# Patient Record
Sex: MALE | Race: WHITE | NOT HISPANIC OR LATINO | Employment: OTHER | URBAN - NONMETROPOLITAN AREA
[De-identification: names, ages, dates, MRNs, and addresses within clinical notes are randomized per-mention and may not be internally consistent; named-entity substitution may affect disease eponyms.]

---

## 2017-07-10 ENCOUNTER — APPOINTMENT (OUTPATIENT)
Dept: LAB | Facility: HOSPITAL | Age: 62
End: 2017-07-10

## 2017-07-10 ENCOUNTER — OFFICE VISIT (OUTPATIENT)
Dept: FAMILY MEDICINE CLINIC | Facility: CLINIC | Age: 62
End: 2017-07-10

## 2017-07-10 VITALS
DIASTOLIC BLOOD PRESSURE: 72 MMHG | WEIGHT: 184 LBS | SYSTOLIC BLOOD PRESSURE: 104 MMHG | HEIGHT: 66 IN | BODY MASS INDEX: 29.57 KG/M2

## 2017-07-10 DIAGNOSIS — R53.81 MALAISE: ICD-10-CM

## 2017-07-10 DIAGNOSIS — R73.9 HYPERGLYCEMIA: ICD-10-CM
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
## 2017-07-10 DIAGNOSIS — J18.9 PNEUMONIA DUE TO ORGANISM: ICD-10-CM

## 2017-07-10 DIAGNOSIS — Z00.00 GENERAL MEDICAL EXAM: Primary | ICD-10-CM

## 2017-07-10 DIAGNOSIS — R93.89 ABNORMAL CHEST X-RAY: Primary | ICD-10-CM

## 2017-07-10 DIAGNOSIS — R05.9 COUGH: ICD-10-CM

## 2017-07-10 DIAGNOSIS — Z12.11 SCREENING FOR COLON CANCER: ICD-10-CM

## 2017-07-10 LAB
25(OH)D3 SERPL-MCNC: 58.2 NG/ML (ref 30–100)
ALBUMIN SERPL-MCNC: 3.9 G/DL (ref 3.4–4.8)
ALBUMIN/GLOB SERPL: 1.6 G/DL (ref 1.1–1.8)
ALP SERPL-CCNC: 69 U/L (ref 38–126)
ALT SERPL W P-5'-P-CCNC: 61 U/L (ref 21–72)
ANION GAP SERPL CALCULATED.3IONS-SCNC: 9 MMOL/L (ref 5–15)
ARTICHOKE IGE QN: 96 MG/DL (ref 1–129)
AST SERPL-CCNC: 33 U/L (ref 17–59)
BASOPHILS # BLD AUTO: 0.02 10*3/MM3 (ref 0–0.2)
BASOPHILS NFR BLD AUTO: 0.3 % (ref 0–2)
BILIRUB SERPL-MCNC: 0.9 MG/DL (ref 0.2–1.3)
BUN BLD-MCNC: 12 MG/DL (ref 7–21)
BUN/CREAT SERPL: 14.1 (ref 7–25)
CALCIUM SPEC-SCNC: 8.9 MG/DL (ref 8.4–10.2)
CHLORIDE SERPL-SCNC: 106 MMOL/L (ref 95–110)
CHOLEST SERPL-MCNC: 153 MG/DL (ref 0–199)
CO2 SERPL-SCNC: 24 MMOL/L (ref 22–31)
CREAT BLD-MCNC: 0.85 MG/DL (ref 0.7–1.3)
DEPRECATED RDW RBC AUTO: 45.5 FL (ref 35.1–43.9)
EOSINOPHIL # BLD AUTO: 0.13 10*3/MM3 (ref 0–0.7)
EOSINOPHIL NFR BLD AUTO: 2 % (ref 0–7)
ERYTHROCYTE [DISTWIDTH] IN BLOOD BY AUTOMATED COUNT: 13.6 % (ref 11.5–14.5)
GFR SERPL CREATININE-BSD FRML MDRD: 92 ML/MIN/1.73 (ref 60–113)
GLOBULIN UR ELPH-MCNC: 2.5 GM/DL (ref 2.3–3.5)
GLUCOSE BLD-MCNC: 92 MG/DL (ref 60–100)
HBA1C MFR BLD: 5.51 % (ref 4–5.6)
HCT VFR BLD AUTO: 43.5 % (ref 39–49)
HDLC SERPL-MCNC: 54 MG/DL (ref 60–200)
HGB BLD-MCNC: 14.5 G/DL (ref 13.7–17.3)
IMM GRANULOCYTES # BLD: 0.01 10*3/MM3 (ref 0–0.02)
IMM GRANULOCYTES NFR BLD: 0.2 % (ref 0–0.5)
IRON 24H UR-MRATE: 27 MCG/DL (ref 49–181)
LDLC/HDLC SERPL: 1.61 {RATIO} (ref 0–3.55)
LYMPHOCYTES # BLD AUTO: 1.93 10*3/MM3 (ref 0.6–4.2)
LYMPHOCYTES NFR BLD AUTO: 30.2 % (ref 10–50)
MAGNESIUM SERPL-MCNC: 2.1 MG/DL (ref 1.6–2.3)
MCH RBC QN AUTO: 30.6 PG (ref 26.5–34)
MCHC RBC AUTO-ENTMCNC: 33.3 G/DL (ref 31.5–36.3)
MCV RBC AUTO: 91.8 FL (ref 80–98)
MONOCYTES # BLD AUTO: 0.64 10*3/MM3 (ref 0–0.9)
MONOCYTES NFR BLD AUTO: 10 % (ref 0–12)
NEUTROPHILS # BLD AUTO: 3.66 10*3/MM3 (ref 2–8.6)
NEUTROPHILS NFR BLD AUTO: 57.3 % (ref 37–80)
PLATELET # BLD AUTO: 165 10*3/MM3 (ref 150–450)
PMV BLD AUTO: 10.7 FL (ref 8–12)
POTASSIUM BLD-SCNC: 4.1 MMOL/L (ref 3.5–5.1)
PROT SERPL-MCNC: 6.4 G/DL (ref 6.3–8.6)
RBC # BLD AUTO: 4.74 10*6/MM3 (ref 4.37–5.74)
SODIUM BLD-SCNC: 139 MMOL/L (ref 137–145)
TRIGL SERPL-MCNC: 59 MG/DL (ref 20–199)
TSH SERPL DL<=0.05 MIU/L-ACNC: 1.7 MIU/ML (ref 0.46–4.68)
VIT B12 BLD-MCNC: 589 PG/ML (ref 239–931)
WBC NRBC COR # BLD: 6.39 10*3/MM3 (ref 3.2–9.8)

## 2017-07-10 PROCEDURE — 93010 ELECTROCARDIOGRAM REPORT: CPT | Performed by: INTERNAL MEDICINE

## 2017-07-10 PROCEDURE — 93005 ELECTROCARDIOGRAM TRACING: CPT | Performed by: NURSE PRACTITIONER

## 2017-07-10 PROCEDURE — 82607 VITAMIN B-12: CPT | Performed by: NURSE PRACTITIONER

## 2017-07-10 PROCEDURE — 80061 LIPID PANEL: CPT | Performed by: NURSE PRACTITIONER

## 2017-07-10 PROCEDURE — 83036 HEMOGLOBIN GLYCOSYLATED A1C: CPT | Performed by: NURSE PRACTITIONER

## 2017-07-10 PROCEDURE — 84443 ASSAY THYROID STIM HORMONE: CPT | Performed by: NURSE PRACTITIONER

## 2017-07-10 PROCEDURE — 87340 HEPATITIS B SURFACE AG IA: CPT | Performed by: NURSE PRACTITIONER

## 2017-07-10 PROCEDURE — 83735 ASSAY OF MAGNESIUM: CPT | Performed by: NURSE PRACTITIONER

## 2017-07-10 PROCEDURE — 80053 COMPREHEN METABOLIC PANEL: CPT | Performed by: NURSE PRACTITIONER

## 2017-07-10 PROCEDURE — 36415 COLL VENOUS BLD VENIPUNCTURE: CPT | Performed by: NURSE PRACTITIONER

## 2017-07-10 PROCEDURE — 82306 VITAMIN D 25 HYDROXY: CPT | Performed by: NURSE PRACTITIONER

## 2017-07-10 PROCEDURE — 86705 HEP B CORE ANTIBODY IGM: CPT | Performed by: NURSE PRACTITIONER

## 2017-07-10 PROCEDURE — 83540 ASSAY OF IRON: CPT | Performed by: NURSE PRACTITIONER

## 2017-07-10 PROCEDURE — 85025 COMPLETE CBC W/AUTO DIFF WBC: CPT | Performed by: NURSE PRACTITIONER

## 2017-07-10 PROCEDURE — 99214 OFFICE O/P EST MOD 30 MIN: CPT | Performed by: NURSE PRACTITIONER

## 2017-07-10 PROCEDURE — 86709 HEPATITIS A IGM ANTIBODY: CPT | Performed by: NURSE PRACTITIONER

## 2017-07-10 RX ORDER — CLARITHROMYCIN 500 MG/1
500 TABLET, COATED ORAL 2 TIMES DAILY
Qty: 20 TABLET | Refills: 0 | Status: SHIPPED | OUTPATIENT
Start: 2017-07-10 | End: 2018-04-27

## 2017-07-10 NOTE — PROGRESS NOTES
Chief Complaint   Patient presents with   • Establish Care     has been feeling bad the past few weeks     Subjective   Pavan Mccauley is a 62 y.o. male.     Fatigue   This is a recurrent problem. The current episode started in the past 7 days. The problem occurs constantly. The problem has been gradually worsening. Associated symptoms include arthralgias, coughing, fatigue and myalgias. Pertinent negatives include no abdominal pain, anorexia, change in bowel habit, chest pain, chills, congestion, diaphoresis, fever, headaches, joint swelling, nausea, neck pain, numbness, rash, sore throat, swollen glands, urinary symptoms, vertigo, visual change, vomiting or weakness. Nothing aggravates the symptoms. He has tried rest for the symptoms. The treatment provided mild relief.   Cough   This is a recurrent problem. The current episode started more than 1 month ago. The problem has been gradually worsening. The problem occurs every few hours. The cough is non-productive. Associated symptoms include myalgias, nasal congestion, postnasal drip, rhinorrhea and shortness of breath. Pertinent negatives include no chest pain, chills, ear congestion, ear pain, eye redness, fever, headaches, heartburn, hemoptysis, rash, sore throat, sweats, weight loss or wheezing. Nothing aggravates the symptoms. He has tried nothing for the symptoms. There is no history of asthma, bronchiectasis, bronchitis, COPD, emphysema, environmental allergies or pneumonia.        The following portions of the patient's history were reviewed and updated as appropriate: allergies, current medications, past social history and problem list.    Review of Systems   Constitutional: Positive for activity change, appetite change and fatigue. Negative for chills, diaphoresis, fever and weight loss.   HENT: Positive for postnasal drip and rhinorrhea. Negative for congestion, ear pain and sore throat.    Eyes: Negative.  Negative for photophobia, pain, discharge,  "redness, itching and visual disturbance.   Respiratory: Positive for cough and shortness of breath. Negative for apnea, hemoptysis, choking, chest tightness, wheezing and stridor.    Cardiovascular: Negative.  Negative for chest pain, palpitations and leg swelling.   Gastrointestinal: Negative.  Negative for abdominal distention, abdominal pain, anal bleeding, anorexia, blood in stool, change in bowel habit, heartburn, nausea and vomiting.   Endocrine: Negative.  Negative for cold intolerance, heat intolerance, polydipsia, polyphagia and polyuria.   Genitourinary: Negative.    Musculoskeletal: Positive for arthralgias and myalgias. Negative for back pain, gait problem, joint swelling and neck pain.   Skin: Positive for color change. Negative for pallor and rash.        Recent tick bite-right leg stayed on his leg for 2 days    Allergic/Immunologic: Negative.  Negative for environmental allergies, food allergies and immunocompromised state.   Neurological: Negative.  Negative for dizziness, vertigo, tremors, seizures, syncope, facial asymmetry, speech difficulty, weakness, light-headedness, numbness and headaches.   Hematological: Negative.  Negative for adenopathy. Does not bruise/bleed easily.   Psychiatric/Behavioral: Negative.  Negative for agitation, behavioral problems, confusion, decreased concentration, dysphoric mood, hallucinations, self-injury, sleep disturbance and suicidal ideas. The patient is not nervous/anxious and is not hyperactive.        Objective   /72  Ht 66\" (167.6 cm)  Wt 184 lb (83.5 kg)  BMI 29.7 kg/m2  Physical Exam   Constitutional: He is oriented to person, place, and time. He appears well-developed and well-nourished.   HENT:   Head: Normocephalic and atraumatic.   Mouth/Throat: No oropharyngeal exudate.   Eyes: Conjunctivae and EOM are normal. Pupils are equal, round, and reactive to light. Right eye exhibits no discharge. Left eye exhibits no discharge. No scleral icterus. "   Neck: Normal range of motion. Neck supple. No JVD present. No tracheal deviation present. No thyromegaly present.   Cardiovascular: Normal rate, regular rhythm and normal heart sounds.  Exam reveals no gallop and no friction rub.    No murmur heard.  ekg sinus arrhythmia    Pulmonary/Chest: Effort normal and breath sounds normal. No stridor. No respiratory distress. He has no wheezes. He has no rales. He exhibits no tenderness.   Abdominal: Soft. Bowel sounds are normal. He exhibits no distension and no mass. There is no tenderness. There is no rebound and no guarding. No hernia.   Genitourinary: Rectum normal and penis normal.   Musculoskeletal: Normal range of motion. He exhibits no edema, tenderness or deformity.   Lymphadenopathy:     He has no cervical adenopathy.   Neurological: He is alert and oriented to person, place, and time. He has normal reflexes. He displays normal reflexes. No cranial nerve deficit. He exhibits normal muscle tone. Coordination normal.   Skin: Skin is warm and dry. Rash noted. No erythema. No pallor.   Previous area of tick bite stayed on his leg for 2 days    Psychiatric: He has a normal mood and affect.   Nursing note and vitals reviewed.      Assessment/Plan   Problem List Items Addressed This Visit        Respiratory    Cough    Relevant Orders    XR Chest 2 View (Completed)    Hepatitis Diagnostic Profile    Pneumonia due to organism       Musculoskeletal and Integument    Tick bite    Relevant Orders    Prashant [place] Spotted Fever, IgM    Prashant [place] Spotted Fever, IgG    Lyme Disease, PCR       Other    General medical exam - Primary    Relevant Orders    CBC Auto Differential (Completed)    Hemoglobin A1c (Completed)    Comprehensive Metabolic Panel (Completed)    Iron (Completed)    Lipid Panel (Completed)    Magnesium (Completed)    TSH (Completed)    Vitamin B12 (Completed)    Vitamin D 25 Hydroxy (Completed)    XR Chest 2 View (Completed)    Hepatitis Diagnostic Profile     Malaise    Relevant Orders    CBC Auto Differential (Completed)    Hemoglobin A1c (Completed)    Comprehensive Metabolic Panel (Completed)    Iron (Completed)    Lipid Panel (Completed)    Magnesium (Completed)    TSH (Completed)    Vitamin B12 (Completed)    Vitamin D 25 Hydroxy (Completed)    Hepatitis Diagnostic Profile    ECG 12 Lead    Hyperglycemia    Relevant Orders    CBC Auto Differential (Completed)    Hemoglobin A1c (Completed)    Comprehensive Metabolic Panel (Completed)    Iron (Completed)    Lipid Panel (Completed)    Magnesium (Completed)    TSH (Completed)    Vitamin B12 (Completed)    Vitamin D 25 Hydroxy (Completed)    Hepatitis Diagnostic Profile    Screening for colon cancer    Relevant Orders    Ambulatory Referral to Gastroenterology         No orders of the defined types were placed in this encounter.          It's not just what you eat, but when you eat  Eat breakfast, and eat smaller meals throughout the day. A healthy breakfast can jumpstart your metabolism, while eating small, healthy meals (rather than the standard three large meals) keeps your energy up.   Avoid eating at night. Try to eat dinner earlier and fast for 14-16 hours until breakfast the next morning. Studies suggest that eating only when you’re most active and giving your digestive system a long break each day may help to regulate weight.     Ekg, labs and xray ordered-chest xray pneumonia-repeat xray in 2 weeks for resolution of symptoms

## 2017-07-12 LAB
HAV IGM SERPL QL IA: NEGATIVE
HBV CORE IGM SERPL QL IA: NEGATIVE
HBV SURFACE AG SERPL QL IA: NEGATIVE

## 2017-07-13 LAB
R RICKETTSI IGG SER QL IA: ABNORMAL
R RICKETTSI IGG SER QL IA: POSITIVE
R RICKETTSI IGM TITR SER: 0.75 INDEX (ref 0–0.89)

## 2017-07-13 RX ORDER — DOXYCYCLINE 100 MG/1
100 CAPSULE ORAL 2 TIMES DAILY
Qty: 28 CAPSULE | Refills: 0 | Status: SHIPPED | OUTPATIENT
Start: 2017-07-13 | End: 2018-04-27

## 2018-04-19 ENCOUNTER — HOSPITAL ENCOUNTER (EMERGENCY)
Facility: HOSPITAL | Age: 63
Discharge: HOME | End: 2018-04-19
Attending: EMERGENCY MEDICINE | Admitting: FAMILY MEDICINE

## 2018-04-19 ENCOUNTER — APPOINTMENT (OUTPATIENT)
Dept: GENERAL RADIOLOGY | Facility: HOSPITAL | Age: 63
End: 2018-04-19

## 2018-04-19 ENCOUNTER — APPOINTMENT (OUTPATIENT)
Dept: MRI IMAGING | Facility: HOSPITAL | Age: 63
End: 2018-04-19

## 2018-04-19 VITALS
WEIGHT: 182 LBS | OXYGEN SATURATION: 95 % | RESPIRATION RATE: 18 BRPM | DIASTOLIC BLOOD PRESSURE: 88 MMHG | SYSTOLIC BLOOD PRESSURE: 131 MMHG | HEART RATE: 78 BPM | BODY MASS INDEX: 28.56 KG/M2 | TEMPERATURE: 97.4 F | HEIGHT: 67 IN

## 2018-04-19 DIAGNOSIS — R42 VERTIGO: Primary | ICD-10-CM

## 2018-04-19 LAB
ALBUMIN SERPL-MCNC: 4.1 G/DL (ref 3.4–4.8)
ALBUMIN/GLOB SERPL: 1.6 G/DL (ref 1.1–1.8)
ALP SERPL-CCNC: 67 U/L (ref 38–126)
ALT SERPL W P-5'-P-CCNC: 34 U/L (ref 21–72)
ANION GAP SERPL CALCULATED.3IONS-SCNC: 13 MMOL/L (ref 5–15)
APTT PPP: 22.3 SECONDS (ref 20–40.3)
AST SERPL-CCNC: 18 U/L (ref 17–59)
BASOPHILS # BLD AUTO: 0.03 10*3/MM3 (ref 0–0.2)
BASOPHILS NFR BLD AUTO: 0.5 % (ref 0–2)
BILIRUB SERPL-MCNC: 0.7 MG/DL (ref 0.2–1.3)
BILIRUB UR QL STRIP: NEGATIVE
BUN BLD-MCNC: 13 MG/DL (ref 7–21)
BUN/CREAT SERPL: 17.8 (ref 7–25)
CALCIUM SPEC-SCNC: 9.1 MG/DL (ref 8.4–10.2)
CHLORIDE SERPL-SCNC: 105 MMOL/L (ref 95–110)
CK MB SERPL-CCNC: 1.94 NG/ML (ref 0–5)
CK SERPL-CCNC: 116 U/L (ref 55–170)
CLARITY UR: CLEAR
CO2 SERPL-SCNC: 24 MMOL/L (ref 22–31)
COLOR UR: YELLOW
CREAT BLD-MCNC: 0.73 MG/DL (ref 0.7–1.3)
D-DIMER, QUANTITATIVE (MAD,POW, STR): 328 NG/ML (FEU) (ref 0–470)
DEPRECATED RDW RBC AUTO: 44.9 FL (ref 35.1–43.9)
EOSINOPHIL # BLD AUTO: 0.13 10*3/MM3 (ref 0–0.7)
EOSINOPHIL NFR BLD AUTO: 2 % (ref 0–7)
ERYTHROCYTE [DISTWIDTH] IN BLOOD BY AUTOMATED COUNT: 13.9 % (ref 11.5–14.5)
GFR SERPL CREATININE-BSD FRML MDRD: 110 ML/MIN/1.73 (ref 49–113)
GLOBULIN UR ELPH-MCNC: 2.6 GM/DL (ref 2.3–3.5)
GLUCOSE BLD-MCNC: 108 MG/DL (ref 60–100)
GLUCOSE BLDC GLUCOMTR-MCNC: 104 MG/DL (ref 70–130)
GLUCOSE UR STRIP-MCNC: NEGATIVE MG/DL
HCT VFR BLD AUTO: 46.1 % (ref 39–49)
HGB BLD-MCNC: 15.9 G/DL (ref 13.7–17.3)
HGB UR QL STRIP.AUTO: NEGATIVE
HOLD SPECIMEN: NORMAL
IMM GRANULOCYTES # BLD: 0.01 10*3/MM3 (ref 0–0.02)
IMM GRANULOCYTES NFR BLD: 0.2 % (ref 0–0.5)
INR PPP: 1.01 (ref 0.8–1.2)
KETONES UR QL STRIP: ABNORMAL
LEUKOCYTE ESTERASE UR QL STRIP.AUTO: NEGATIVE
LYMPHOCYTES # BLD AUTO: 1.97 10*3/MM3 (ref 0.6–4.2)
LYMPHOCYTES NFR BLD AUTO: 30.8 % (ref 10–50)
MCH RBC QN AUTO: 30.6 PG (ref 26.5–34)
MCHC RBC AUTO-ENTMCNC: 34.5 G/DL (ref 31.5–36.3)
MCV RBC AUTO: 88.7 FL (ref 80–98)
MONOCYTES # BLD AUTO: 0.56 10*3/MM3 (ref 0–0.9)
MONOCYTES NFR BLD AUTO: 8.8 % (ref 0–12)
NEUTROPHILS # BLD AUTO: 3.7 10*3/MM3 (ref 2–8.6)
NEUTROPHILS NFR BLD AUTO: 57.7 % (ref 37–80)
NITRITE UR QL STRIP: NEGATIVE
PH UR STRIP.AUTO: 5.5 [PH] (ref 5–9)
PLATELET # BLD AUTO: 167 10*3/MM3 (ref 150–450)
PMV BLD AUTO: 10 FL (ref 8–12)
POTASSIUM BLD-SCNC: 3.5 MMOL/L (ref 3.5–5.1)
PROT SERPL-MCNC: 6.7 G/DL (ref 6.3–8.6)
PROT UR QL STRIP: NEGATIVE
PROTHROMBIN TIME: 13.1 SECONDS (ref 11.1–15.3)
RBC # BLD AUTO: 5.2 10*6/MM3 (ref 4.37–5.74)
SODIUM BLD-SCNC: 142 MMOL/L (ref 137–145)
SP GR UR STRIP: 1.02 (ref 1–1.03)
TROPONIN I SERPL-MCNC: <0.012 NG/ML
UROBILINOGEN UR QL STRIP: ABNORMAL
WBC NRBC COR # BLD: 6.4 10*3/MM3 (ref 3.2–9.8)

## 2018-04-19 PROCEDURE — 96375 TX/PRO/DX INJ NEW DRUG ADDON: CPT

## 2018-04-19 PROCEDURE — 82553 CREATINE MB FRACTION: CPT | Performed by: EMERGENCY MEDICINE

## 2018-04-19 PROCEDURE — 96361 HYDRATE IV INFUSION ADD-ON: CPT

## 2018-04-19 PROCEDURE — 85379 FIBRIN DEGRADATION QUANT: CPT | Performed by: EMERGENCY MEDICINE

## 2018-04-19 PROCEDURE — 93005 ELECTROCARDIOGRAM TRACING: CPT | Performed by: EMERGENCY MEDICINE

## 2018-04-19 PROCEDURE — 85025 COMPLETE CBC W/AUTO DIFF WBC: CPT | Performed by: EMERGENCY MEDICINE

## 2018-04-19 PROCEDURE — 82962 GLUCOSE BLOOD TEST: CPT

## 2018-04-19 PROCEDURE — 25010000002 LORAZEPAM PER 2 MG: Performed by: EMERGENCY MEDICINE

## 2018-04-19 PROCEDURE — 99284 EMERGENCY DEPT VISIT MOD MDM: CPT

## 2018-04-19 PROCEDURE — 71045 X-RAY EXAM CHEST 1 VIEW: CPT

## 2018-04-19 PROCEDURE — 85610 PROTHROMBIN TIME: CPT | Performed by: EMERGENCY MEDICINE

## 2018-04-19 PROCEDURE — 81003 URINALYSIS AUTO W/O SCOPE: CPT | Performed by: EMERGENCY MEDICINE

## 2018-04-19 PROCEDURE — 82550 ASSAY OF CK (CPK): CPT | Performed by: EMERGENCY MEDICINE

## 2018-04-19 PROCEDURE — 70551 MRI BRAIN STEM W/O DYE: CPT

## 2018-04-19 PROCEDURE — 85730 THROMBOPLASTIN TIME PARTIAL: CPT | Performed by: EMERGENCY MEDICINE

## 2018-04-19 PROCEDURE — 70544 MR ANGIOGRAPHY HEAD W/O DYE: CPT

## 2018-04-19 PROCEDURE — 25010000002 ONDANSETRON PER 1 MG: Performed by: EMERGENCY MEDICINE

## 2018-04-19 PROCEDURE — 84484 ASSAY OF TROPONIN QUANT: CPT | Performed by: EMERGENCY MEDICINE

## 2018-04-19 PROCEDURE — 93010 ELECTROCARDIOGRAM REPORT: CPT | Performed by: INTERNAL MEDICINE

## 2018-04-19 PROCEDURE — 80053 COMPREHEN METABOLIC PANEL: CPT | Performed by: EMERGENCY MEDICINE

## 2018-04-19 PROCEDURE — 96374 THER/PROPH/DIAG INJ IV PUSH: CPT

## 2018-04-19 RX ORDER — SODIUM CHLORIDE 0.9 % (FLUSH) 0.9 %
10 SYRINGE (ML) INJECTION AS NEEDED
Status: DISCONTINUED | OUTPATIENT
Start: 2018-04-19 | End: 2018-04-19 | Stop reason: HOSPADM

## 2018-04-19 RX ORDER — LORAZEPAM 2 MG/ML
1 INJECTION INTRAMUSCULAR ONCE
Status: COMPLETED | OUTPATIENT
Start: 2018-04-19 | End: 2018-04-19

## 2018-04-19 RX ORDER — SODIUM CHLORIDE 9 MG/ML
125 INJECTION, SOLUTION INTRAVENOUS CONTINUOUS
Status: DISCONTINUED | OUTPATIENT
Start: 2018-04-19 | End: 2018-04-19 | Stop reason: HOSPADM

## 2018-04-19 RX ORDER — ONDANSETRON 2 MG/ML
4 INJECTION INTRAMUSCULAR; INTRAVENOUS ONCE
Status: COMPLETED | OUTPATIENT
Start: 2018-04-19 | End: 2018-04-19

## 2018-04-19 RX ORDER — MECLIZINE HYDROCHLORIDE 25 MG/1
25 TABLET ORAL 3 TIMES DAILY PRN
Qty: 15 TABLET | Refills: 0 | Status: SHIPPED | OUTPATIENT
Start: 2018-04-19 | End: 2019-12-02

## 2018-04-19 RX ADMIN — LORAZEPAM 1 MG: 2 INJECTION INTRAMUSCULAR; INTRAVENOUS at 15:09

## 2018-04-19 RX ADMIN — ONDANSETRON 4 MG: 2 INJECTION INTRAMUSCULAR; INTRAVENOUS at 15:08

## 2018-04-19 RX ADMIN — SODIUM CHLORIDE 500 ML: 9 INJECTION, SOLUTION INTRAVENOUS at 15:08

## 2018-04-19 NOTE — ED PROVIDER NOTES
Subjective   63yo male presents ED c/o 1d hx onset dizziness/vertigo, associated nausea, exacerbated head movement/partial relief head immobility.  ROS neg headache/fever/chills/trauma/paresthesia/motor weakness/diplopia/dysarthria/dysphagia/syncope/ chest pain/otalgia/tinnitus/hearing loss.        Dizziness   Quality:  Head spinning and imbalance  Severity:  Moderate  Onset quality:  Sudden  Duration:  1 day  Timing:  Constant  Progression:  Waxing and waning  Context: head movement    Relieved by:  Nothing  Worsened by:  Turning head  Ineffective treatments:  None tried  Associated symptoms: nausea    Associated symptoms: no headaches, no vomiting and no weakness        Review of Systems   Constitutional: Negative.    HENT: Negative.    Eyes: Negative.    Respiratory: Negative.    Cardiovascular: Negative.    Gastrointestinal: Positive for nausea. Negative for abdominal pain and vomiting.   Genitourinary: Negative.    Musculoskeletal: Negative.    Neurological: Positive for dizziness. Negative for tremors, syncope, facial asymmetry, speech difficulty, weakness, light-headedness, numbness and headaches.       Past Medical History:   Diagnosis Date   • Bronchopneumonia    • Chest pain    • Corneal foreign body     both eyes   • Neck pain    • Perforation of left tympanic membrane     history of   • Prashant [place] spotted fever    • Wheezing        No Known Allergies    Past Surgical History:   Procedure Laterality Date   • EYE FOREIGN BODY REMOVAL  07/29/2013    REMOVE FOREIGN BODY CORNEA W/SLIT LAMP 96201 (1)     • INJECTION OF MEDICATION  07/12/2011    Kenalog (1)      • SPINE SURGERY  03/12/1991    Exploration of the previous C5-6 fusion with refusion, exploration of the C5 nerve root, left side   • TYMPANOPLASTY Left 05/16/1972    Perforation of the left tympanic membrane       History reviewed. No pertinent family history.    Social History     Social History   • Marital status:      Social History Main  Topics   • Smoking status: Current Every Day Smoker   • Smokeless tobacco: Never Used   • Alcohol use Yes      Comment: nominal   • Drug use: Unknown     Other Topics Concern   • Not on file           Objective   Physical Exam   Constitutional: He appears well-developed and well-nourished.   HENT:   Head: Normocephalic and atraumatic.   Right Ear: External ear normal.   Left Ear: External ear normal.   Mouth/Throat: Oropharynx is clear and moist.   Eyes: EOM are normal. Pupils are equal, round, and reactive to light.   Neck: Normal range of motion. Neck supple. No JVD present. No tracheal deviation present.   Cardiovascular: Normal rate, regular rhythm, normal heart sounds and intact distal pulses.  Exam reveals no gallop and no friction rub.    No murmur heard.  Pulmonary/Chest: Effort normal and breath sounds normal. He has no wheezes. He has no rales.   Abdominal: Soft. Bowel sounds are normal. He exhibits no mass. There is no tenderness. There is no rebound and no guarding.   Musculoskeletal: Normal range of motion.   Lymphadenopathy:     He has no cervical adenopathy.   Neurological: He has normal strength. No cranial nerve deficit or sensory deficit. He displays a negative Romberg sign. Coordination normal. GCS eye subscore is 4. GCS verbal subscore is 5. GCS motor subscore is 6.   HINTS (+)  Catalina Hallpike (+)   Skin: Skin is warm and dry.   Nursing note and vitals reviewed.      ECG 12 Lead    Date/Time: 4/19/2018 3:02 PM  Performed by: ЕЛЕНА DOUGLASS  Authorized by: ЕЛЕНА DOUGLASS   Interpreted by physician  Rhythm: sinus rhythm  Rate: bradycardic  BPM: 59  QRS axis: normal  Conduction: conduction normal  ST Segments: ST segments normal  T Waves: T waves normal  Other: no other findings  Clinical impression: normal ECG                 ED Course  ED Course   Comment By Time   Pt evaluated by teleneurology SOC. See consult note. Recommends MRI brain-pending. Елена Douglass MD 04/19 3890   Checkout Dr. Fraire  1900hrs. Pending MRI Brain report. Avery Ibrahim MD 04/19 1844      Labs Reviewed   COMPREHENSIVE METABOLIC PANEL - Abnormal; Notable for the following:        Result Value    Glucose 108 (*)     All other components within normal limits   URINALYSIS W/ CULTURE IF INDICATED - Abnormal; Notable for the following:     Ketones, UA Trace (*)     All other components within normal limits    Narrative:     Urine microscopic not indicated.   CBC WITH AUTO DIFFERENTIAL - Abnormal; Notable for the following:     RDW-SD 44.9 (*)     All other components within normal limits   PROTIME-INR - Normal    Narrative:     Therapeutic range for most indications is 2.0-3.0 INR,  or 2.5-3.5 for mechanical heart valves.   APTT - Normal    Narrative:     The recommended Heparin therapeutic range is 68-97 seconds.   TROPONIN (IN-HOUSE) - Normal   CK - Normal   CK MB - Normal   D-DIMER, QUANTITATIVE - Normal    Narrative:     Dimer values <500 ng/ml FEU are FDA approved as aid in diagnosis of deep venous thrombosis and pulmonary embolism.  This test should not be used in an exclusion strategy with pretest probability alone.    A recent guideline regarding diagnosis for pulmonary thomboembolism recommends an adjusted exclusion criterion of age x 10 ng/ml FEU for patients >50 years of age (Emi Intern Med 2015; 163: 701-711).   POCT GLUCOSE FINGERSTICK - Normal   TROPONIN (IN-HOUSE)   TROPONIN (IN-HOUSE)   POCT GLUCOSE FINGERSTICK   CBC AND DIFFERENTIAL    Narrative:     The following orders were created for panel order CBC & Differential.  Procedure                               Abnormality         Status                     ---------                               -----------         ------                     CBC Auto Differential[[id number]]        Abnormal            Final result                 Please view results for these tests on the individual orders.   EXTRA TUBES    Narrative:     The following orders were created for panel order  Extra Tubes.  Procedure                               Abnormality         Status                     ---------                               -----------         ------                     [facility] - [facility][[id number]]                                   Final result                 Please view results for these tests on the individual orders.   GOLD TOP - [facility]     No results found.              [facility]    Final diagnoses:   Vertigo            Avery Ibrahim MD  04/19/18 [id number]

## 2018-04-20 ENCOUNTER — TELEPHONE (OUTPATIENT)
Dept: FAMILY MEDICINE CLINIC | Facility: CLINIC | Age: 63
End: 2018-04-20

## 2018-04-20 NOTE — ED PROVIDER NOTES
Subjective   History of Present Illness    Review of Systems    Past Medical History:   Diagnosis Date   • Bronchopneumonia    • Chest pain    • Corneal foreign body     both eyes   • Neck pain    • Perforation of left tympanic membrane     history of   • Prashant [place] spotted fever    • Wheezing        No Known Allergies    Past Surgical History:   Procedure Laterality Date   • EYE FOREIGN BODY REMOVAL  07/29/2013    REMOVE FOREIGN BODY CORNEA W/SLIT LAMP 30853 (1)     • INJECTION OF MEDICATION  07/12/2011    Kenalog (1)      • SPINE SURGERY  03/12/1991    Exploration of the previous C5-6 fusion with refusion, exploration of the C5 nerve root, left side   • TYMPANOPLASTY Left 05/16/1972    Perforation of the left tympanic membrane       History reviewed. No pertinent family history.    Social History     Social History   • Marital status:      Social History Main Topics   • Smoking status: Current Every Day Smoker   • Smokeless tobacco: Never Used   • Alcohol use Yes      Comment: nominal   • Drug use: Unknown     Other Topics Concern   • Not on file           Objective   Physical Exam    Procedures         ED Course  ED Course   Comment By Time   Pt evaluated by teleneurology SOC. See consult note. Recommends MRI brain-pending. Avery Ibrahim MD 04/19 1635   Checkout Dr. Fraire 1900hrs. Pending MRI Brain report. Avery Ibrahim MD 04/19 1844      Patient was revaluated. The dizziness has resolved. Patient has been waiting for the MRI result for a long time. He said that he he can wait anymore. He understand the risk involved.  I called the radiologist several times and they said that their pac is down.             MDM    Final diagnoses:   Vertigo            Kimani Fraire MD  04/19/18 2022

## 2018-04-20 NOTE — TELEPHONE ENCOUNTER
Patient was seen in ER for dizziness and he is wondering if it's ok to take the Biaxin that you gave him back the last time he saw you for RMSF. Is it ok for him to take that along with the meclizine.

## 2018-04-25 ENCOUNTER — HOSPITAL ENCOUNTER (EMERGENCY)
Facility: HOSPITAL | Age: 63
Discharge: HOME | End: 2018-04-25
Attending: EMERGENCY MEDICINE | Admitting: EMERGENCY MEDICINE

## 2018-04-25 ENCOUNTER — APPOINTMENT (OUTPATIENT)
Dept: CT IMAGING | Facility: HOSPITAL | Age: 63
End: 2018-04-25

## 2018-04-25 VITALS
BODY MASS INDEX: 28.56 KG/M2 | OXYGEN SATURATION: 94 % | RESPIRATION RATE: 18 BRPM | TEMPERATURE: 98.4 F | DIASTOLIC BLOOD PRESSURE: 90 MMHG | HEART RATE: 78 BPM | SYSTOLIC BLOOD PRESSURE: 150 MMHG | HEIGHT: 67 IN | WEIGHT: 182 LBS

## 2018-04-25 DIAGNOSIS — I67.1 ANEURYSM OF INTERNAL CAROTID ARTERY: Primary | ICD-10-CM

## 2018-04-25 LAB
HOLD SPECIMEN: NORMAL
HOLD SPECIMEN: NORMAL
WHOLE BLOOD HOLD SPECIMEN: NORMAL
WHOLE BLOOD HOLD SPECIMEN: NORMAL

## 2018-04-25 PROCEDURE — 70496 CT ANGIOGRAPHY HEAD: CPT

## 2018-04-25 PROCEDURE — 0 IOPAMIDOL PER 1 ML: Performed by: EMERGENCY MEDICINE

## 2018-04-25 PROCEDURE — 96360 HYDRATION IV INFUSION INIT: CPT

## 2018-04-25 PROCEDURE — 99283 EMERGENCY DEPT VISIT LOW MDM: CPT

## 2018-04-25 RX ORDER — SODIUM CHLORIDE 9 MG/ML
125 INJECTION, SOLUTION INTRAVENOUS CONTINUOUS
Status: DISCONTINUED | OUTPATIENT
Start: 2018-04-25 | End: 2018-04-25 | Stop reason: HOSPADM

## 2018-04-25 RX ORDER — SODIUM CHLORIDE 0.9 % (FLUSH) 0.9 %
10 SYRINGE (ML) INJECTION AS NEEDED
Status: DISCONTINUED | OUTPATIENT
Start: 2018-04-25 | End: 2018-04-25 | Stop reason: HOSPADM

## 2018-04-25 RX ADMIN — IOPAMIDOL 95 ML: 755 INJECTION, SOLUTION INTRAVENOUS at 18:55

## 2018-04-25 RX ADMIN — SODIUM CHLORIDE 125 ML/HR: 9 INJECTION, SOLUTION INTRAVENOUS at 19:06

## 2018-04-25 NOTE — ED PROVIDER NOTES
Subjective   63yo male presents ED c/o 6d hx intermittent dizziness/vertigo which has been improving.  Pt seen [facility] ED 04.19.2018 for same with teleneurology consultation and MRI/MRA brain obtained with initial interpretation as negative study for acute abnormality or aneurysm.  [facility] ED notified today by Dr. 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  Pt presents to ED for evaluation et transfer for [facility] consultation.        History provided by:  Patient  Dizziness   Quality:  Vertigo  Severity:  Moderate  Timing:  Intermittent  Progression:  Improving  Chronicity:  New  Associated symptoms: no headaches and no weakness        Review of Systems   Constitutional: Negative.    HENT: Negative.    Eyes: Negative.    Respiratory: Negative.    Cardiovascular: Negative.    Gastrointestinal: Negative.    Neurological: Positive for dizziness. Negative for seizures, syncope, facial asymmetry, speech difficulty, weakness, light-headedness and headaches.   All other systems reviewed and are negative.      Past Medical History:   Diagnosis Date   • Bronchopneumonia    • Chest pain    • Corneal foreign body     both eyes   • Neck pain    • Perforation of left tympanic membrane     history of   • Prashant [place] spotted fever    • Wheezing        No Known Allergies    Past Surgical History:   Procedure Laterality Date   • EYE FOREIGN BODY REMOVAL  07/29/2013    REMOVE FOREIGN BODY CORNEA W/SLIT LAMP 78986 (1)     • INJECTION OF MEDICATION  07/12/2011    Kenalog (1)      • SPINE SURGERY  03/12/1991    Exploration of the previous C5-6 fusion with refusion, exploration of the C5 nerve root, left side   • TYMPANOPLASTY Left 05/16/1972    Perforation of the left tympanic membrane       History reviewed. No pertinent family history.    Social History     Social History   • Marital status:      Social History Main Topics   • Smoking  status: Current Every Day Smoker     Types: Cigarettes   • Smokeless tobacco: Never Used   • Alcohol use Yes      Comment: nominal   • Drug use: No   • Sexual activity: Defer     Other Topics Concern   • Not on file           Objective   Physical Exam   Constitutional: He is oriented to person, place, and time. He appears well-developed and well-nourished.   HENT:   Head: Normocephalic and atraumatic.   Right Ear: External ear normal.   Left Ear: External ear normal.   Nose: Nose normal.   Mouth/Throat: Oropharynx is clear and moist.   Eyes: EOM are normal. Pupils are equal, round, and reactive to light.   Neck: Normal range of motion. Neck supple. No JVD present. No tracheal deviation present.   Cardiovascular: Normal rate, regular rhythm, normal heart sounds and intact distal pulses.  Exam reveals no gallop and no friction rub.    No murmur heard.  Pulmonary/Chest: Effort normal and breath sounds normal. He has no wheezes. He has no rales. He exhibits no tenderness.   Abdominal: Soft. Bowel sounds are normal. He exhibits no mass. There is no tenderness. There is no rebound and no guarding.   Musculoskeletal: He exhibits no edema.   Lymphadenopathy:     He has no cervical adenopathy.   Neurological: He is alert and oriented to person, place, and time. He has normal strength. No cranial nerve deficit or sensory deficit. Coordination normal. GCS eye subscore is 4. GCS verbal subscore is 5. GCS motor subscore is 6.   Skin: Skin is warm and dry.   Nursing note and vitals reviewed.      Procedures         ED Course  ED Course   Comment By Time   Will arrange interfacility transfer secondary to no FROD on call at this facility. Avery Ibrahim MD 04/25 0533   [facility], Dr. Amaya.  Pt not accepted for transfer. Avery Ibrahim MD 04/25 1822   D/w Dr. Hays ([facility]). Requests noncontrast CT head/CTA head. Advised if no hemorrhage noted, patient stable for outpatient [facility] followup with   [facility] [phone]. Avery Ibrahim MD 04/25 1835      Mri Angiogram Head Without Contrast    Result Date: 4/20/2018  Narrative: PROCEDURE: MR BRAIN WITHOUT IV CONTRAST, MR BRAIN ANGIOGRAPHY WITHOUT IV CONTRAST COMPARISON: No comparison HISTORY: VERTIGO TECHNIQUE: Multisequence multiplanar MR imaging of the brain is obtained agnetic resonance angiography (MRA) of the [place] was performed using 3-dimensional time of flight sequences without contrast. Computer generated 3-D reconstructions/MIPS were performed CONTRAST: None MRI findings: There is no infarct, hemorrhage, hydrocephalus or intracranial mass. There is no vasogenic edema or mass effect. Foci of increased T2 signal are seen in the periventricular white matter, possibly due to microvascular ischemia versus demyelination. There is partial opacification of the left maxillary sinus and bilateral ethmoid air cells. There is a partial left mastoid effusion.  The orbits appear unremarkable. The visualized paranasal sinuses and mastoid air cells appear otherwise clear. There are degenerative changes of the tip of the atlantodental joint. MRA findings: The distal right and left internal carotid arteries as well as the anterior and middle cerebral arteries are normally visualized. Anterior and posterior communicating arteries are not definitely visualized. The basilar artery as well as the right and left posterior cerebral arteries are also normally visualized.  There is no evidence of aneurysm.     Impression: Impression: Foci of increased T2 signal are seen in the periventricular white matter, possibly due to microvascular ischemia versus demyelination. Paranasal sinus disease as described above. No evidence of aneurysm noted in the brain. Electronically signed by:  Trever Barroso MD  4/19/2018 9:40 PM CDT Workstation: [id number]    Mri Brain Without Contrast    Result Date: 4/20/2018  Narrative: PROCEDURE: MR BRAIN WITHOUT IV  CONTRAST, MR BRAIN ANGIOGRAPHY WITHOUT IV CONTRAST COMPARISON: No comparison HISTORY: VERTIGO TECHNIQUE: Multisequence multiplanar MR imaging of the brain is obtained agnetic resonance angiography (MRA) of the [place] of [place] was performed using 3-dimensional time of flight sequences without contrast. Computer generated 3-D reconstructions/MIPS were performed CONTRAST: None MRI findings: There is no infarct, hemorrhage, hydrocephalus or intracranial mass. There is no vasogenic edema or mass effect. Foci of increased T2 signal are seen in the periventricular white matter, possibly due to microvascular ischemia versus demyelination. There is partial opacification of the left maxillary sinus and bilateral ethmoid air cells. There is a partial left mastoid effusion.  The orbits appear unremarkable. The visualized paranasal sinuses and mastoid air cells appear otherwise clear. There are degenerative changes of the tip of the atlantodental joint. MRA findings: The distal right and left internal carotid arteries as well as the anterior and middle cerebral arteries are normally visualized. Anterior and posterior communicating arteries are not definitely visualized. The basilar artery as well as the right and left posterior cerebral arteries are also normally visualized.  There is no evidence of aneurysm.     Impression: Impression: Foci of increased T2 signal are seen in the periventricular white matter, possibly due to microvascular ischemia versus demyelination. Paranasal sinus disease as described above. No evidence of aneurysm noted in the brain. Electronically signed by:  Trever Barroso MD  4/19/2018 9:40 PM CDT Workstation: [id number]    Xr Chest 1 View    Result Date: 4/19/2018  Narrative: EXAM:         Radiograph(s), Chest VIEWS:   Portable ; 1     DATE/TIME:  4/19/2018 9:38 PM CDT            INDICATION:   dizziness  COMPARISON:  CXR: 7/10/17         FINDINGS:         - lines/tubes:    none   - cardiac:         size  within normal limits       - mediastinum: contour within normal limits       - lungs:         no focal air space process, pulmonary interstitial edema, nodule(s)/mass           - pleura:         no evidence of  fluid                - osseous:         unremarkable for age                - misc.:        Impression: CONCLUSION:    1. No evidence of an active cardiopulmonary process.                                  Electronically signed by:  SERGIO Henriquez MD  4/19/2018 9:38 PM CDT Workstation: [id number]    Ct Angiogram Head With Contrast    Result Date: 4/25/2018  Narrative: EXAM DESCRIPTION: CT ANGIOGRAM HEAD WITH CONTRAST CLINICAL HISTORY: 70-year-old male with history given for cerebral aneurysm. COMPARISON: MRI/MRA brain 4/19/2018. TECHNIQUE: Noncontrast imaging of the head are obtained followed by postcontrast CTA imaging through the [place] per CTA head protocol. Reconstructed MIP images were obtained in multiple obliquities. No 3-D surface rendered images were obtained for this exam. Computer generated 3-D reconstructions/MIPS were performed . This exam was performed according to our departmental dose optimization program, which includes automated exposure control, adjustment of the mA and/or KV according to patient size and/or use of iterative reconstruction technique. The total DLP is 1897.2 FINDINGS:  NONCONTRAST CT HEAD: The craniovertebral junction is unremarkable. No Chiari malformation. Unremarkable CT appearance of the pituitary and the stalk. The midline structures appear intact. No evidence of intracranial hemorrhage or suspicious extra-axial fluid collections. No edema, midline shift, or mass effect. Gray-white differentiation is maintained. No hydrocephalus. There is a 4 mm rounded calcification positioned within the anterior interhemispheric fissure that favors a dural calcification. The orbits are unremarkable for acute or suspicious finding. The mastoid air cells and middle ear  cavities are clear. There is trace scattered mucosal thickening within the paranasal sinuses. No air-fluid levels. No acute or suspicious calvarial lesion identified. CTA HEAD: There is luminal contrast within the included distal extracranial internal carotid arteries, as well as the petrous, cavernous, and supraclinoid segments. There is no evidence of flow-limiting stenosis. There is an approximate 3.8 mm aneurysm projecting medially and inferiorly from the cavernous segment of the left internal carotid artery (series 7 image 57, series 10 image 39) no additional aneurysm seen involving the intracranial carotid arteries. No evidence of flow-limiting stenosis. There is luminal contrast seen within the bilateral anterior cerebral arteries. No evidence of flow-limiting stenosis or aneurysm. Luminal contrast present within the bilateral middle cerebral arteries. No occlusion, significant flow-limiting stenosis or aneurysm. A small anterior communicating artery is noted without aneurysm. Luminal contrast seen within the V4 segments of the vertebral arteries. The left vertebral artery is dominant. There is signal contrast noted within the posterior inferior cerebellar arteries. The vertebrobasilar confluence is normal. The basilar artery demonstrates normal caliber without flow-limiting stenosis or aneurysm. There is luminal contrast seen within the anterior inferior cerebellar arteries, superior cerebellar arteries and the posterior cerebral arteries without flow-limiting stenosis or aneurysm. Equivocal small right posterior communicating artery. The postcontrast appearance of the brain is unremarkable. There is patency of the dural venous sinuses noted.     Impression: No CT evidence of intracranial hemorrhage, mass effect, or acute large vessel distribution infarct. Evidence of a 3.8 mm aneurysm projecting medially and inferiorly from the left cavernous carotid artery. Otherwise, no evidence of occlusion, significant  flow-limiting stenosis, or additional aneurysms. Finding is reported to Dr. Ibrahim on 4/25/2018 at 7:55 PM CST. Electronically signed by:  Andrea Chamberlain MD  4/25/2018 7:58 PM CDT Workstation: [id number]                [facility]    Final diagnoses:   Aneurysm of internal carotid artery            Avery Ibrahim MD  04/25/18 1753       Avery Ibrahim MD  04/25/18 2023

## 2018-04-26 ENCOUNTER — TELEPHONE (OUTPATIENT)
Dept: FAMILY MEDICINE CLINIC | Facility: CLINIC | Age: 63
End: 2018-04-26

## 2018-04-26 DIAGNOSIS — I72.0 ANEURYSM OF CAROTID ARTERY (HCC): Primary | ICD-10-CM

## 2018-04-26 NOTE — DISCHARGE INSTRUCTIONS
Return ED fever, headache, vomiting, syncope, worse condition, other concerns  Followup Dr. Castro as directed.  [facility] [phone].  Call for appointment.

## 2018-04-27 ENCOUNTER — OFFICE VISIT (OUTPATIENT)
Dept: FAMILY MEDICINE CLINIC | Facility: CLINIC | Age: 63
End: 2018-04-27

## 2018-04-27 VITALS
HEIGHT: 67 IN | SYSTOLIC BLOOD PRESSURE: 110 MMHG | DIASTOLIC BLOOD PRESSURE: 80 MMHG | BODY MASS INDEX: 27.78 KG/M2 | WEIGHT: 177 LBS

## 2018-04-27 DIAGNOSIS — R42 DIZZINESS: ICD-10-CM

## 2018-04-27 DIAGNOSIS — I72.0 CAROTID ARTERY ANEURYSM (HCC): Primary | ICD-10-CM

## 2018-04-27 PROCEDURE — 99213 OFFICE O/P EST LOW 20 MIN: CPT | Performed by: NURSE PRACTITIONER

## 2018-04-27 NOTE — PROGRESS NOTES
"  Chief Complaint   Patient presents with   • Follow-up     E/R , needing referral      Subjective   Pavan Mccauley is a 62 y.o. male.     Presents with recheck from ER-requesting referral to Neurosurgeon due to CTA findings. Has records present and chart reviewed -2 brothers with brain aneurysm-one has passed and one has lived with severe neuro deficits -per patient \"the ER provider has already spoke with the neurosurgeon-I just need a referral\".       Dizziness   This is a new problem. The current episode started 1 to 4 weeks ago. The problem occurs intermittently. The problem has been unchanged. Associated symptoms include arthralgias, congestion, fatigue, myalgias and vertigo. Pertinent negatives include no abdominal pain, anorexia, change in bowel habit, chest pain, chills, coughing, diaphoresis, fever, headaches, joint swelling, nausea, neck pain, numbness, rash, sore throat, swollen glands, urinary symptoms, visual change, vomiting or weakness. Exacerbated by: worse when standing still  Treatments tried: antivert otc-it has helped  The treatment provided mild relief.        The following portions of the patient's history were reviewed and updated as appropriate: allergies, current medications, past social history and problem list.    Review of Systems   Constitutional: Positive for activity change, appetite change and fatigue. Negative for chills, diaphoresis and fever.   HENT: Positive for congestion, postnasal drip, rhinorrhea and tinnitus. Negative for dental problem, drooling, ear discharge, ear pain, facial swelling, hearing loss, mouth sores, nosebleeds, sinus pressure, sneezing, sore throat, trouble swallowing and voice change.    Eyes: Negative.  Negative for photophobia, pain, discharge, redness, itching and visual disturbance.   Respiratory: Negative for apnea, cough, choking, chest tightness, shortness of breath, wheezing and stridor.    Cardiovascular: Negative.  Negative for chest pain, " "palpitations and leg swelling.   Gastrointestinal: Negative.  Negative for abdominal distention, abdominal pain, anal bleeding, anorexia, blood in stool, change in bowel habit, constipation, diarrhea, nausea and vomiting.   Endocrine: Negative.  Negative for cold intolerance, heat intolerance, polydipsia, polyphagia and polyuria.   Genitourinary: Negative.  Negative for difficulty urinating, dysuria and enuresis.   Musculoskeletal: Positive for arthralgias and myalgias. Negative for back pain, gait problem, joint swelling, neck pain and neck stiffness.   Skin: Positive for color change. Negative for pallor and rash.   Allergic/Immunologic: Negative.  Negative for environmental allergies, food allergies and immunocompromised state.   Neurological: Positive for dizziness, vertigo and light-headedness. Negative for tremors, seizures, syncope, facial asymmetry, speech difficulty, weakness, numbness and headaches.        Denies headache-does report dizziness-mostly with standing still-not positional -cta reviewed -patient aware of findings.    Hematological: Negative.  Negative for adenopathy. Does not bruise/bleed easily.   Psychiatric/Behavioral: Negative.  Negative for agitation, behavioral problems, confusion, decreased concentration, dysphoric mood, hallucinations, self-injury, sleep disturbance and suicidal ideas. The patient is not nervous/anxious and is not hyperactive.        Objective   /80   Ht 170.2 cm (67\")   Wt 80.3 kg (177 lb)   BMI 27.72 kg/m²   Physical Exam   Constitutional: He is oriented to person, place, and time. He appears well-developed and well-nourished.   HENT:   Head: Normocephalic and atraumatic.   Mouth/Throat: No oropharyngeal exudate.   Clear nasal congestion -tm's clear   Eyes: Conjunctivae and EOM are normal. Pupils are equal, round, and reactive to light. Right eye exhibits no discharge. Left eye exhibits no discharge. No scleral icterus.   Neck: Normal range of motion. Neck " supple. No JVD present. No tracheal deviation present. No thyromegaly present.   Cardiovascular: Normal rate, regular rhythm and normal heart sounds.  Exam reveals no gallop and no friction rub.    No murmur heard.  Pulmonary/Chest: Effort normal and breath sounds normal. No stridor. No respiratory distress. He has no wheezes. He has no rales. He exhibits no tenderness.   Abdominal: Soft. Bowel sounds are normal. He exhibits no distension and no mass. There is no tenderness. There is no rebound and no guarding. No hernia.   Genitourinary: Rectum normal and penis normal.   Musculoskeletal: Normal range of motion. He exhibits no edema, tenderness or deformity.        Right shoulder: He exhibits normal range of motion, no tenderness, no bony tenderness, no swelling, no effusion, no crepitus, no deformity, no laceration, no pain, no spasm, normal pulse and normal strength.   Lymphadenopathy:     He has no cervical adenopathy.   Neurological: He is alert and oriented to person, place, and time. He displays no atrophy, no tremor and normal reflexes. No cranial nerve deficit or sensory deficit. He exhibits normal muscle tone. He displays no seizure activity. Coordination and gait normal. GCS eye subscore is 4. GCS verbal subscore is 5. GCS motor subscore is 6.   Reflex Scores:       Tricep reflexes are 4+ on the right side and 4+ on the left side.       Bicep reflexes are 4+ on the right side and 4+ on the left side.       Brachioradialis reflexes are 4+ on the right side and 4+ on the left side.       Patellar reflexes are 4+ on the right side and 4+ on the left side.       Achilles reflexes are 4+ on the left side.  No neuro deficits noted during exam    Skin: Skin is warm and dry. No rash noted. No erythema. No pallor.   Psychiatric: He has a normal mood and affect.   Nursing note and vitals reviewed.    CT Angiogram Head With Contrast [[id number]] (Order [id number])   Order   Status: Final result   Appointment Information      PACS Images     Radiology Images   Study Result     EXAM DESCRIPTION: CT ANGIOGRAM HEAD WITH CONTRAST     CLINICAL HISTORY: 70-year-old male with history given for  cerebral aneurysm.     COMPARISON: MRI/MRA brain 4/19/2018.     TECHNIQUE: Noncontrast imaging of the head are obtained followed  by postcontrast CTA imaging through the [place] of [place] per CTA  head protocol. Reconstructed MIP images were obtained in multiple  obliquities. No 3-D surface rendered images were obtained for  this exam. Computer generated 3-D reconstructions/MIPS were  performed .     This exam was performed according to our departmental dose  optimization program, which includes automated exposure control,  adjustment of the mA and/or KV according to patient size and/or  use of iterative reconstruction technique. The total DLP is  1897.2     FINDINGS:       NONCONTRAST CT HEAD:  The craniovertebral junction is unremarkable. No Chiari  malformation. Unremarkable CT appearance of the pituitary and the  stalk. The midline structures appear intact.     No evidence of intracranial hemorrhage or suspicious extra-axial  fluid collections. No edema, midline shift, or mass effect.  Gray-white differentiation is maintained. No hydrocephalus. There  is a 4 mm rounded calcification positioned within the anterior  interhemispheric fissure that favors a dural calcification.  The orbits are unremarkable for acute or suspicious finding. The  mastoid air cells and middle ear cavities are clear. There is  trace scattered mucosal thickening within the paranasal sinuses.  No air-fluid levels. No acute or suspicious calvarial lesion  identified.     CTA HEAD:  There is luminal contrast within the included distal extracranial  internal carotid arteries, as well as the petrous, cavernous, and  supraclinoid segments. There is no evidence of flow-limiting  stenosis. There is an approximate 3.8 mm aneurysm projecting  medially and inferiorly from the cavernous  segment of the left  internal carotid artery (series 7 image 57, series 10 image 39)  no additional aneurysm seen involving the intracranial carotid  arteries. No evidence of flow-limiting stenosis.     There is luminal contrast seen within the bilateral anterior  cerebral arteries. No evidence of flow-limiting stenosis or  aneurysm.  Luminal contrast present within the bilateral middle cerebral  arteries. No occlusion, significant flow-limiting stenosis or  aneurysm. A small anterior communicating artery is noted without  aneurysm.     Luminal contrast seen within the V4 segments of the vertebral  arteries. The left vertebral artery is dominant. There is signal  contrast noted within the posterior inferior cerebellar arteries.  The vertebrobasilar confluence is normal. The basilar artery  demonstrates normal caliber without flow-limiting stenosis or  aneurysm. There is luminal contrast seen within the anterior  inferior cerebellar arteries, superior cerebellar arteries and  the posterior cerebral arteries without flow-limiting stenosis or  aneurysm. Equivocal small right posterior communicating artery.     The postcontrast appearance of the brain is unremarkable. There  is patency of the dural venous sinuses noted.     IMPRESSION:  No CT evidence of intracranial hemorrhage, mass effect, or acute  large vessel distribution infarct.     Evidence of a 3.8 mm aneurysm projecting medially and inferiorly  from the left cavernous carotid artery.     Otherwise, no evidence of occlusion, significant flow-limiting  stenosis, or additional aneurysms.     Finding is reported to Dr. Ibrahim on 4/25/2018 at 7:55 PM CST.     Electronically signed by:  Andrea Chamberlain           Assessment/Plan   Problem List Items Addressed This Visit        Cardiovascular and Mediastinum    Carotid artery aneurysm - Primary    Relevant Orders    Ambulatory Referral to Neurosurgery       Other    Dizziness      Other Visit Diagnoses    None.         No orders of the defined types were placed in this encounter.     sent reports with referral -patient instructed if symptoms worsen will present directly to ER in [place] or [place] where neuro is present or call 911-patient agrees with plan of action-referral sent as directed-patient's questions answered in detail.

## 2018-05-18 RX ORDER — CLARITHROMYCIN 500 MG/1
500 TABLET, COATED ORAL EVERY 12 HOURS SCHEDULED
Qty: 20 TABLET | Refills: 0 | Status: SHIPPED | OUTPATIENT
Start: 2018-05-18 | End: 2018-05-28

## 2019-12-02 ENCOUNTER — LAB (OUTPATIENT)
Dept: LAB | Facility: HOSPITAL | Age: 64
End: 2019-12-02

## 2019-12-02 ENCOUNTER — TELEPHONE (OUTPATIENT)
Dept: FAMILY MEDICINE CLINIC | Facility: CLINIC | Age: 64
End: 2019-12-02

## 2019-12-02 ENCOUNTER — OFFICE VISIT (OUTPATIENT)
Dept: FAMILY MEDICINE CLINIC | Facility: CLINIC | Age: 64
End: 2019-12-02

## 2019-12-02 VITALS
DIASTOLIC BLOOD PRESSURE: 90 MMHG | SYSTOLIC BLOOD PRESSURE: 128 MMHG | WEIGHT: 193.1 LBS | OXYGEN SATURATION: 94 % | HEART RATE: 76 BPM | BODY MASS INDEX: 30.31 KG/M2 | HEIGHT: 67 IN

## 2019-12-02 DIAGNOSIS — L52 ERYTHEMA NODOSUM: ICD-10-CM

## 2019-12-02 DIAGNOSIS — L52 ERYTHEMA NODOSUM: Primary | ICD-10-CM

## 2019-12-02 DIAGNOSIS — J01.10 ACUTE NON-RECURRENT FRONTAL SINUSITIS: ICD-10-CM

## 2019-12-02 LAB
BASOPHILS # BLD AUTO: 0.05 10*3/MM3 (ref 0–0.2)
BASOPHILS NFR BLD AUTO: 0.3 % (ref 0–1.5)
DEPRECATED RDW RBC AUTO: 40.6 FL (ref 37–54)
EOSINOPHIL # BLD AUTO: 0.05 10*3/MM3 (ref 0–0.4)
EOSINOPHIL NFR BLD AUTO: 0.3 % (ref 0.3–6.2)
ERYTHROCYTE [DISTWIDTH] IN BLOOD BY AUTOMATED COUNT: 12.7 % (ref 12.3–15.4)
ERYTHROCYTE [SEDIMENTATION RATE] IN BLOOD: 40 MM/HR (ref 0–15)
EXPIRATION DATE: NORMAL
HCT VFR BLD AUTO: 47.2 % (ref 37.5–51)
HGB BLD-MCNC: 16.2 G/DL (ref 13–17.7)
IMM GRANULOCYTES # BLD AUTO: 0.06 10*3/MM3 (ref 0–0.05)
IMM GRANULOCYTES NFR BLD AUTO: 0.4 % (ref 0–0.5)
INTERNAL CONTROL: NORMAL
LYMPHOCYTES # BLD AUTO: 1.29 10*3/MM3 (ref 0.7–3.1)
LYMPHOCYTES NFR BLD AUTO: 8.7 % (ref 19.6–45.3)
Lab: NORMAL
MCH RBC QN AUTO: 30.1 PG (ref 26.6–33)
MCHC RBC AUTO-ENTMCNC: 34.3 G/DL (ref 31.5–35.7)
MCV RBC AUTO: 87.6 FL (ref 79–97)
MONOCYTES # BLD AUTO: 1.32 10*3/MM3 (ref 0.1–0.9)
MONOCYTES NFR BLD AUTO: 8.9 % (ref 5–12)
NEUTROPHILS # BLD AUTO: 12.1 10*3/MM3 (ref 1.7–7)
NEUTROPHILS NFR BLD AUTO: 81.4 % (ref 42.7–76)
NRBC BLD AUTO-RTO: 0 /100 WBC (ref 0–0.2)
PLATELET # BLD AUTO: 307 10*3/MM3 (ref 140–450)
PMV BLD AUTO: 9.7 FL (ref 6–12)
RBC # BLD AUTO: 5.39 10*6/MM3 (ref 4.14–5.8)
S PYO AG THROAT QL: NEGATIVE
WBC NRBC COR # BLD: 14.87 10*3/MM3 (ref 3.4–10.8)

## 2019-12-02 PROCEDURE — 11104 PUNCH BX SKIN SINGLE LESION: CPT | Performed by: FAMILY MEDICINE

## 2019-12-02 PROCEDURE — 86592 SYPHILIS TEST NON-TREP QUAL: CPT

## 2019-12-02 PROCEDURE — 88305 TISSUE EXAM BY PATHOLOGIST: CPT | Performed by: PATHOLOGY

## 2019-12-02 PROCEDURE — 86480 TB TEST CELL IMMUN MEASURE: CPT

## 2019-12-02 PROCEDURE — 86060 ANTISTREPTOLYSIN O TITER: CPT

## 2019-12-02 PROCEDURE — 36415 COLL VENOUS BLD VENIPUNCTURE: CPT

## 2019-12-02 PROCEDURE — 80053 COMPREHEN METABOLIC PANEL: CPT | Performed by: FAMILY MEDICINE

## 2019-12-02 PROCEDURE — 88305 TISSUE EXAM BY PATHOLOGIST: CPT | Performed by: FAMILY MEDICINE

## 2019-12-02 PROCEDURE — 86038 ANTINUCLEAR ANTIBODIES: CPT

## 2019-12-02 PROCEDURE — 85025 COMPLETE CBC W/AUTO DIFF WBC: CPT

## 2019-12-02 PROCEDURE — 99214 OFFICE O/P EST MOD 30 MIN: CPT | Performed by: FAMILY MEDICINE

## 2019-12-02 PROCEDURE — 85651 RBC SED RATE NONAUTOMATED: CPT

## 2019-12-02 PROCEDURE — 87880 STREP A ASSAY W/OPTIC: CPT | Performed by: FAMILY MEDICINE

## 2019-12-02 RX ORDER — AMOXICILLIN AND CLAVULANATE POTASSIUM 875; 125 MG/1; MG/1
1 TABLET, FILM COATED ORAL 2 TIMES DAILY
Qty: 14 TABLET | Refills: 0 | Status: ON HOLD | OUTPATIENT
Start: 2019-12-02 | End: 2020-08-31

## 2019-12-02 NOTE — PROGRESS NOTES
"Acute Office Visit          Pavan Mccauley is a 64 y.o. male that presents today for   Chief Complaint   Patient presents with   • Rash     all over body       HPI   Nasal congestion, cough and sore neck muscles started about a week ago. Patient figured that symptoms were from a cold and would pass, however they are still present.  No fever with this illness.  Some associated sinus pressure.  Did have a really sore throat initially, which has improved. Patient then developed a painful rash about 4 days ago.  Pain started in his fingertips and has spread.  He now has rash on his palms, hands, forearms, trunk and thighs.  No lesions in genital area or on feet.  Patient took six doses of an old antibiotic (clarithromycin) that he had left over from when he previously had [place] Spotted Fever.  No improvement with this treatment.  He has also sporadically been taking ibuprofen.       The following portions of the patient's history were reviewed and updated as appropriate: allergies, current medications, past medical history and problem list.    Review of Systems   Constitutional: Negative for activity change, appetite change, chills and fever.   HENT: Positive for congestion, sinus pain and sore throat.    Eyes: Negative for visual disturbance.   Respiratory: Positive for cough and shortness of breath. Negative for wheezing.    Cardiovascular: Negative for chest pain and palpitations.   Gastrointestinal: Negative for abdominal pain, nausea and vomiting.   Musculoskeletal: Positive for myalgias and neck stiffness.   Skin: Positive for rash.   Neurological: Negative for headaches.           Vitals:    12/02/19 0814   BP: 128/90   Pulse: 76   SpO2: 94%   Weight: 87.6 kg (193 lb 1.6 oz)   Height: 170.2 cm (67\")     Body mass index is 30.24 kg/m².    Physical Exam   Constitutional: He is oriented to person, place, and time. He appears well-developed and well-nourished. No distress.   HENT:   Head: Normocephalic and " atraumatic.   Nose: Right sinus exhibits frontal sinus tenderness. Left sinus exhibits frontal sinus tenderness.   Mouth/Throat: Oropharynx is clear and moist.   Eyes: Conjunctivae and EOM are normal.   Neck: Neck supple. Muscular tenderness present. No spinous process tenderness present. No neck rigidity. Decreased range of motion (mild) present. No edema present. No Kernig's sign noted.   Cardiovascular: Normal rate and regular rhythm.   No murmur heard.  Pulmonary/Chest: Effort normal. No respiratory distress. He has wheezes (right lower lung field).   Abdominal: Soft. There is no tenderness.   Musculoskeletal:        Right hand: He exhibits decreased range of motion and tenderness.        Left hand: He exhibits decreased range of motion and tenderness.   Neurological: He is alert and oriented to person, place, and time.   Skin: Skin is warm. Rash noted.   Small erythematous nodules present on palms, more scattered nodules on lower extremities, forearms and trunk.  Painful to touch.   Psychiatric: He has a normal mood and affect.                 Biopsy  Date/Time: 12/2/2019 8:30 AM  Performed by: Renee Roblero MD  Authorized by: Renee Roblero MD     Procedure Details - Skin Biopsy:     Body area: trunk    Trunk location: back    Final defect size (mm): 3    Destruction method: punch biopsy      Comments:   Informed consent obtained.  1% lidocaine used for local anesthesia.  Area prepped in sterile fashion.  3 mm punch biopsy done.  Tissue placed in specimen container and sent to pathology.  Silver nitrate used for coagulation.  Gauze and bandage applied.  Patient tolerated procedure well.           Pavan was seen today for rash.    Diagnoses and all orders for this visit:    Erythema nodosum  History of recent URI symptoms with the above physical exam findings are suspicious for erythema nodosum. Will check labs for further evaluation.  Rapid strep test done in the office and was negative.  Punch  biopsy of the lesion done given atypical location of lesions.   Patient should use anti-inflammatory medication regularly over the next 3-4 days for treatment and pain relief.  Will contact patient with lab results when available.     -     CBC & Differential; Future  -     Sedimentation rate, automated; Future  -     Antistreptolysin O (ASO) Titer; Future  -     FARHANA; Future  -     XR Chest PA & Lateral; Future  -     QuantiFERON TB Gold; Future  -     RPR; Future  -     POCT rapid strep A  -     Tissue Pathology Exam; Future    Acute non-recurrent frontal sinusitis  URI symptoms present for > 7 days with worsening.  Will treat with antibiotics.  Patient advised to take medication twice daily for the next 7 days.  Should call with worsening or persistent symptoms despite treatment.  -     amoxicillin-clavulanate (AUGMENTIN) 875-125 MG per tablet; Take 1 tablet by mouth 2 (Two) Times a Day.            This document has been electronically signed by Renee Roblero MD on December 2, 2019 8:29 AM

## 2019-12-02 NOTE — PATIENT INSTRUCTIONS
Erythema Nodosum  Erythema nodosum is a skin condition in which patches of fat under the skin of the lower legs become inflamed. This causes painful bumps (nodules) to form.  What are the causes?  This condition may be caused by:  · Infections. Strep throat (pharyngitis) is a common cause.  · Certain medicines, especially birth control pills, penicillin, and sulfa medicines.  · Sarcoidosis.  · Pregnancy.  · Certain inflammatory conditions, such as Crohn's disease.  · Certain cancers.  In some cases, the cause may not be known.  What increases the risk?  This condition is more likely to develop in young adult women.  What are the signs or symptoms?  The nodules from erythema nodosum:  · Look like raised bruises and are tender to the touch.  · Usually appear on the front of the lower legs (shins) and may also appear on the arms or the abdomen.  · Gradually change in color from pink to brown.  · Leave a dark presley that fades away after several months.  Other symptoms besides nodules may include:  · Fever.  · Fatigue.  · Joint pain.  How is this diagnosed?  This condition is often diagnosed based on your symptoms. You may have a physical exam, X-rays, and blood tests to help find the cause of your erythema nodosum. A skin sample may be removed (skin biopsy) to be examined by a specialist (pathologist).  How is this treated?  Treatment for this condition depends on the cause. The nodules usually go away after the underlying cause is treated, such as after medicine is used to fight infection. Treatment may also include:  · NSAIDs for pain and inflammation.  · Potassium iodide supplements.  · Steroid medicines.  · Rest.  Follow these instructions at home:  Activity  · Rest and return to your normal activities as told by your health care provider. Ask your health care provider what activities are safe for you.  · Avoid very intense (vigorous) exercise until your symptoms go away.  General instructions    · Take  over-the-counter and prescription medicines only as told by your health care provider. If you were prescribed antibiotic medicine, take or apply it as told by your health care provider. Do not stop using the antibiotic even if you start to feel better  · If directed, put ice on affected areas to help relieve pain or inflammation:  ? Put ice in a plastic bag.  ? Place a towel between your skin and the bag.  ? Apply ice 2-3 times a day. Do not apply ice for more than 20 minutes at a time.  · If possible, raise (elevate) the affected area above the level of your heart when you are sitting or lying down. This may help with pain and inflammation.  · Avoid scratching your skin.  · To relieve itchiness, make a paste with dry oatmeal and warm water, then put the paste on itchy areas. Let the paste dry, remove it, and then apply moisturizer. You may do this 2-3 times a day, or as needed.  · Keep all follow-up visits as told by your health care provider. This is important.  Contact a health care provider if you:  · Have symptoms that do not get better with treatment and home care.  · Have a fever that does not go away.  Get help right away if you:  · Have pain that gets worse.  · Have a sore throat.  · Vomit more than one time.  Summary  · Erythema nodosum is a skin condition that causes painful bumps (nodules) to form.  · The bumps usually appear on the front of the lower legs (shins).  · Avoid very intense (vigorous) exercise until your symptoms go away.  · Contact a health care provider if you have a fever or if your symptoms do not improve.  This information is not intended to replace advice given to you by your health care provider. Make sure you discuss any questions you have with your health care provider.  Document Released: 01/25/2006 Document Revised: 10/23/2018 Document Reviewed: 10/23/2018  [employer] Interactive Patient Education © 2019 [employer]

## 2019-12-02 NOTE — TELEPHONE ENCOUNTER
Called and let patient know that his ESR was elevated.  WBC also elevated with 81% neutrophils.  Patient should take antibiotics for bacterial sinusitis as discussed at office visit.  Advised ibuprofen 400 mg every 4-6 hours over the next couple of days to help with inflammation.  Patient will call in 2 days to check in with MA and let her know how he is doing.  Kim Roblero

## 2019-12-03 LAB
ANA SER QL: NEGATIVE
ASO AB TITR SER: ABNORMAL {TITER}
CYTO UR: NORMAL
LAB AP CASE REPORT: NORMAL
LAB AP CLINICAL INFORMATION: NORMAL
LAB AP INTRADEPARTMENTAL CONSULT: NORMAL
PATH REPORT.FINAL DX SPEC: NORMAL
PATH REPORT.GROSS SPEC: NORMAL
RPR SER QL: NORMAL

## 2019-12-04 ENCOUNTER — TELEPHONE (OUTPATIENT)
Dept: FAMILY MEDICINE CLINIC | Facility: CLINIC | Age: 64
End: 2019-12-04

## 2019-12-04 ENCOUNTER — OFFICE VISIT (OUTPATIENT)
Dept: FAMILY MEDICINE CLINIC | Facility: CLINIC | Age: 64
End: 2019-12-04

## 2019-12-04 VITALS
BODY MASS INDEX: 30.29 KG/M2 | SYSTOLIC BLOOD PRESSURE: 110 MMHG | OXYGEN SATURATION: 95 % | DIASTOLIC BLOOD PRESSURE: 74 MMHG | HEART RATE: 84 BPM | WEIGHT: 193 LBS | HEIGHT: 67 IN

## 2019-12-04 DIAGNOSIS — R21 RASH: ICD-10-CM

## 2019-12-04 DIAGNOSIS — R21 RASH: Primary | ICD-10-CM

## 2019-12-04 DIAGNOSIS — L52 ERYTHEMA NODOSUM: Primary | ICD-10-CM

## 2019-12-04 LAB
QUANTIFERON CRITERIA: NORMAL
QUANTIFERON INCUBATION: NORMAL
QUANTIFERON MITOGEN VALUE: >10 IU/ML
QUANTIFERON NIL VALUE: 0.02 IU/ML
QUANTIFERON TB1 AG VALUE: 0.02 IU/ML
QUANTIFERON TB2 AG VALUE: 0.02 IU/ML
QUANTIFERON-TB GOLD PLUS: NEGATIVE

## 2019-12-04 PROCEDURE — 99213 OFFICE O/P EST LOW 20 MIN: CPT | Performed by: FAMILY MEDICINE

## 2019-12-04 NOTE — TELEPHONE ENCOUNTER
Patient is requesting a call back regarding office visit on Monday 12/2/19 please call [phone]

## 2019-12-04 NOTE — TELEPHONE ENCOUNTER
Called patient back and he stated that red spots on legs and arms are getting bigger-rash is still present. I told him I would relay msg to Dr Roblero and get back with him. He stated that he will be at Dr. Parker office at 1 today, with his sister, in case she wanted to look at rash.

## 2019-12-05 ENCOUNTER — TELEPHONE (OUTPATIENT)
Dept: FAMILY MEDICINE CLINIC | Facility: CLINIC | Age: 64
End: 2019-12-05

## 2019-12-05 ENCOUNTER — APPOINTMENT (OUTPATIENT)
Dept: LAB | Facility: HOSPITAL | Age: 64
End: 2019-12-05

## 2019-12-05 ENCOUNTER — TRANSCRIBE ORDERS (OUTPATIENT)
Dept: LAB | Facility: HOSPITAL | Age: 64
End: 2019-12-05

## 2019-12-05 DIAGNOSIS — T50.905A ADVERSE EFFECT OF DRUG, INITIAL ENCOUNTER: Primary | ICD-10-CM

## 2019-12-05 DIAGNOSIS — R21 RASH: Primary | ICD-10-CM

## 2019-12-05 LAB
ALBUMIN SERPL-MCNC: 4.1 G/DL (ref 3.5–5.2)
ALBUMIN/GLOB SERPL: 1.4 G/DL
ALP SERPL-CCNC: 92 U/L (ref 39–117)
ALT SERPL W P-5'-P-CCNC: 20 U/L (ref 1–41)
ANION GAP SERPL CALCULATED.3IONS-SCNC: 15 MMOL/L (ref 5–15)
AST SERPL-CCNC: 18 U/L (ref 1–40)
BILIRUB SERPL-MCNC: 0.6 MG/DL (ref 0.2–1.2)
BUN BLD-MCNC: 12 MG/DL (ref 8–23)
BUN/CREAT SERPL: 16.2 (ref 7–25)
CALCIUM SPEC-SCNC: 9.4 MG/DL (ref 8.6–10.5)
CHLORIDE SERPL-SCNC: 102 MMOL/L (ref 98–107)
CO2 SERPL-SCNC: 20 MMOL/L (ref 22–29)
CREAT BLD-MCNC: 0.74 MG/DL (ref 0.76–1.27)
GFR SERPL CREATININE-BSD FRML MDRD: 107 ML/MIN/1.73
GLOBULIN UR ELPH-MCNC: 2.9 GM/DL
GLUCOSE BLD-MCNC: 98 MG/DL (ref 65–99)
POTASSIUM BLD-SCNC: 4.5 MMOL/L (ref 3.5–5.2)
PROT SERPL-MCNC: 7 G/DL (ref 6–8.5)
SODIUM BLD-SCNC: 137 MMOL/L (ref 136–145)

## 2019-12-05 PROCEDURE — 81003 URINALYSIS AUTO W/O SCOPE: CPT | Performed by: NURSE PRACTITIONER

## 2019-12-05 NOTE — TELEPHONE ENCOUNTER
"Called and spoke to patient-relayed message from Dr. Roblero. He verbalized understanding. Patient came by earlier today and dropped off results from dermatology appt. I have them in \"while you were out\" folder.   "

## 2019-12-05 NOTE — TELEPHONE ENCOUNTER
Please call this afternoon and let patient know that lab test was received and showed liver enzymes, kidney function and electrolytes were all normal.   You can find out how dermatology appointment went today as well.  [facility]

## 2019-12-05 NOTE — PROGRESS NOTES
"Acute Office Visit          Pavan Mccauley is a 64 y.o. male that presents today for   Chief Complaint   Patient presents with   • Follow-up     rash       HPI    Patient seen today for follow up of rash.  He was seen in the office two days ago with this complaint.  He notes that hands continue to swell and burn.  The rash has become more \"purplish on his hands\".  He still has limited ROM with making a fist due to the swelling.  He has not gotten much relief from the NSAIDs.  The lesions all over his body have increased in size and become more warm.  They are not painful or itchy.  Patient's sinusitis symptoms are improving.  He is concerned about what may be going on.    The following portions of the patient's history were reviewed and updated as appropriate: allergies, current medications, past medical history, past social history and problem list.    Review of Systems   Constitutional: Positive for activity change. Negative for chills and fever.   HENT: Positive for congestion and sinus pain (improving). Negative for sore throat.    Respiratory: Positive for cough. Negative for shortness of breath and wheezing.    Cardiovascular: Negative for chest pain and palpitations.   Gastrointestinal: Negative for abdominal pain, diarrhea, nausea and vomiting.   Musculoskeletal: Positive for myalgias.   Skin: Positive for rash.   Neurological: Negative for dizziness and headaches.           Vitals:    12/04/19 1408   BP: 110/74   Pulse: 84   SpO2: 95%   Weight: 87.5 kg (193 lb)   Height: 170.2 cm (67\")     Body mass index is 30.23 kg/m².    Physical Exam   Constitutional: He is oriented to person, place, and time. He appears well-developed and well-nourished. He appears distressed.   HENT:   Head: Normocephalic.   Mouth/Throat: Oropharynx is clear and moist.   Eyes: Conjunctivae and EOM are normal.   Neck: Neck supple.   Cardiovascular: Normal rate and regular rhythm.   Murmur heard.  Pulmonary/Chest: Breath sounds normal. " He is in respiratory distress. He has no wheezes.   Abdominal: Soft. There is no tenderness.   Neurological: He is alert and oriented to person, place, and time.   Skin:   Erythematous, lacy rash noted on hands previously has now coalesced and forms an erythematous plaque on patient's palms bilaterally.  Erythematous lesions on forearms, lower extremities and trunk are tender, erythematous and enlarging in size from previous exam.  Site of biopsied lesion on lower back is healing well.   Psychiatric: He has a normal mood and affect.       Lab Results   Component Value Date    WBC 14.87 (H) 12/02/2019    HGB 16.2 12/02/2019    HCT 47.2 12/02/2019    MCV 87.6 12/02/2019     12/02/2019     Lab Results   Component Value Date    RPR Non-Reactive 12/02/2019     Lab Results   Component Value Date    ASOTITER 200 IU/mL (A) 12/02/2019     Lab Results   Component Value Date    ANADIRECT Negative 12/02/2019     Lab Results   Component Value Date    QUANTITBGLDP Negative 12/02/2019     Lab Results   Component Value Date    SEDRATE 40 (H) 12/02/2019     Lab Results   Component Value Date    FINALDX  12/02/2019     SKIN (PUNCH BIOPSY), BACK:  SUPERFICIAL MIXED INFILTRATE OF NEUTROPHILS, LYMPHOCYTES AND HISTIOCYTES INVOLVING THE EPIDERMIS AND THE PAPILLARY DERMIS.             Pavan was seen today for follow-up.    Diagnoses and all orders for this visit:    Rash    Previous working diagnosis of erythema nodosum not supported by tissue pathology exam.  Unknown etiology for patient's rash.  Abnormal labs include elevated ESR and WBC, and borderline ASO.  Patient being treated with Augmentin for sinusitis at this time.  Biopsy results showed non-specific inflammatory cells confined to epidermis and papillary dermis.  No evidence of vasculitis or granulomatous disease based on tissue diagnosis.  Will refer to dermatology.  Call made, and patient scheduled for appointment tomorrow with this provider. Patient informed of  appointment date/time prior to leaving today.  Should continue taking NSAIDs in the meantime to help with inflammation and pain.            This document has been electronically signed by Renee Roblero MD on December 4, 2019

## 2019-12-06 LAB
BILIRUB UR QL STRIP: NEGATIVE
CLARITY UR: CLEAR
COLOR UR: YELLOW
GLUCOSE UR STRIP-MCNC: NEGATIVE MG/DL
HGB UR QL STRIP.AUTO: NEGATIVE
KETONES UR QL STRIP: NEGATIVE
LEUKOCYTE ESTERASE UR QL STRIP.AUTO: NEGATIVE
NITRITE UR QL STRIP: NEGATIVE
PH UR STRIP.AUTO: 5.5 [PH] (ref 5–8)
PROT UR QL STRIP: NEGATIVE
SP GR UR STRIP: 1.01 (ref 1–1.03)
UROBILINOGEN UR QL STRIP: NORMAL

## 2019-12-16 ENCOUNTER — APPOINTMENT (OUTPATIENT)
Dept: LAB | Facility: HOSPITAL | Age: 64
End: 2019-12-16

## 2019-12-16 DIAGNOSIS — R21 RASH: Primary | ICD-10-CM

## 2019-12-16 LAB — HETEROPH AB SER QL LA: NEGATIVE

## 2019-12-16 PROCEDURE — 86747 PARVOVIRUS ANTIBODY: CPT | Performed by: NURSE PRACTITIONER

## 2019-12-16 PROCEDURE — 86308 HETEROPHILE ANTIBODY SCREEN: CPT | Performed by: NURSE PRACTITIONER

## 2019-12-16 PROCEDURE — 36415 COLL VENOUS BLD VENIPUNCTURE: CPT | Performed by: NURSE PRACTITIONER

## 2019-12-18 LAB
B19V IGG SER IA-ACNC: 7 INDEX (ref 0–0.8)
B19V IGM SER IA-ACNC: 0.3 INDEX (ref 0–0.8)

## 2019-12-19 ENCOUNTER — TELEPHONE (OUTPATIENT)
Dept: FAMILY MEDICINE CLINIC | Facility: CLINIC | Age: 64
End: 2019-12-19

## 2020-08-31 ENCOUNTER — HOSPITAL ENCOUNTER (OUTPATIENT)
Facility: HOSPITAL | Age: 65
Setting detail: OBSERVATION
Discharge: HOME | End: 2020-09-01
Attending: EMERGENCY MEDICINE | Admitting: INTERNAL MEDICINE

## 2020-08-31 ENCOUNTER — APPOINTMENT (OUTPATIENT)
Dept: GENERAL RADIOLOGY | Facility: HOSPITAL | Age: 65
End: 2020-08-31

## 2020-08-31 DIAGNOSIS — I47.1 SVT (SUPRAVENTRICULAR TACHYCARDIA) (HCC): Primary | ICD-10-CM

## 2020-08-31 PROBLEM — I47.10 SVT (SUPRAVENTRICULAR TACHYCARDIA): Status: ACTIVE | Noted: 2020-08-31

## 2020-08-31 LAB
ALBUMIN SERPL-MCNC: 4.3 G/DL (ref 3.5–5.2)
ALBUMIN/GLOB SERPL: 1.9 G/DL
ALP SERPL-CCNC: 61 U/L (ref 39–117)
ALT SERPL W P-5'-P-CCNC: 20 U/L (ref 1–41)
AMPHET+METHAMPHET UR QL: NEGATIVE
AMPHETAMINES UR QL: NEGATIVE
ANION GAP SERPL CALCULATED.3IONS-SCNC: 14 MMOL/L (ref 5–15)
AST SERPL-CCNC: 18 U/L (ref 1–40)
BARBITURATES UR QL SCN: NEGATIVE
BASOPHILS # BLD AUTO: 0.05 10*3/MM3 (ref 0–0.2)
BASOPHILS NFR BLD AUTO: 0.5 % (ref 0–1.5)
BENZODIAZ UR QL SCN: NEGATIVE
BILIRUB SERPL-MCNC: 0.7 MG/DL (ref 0–1.2)
BILIRUB UR QL STRIP: NEGATIVE
BUN SERPL-MCNC: 11 MG/DL (ref 8–23)
BUN/CREAT SERPL: 13.1 (ref 7–25)
BUPRENORPHINE SERPL-MCNC: NEGATIVE NG/ML
CALCIUM SPEC-SCNC: 9.1 MG/DL (ref 8.6–10.5)
CANNABINOIDS SERPL QL: NEGATIVE
CHLORIDE SERPL-SCNC: 107 MMOL/L (ref 98–107)
CHOLEST SERPL-MCNC: 194 MG/DL (ref 0–200)
CLARITY UR: CLEAR
CO2 SERPL-SCNC: 23 MMOL/L (ref 22–29)
COCAINE UR QL: NEGATIVE
COLOR UR: YELLOW
CREAT SERPL-MCNC: 0.84 MG/DL (ref 0.76–1.27)
D-DIMER, QUANTITATIVE (MAD,POW, STR): 406 NG/ML (FEU) (ref 0–470)
DEPRECATED RDW RBC AUTO: 46.1 FL (ref 37–54)
EOSINOPHIL # BLD AUTO: 0.12 10*3/MM3 (ref 0–0.4)
EOSINOPHIL NFR BLD AUTO: 1.2 % (ref 0.3–6.2)
ERYTHROCYTE [DISTWIDTH] IN BLOOD BY AUTOMATED COUNT: 13.6 % (ref 12.3–15.4)
GFR SERPL CREATININE-BSD FRML MDRD: 92 ML/MIN/1.73
GLOBULIN UR ELPH-MCNC: 2.3 GM/DL
GLUCOSE SERPL-MCNC: 88 MG/DL (ref 65–99)
GLUCOSE UR STRIP-MCNC: NEGATIVE MG/DL
HCT VFR BLD AUTO: 51.5 % (ref 37.5–51)
HDLC SERPL-MCNC: 72 MG/DL (ref 40–60)
HGB BLD-MCNC: 17.4 G/DL (ref 13–17.7)
HGB UR QL STRIP.AUTO: NEGATIVE
HOLD SPECIMEN: NORMAL
HOLD SPECIMEN: NORMAL
IMM GRANULOCYTES # BLD AUTO: 0.03 10*3/MM3 (ref 0–0.05)
IMM GRANULOCYTES NFR BLD AUTO: 0.3 % (ref 0–0.5)
KETONES UR QL STRIP: ABNORMAL
LDLC SERPL CALC-MCNC: 109 MG/DL (ref 0–100)
LDLC/HDLC SERPL: 1.52 {RATIO}
LEUKOCYTE ESTERASE UR QL STRIP.AUTO: NEGATIVE
LYMPHOCYTES # BLD AUTO: 2.85 10*3/MM3 (ref 0.7–3.1)
LYMPHOCYTES NFR BLD AUTO: 29.5 % (ref 19.6–45.3)
MAGNESIUM SERPL-MCNC: 2 MG/DL (ref 1.6–2.4)
MCH RBC QN AUTO: 30.9 PG (ref 26.6–33)
MCHC RBC AUTO-ENTMCNC: 33.8 G/DL (ref 31.5–35.7)
MCV RBC AUTO: 91.5 FL (ref 79–97)
METHADONE UR QL SCN: NEGATIVE
MONOCYTES # BLD AUTO: 1.1 10*3/MM3 (ref 0.1–0.9)
MONOCYTES NFR BLD AUTO: 11.4 % (ref 5–12)
NEUTROPHILS NFR BLD AUTO: 5.51 10*3/MM3 (ref 1.7–7)
NEUTROPHILS NFR BLD AUTO: 57.1 % (ref 42.7–76)
NITRITE UR QL STRIP: NEGATIVE
NRBC BLD AUTO-RTO: 0 /100 WBC (ref 0–0.2)
NT-PROBNP SERPL-MCNC: 2112 PG/ML (ref 0–900)
OPIATES UR QL: NEGATIVE
OXYCODONE UR QL SCN: NEGATIVE
PCP UR QL SCN: NEGATIVE
PH UR STRIP.AUTO: 6.5 [PH] (ref 5–9)
PLATELET # BLD AUTO: 222 10*3/MM3 (ref 140–450)
PMV BLD AUTO: 10.4 FL (ref 6–12)
POTASSIUM SERPL-SCNC: 3.8 MMOL/L (ref 3.5–5.2)
PROPOXYPH UR QL: NEGATIVE
PROT SERPL-MCNC: 6.6 G/DL (ref 6–8.5)
PROT UR QL STRIP: NEGATIVE
RBC # BLD AUTO: 5.63 10*6/MM3 (ref 4.14–5.8)
SARS-COV-2 N GENE RESP QL NAA+PROBE: NOT DETECTED
SODIUM SERPL-SCNC: 144 MMOL/L (ref 136–145)
SP GR UR STRIP: 1.01 (ref 1–1.03)
T4 FREE SERPL-MCNC: 1.35 NG/DL (ref 0.93–1.7)
TRICYCLICS UR QL SCN: NEGATIVE
TRIGL SERPL-MCNC: 64 MG/DL (ref 0–150)
TROPONIN T SERPL-MCNC: <0.01 NG/ML (ref 0–0.03)
TSH SERPL DL<=0.05 MIU/L-ACNC: 2.67 UIU/ML (ref 0.27–4.2)
UROBILINOGEN UR QL STRIP: ABNORMAL
VLDLC SERPL-MCNC: 12.8 MG/DL
WBC # BLD AUTO: 9.66 10*3/MM3 (ref 3.4–10.8)
WHOLE BLOOD HOLD SPECIMEN: NORMAL
WHOLE BLOOD HOLD SPECIMEN: NORMAL

## 2020-08-31 PROCEDURE — 96365 THER/PROPH/DIAG IV INF INIT: CPT

## 2020-08-31 PROCEDURE — 93010 ELECTROCARDIOGRAM REPORT: CPT | Performed by: INTERNAL MEDICINE

## 2020-08-31 PROCEDURE — 84443 ASSAY THYROID STIM HORMONE: CPT | Performed by: EMERGENCY MEDICINE

## 2020-08-31 PROCEDURE — 83735 ASSAY OF MAGNESIUM: CPT | Performed by: INTERNAL MEDICINE

## 2020-08-31 PROCEDURE — 81003 URINALYSIS AUTO W/O SCOPE: CPT | Performed by: INTERNAL MEDICINE

## 2020-08-31 PROCEDURE — 96375 TX/PRO/DX INJ NEW DRUG ADDON: CPT

## 2020-08-31 PROCEDURE — C9803 HOPD COVID-19 SPEC COLLECT: HCPCS

## 2020-08-31 PROCEDURE — 84484 ASSAY OF TROPONIN QUANT: CPT | Performed by: HOSPITALIST

## 2020-08-31 PROCEDURE — 25010000002 ADENOSINE PER 6 MG: Performed by: EMERGENCY MEDICINE

## 2020-08-31 PROCEDURE — 96376 TX/PRO/DX INJ SAME DRUG ADON: CPT

## 2020-08-31 PROCEDURE — 80053 COMPREHEN METABOLIC PANEL: CPT | Performed by: EMERGENCY MEDICINE

## 2020-08-31 PROCEDURE — G0378 HOSPITAL OBSERVATION PER HR: HCPCS

## 2020-08-31 PROCEDURE — 71045 X-RAY EXAM CHEST 1 VIEW: CPT

## 2020-08-31 PROCEDURE — 99243 OFF/OP CNSLTJ NEW/EST LOW 30: CPT | Performed by: INTERNAL MEDICINE

## 2020-08-31 PROCEDURE — 93005 ELECTROCARDIOGRAM TRACING: CPT | Performed by: EMERGENCY MEDICINE

## 2020-08-31 PROCEDURE — 36415 COLL VENOUS BLD VENIPUNCTURE: CPT | Performed by: EMERGENCY MEDICINE

## 2020-08-31 PROCEDURE — 83880 ASSAY OF NATRIURETIC PEPTIDE: CPT | Performed by: EMERGENCY MEDICINE

## 2020-08-31 PROCEDURE — 85025 COMPLETE CBC W/AUTO DIFF WBC: CPT | Performed by: EMERGENCY MEDICINE

## 2020-08-31 PROCEDURE — 80306 DRUG TEST PRSMV INSTRMNT: CPT | Performed by: INTERNAL MEDICINE

## 2020-08-31 PROCEDURE — 99284 EMERGENCY DEPT VISIT MOD MDM: CPT

## 2020-08-31 PROCEDURE — 87635 SARS-COV-2 COVID-19 AMP PRB: CPT | Performed by: HOSPITALIST

## 2020-08-31 PROCEDURE — 96366 THER/PROPH/DIAG IV INF ADDON: CPT

## 2020-08-31 PROCEDURE — 85379 FIBRIN DEGRADATION QUANT: CPT | Performed by: INTERNAL MEDICINE

## 2020-08-31 PROCEDURE — 84439 ASSAY OF FREE THYROXINE: CPT | Performed by: EMERGENCY MEDICINE

## 2020-08-31 PROCEDURE — 80061 LIPID PANEL: CPT | Performed by: INTERNAL MEDICINE

## 2020-08-31 PROCEDURE — 84484 ASSAY OF TROPONIN QUANT: CPT | Performed by: EMERGENCY MEDICINE

## 2020-08-31 RX ORDER — ADENOSINE 3 MG/ML
6 INJECTION, SOLUTION INTRAVENOUS ONCE
Status: COMPLETED | OUTPATIENT
Start: 2020-08-31 | End: 2020-08-31

## 2020-08-31 RX ORDER — SODIUM CHLORIDE 9 MG/ML
75 INJECTION, SOLUTION INTRAVENOUS CONTINUOUS
Status: DISCONTINUED | OUTPATIENT
Start: 2020-08-31 | End: 2020-09-01 | Stop reason: HOSPADM

## 2020-08-31 RX ORDER — DILTIAZEM HYDROCHLORIDE 5 MG/ML
10 INJECTION INTRAVENOUS ONCE
Status: COMPLETED | OUTPATIENT
Start: 2020-08-31 | End: 2020-08-31

## 2020-08-31 RX ORDER — ACETAMINOPHEN 160 MG/5ML
650 SOLUTION ORAL EVERY 4 HOURS PRN
Status: DISCONTINUED | OUTPATIENT
Start: 2020-08-31 | End: 2020-09-01 | Stop reason: HOSPADM

## 2020-08-31 RX ORDER — SODIUM CHLORIDE 0.9 % (FLUSH) 0.9 %
10 SYRINGE (ML) INJECTION AS NEEDED
Status: DISCONTINUED | OUTPATIENT
Start: 2020-08-31 | End: 2020-09-01 | Stop reason: HOSPADM

## 2020-08-31 RX ORDER — SODIUM CHLORIDE 0.9 % (FLUSH) 0.9 %
10 SYRINGE (ML) INJECTION EVERY 12 HOURS SCHEDULED
Status: DISCONTINUED | OUTPATIENT
Start: 2020-08-31 | End: 2020-09-01 | Stop reason: HOSPADM

## 2020-08-31 RX ORDER — IPRATROPIUM BROMIDE AND ALBUTEROL SULFATE 2.5; .5 MG/3ML; MG/3ML
3 SOLUTION RESPIRATORY (INHALATION) EVERY 6 HOURS PRN
Status: DISCONTINUED | OUTPATIENT
Start: 2020-08-31 | End: 2020-09-01 | Stop reason: HOSPADM

## 2020-08-31 RX ORDER — ONDANSETRON 2 MG/ML
4 INJECTION INTRAMUSCULAR; INTRAVENOUS EVERY 6 HOURS PRN
Status: DISCONTINUED | OUTPATIENT
Start: 2020-08-31 | End: 2020-09-01 | Stop reason: HOSPADM

## 2020-08-31 RX ORDER — DILTIAZEM HCL IN NACL,ISO-OSM 125 MG/125
5-15 PLASTIC BAG, INJECTION (ML) INTRAVENOUS
Status: DISCONTINUED | OUTPATIENT
Start: 2020-08-31 | End: 2020-09-01

## 2020-08-31 RX ORDER — ACETAMINOPHEN 325 MG/1
650 TABLET ORAL EVERY 4 HOURS PRN
Status: DISCONTINUED | OUTPATIENT
Start: 2020-08-31 | End: 2020-09-01 | Stop reason: HOSPADM

## 2020-08-31 RX ORDER — ONDANSETRON 4 MG/1
4 TABLET, FILM COATED ORAL EVERY 6 HOURS PRN
Status: DISCONTINUED | OUTPATIENT
Start: 2020-08-31 | End: 2020-09-01 | Stop reason: HOSPADM

## 2020-08-31 RX ORDER — ADENOSINE 3 MG/ML
12 INJECTION, SOLUTION INTRAVENOUS ONCE
Status: COMPLETED | OUTPATIENT
Start: 2020-08-31 | End: 2020-08-31

## 2020-08-31 RX ORDER — ALUMINA, MAGNESIA, AND SIMETHICONE 2400; 2400; 240 MG/30ML; MG/30ML; MG/30ML
15 SUSPENSION ORAL EVERY 6 HOURS PRN
Status: DISCONTINUED | OUTPATIENT
Start: 2020-08-31 | End: 2020-09-01 | Stop reason: HOSPADM

## 2020-08-31 RX ORDER — ACETAMINOPHEN 650 MG/1
650 SUPPOSITORY RECTAL EVERY 4 HOURS PRN
Status: DISCONTINUED | OUTPATIENT
Start: 2020-08-31 | End: 2020-09-01 | Stop reason: HOSPADM

## 2020-08-31 RX ORDER — POTASSIUM CHLORIDE 1.5 G/1.77G
20 POWDER, FOR SOLUTION ORAL ONCE
Status: COMPLETED | OUTPATIENT
Start: 2020-08-31 | End: 2020-08-31

## 2020-08-31 RX ORDER — BISACODYL 5 MG/1
5 TABLET, DELAYED RELEASE ORAL DAILY PRN
Status: DISCONTINUED | OUTPATIENT
Start: 2020-08-31 | End: 2020-09-01 | Stop reason: HOSPADM

## 2020-08-31 RX ADMIN — APIXABAN 5 MG: 5 TABLET, FILM COATED ORAL at 20:37

## 2020-08-31 RX ADMIN — SODIUM CHLORIDE, PRESERVATIVE FREE 10 ML: 5 INJECTION INTRAVENOUS at 20:37

## 2020-08-31 RX ADMIN — Medication 5 MG/HR: at 13:54

## 2020-08-31 RX ADMIN — SODIUM CHLORIDE 75 ML/HR: 9 INJECTION, SOLUTION INTRAVENOUS at 17:23

## 2020-08-31 RX ADMIN — POTASSIUM CHLORIDE 20 MEQ: 1.5 POWDER, FOR SOLUTION ORAL at 20:37

## 2020-08-31 RX ADMIN — ADENOSINE 12 MG: 3 INJECTION, SOLUTION INTRAVENOUS at 13:28

## 2020-08-31 RX ADMIN — DILTIAZEM HYDROCHLORIDE 10 MG: 5 INJECTION INTRAVENOUS at 13:37

## 2020-08-31 RX ADMIN — METOPROLOL TARTRATE 25 MG: 25 TABLET, FILM COATED ORAL at 20:37

## 2020-08-31 RX ADMIN — ADENOSINE 6 MG: 3 INJECTION, SOLUTION INTRAVENOUS at 13:24

## 2020-09-01 ENCOUNTER — EPISODE CHANGES (OUTPATIENT)
Dept: CASE MANAGEMENT | Facility: OTHER | Age: 65
End: 2020-09-01

## 2020-09-01 ENCOUNTER — READMISSION MANAGEMENT (OUTPATIENT)
Dept: CALL CENTER | Facility: HOSPITAL | Age: 65
End: 2020-09-01

## 2020-09-01 ENCOUNTER — APPOINTMENT (OUTPATIENT)
Dept: CARDIOLOGY | Facility: HOSPITAL | Age: 65
End: 2020-09-01

## 2020-09-01 VITALS
OXYGEN SATURATION: 94 % | BODY MASS INDEX: 31.38 KG/M2 | HEART RATE: 70 BPM | DIASTOLIC BLOOD PRESSURE: 88 MMHG | TEMPERATURE: 98 F | RESPIRATION RATE: 16 BRPM | WEIGHT: 199.96 LBS | HEIGHT: 67 IN | SYSTOLIC BLOOD PRESSURE: 130 MMHG

## 2020-09-01 LAB
ANION GAP SERPL CALCULATED.3IONS-SCNC: 12 MMOL/L (ref 5–15)
BASOPHILS # BLD AUTO: 0.05 10*3/MM3 (ref 0–0.2)
BASOPHILS NFR BLD AUTO: 0.6 % (ref 0–1.5)
BH CV ECHO MEAS - ACS: 2.2 CM
BH CV ECHO MEAS - AO MAX PG (FULL): 2.1 MMHG
BH CV ECHO MEAS - AO MAX PG: 6.1 MMHG
BH CV ECHO MEAS - AO MEAN PG (FULL): 2 MMHG
BH CV ECHO MEAS - AO MEAN PG: 4 MMHG
BH CV ECHO MEAS - AO ROOT AREA (BSA CORRECTED): 1.7
BH CV ECHO MEAS - AO ROOT AREA: 9.1 CM^2
BH CV ECHO MEAS - AO ROOT DIAM: 3.4 CM
BH CV ECHO MEAS - AO V2 MAX: 123 CM/SEC
BH CV ECHO MEAS - AO V2 MEAN: 91.9 CM/SEC
BH CV ECHO MEAS - AO V2 VTI: 24.5 CM
BH CV ECHO MEAS - ASC AORTA: 3.3 CM
BH CV ECHO MEAS - AVA(I,A): 2.7 CM^2
BH CV ECHO MEAS - AVA(I,D): 2.7 CM^2
BH CV ECHO MEAS - AVA(V,A): 2.8 CM^2
BH CV ECHO MEAS - AVA(V,D): 2.8 CM^2
BH CV ECHO MEAS - BSA(HAYCOCK): 2.1 M^2
BH CV ECHO MEAS - BSA: 2 M^2
BH CV ECHO MEAS - BZI_BMI: 31.2 KILOGRAMS/M^2
BH CV ECHO MEAS - BZI_METRIC_HEIGHT: 170.2 CM
BH CV ECHO MEAS - BZI_METRIC_WEIGHT: 90.3 KG
BH CV ECHO MEAS - EDV(CUBED): 99.3 ML
BH CV ECHO MEAS - EDV(MOD-SP2): 91.9 ML
BH CV ECHO MEAS - EDV(MOD-SP4): 69.4 ML
BH CV ECHO MEAS - EDV(TEICH): 98.8 ML
BH CV ECHO MEAS - EF(CUBED): 65.7 %
BH CV ECHO MEAS - EF(MOD-SP2): 64.6 %
BH CV ECHO MEAS - EF(MOD-SP4): 55.2 %
BH CV ECHO MEAS - EF(TEICH): 57.3 %
BH CV ECHO MEAS - ESV(CUBED): 34 ML
BH CV ECHO MEAS - ESV(MOD-SP2): 32.5 ML
BH CV ECHO MEAS - ESV(MOD-SP4): 31.1 ML
BH CV ECHO MEAS - ESV(TEICH): 42.2 ML
BH CV ECHO MEAS - FS: 30 %
BH CV ECHO MEAS - IVS/LVPW: 1.2
BH CV ECHO MEAS - IVSD: 1.5 CM
BH CV ECHO MEAS - LA DIMENSION: 4.6 CM
BH CV ECHO MEAS - LA/AO: 1.4
BH CV ECHO MEAS - LV DIASTOLIC VOL/BSA (35-75): 34.4 ML/M^2
BH CV ECHO MEAS - LV MASS(C)D: 253.8 GRAMS
BH CV ECHO MEAS - LV MASS(C)DI: 125.8 GRAMS/M^2
BH CV ECHO MEAS - LV MAX PG: 4 MMHG
BH CV ECHO MEAS - LV MEAN PG: 2 MMHG
BH CV ECHO MEAS - LV SYSTOLIC VOL/BSA (12-30): 15.4 ML/M^2
BH CV ECHO MEAS - LV V1 MAX: 99.8 CM/SEC
BH CV ECHO MEAS - LV V1 MEAN: 62.9 CM/SEC
BH CV ECHO MEAS - LV V1 VTI: 19.4 CM
BH CV ECHO MEAS - LVIDD: 4.6 CM
BH CV ECHO MEAS - LVIDS: 3.2 CM
BH CV ECHO MEAS - LVLD AP2: 7.6 CM
BH CV ECHO MEAS - LVLD AP4: 7.3 CM
BH CV ECHO MEAS - LVLS AP2: 6.4 CM
BH CV ECHO MEAS - LVLS AP4: 6 CM
BH CV ECHO MEAS - LVOT AREA (M): 3.5 CM^2
BH CV ECHO MEAS - LVOT AREA: 3.5 CM^2
BH CV ECHO MEAS - LVOT DIAM: 2.1 CM
BH CV ECHO MEAS - LVPWD: 1.3 CM
BH CV ECHO MEAS - MV A MAX VEL: 62.1 CM/SEC
BH CV ECHO MEAS - MV DEC SLOPE: 451 CM/SEC^2
BH CV ECHO MEAS - MV E MAX VEL: 90.7 CM/SEC
BH CV ECHO MEAS - MV E/A: 1.5
BH CV ECHO MEAS - MV P1/2T MAX VEL: 108 CM/SEC
BH CV ECHO MEAS - MV P1/2T: 70.1 MSEC
BH CV ECHO MEAS - MVA P1/2T LCG: 2 CM^2
BH CV ECHO MEAS - MVA(P1/2T): 3.1 CM^2
BH CV ECHO MEAS - PA MAX PG (FULL): 3.4 MMHG
BH CV ECHO MEAS - PA MAX PG: 6.7 MMHG
BH CV ECHO MEAS - PA V2 MAX: 129 CM/SEC
BH CV ECHO MEAS - RAP SYSTOLE: 10 MMHG
BH CV ECHO MEAS - RV MAX PG: 3.2 MMHG
BH CV ECHO MEAS - RV MEAN PG: 1 MMHG
BH CV ECHO MEAS - RV V1 MAX: 90.1 CM/SEC
BH CV ECHO MEAS - RV V1 MEAN: 56.1 CM/SEC
BH CV ECHO MEAS - RV V1 VTI: 20.9 CM
BH CV ECHO MEAS - RVDD: 4.8 CM
BH CV ECHO MEAS - RVSP: 38.1 MMHG
BH CV ECHO MEAS - SI(AO): 110.2 ML/M^2
BH CV ECHO MEAS - SI(CUBED): 32.3 ML/M^2
BH CV ECHO MEAS - SI(LVOT): 33.3 ML/M^2
BH CV ECHO MEAS - SI(MOD-SP2): 29.4 ML/M^2
BH CV ECHO MEAS - SI(MOD-SP4): 19 ML/M^2
BH CV ECHO MEAS - SI(TEICH): 28.1 ML/M^2
BH CV ECHO MEAS - SV(AO): 222.4 ML
BH CV ECHO MEAS - SV(CUBED): 65.2 ML
BH CV ECHO MEAS - SV(LVOT): 67.2 ML
BH CV ECHO MEAS - SV(MOD-SP2): 59.4 ML
BH CV ECHO MEAS - SV(MOD-SP4): 38.3 ML
BH CV ECHO MEAS - SV(TEICH): 56.6 ML
BH CV ECHO MEAS - TR MAX VEL: 265 CM/SEC
BUN SERPL-MCNC: 13 MG/DL (ref 8–23)
BUN/CREAT SERPL: 15.3 (ref 7–25)
CALCIUM SPEC-SCNC: 8.7 MG/DL (ref 8.6–10.5)
CHLORIDE SERPL-SCNC: 110 MMOL/L (ref 98–107)
CO2 SERPL-SCNC: 21 MMOL/L (ref 22–29)
CREAT SERPL-MCNC: 0.85 MG/DL (ref 0.76–1.27)
DEPRECATED RDW RBC AUTO: 46 FL (ref 37–54)
EOSINOPHIL # BLD AUTO: 0.21 10*3/MM3 (ref 0–0.4)
EOSINOPHIL NFR BLD AUTO: 2.4 % (ref 0.3–6.2)
ERYTHROCYTE [DISTWIDTH] IN BLOOD BY AUTOMATED COUNT: 13.8 % (ref 12.3–15.4)
GFR SERPL CREATININE-BSD FRML MDRD: 91 ML/MIN/1.73
GLUCOSE SERPL-MCNC: 103 MG/DL (ref 65–99)
HCT VFR BLD AUTO: 45.6 % (ref 37.5–51)
HGB BLD-MCNC: 15.5 G/DL (ref 13–17.7)
IMM GRANULOCYTES # BLD AUTO: 0.04 10*3/MM3 (ref 0–0.05)
IMM GRANULOCYTES NFR BLD AUTO: 0.5 % (ref 0–0.5)
LYMPHOCYTES # BLD AUTO: 2.43 10*3/MM3 (ref 0.7–3.1)
LYMPHOCYTES NFR BLD AUTO: 28 % (ref 19.6–45.3)
MAXIMAL PREDICTED HEART RATE: 157 BPM
MCH RBC QN AUTO: 30.8 PG (ref 26.6–33)
MCHC RBC AUTO-ENTMCNC: 34 G/DL (ref 31.5–35.7)
MCV RBC AUTO: 90.7 FL (ref 79–97)
MONOCYTES # BLD AUTO: 1.08 10*3/MM3 (ref 0.1–0.9)
MONOCYTES NFR BLD AUTO: 12.5 % (ref 5–12)
NEUTROPHILS NFR BLD AUTO: 4.86 10*3/MM3 (ref 1.7–7)
NEUTROPHILS NFR BLD AUTO: 56 % (ref 42.7–76)
NRBC BLD AUTO-RTO: 0 /100 WBC (ref 0–0.2)
PLATELET # BLD AUTO: 207 10*3/MM3 (ref 140–450)
PMV BLD AUTO: 10.5 FL (ref 6–12)
POTASSIUM SERPL-SCNC: 3.8 MMOL/L (ref 3.5–5.2)
RBC # BLD AUTO: 5.03 10*6/MM3 (ref 4.14–5.8)
SODIUM SERPL-SCNC: 143 MMOL/L (ref 136–145)
STRESS TARGET HR: 133 BPM
TROPONIN T SERPL-MCNC: <0.01 NG/ML (ref 0–0.03)
WBC # BLD AUTO: 8.67 10*3/MM3 (ref 3.4–10.8)

## 2020-09-01 PROCEDURE — G0378 HOSPITAL OBSERVATION PER HR: HCPCS

## 2020-09-01 PROCEDURE — 96366 THER/PROPH/DIAG IV INF ADDON: CPT

## 2020-09-01 PROCEDURE — 99213 OFFICE O/P EST LOW 20 MIN: CPT | Performed by: INTERNAL MEDICINE

## 2020-09-01 PROCEDURE — 80048 BASIC METABOLIC PNL TOTAL CA: CPT | Performed by: INTERNAL MEDICINE

## 2020-09-01 PROCEDURE — 84484 ASSAY OF TROPONIN QUANT: CPT | Performed by: HOSPITALIST

## 2020-09-01 PROCEDURE — 93306 TTE W/DOPPLER COMPLETE: CPT | Performed by: INTERNAL MEDICINE

## 2020-09-01 PROCEDURE — 93306 TTE W/DOPPLER COMPLETE: CPT

## 2020-09-01 PROCEDURE — 85025 COMPLETE CBC W/AUTO DIFF WBC: CPT | Performed by: INTERNAL MEDICINE

## 2020-09-01 PROCEDURE — 96361 HYDRATE IV INFUSION ADD-ON: CPT

## 2020-09-01 RX ORDER — DILTIAZEM HYDROCHLORIDE 120 MG/1
120 CAPSULE, COATED, EXTENDED RELEASE ORAL
Qty: 30 CAPSULE | Refills: 1 | Status: SHIPPED | OUTPATIENT
Start: 2020-09-01 | End: 2020-09-09 | Stop reason: SDUPTHER

## 2020-09-01 RX ORDER — METOPROLOL SUCCINATE 50 MG/1
50 TABLET, EXTENDED RELEASE ORAL
Status: DISCONTINUED | OUTPATIENT
Start: 2020-09-01 | End: 2020-09-01 | Stop reason: HOSPADM

## 2020-09-01 RX ORDER — ASPIRIN 81 MG/1
81 TABLET ORAL DAILY
Qty: 30 TABLET | Refills: 1 | Status: SHIPPED | OUTPATIENT
Start: 2020-09-02 | End: 2022-08-30

## 2020-09-01 RX ORDER — ASPIRIN 81 MG/1
81 TABLET ORAL DAILY
Status: DISCONTINUED | OUTPATIENT
Start: 2020-09-01 | End: 2020-09-01 | Stop reason: HOSPADM

## 2020-09-01 RX ORDER — POTASSIUM CHLORIDE 1.5 G/1.77G
40 POWDER, FOR SOLUTION ORAL ONCE
Status: COMPLETED | OUTPATIENT
Start: 2020-09-01 | End: 2020-09-01

## 2020-09-01 RX ORDER — METOPROLOL SUCCINATE 50 MG/1
50 TABLET, EXTENDED RELEASE ORAL
Qty: 30 TABLET | Refills: 1 | Status: SHIPPED | OUTPATIENT
Start: 2020-09-02 | End: 2020-09-09 | Stop reason: SDUPTHER

## 2020-09-01 RX ORDER — DILTIAZEM HYDROCHLORIDE 120 MG/1
120 CAPSULE, COATED, EXTENDED RELEASE ORAL
Status: DISCONTINUED | OUTPATIENT
Start: 2020-09-01 | End: 2020-09-01 | Stop reason: HOSPADM

## 2020-09-01 RX ADMIN — ASPIRIN 81 MG: 81 TABLET, COATED ORAL at 10:10

## 2020-09-01 RX ADMIN — METOPROLOL SUCCINATE 50 MG: 50 TABLET, EXTENDED RELEASE ORAL at 10:10

## 2020-09-01 RX ADMIN — POTASSIUM CHLORIDE 40 MEQ: 1.5 POWDER, FOR SOLUTION ORAL at 10:10

## 2020-09-01 RX ADMIN — SODIUM CHLORIDE 75 ML/HR: 9 INJECTION, SOLUTION INTRAVENOUS at 05:28

## 2020-09-01 RX ADMIN — DILTIAZEM HYDROCHLORIDE 120 MG: 120 CAPSULE, COATED, EXTENDED RELEASE ORAL at 11:00

## 2020-09-01 NOTE — OUTREACH NOTE
Prep Survey      Responses   [facility] facility patient discharged from?  [facility]   Is LACE score < 7 ?  Yes   Eligibility  [facility]   Date of Admission  08/31/20   Date of Discharge  09/01/20   Discharge Disposition  Home or Self Care   Discharge diagnosis  SVT   COVID-19 Test Status  Negative   Does the patient have one of the following disease processes/diagnoses(primary or secondary)?  Other   Does the patient have Home health ordered?  No   Is there a DME ordered?  No   Prep survey completed?  Yes          Vanessa Rosas RN

## 2020-09-01 NOTE — DISCHARGE SUMMARY
[facility] Medicine Services  DISCHARGE SUMMARY       Date of Admission: 8/31/2020  Date of Discharge:  9/1/2020  Primary Care Physician: Dilcia Kapoor APRN    Presenting Problem/History of Present Illness:  SVT (supraventricular tachycardia) (CMS/[facility]) [I47.1]       Final Discharge Diagnoses:  Active Hospital Problems    Diagnosis   • SVT (supraventricular tachycardia) (CMS/[facility])   Patient is a 65 y.o. male with history of nicotine dependence presents with onset of palpitation with chest tightness while he was at work earlier today.  Symptom is associated with diaphoresis but patient denies chest pain, nausea, vomiting, shortness of air, fever, chills, syncope or presyncope.     On triage his heart rate ranged from 112-140.  Initial EKG showed sinus tachycardia with ventricular response of 140 and diffuse nonspecific ST-T wave changes.  Patient was given 2 doses of adenosine without any improvement.  He was subsequently started on Cardizem infusion.  Follow-up EKG showed atrial flutter with ventricular response of 93.       Consults:   Consults     Date and Time Order Name Status Description    8/31/2020 1633 Inpatient Cardiology Consult            Procedures Performed: None.                Pertinent Test Results:   Lab Results (last 7 days)     Procedure Component Value Units Date/Time    Troponin [[id number]]  (Normal) Collected:  09/01/20 0259    Specimen:  Blood Updated:  09/01/20 0407     Troponin T <0.010 ng/mL     Narrative:       Troponin T Reference Range:  <= 0.03 ng/mL-   Negative for AMI  >0.03 ng/mL-     Abnormal for myocardial necrosis.  Clinicians would have to utilize clinical acumen, EKG, Troponin and serial changes to determine if it is an Acute Myocardial Infarction or myocardial injury due to an underlying chronic condition.       Results may be falsely decreased if patient taking Biotin.      Basic Metabolic Panel [[id number]]  (Abnormal)  Collected:  09/01/20 0259    Specimen:  Blood Updated:  09/01/20 0407     Glucose 103 mg/dL      BUN 13 mg/dL      Creatinine 0.85 mg/dL      Sodium 143 mmol/L      Potassium 3.8 mmol/L      Chloride 110 mmol/L      CO2 21.0 mmol/L      Calcium 8.7 mg/dL      eGFR Non African Amer 91 mL/min/1.73      BUN/Creatinine Ratio 15.3     Anion Gap 12.0 mmol/L     Narrative:       GFR Normal >60  Chronic Kidney Disease <60  Kidney Failure <15      CBC Auto Differential [[id number]]  (Abnormal) Collected:  09/01/20 0259    Specimen:  Blood Updated:  09/01/20 0342     WBC 8.67 10*3/mm3      RBC 5.03 10*6/mm3      Hemoglobin 15.5 g/dL      Hematocrit 45.6 %      MCV 90.7 fL      MCH 30.8 pg      MCHC 34.0 g/dL      RDW 13.8 %      RDW-SD 46.0 fl      MPV 10.5 fL      Platelets 207 10*3/mm3      Neutrophil % 56.0 %      Lymphocyte % 28.0 %      Monocyte % 12.5 %      Eosinophil % 2.4 %      Basophil % 0.6 %      Immature Grans % 0.5 %      Neutrophils, Absolute 4.86 10*3/mm3      Lymphocytes, Absolute 2.43 10*3/mm3      Monocytes, Absolute 1.08 10*3/mm3      Eosinophils, Absolute 0.21 10*3/mm3      Basophils, Absolute 0.05 10*3/mm3      Immature Grans, Absolute 0.04 10*3/mm3      nRBC 0.0 /100 WBC     COVID PRE-OP / PRE-PROCEDURE SCREENING ORDER (NO ISOLATION) - Swab, Nasopharynx [[id number]] Collected:  08/31/20 1616    Specimen:  Swab from Nasopharynx Updated:  08/31/20 2769    Narrative:       The following orders were created for panel order COVID PRE-OP / PRE-PROCEDURE SCREENING ORDER (NO ISOLATION) - Swab, Nasopharynx.  Procedure                               Abnormality         Status                     ---------                               -----------         ------                     COVID-19, [facility] IN-HOUS...[[id number]]  Normal              Final result                 Please view results for these tests on the individual orders.    CHANG MILLER IN-HOUSE, NP SWAB IN TRANSPORT MEDIA 8-10 HR TAT - Swab,  Nasopharynx [[id number]]  (Normal) Collected:  08/31/20 1616    Specimen:  Swab from Nasopharynx Updated:  08/31/20 2319     COVID19 Not Detected    Narrative:       Testing performed by Real Time RT-PCR  This test has not been approved by the [facility] but is authorized under the Emergency Use Act (EUA)    Fact sheet for providers: https://www.fda.gov/media/005261/download    Fact sheet for patients: https://www.fda.gov/media/280198/download        Troponin [[id number]]  (Normal) Collected:  08/31/20 2117    Specimen:  Blood Updated:  08/31/20 2144     Troponin T <0.010 ng/mL     Narrative:       Troponin T Reference Range:  <= 0.03 ng/mL-   Negative for AMI  >0.03 ng/mL-     Abnormal for myocardial necrosis.  Clinicians would have to utilize clinical acumen, EKG, Troponin and serial changes to determine if it is an Acute Myocardial Infarction or myocardial injury due to an underlying chronic condition.       Results may be falsely decreased if patient taking Biotin.      Urine Drug Screen - Urine, Clean Catch [[id number]]  (Normal) Collected:  08/31/20 1815    Specimen:  Urine, Clean Catch Updated:  08/31/20 2055     THC, Screen, Urine Negative     Phencyclidine (PCP), Urine Negative     Cocaine Screen, Urine Negative     Methamphetamine, Ur Negative     Opiate Screen Negative     Amphetamine Screen, Urine Negative     Benzodiazepine Screen, Urine Negative     Tricyclic Antidepressants Screen Negative     Methadone Screen, Urine Negative     Barbiturates Screen, Urine Negative     Oxycodone Screen, Urine Negative     Propoxyphene Screen Negative     Buprenorphine, Screen, Urine Negative    Narrative:       Cutoff For Drugs Screened:    Amphetamines               500 ng/ml  Barbiturates               200 ng/ml  Benzodiazepines            150 ng/ml  Cocaine                    150 ng/ml  Methadone                  200 ng/ml  Opiates                    100 ng/ml  Phencyclidine               25 ng/ml  THC                             50 ng/ml  Methamphetamine            500 ng/ml  Tricyclic Antidepressants  300 ng/ml  Oxycodone                  100 ng/ml  Propoxyphene               300 ng/ml  Buprenorphine               10 ng/ml    The normal value for all drugs tested is negative. This report includes unconfirmed screening results, with the cutoff values listed, to be used for medical treatment purposes only.  Unconfirmed results must not be used for non-medical purposes such as employment or legal testing.  Clinical consideration should be applied to any drug of abuse test, particularly when unconfirmed results are used.      D-dimer, Quantitative [[id number]]  (Normal) Collected:  08/31/20 1322    Specimen:  Blood Updated:  08/31/20 1835     D-Dimer, Quantitative 406 ng/mL (FEU)     Narrative:       Dimer values <500 ng/ml FEU are FDA approved as aid in diagnosis of deep venous thrombosis and pulmonary embolism.  This test should not be used in an exclusion strategy with pretest probability alone.    A recent guideline regarding diagnosis for pulmonary thromboembolism recommends an adjusted exclusion criterion of age x 10 ng/ml FEU for patients >50 years of age (Emi Intern Med 2015; 163: 701-711).      Urinalysis With Microscopic If Indicated (No Culture) - Urine, Clean Catch [[id number]]  (Abnormal) Collected:  08/31/20 1815    Specimen:  Urine, Clean Catch Updated:  08/31/20 1830     Color, UA Yellow     Appearance, UA Clear     pH, UA 6.5     Specific Gravity, UA 1.010     Glucose, UA Negative     Ketones, UA 15 mg/dL (1+)     Bilirubin, UA Negative     Blood, UA Negative     Protein, UA Negative     Leuk Esterase, UA Negative     Nitrite, UA Negative     Urobilinogen, UA 1.0 E.U./dL    Narrative:       Urine microscopic not indicated.    Lipid Panel [[id number]]  (Abnormal) Collected:  08/31/20 1519    Specimen:  Blood Updated:  08/31/20 1701     Total Cholesterol 194 mg/dL      Triglycerides 64 mg/dL      HDL Cholesterol 72 mg/dL       LDL Cholesterol  109 mg/dL      VLDL Cholesterol 12.8 mg/dL      LDL/HDL Ratio 1.52    Narrative:       Cholesterol Reference Ranges  (U.S. Department of Health and Human Services ATP III Classifications)    Desirable          <200 mg/dL  Borderline High    200-239 mg/dL  High Risk          >240 mg/dL      Triglyceride Reference Ranges  (U.S. Department of Health and Human Services ATP III Classifications)    Normal           <150 mg/dL  Borderline High  150-199 mg/dL  High             200-499 mg/dL  Very High        >500 mg/dL    HDL Reference Ranges  (U.S. Department of Health and Human Services ATP III Classifcations)    Low     <40 mg/dl (major risk factor for CHD)  High    >60 mg/dl ('negative' risk factor for CHD)        LDL Reference Ranges  (U.S. Department of Health and Human Services ATP III Classifcations)    Optimal          <100 mg/dL  Near Optimal     100-129 mg/dL  Borderline High  130-159 mg/dL  High             160-189 mg/dL  Very High        >189 mg/dL    Magnesium [[id number]]  (Normal) Collected:  08/31/20 1519    Specimen:  Blood Updated:  08/31/20 1701     Magnesium 2.0 mg/dL     Troponin [[id number]]  (Normal) Collected:  08/31/20 1519    Specimen:  Blood Updated:  08/31/20 1551     Troponin T <0.010 ng/mL     Narrative:       Troponin T Reference Range:  <= 0.03 ng/mL-   Negative for AMI  >0.03 ng/mL-     Abnormal for myocardial necrosis.  Clinicians would have to utilize clinical acumen, EKG, Troponin and serial changes to determine if it is an Acute Myocardial Infarction or myocardial injury due to an underlying chronic condition.       Results may be falsely decreased if patient taking Biotin.      TSH [[id number]]  (Normal) Collected:  08/31/20 1322    Specimen:  Blood Updated:  08/31/20 1452     TSH 2.670 uIU/mL     T4, Free [[id number]]  (Normal) Collected:  08/31/20 1322    Specimen:  Blood Updated:  08/31/20 1452     Free T4 1.35 ng/dL     Narrative:       Results may be falsely increased  if patient taking Biotin.      [place] Draw [[id number]] Collected:  08/31/20 1322    Specimen:  Blood Updated:  08/31/20 1430    Narrative:       The following orders were created for panel order [place] Draw.  Procedure                               Abnormality         Status                     ---------                               -----------         ------                     Light Blue Top[[id number]]                                   Final result               Green Top (Gel)[[id number]]                                  Final result               Lavender Top[[id number]]                                     Final result               Gold Top - SST[[id number]]                                   Final result                 Please view results for these tests on the individual orders.    Light Blue Top [[id number]] Collected:  08/31/20 1322    Specimen:  Blood Updated:  08/31/20 1430     Extra Tube hold for add-on     Comment: Auto resulted       Green Top (Gel) [[id number]] Collected:  08/31/20 1322    Specimen:  Blood Updated:  08/31/20 1430     Extra Tube Hold for add-ons.     Comment: Auto resulted.       Lavender Top [[id number]] Collected:  08/31/20 1322    Specimen:  Blood Updated:  08/31/20 1430     Extra Tube hold for add-on     Comment: Auto resulted       Gold Top - SST [[id number]] Collected:  08/31/20 1322    Specimen:  Blood Updated:  08/31/20 1430     Extra Tube Hold for add-ons.     Comment: Auto resulted.       Comprehensive Metabolic Panel [[id number]] Collected:  08/31/20 1322    Specimen:  Blood Updated:  08/31/20 1358     Glucose 88 mg/dL      BUN 11 mg/dL      Creatinine 0.84 mg/dL      Sodium 144 mmol/L      Potassium 3.8 mmol/L      Comment: Slight hemolysis detected by analyzer. Results may be affected.        Chloride 107 mmol/L      CO2 23.0 mmol/L      Calcium 9.1 mg/dL      Total Protein 6.6 g/dL      Albumin 4.30 g/dL      ALT (SGPT) 20 U/L      AST (SGOT) 18 U/L      Alkaline Phosphatase  61 U/L      Total Bilirubin 0.7 mg/dL      eGFR Non African Amer 92 mL/min/1.73      Globulin 2.3 gm/dL      A/G Ratio 1.9 g/dL      BUN/Creatinine Ratio 13.1     Anion Gap 14.0 mmol/L     Narrative:       GFR Normal >60  Chronic Kidney Disease <60  Kidney Failure <15      Troponin [[id number]]  (Normal) Collected:  08/31/20 1322    Specimen:  Blood Updated:  08/31/20 1356     Troponin T <0.010 ng/mL     Narrative:       Troponin T Reference Range:  <= 0.03 ng/mL-   Negative for AMI  >0.03 ng/mL-     Abnormal for myocardial necrosis.  Clinicians would have to utilize clinical acumen, EKG, Troponin and serial changes to determine if it is an Acute Myocardial Infarction or myocardial injury due to an underlying chronic condition.       Results may be falsely decreased if patient taking Biotin.      BNP [[id number]]  (Abnormal) Collected:  08/31/20 1322    Specimen:  Blood Updated:  08/31/20 1355     proBNP 2,112.0 pg/mL     Narrative:       Among patients with dyspnea, NT-proBNP is highly sensitive for the detection of acute congestive heart failure. In addition NT-proBNP of <300 pg/ml effectively rules out acute congestive heart failure with 99% negative predictive value.    Results may be falsely decreased if patient taking Biotin.      CBC & Differential [[id number]] Collected:  08/31/20 1322    Specimen:  Blood Updated:  08/31/20 1334    Narrative:       The following orders were created for panel order CBC & Differential.  Procedure                               Abnormality         Status                     ---------                               -----------         ------                     CBC Auto Differential[[id number]]        Abnormal            Final result                 Please view results for these tests on the individual orders.    CBC Auto Differential [[id number]]  (Abnormal) Collected:  08/31/20 1322    Specimen:  Blood Updated:  08/31/20 1334     WBC 9.66 10*3/mm3      RBC 5.63 10*6/mm3       Hemoglobin 17.4 g/dL      Hematocrit 51.5 %      MCV 91.5 fL      MCH 30.9 pg      MCHC 33.8 g/dL      RDW 13.6 %      RDW-SD 46.1 fl      MPV 10.4 fL      Platelets 222 10*3/mm3      Neutrophil % 57.1 %      Lymphocyte % 29.5 %      Monocyte % 11.4 %      Eosinophil % 1.2 %      Basophil % 0.5 %      Immature Grans % 0.3 %      Neutrophils, Absolute 5.51 10*3/mm3      Lymphocytes, Absolute 2.85 10*3/mm3      Monocytes, Absolute 1.10 10*3/mm3      Eosinophils, Absolute 0.12 10*3/mm3      Basophils, Absolute 0.05 10*3/mm3      Immature Grans, Absolute 0.03 10*3/mm3      nRBC 0.0 /100 WBC         Imaging Results (Last 7 Days)     Procedure Component Value Units Date/Time    XR Chest 1 View [[id number]] Collected:  08/31/20 1331     Updated:  08/31/20 1352    Narrative:       EXAM: XR CHEST 1 VIEW    COMPARISONS: Radiograph 12/2/2019    INDICATION: Chest pain protocol  chest pain protocol    FINDINGS:  Frontal view of the chest.    Lungs are adequately expanded. Question early faint airspace  opacities in the peripheral right middle to lower lobe.    No effusion or pneumothorax. Infrahilar streaky opacities are  essentially unchanged given differences in technique. Heart size  is normal. No acute osseous abnormality.      Impression:       Possible early peripheral middle to lower lobe airspace disease  versus portable technique and superimposed soft tissues, favoring  latter. Would obtained dedicated PA and lateral chest radiograph  for further evaluation.    Electronically signed by:  Imtiaz Ragland MD  8/31/2020  1:51 PM CDT Workstation: [id number]            Chief Complaint on Day of Discharge: None.    Hospital Course:  The patient was admitted for new onset A. fib/flutter with rapid ventricular response, started on Cardizem infusion, metoprolol and anticoagulation with Eliquis.  He was seen by the cardiologist on consult who recommended discontinuation of Eliquis and start of daily aspirin due to patient's  "chads score of 0.  Patient did convert to normal sinus rhythm and was transitioned to Cardizem CD with continuation of oral metoprolol.  He was cleared for discharge by Dr. Sims and will be scheduled to have an event monitor as an outpatient.    He was recommended nicotine patch secondary to nicotine dependence but patient declined.  He did receive counseling.      Condition on Discharge: Stable and improved.    Physical Exam on Discharge:  /97   Pulse 72   Temp 98 °F (36.7 °C) (Oral)   Resp 16   Ht 170 cm (66.93\")   Wt 90.7 kg (199 lb 15.3 oz)   SpO2 94%   BMI 31.38 kg/m²   Physical Exam   Constitutional: He is oriented to person, place, and time. He appears well-developed and well-nourished. No distress.   HENT:   Head: Normocephalic and atraumatic.   Eyes: Pupils are equal, round, and reactive to light. EOM are normal. No scleral icterus.   Neck: Normal range of motion. Neck supple. No JVD present. No thyromegaly present.   Cardiovascular: Normal rate, regular rhythm and normal heart sounds. Exam reveals no gallop and no friction rub.   No murmur heard.  Pulmonary/Chest: Effort normal and breath sounds normal. He has no wheezes. He has no rales. He exhibits no tenderness.   Abdominal: Soft. Bowel sounds are normal. He exhibits no distension and no mass. There is no tenderness. There is no rebound and no guarding.   Musculoskeletal: He exhibits no edema, tenderness or deformity.   Neurological: He is alert and oriented to person, place, and time. He displays normal reflexes. No cranial nerve deficit or sensory deficit. He exhibits normal muscle tone. Coordination normal.   Skin: Skin is warm and dry. No rash noted. He is not diaphoretic. No erythema. No pallor.   Psychiatric: He has a normal mood and affect. His behavior is normal. Judgment and thought content normal.   Nursing note and vitals reviewed.        Discharge Disposition:  Home or Self Care    Discharge Medications:     Discharge " Medications      New Medications      Instructions Start Date   aspirin 81 MG EC tablet   81 mg, Oral, Daily   Start Date:  September 2, 2020     dilTIAZem  MG 24 hr capsule  Commonly known as:  CARDIZEM CD   120 mg, Oral, Every 24 Hours Scheduled      metoprolol succinate XL 50 MG 24 hr tablet  Commonly known as:  TOPROL-XL   50 mg, Oral, Every 24 Hours Scheduled   Start Date:  September 2, 2020            Discharge Diet: Cardiac.    Activity at Discharge: As tolerated.    Discharge Care Plan/Instructions: Patient has been advised to take his medications as prescribed and to return to emergency in the event of any worsening symptoms.    Follow-up Appointments:   Follow-up with primary care provider in 1 week and with Dr. Sims as outpatient.  Test Results Pending at Discharge:     Ryan Toney MD  09/01/20  10:52    Time: 35 minutes.

## 2020-09-01 NOTE — PLAN OF CARE
Problem: Patient Care Overview  Goal: Plan of Care Review  Outcome: Ongoing (interventions implemented as appropriate)  Flowsheets  Taken 9/1/2020 0652  Progress: improving  Outcome Summary: Pt converted to SR at 2150, cardizem stopped. Pt remained in SR through rest of shift. Pt denies pain or needs.  Taken 9/1/2020 0200  Plan of Care Reviewed With: patient

## 2020-09-02 ENCOUNTER — TRANSITIONAL CARE MANAGEMENT TELEPHONE ENCOUNTER (OUTPATIENT)
Dept: CALL CENTER | Facility: HOSPITAL | Age: 65
End: 2020-09-02

## 2020-09-02 NOTE — OUTREACH NOTE
Call Center TCM Note      Responses   [facility] patient discharged from?  [place]   COVID-19 Test Status  Negative   Does the patient have one of the following disease processes/diagnoses(primary or secondary)?  Other   TCM attempt successful?  No   Unsuccessful attempts  Attempt 2 [Attempted patient # only,  no updated verbal release to attempt other contacts. ]          Rae Fermin RN    9/2/2020, 15:48

## 2020-09-02 NOTE — OUTREACH NOTE
Call Center TCM Note      Responses   [facility] patient discharged from?  [place]   COVID-19 Test Status  Negative   Does the patient have one of the following disease processes/diagnoses(primary or secondary)?  Other   TCM attempt successful?  No   Unsuccessful attempts  Attempt 1          Rae Fermin RN    9/2/2020, 13:16

## 2020-09-03 ENCOUNTER — TRANSITIONAL CARE MANAGEMENT TELEPHONE ENCOUNTER (OUTPATIENT)
Dept: CALL CENTER | Facility: HOSPITAL | Age: 65
End: 2020-09-03

## 2020-09-03 NOTE — OUTREACH NOTE
Call Center TCM Note      Responses   [facility]   COVID-19 Test Status  Negative   Does the patient have one of the following disease processes/diagnoses(primary or secondary)?  Other   TCM attempt successful?  Yes   Call start time  1045   Call end time  1054   Discharge diagnosis  SVT   Meds reviewed with patient/caregiver?  Yes   Is the patient having any side effects they believe may be caused by any medication additions or changes?  No   Does the patient have all medications ordered at discharge?  Yes   Is the patient taking all medications as directed (includes completed medication regime)?  Yes   Comments regarding appointments  Cardiology followup on 9/24/2020   Does the patient have a primary care provider?   Yes   Does the patient have an appointment with their PCP within 7 days of discharge?  Greater than 7 days   Comments regarding PCP  PCP:  Dilcia Kapoor APRN Has a followup with 9/9/2020   What is preventing the patient from scheduling follow up appointments within 7 days of discharge?  Earlier appointment not available   Nursing Interventions  Verified appointment date/time/provider   Has the patient kept scheduled appointments due by today?  N/A   Has home health visited the patient within 72 hours of discharge?  N/A   Psychosocial issues?  No   Comments  Patient denies any heart palpitations or SOB. He reports he is doing well.    Did the patient receive a copy of their discharge instructions?  Yes   Nursing interventions  Reviewed instructions with patient   What is the patient's perception of their health status since discharge?  Improving   Is the patient/caregiver able to teach back signs and symptoms related to disease process for when to call PCP?  Yes   Is the patient/caregiver able to teach back signs and symptoms related to disease process for when to call 911?  Yes   Is the patient/caregiver able to teach back the hierarchy of who to  call/visit for symptoms/problems? PCP, Specialist, Home health nurse, Urgent Care, ED, 911  Yes   TCM call completed?  Yes          Kyle Roger RN    9/3/2020, 10:54

## 2020-09-09 ENCOUNTER — OFFICE VISIT (OUTPATIENT)
Dept: FAMILY MEDICINE CLINIC | Facility: CLINIC | Age: 65
End: 2020-09-09

## 2020-09-09 VITALS
TEMPERATURE: 98 F | OXYGEN SATURATION: 97 % | DIASTOLIC BLOOD PRESSURE: 78 MMHG | WEIGHT: 194 LBS | HEART RATE: 58 BPM | HEIGHT: 67 IN | BODY MASS INDEX: 30.45 KG/M2 | SYSTOLIC BLOOD PRESSURE: 112 MMHG

## 2020-09-09 DIAGNOSIS — I48.91 ATRIAL FIBRILLATION, UNSPECIFIED TYPE (HCC): Primary | ICD-10-CM

## 2020-09-09 PROCEDURE — 99213 OFFICE O/P EST LOW 20 MIN: CPT | Performed by: NURSE PRACTITIONER

## 2020-09-09 RX ORDER — DILTIAZEM HYDROCHLORIDE 120 MG/1
120 CAPSULE, COATED, EXTENDED RELEASE ORAL
Qty: 30 CAPSULE | Refills: 11 | Status: SHIPPED | OUTPATIENT
Start: 2020-09-09 | End: 2020-10-26

## 2020-09-09 RX ORDER — METOPROLOL SUCCINATE 50 MG/1
50 TABLET, EXTENDED RELEASE ORAL
Qty: 30 TABLET | Refills: 11 | Status: SHIPPED | OUTPATIENT
Start: 2020-09-09 | End: 2020-10-26

## 2020-09-09 NOTE — PROGRESS NOTES
Chief Complaint   Patient presents with   • Atrial Fibrillation     hospital follow up from the 08/31/2020     Subjective   Pavan Mccauley is a 65 y.o. male.     Presents with hospital follow up-doing well at this time, reports feeling better, here to review labs and meds     Atrial Fibrillation   Presents for follow-up visit. Symptoms include chest pain, dizziness, hypertension and palpitations. Symptoms are negative for an AICD problem, bradycardia, hemodynamic instability, hypotension, pacemaker problem, shortness of breath, syncope, tachycardia and weakness. The symptoms have been stable. Past medical history includes atrial fibrillation. Medication compliance problems include medication side effects.        The following portions of the patient's history were reviewed and updated as appropriate: allergies, current medications, past social history and problem list.    Review of Systems   Constitutional: Negative.  Negative for activity change, appetite change, chills, diaphoresis, fatigue, fever and unexpected weight change.   HENT: Negative.    Eyes: Negative.  Negative for photophobia, pain, discharge, redness and itching.   Respiratory: Negative.  Negative for apnea, cough, choking, chest tightness and shortness of breath.    Cardiovascular: Positive for chest pain and palpitations. Negative for leg swelling and syncope.   Gastrointestinal: Negative.  Negative for abdominal distention, abdominal pain and anal bleeding.   Endocrine: Negative.  Negative for cold intolerance, heat intolerance, polydipsia, polyphagia and polyuria.   Genitourinary: Negative.    Musculoskeletal: Negative.  Negative for arthralgias, back pain, gait problem and joint swelling.   Skin: Negative.  Negative for color change.   Allergic/Immunologic: Negative.  Negative for environmental allergies, food allergies and immunocompromised state.   Neurological: Positive for dizziness. Negative for tremors, seizures, syncope, facial  "asymmetry, speech difficulty, weakness, light-headedness, numbness and headaches.   Hematological: Negative.  Negative for adenopathy. Does not bruise/bleed easily.   Psychiatric/Behavioral: Negative.  Negative for agitation, behavioral problems, confusion, decreased concentration, dysphoric mood and hallucinations.       Objective   /78   Pulse 58   Temp 98 °F (36.7 °C) (Tympanic)   Ht 169.9 cm (66.9\")   Wt 88 kg (194 lb)   SpO2 97%   BMI 30.48 kg/m²   Physical Exam   Constitutional: He is oriented to person, place, and time. He appears well-developed and well-nourished. He appears distressed.   HENT:   Head: Normocephalic.   Mouth/Throat: Oropharynx is clear and moist.   Eyes: Conjunctivae and EOM are normal.   Neck: Neck supple.   Cardiovascular: Normal rate and regular rhythm. Exam reveals no gallop and no friction rub.   Murmur heard.  Pulmonary/Chest: Breath sounds normal. No stridor. He is in respiratory distress. He has no wheezes. He has no rales. He exhibits no tenderness.   Abdominal: Soft. He exhibits no mass. There is no tenderness. There is no guarding.   Musculoskeletal: Normal range of motion. He exhibits no edema, tenderness or deformity.   Neurological: He is alert and oriented to person, place, and time. He displays normal reflexes. No cranial nerve deficit or sensory deficit. He exhibits normal muscle tone. Coordination normal.   Skin: No rash noted. No erythema. No pallor.   Psychiatric: He has a normal mood and affect.       Assessment/Plan   Problem List Items Addressed This Visit        Cardiovascular and Mediastinum    Atrial fibrillation (CMS/HCC) - Primary    Relevant Medications    dilTIAZem CD (CARDIZEM CD) 120 MG 24 hr capsule    metoprolol succinate XL (TOPROL-XL) 50 MG 24 hr tablet    Other Relevant Orders    CBC & Differential    Comprehensive Metabolic Panel    TSH    Vitamin B12    Vitamin D 25 Hydroxy    Iron           New Medications Ordered This Visit   Medications "   • dilTIAZem CD (CARDIZEM CD) 120 MG 24 hr capsule     Sig: Take 1 capsule by mouth Daily.     Dispense:  30 capsule     Refill:  11   • metoprolol succinate XL (TOPROL-XL) 50 MG 24 hr tablet     Sig: Take 1 tablet by mouth Daily.     Dispense:  30 tablet     Refill:  11       It's not just what you eat, but when you eat  Eat breakfast, and eat smaller meals throughout the day. A healthy breakfast can jumpstart your metabolism, while eating small, healthy meals (rather than the standard three large meals) keeps your energy up.   Avoid eating at night. Try to eat dinner earlier and fast for 14-16 hours until breakfast the next morning. Studies suggest that eating only when you’re most active and giving your digestive system a long break each day may help to regulate weight.     Continue meds as directed, no changes for now     Refill meds as directed, follow up with cardio as directed    ER [id number]  Patient first day off was [id number]    Return to work sept 14th, no heavy lifting over 10 pounds for 2 weeks

## 2020-09-14 ENCOUNTER — TELEPHONE (OUTPATIENT)
Dept: FAMILY MEDICINE CLINIC | Facility: CLINIC | Age: 65
End: 2020-09-14

## 2020-09-14 NOTE — TELEPHONE ENCOUNTER
I think this was already done with his last visit-can you see-there was no lifting over 10 pounds for 2 weeks

## 2020-09-14 NOTE — TELEPHONE ENCOUNTER
Tony said he needs a work excuse from 09- to  09- and listing the restrictions he should have now that he is back to work. Call him when completed, said Emily in HR is wanting this.  He will take to her.

## 2020-09-24 ENCOUNTER — OFFICE VISIT (OUTPATIENT)
Dept: CARDIOLOGY | Facility: CLINIC | Age: 65
End: 2020-09-24

## 2020-09-24 ENCOUNTER — DOCUMENTATION (OUTPATIENT)
Dept: CARDIOLOGY | Facility: CLINIC | Age: 65
End: 2020-09-24

## 2020-09-24 VITALS
HEIGHT: 67 IN | HEART RATE: 57 BPM | DIASTOLIC BLOOD PRESSURE: 84 MMHG | BODY MASS INDEX: 31.14 KG/M2 | SYSTOLIC BLOOD PRESSURE: 124 MMHG | OXYGEN SATURATION: 99 % | WEIGHT: 198.4 LBS

## 2020-09-24 DIAGNOSIS — I48.0 PAROXYSMAL ATRIAL FIBRILLATION (HCC): Primary | ICD-10-CM

## 2020-09-24 PROCEDURE — 99214 OFFICE O/P EST MOD 30 MIN: CPT | Performed by: INTERNAL MEDICINE

## 2020-09-24 PROCEDURE — 93000 ELECTROCARDIOGRAM COMPLETE: CPT | Performed by: INTERNAL MEDICINE

## 2020-09-24 NOTE — PROGRESS NOTES
Pavan Mccauley  65 y.o. male    09/24/2020  Chief Complaint   Patient presents with   • Follow-up       History of Present Illness  Patient was admitted with SVT/atrial flutter    -        SUBJECTIVE  Patient is doing better.  He wore his Holter monitor and was in sinus rhythm with PVCs.  He is not feeling any significant palpitations.  He is on a 10 pound weight restriction.  No Known Allergies      Past Medical History:   Diagnosis Date   • Bronchopneumonia    • Chest pain    • Corneal foreign body     both eyes   • Neck pain    • Perforation of left tympanic membrane     history of   • Prashant Mountain spotted fever    • Wheezing          Past Surgical History:   Procedure Laterality Date   • EYE FOREIGN BODY REMOVAL  07/29/2013    REMOVE FOREIGN BODY CORNEA W/SLIT LAMP 15596 (1)     • INJECTION OF MEDICATION  07/12/2011    Kenalog (1)      • SPINE SURGERY  03/12/1991    Exploration of the previous C5-6 fusion with refusion, exploration of the C5 nerve root, left side   • TYMPANOPLASTY Left 05/16/1972    Perforation of the left tympanic membrane         History reviewed. No pertinent family history.      Social History     Socioeconomic History   • Marital status:      Spouse name: Not on file   • Number of children: Not on file   • Years of education: Not on file   • Highest education level: Not on file   Tobacco Use   • Smoking status: Current Every Day Smoker     Types: Cigarettes   • Smokeless tobacco: Never Used   Substance and Sexual Activity   • Alcohol use: Yes     Comment: nominal   • Drug use: No   • Sexual activity: Defer         Prior to Admission medications    Medication Sig Start Date End Date Taking? Authorizing Provider   aspirin 81 MG EC tablet Take 1 tablet by mouth Daily. 9/2/20  Yes Ryan Toney MD   dilTIAZem CD (CARDIZEM CD) 120 MG 24 hr capsule Take 1 capsule by mouth Daily. 9/9/20  Yes Dilcia Kapoor APRN   metoprolol succinate XL (TOPROL-XL) 50 MG 24 hr tablet  "Take 1 tablet by mouth Daily. 9/9/20  Yes Alexey Dilcia Childers APRN         OBJECTIVE    /84 (BP Location: Right arm, Patient Position: Sitting)   Pulse 57   Ht 169.9 cm (66.9\")   Wt 90 kg (198 lb 6.4 oz)   SpO2 99%   BMI 31.17 kg/m²         Review of Systems     Constitutional:  Denies recent weight loss, weight gain, fever or chills, no change in exercise tolerance     HENT:  Denies any hearing loss, epistaxis, hoarseness, or difficulty speaking.     Eyes: Wears eyeglasses or contact lenses     Respiratory:  Denies dyspnea with exertion,no cough, wheezing, or hemoptysis.     Cardiovascular: Negative for palpations, chest pain, orthopnea, PND, peripheral edema, syncope, or claudication.     Gastrointestinal:  Denies change in bowel habits, dyspepsia, ulcer disease, hematochezia, or melena.     Endocrine: Negative for cold intolerance, heat intolerance, polydipsia, polyphagia and polyuria. Denies any history of weight change, heat/cold intolerance, polydipsia, polyuria     Genitourinary: Negative.      Musculoskeletal: Denies any history of arthritic symptoms or back problems     Skin:  Denies any change in hair or nails, rashes, or skin lesions.     Allergic/Immunologic: Negative.  Negative for environmental allergies, food allergies and immunocompromised state.     Neurological:  Denies any history of recurrent headaches, strokes, TIA, or seizure disorder.     Hematological: Denies any food allergies, seasonal allergies, bleeding disorders, or lymphadenopathy.     Psychiatric/Behavioral: Denies any history of depression, substance abuse, or change in cognitive function.         Physical Exam     Constitutional: Cooperative, alert and oriented, well-developed, well-nourished, in no acute distress.     HENT:   Head: Normocephalic, normal hair patterns, no masses or tenderness.  Ears, Nose, and Throat: No gross abnormalities. No pallor or cyanosis. Dentition good.   Eyes: EOMS intact, PERRL, conjunctivae " and lids unremarkable. Fundoscopic exam and visual fields not performed.   Neck: No palpable masses or adenopathy, no thyromegaly, no JVD, carotid pulses are full and equal bilaterally and without  Bruits.     Cardiovascular: Regular rhythm, S1 and S2 normal, no S3 or S4. Apical impulse not displaced. No murmurs, gallops, or rubs detected.     Pulmonary/Chest: Chest: normal symmetry, no tenderness to palpation, normal respiratory excursion, no intercostal retraction, no use of accessory muscles.            Pulmonary: Normal breath sounds. No rales or ronchi.    Abdominal: Abdomen soft, bowel sounds normoactive, no masses, no hepatosplenomegaly, non-tender, no bruits.     Musculoskeletal: No deformities, clubbing, cyanosis, erythema, or edema observed. There are no spinal abnormalities noted. Normal muscle strength and tone. Pulses full and equal in all extremities, no bruits auscultated.     Neurological: No gross motor or sensory deficits noted, affect appropriate, oriented to time, person, place.     Skin: Warm and dry to the touch, no apparent skin lesions or masses noted.     Psychiatric: He has a normal mood and affect. His behavior is normal. Judgment and thought content normal.         Procedures      Lab Results   Component Value Date    WBC 8.67 09/01/2020    HGB 15.5 09/01/2020    HCT 45.6 09/01/2020    MCV 90.7 09/01/2020     09/01/2020     Lab Results   Component Value Date    GLUCOSE 103 (H) 09/01/2020    BUN 13 09/01/2020    CREATININE 0.85 09/01/2020    EGFRIFNONA 91 09/01/2020    BCR 15.3 09/01/2020    CO2 21.0 (L) 09/01/2020    CALCIUM 8.7 09/01/2020    ALBUMIN 4.30 08/31/2020    AST 18 08/31/2020    ALT 20 08/31/2020     Lab Results   Component Value Date    CHOL 194 08/31/2020    CHOL 153 07/10/2017     Lab Results   Component Value Date    TRIG 64 08/31/2020    TRIG 59 07/10/2017     Lab Results   Component Value Date    HDL 72 (H) 08/31/2020    HDL 54 (L) 07/10/2017     No components  found for: LDLCALC  Lab Results   Component Value Date     (H) 08/31/2020    LDL 96 07/10/2017     No results found for: HDLLDLRATIO  No components found for: CHOLHDL  Lab Results   Component Value Date    HGBA1C 5.51 07/10/2017     Lab Results   Component Value Date    TSH 2.670 08/31/2020           ASSESSMENT AND PLAN      Pavan was seen today for follow-up.    Diagnoses and all orders for this visit:    Paroxysmal atrial fibrillation (CMS/HCC)  -     ECG 12 Lead; Future  -     Stress Test With Myocardial Perfusion One Day    His ECG shows sinus rhythm with PACs.  Our plan is to go ahead and do a Lexiscan Cardiolite test sure that we do not have any significant ischemia.    Patient's Body mass index is 31.17 kg/m². BMI is within normal parameters. No follow-up required..      Pavan Mccauley  reports that he has been smoking cigarettes. He has never used smokeless tobacco.. I have educated him on the risk of diseases from using tobacco products such as cancer, COPD and heart diease.     I advised him to quit and he is not willing to quit.    I spent 3  minutes counseling the patient.               Hilary Sims MD  9/24/2020  09:02 CDT

## 2020-10-19 ENCOUNTER — APPOINTMENT (OUTPATIENT)
Dept: NUCLEAR MEDICINE | Facility: HOSPITAL | Age: 65
End: 2020-10-19

## 2020-10-19 ENCOUNTER — APPOINTMENT (OUTPATIENT)
Dept: CARDIOLOGY | Facility: HOSPITAL | Age: 65
End: 2020-10-19

## 2020-10-26 ENCOUNTER — OFFICE VISIT (OUTPATIENT)
Dept: CARDIOLOGY | Facility: CLINIC | Age: 65
End: 2020-10-26

## 2020-10-26 VITALS
SYSTOLIC BLOOD PRESSURE: 160 MMHG | OXYGEN SATURATION: 98 % | WEIGHT: 198 LBS | HEART RATE: 148 BPM | BODY MASS INDEX: 31.08 KG/M2 | DIASTOLIC BLOOD PRESSURE: 100 MMHG | HEIGHT: 67 IN

## 2020-10-26 DIAGNOSIS — I48.0 PAROXYSMAL ATRIAL FIBRILLATION (HCC): Primary | ICD-10-CM

## 2020-10-26 PROCEDURE — 99214 OFFICE O/P EST MOD 30 MIN: CPT | Performed by: INTERNAL MEDICINE

## 2020-10-26 PROCEDURE — 93000 ELECTROCARDIOGRAM COMPLETE: CPT | Performed by: INTERNAL MEDICINE

## 2020-10-26 RX ORDER — DILTIAZEM HYDROCHLORIDE 240 MG/1
240 CAPSULE, COATED, EXTENDED RELEASE ORAL
Qty: 30 CAPSULE | Refills: 11 | Status: SHIPPED | OUTPATIENT
Start: 2020-10-26 | End: 2020-11-12 | Stop reason: SDUPTHER

## 2020-10-26 RX ORDER — DABIGATRAN ETEXILATE 150 MG/1
150 CAPSULE ORAL 2 TIMES DAILY
Qty: 60 CAPSULE | Refills: 11 | COMMUNITY
Start: 2020-10-26 | End: 2020-11-12 | Stop reason: SDUPTHER

## 2020-10-26 RX ORDER — METOPROLOL SUCCINATE 100 MG/1
100 TABLET, EXTENDED RELEASE ORAL
Qty: 30 TABLET | Refills: 11 | Status: SHIPPED | OUTPATIENT
Start: 2020-10-26 | End: 2020-11-02

## 2020-10-26 NOTE — PROGRESS NOTES
Pavan Mccauley  65 y.o. male    10/26/2020  Chief Complaint   Patient presents with   • Atrial Fibrillation       History of Present Illness  Patient follow-up for atrial fibrillation    -        SUBJECTIVE  He was seen in the office Friday by Dr. Reed was in A. fib with rapid response.  He is back today for follow-up.  He is feeling somewhat tired.  He does not want to go to the emergency room or being admitted.  No Known Allergies      Past Medical History:   Diagnosis Date   • Bronchopneumonia    • Chest pain    • Corneal foreign body     both eyes   • Neck pain    • Perforation of left tympanic membrane     history of   • Prashant Mountain spotted fever    • Wheezing          Past Surgical History:   Procedure Laterality Date   • EYE FOREIGN BODY REMOVAL  07/29/2013    REMOVE FOREIGN BODY CORNEA W/SLIT LAMP 24645 (1)     • INJECTION OF MEDICATION  07/12/2011    Kenalog (1)      • SPINE SURGERY  03/12/1991    Exploration of the previous C5-6 fusion with refusion, exploration of the C5 nerve root, left side   • TYMPANOPLASTY Left 05/16/1972    Perforation of the left tympanic membrane         History reviewed. No pertinent family history.      Social History     Socioeconomic History   • Marital status:      Spouse name: Not on file   • Number of children: Not on file   • Years of education: Not on file   • Highest education level: Not on file   Tobacco Use   • Smoking status: Current Every Day Smoker     Types: Cigarettes   • Smokeless tobacco: Never Used   Substance and Sexual Activity   • Alcohol use: Yes     Comment: nominal   • Drug use: No   • Sexual activity: Defer         Prior to Admission medications    Medication Sig Start Date End Date Taking? Authorizing Provider   aspirin 81 MG EC tablet Take 1 tablet by mouth Daily. 9/2/20  Yes Ryan Toney MD   dilTIAZem CD (CARDIZEM CD) 240 MG 24 hr capsule Take 1 capsule by mouth Daily. 10/26/20  Yes Hilary Sims MD   metoprolol succinate  "XL (TOPROL-XL) 100 MG 24 hr tablet Take 1 tablet by mouth Daily. 10/26/20  Yes Hilary Sims MD   dilTIAZem CD (CARDIZEM CD) 120 MG 24 hr capsule Take 1 capsule by mouth Daily. 9/9/20 10/26/20 Yes Dilcia Kapoor APRN   metoprolol succinate XL (TOPROL-XL) 50 MG 24 hr tablet Take 1 tablet by mouth Daily. 9/9/20 10/26/20 Yes Dilcia Kapoor APRN   dabigatran etexilate (Pradaxa) 150 MG capsu Take 1 capsule by mouth 2 (Two) Times a Day. 10/26/20   Hilary Sims MD         OBJECTIVE    /100 (BP Location: Left arm, Patient Position: Sitting, Cuff Size: Adult)   Pulse (!) 148   Ht 169.9 cm (66.89\")   Wt 89.8 kg (198 lb)   SpO2 98%   BMI 31.11 kg/m²         Review of Systems     Constitutional:  Denies recent weight loss, weight gain, fever or chills, no change in exercise tolerance     HENT:  Denies any hearing loss, epistaxis, hoarseness, or difficulty speaking.     Eyes: Wears eyeglasses or contact lenses     Respiratory:  Denies dyspnea with exertion,no cough, wheezing, or hemoptysis.     Cardiovascular: Negative for palpations, chest pain, orthopnea, PND, peripheral edema, syncope, or claudication.     Gastrointestinal:  Denies change in bowel habits, dyspepsia, ulcer disease, hematochezia, or melena.     Endocrine: Negative for cold intolerance, heat intolerance, polydipsia, polyphagia and polyuria. Denies any history of weight change, heat/cold intolerance, polydipsia, polyuria     Genitourinary: Negative.      Musculoskeletal: Denies any history of arthritic symptoms or back problems     Skin:  Denies any change in hair or nails, rashes, or skin lesions.     Allergic/Immunologic: Negative.  Negative for environmental allergies, food allergies and immunocompromised state.     Neurological:  Denies any history of recurrent headaches, strokes, TIA, or seizure disorder.     Hematological: Denies any food allergies, seasonal allergies, bleeding disorders, or lymphadenopathy.     "     Psychiatric/Behavioral: Denies any history of depression, substance abuse, or change in cognitive function.         Physical Exam     Constitutional: Cooperative, alert and oriented, well-developed, well-nourished, in no acute distress.     HENT:   Head: Normocephalic, normal hair patterns, no masses or tenderness.  Ears, Nose, and Throat: No gross abnormalities. No pallor or cyanosis. Dentition good.   Eyes: EOMS intact, PERRL, conjunctivae and lids unremarkable. Fundoscopic exam and visual fields not performed.   Neck: No palpable masses or adenopathy, no thyromegaly, no JVD, carotid pulses are full and equal bilaterally and without  Bruits.     Cardiovascular: IRRegular rhythm, S1 and S2 normal, no S3 or S4. Apical impulse not displaced. No murmurs, gallops, or rubs detected.     Pulmonary/Chest: Chest: normal symmetry, no tenderness to palpation, normal respiratory excursion, no intercostal retraction, no use of accessory muscles.            Pulmonary: Normal breath sounds. No rales or ronchi.    Abdominal: Abdomen soft, bowel sounds normoactive, no masses, no hepatosplenomegaly, non-tender, no bruits.     Musculoskeletal: No deformities, clubbing, cyanosis, erythema, or edema observed. There are no spinal abnormalities noted. Normal muscle strength and tone. Pulses full and equal in all extremities, no bruits auscultated.     Neurological: No gross motor or sensory deficits noted, affect appropriate, oriented to time, person, place.     Skin: Warm and dry to the touch, no apparent skin lesions or masses noted.     Psychiatric: He has a normal mood and affect. His behavior is normal. Judgment and thought content normal.         Procedures      Lab Results   Component Value Date    WBC 8.67 09/01/2020    HGB 15.5 09/01/2020    HCT 45.6 09/01/2020    MCV 90.7 09/01/2020     09/01/2020     Lab Results   Component Value Date    GLUCOSE 103 (H) 09/01/2020    BUN 13 09/01/2020    CREATININE 0.85 09/01/2020     EGFRIFNONA 91 09/01/2020    BCR 15.3 09/01/2020    CO2 21.0 (L) 09/01/2020    CALCIUM 8.7 09/01/2020    ALBUMIN 4.30 08/31/2020    AST 18 08/31/2020    ALT 20 08/31/2020     Lab Results   Component Value Date    CHOL 194 08/31/2020    CHOL 153 07/10/2017     Lab Results   Component Value Date    TRIG 64 08/31/2020    TRIG 59 07/10/2017     Lab Results   Component Value Date    HDL 72 (H) 08/31/2020    HDL 54 (L) 07/10/2017     No components found for: LDLCALC  Lab Results   Component Value Date     (H) 08/31/2020    LDL 96 07/10/2017     No results found for: HDLLDLRATIO  No components found for: CHOLHDL  Lab Results   Component Value Date    HGBA1C 5.51 07/10/2017     Lab Results   Component Value Date    TSH 2.670 08/31/2020           ASSESSMENT AND PLAN      Diagnoses and all orders for this visit:    1. Paroxysmal atrial fibrillation (CMS/HCC) (Primary)  -     ECG 12 Lead  -     Basic Metabolic Panel; Future  -     ECG 12 Lead; Future  With increased heart rate.  Our plan will be to double his diltiazem and his metoprolol.  We will stop the Eliquis samples and start him on Pradaxa 150 twice daily.  He is to fill it thoroughly for his Lexiscan.  Other orders  -     dilTIAZem CD (CARDIZEM CD) 240 MG 24 hr capsule; Take 1 capsule by mouth Daily.  Dispense: 30 capsule; Refill: 11  -     metoprolol succinate XL (TOPROL-XL) 100 MG 24 hr tablet; Take 1 tablet by mouth Daily.  Dispense: 30 tablet; Refill: 11  -     dabigatran etexilate (Pradaxa) 150 MG capsu; Take 1 capsule by mouth 2 (Two) Times a Day.  Dispense: 60 capsule; Refill: 11        Patient's Body mass index is 31.11 kg/m². BMI is above normal parameters. Recommendations include: none (medical contraindication).      Pavan Mccauley  reports that he has been smoking cigarettes. He has never used smokeless tobacco.. I have educated him on the risk of diseases from using tobacco products such as cancer, COPD and heart disease.     I advised him to  quit and he is not willing to quit.    I spent 3  minutes counseling the patient.               Hilary Sims MD  10/26/2020  16:00 CDT

## 2020-10-27 LAB
QT INTERVAL: 272 MS
QTC INTERVAL: 427 MS

## 2020-10-29 ENCOUNTER — HOSPITAL ENCOUNTER (OUTPATIENT)
Dept: CARDIOLOGY | Facility: HOSPITAL | Age: 65
Discharge: HOME | End: 2020-10-29

## 2020-10-29 ENCOUNTER — TELEPHONE (OUTPATIENT)
Dept: CARDIOLOGY | Facility: CLINIC | Age: 65
End: 2020-10-29

## 2020-10-29 ENCOUNTER — HOSPITAL ENCOUNTER (OUTPATIENT)
Dept: NUCLEAR MEDICINE | Facility: HOSPITAL | Age: 65
Discharge: HOME | End: 2020-10-29

## 2020-10-29 LAB
BH CV STRESS BP STAGE 1: NORMAL
BH CV STRESS COMMENTS STAGE 1: NORMAL
BH CV STRESS DOSE REGADENOSON STAGE 1: 0.4
BH CV STRESS DURATION MIN STAGE 1: 0
BH CV STRESS DURATION SEC STAGE 1: 10
BH CV STRESS HR STAGE 1: 107
BH CV STRESS PROTOCOL 1: NORMAL
BH CV STRESS RECOVERY BP: NORMAL MMHG
BH CV STRESS RECOVERY HR: 104 BPM
BH CV STRESS STAGE 1: 1
LV EF NUC BP: 49 %
MAXIMAL PREDICTED HEART RATE: 157 BPM
PERCENT MAX PREDICTED HR: 70.06 %
STRESS BASELINE BP: NORMAL MMHG
STRESS BASELINE HR: 90 BPM
STRESS PERCENT HR: 82 %
STRESS POST ESTIMATED WORKLOAD: 1 METS
STRESS POST PEAK BP: NORMAL MMHG
STRESS POST PEAK HR: 110 BPM
STRESS TARGET HR: 133 BPM

## 2020-10-29 PROCEDURE — A9500 TC99M SESTAMIBI: HCPCS | Performed by: INTERNAL MEDICINE

## 2020-10-29 PROCEDURE — 93016 CV STRESS TEST SUPVJ ONLY: CPT | Performed by: INTERNAL MEDICINE

## 2020-10-29 PROCEDURE — 78452 HT MUSCLE IMAGE SPECT MULT: CPT | Performed by: INTERNAL MEDICINE

## 2020-10-29 PROCEDURE — 25010000002 REGADENOSON 0.4 MG/5ML SOLUTION: Performed by: INTERNAL MEDICINE

## 2020-10-29 PROCEDURE — 78452 HT MUSCLE IMAGE SPECT MULT: CPT

## 2020-10-29 PROCEDURE — 0 TECHNETIUM SESTAMIBI: Performed by: INTERNAL MEDICINE

## 2020-10-29 PROCEDURE — 93018 CV STRESS TEST I&R ONLY: CPT | Performed by: INTERNAL MEDICINE

## 2020-10-29 PROCEDURE — 93017 CV STRESS TEST TRACING ONLY: CPT

## 2020-10-29 RX ORDER — SODIUM CHLORIDE 0.9 % (FLUSH) 0.9 %
10 SYRINGE (ML) INJECTION ONCE
Status: COMPLETED | OUTPATIENT
Start: 2020-10-29 | End: 2020-10-29

## 2020-10-29 RX ADMIN — TECHNETIUM TC 99M SESTAMIBI 1 DOSE: 1 INJECTION INTRAVENOUS at 07:33

## 2020-10-29 RX ADMIN — SODIUM CHLORIDE, PRESERVATIVE FREE 10 ML: 5 INJECTION INTRAVENOUS at 08:55

## 2020-10-29 RX ADMIN — TECHNETIUM TC 99M SESTAMIBI 1 DOSE: 1 INJECTION INTRAVENOUS at 08:55

## 2020-10-29 RX ADMIN — REGADENOSON 0.4 MG: 0.08 INJECTION, SOLUTION INTRAVENOUS at 08:55

## 2020-10-29 NOTE — TELEPHONE ENCOUNTER
Called patient to give results,   He will be in her Monday for ekg   He voiced understanding     ----- Message from Hilary Sims MD sent at 10/29/2020  2:16 PM CDT -----  Regarding: Nuclear, atrial flutter  Let him know that his stress test was negative.I want him to come by the office Monday for another repeat EKG.  If he still in atrial flutter/fibrillation we will schedule him for cardioversion.  ----- Message -----  From: Hilary Sims MD  Sent: 10/29/2020   2:08 PM CDT  To: Hilary Sims MD

## 2020-11-02 ENCOUNTER — TELEPHONE (OUTPATIENT)
Dept: ADMINISTRATIVE | Facility: HOSPITAL | Age: 65
End: 2020-11-02

## 2020-11-02 DIAGNOSIS — I48.0 PAROXYSMAL ATRIAL FIBRILLATION (HCC): Primary | ICD-10-CM

## 2020-11-02 RX ORDER — METOPROLOL SUCCINATE 100 MG/1
150 TABLET, EXTENDED RELEASE ORAL
Qty: 30 TABLET | Refills: 11
Start: 2020-11-02 | End: 2020-11-12 | Stop reason: SDUPTHER

## 2020-11-02 RX ORDER — SODIUM CHLORIDE 9 MG/ML
50 INJECTION, SOLUTION INTRAVENOUS CONTINUOUS
OUTPATIENT
Start: 2020-11-12

## 2020-11-04 ENCOUNTER — TELEPHONE (OUTPATIENT)
Dept: CARDIOLOGY | Facility: CLINIC | Age: 65
End: 2020-11-04

## 2020-11-04 NOTE — TELEPHONE ENCOUNTER
Patient called and said that he is having more shortness of breath with activity. I told him that I will talk to Dr. Sims tomorrow but that if he gets worst than he needs to go to the ER. He wants to know if he needs to wait until Monday to come in for an EKG or come earlier?

## 2020-11-05 DIAGNOSIS — I48.0 PAROXYSMAL ATRIAL FIBRILLATION (HCC): Primary | ICD-10-CM

## 2020-11-06 ENCOUNTER — TELEPHONE (OUTPATIENT)
Dept: PULMONOLOGY | Facility: CLINIC | Age: 65
End: 2020-11-06

## 2020-11-06 DIAGNOSIS — R06.00 DYSPNEA, UNSPECIFIED TYPE: ICD-10-CM

## 2020-11-06 DIAGNOSIS — I48.0 PAROXYSMAL ATRIAL FIBRILLATION (HCC): Primary | ICD-10-CM

## 2020-11-06 NOTE — TELEPHONE ENCOUNTER
Pt wants to know if he can take magnesium citrate? He is constipated.  He also wants to the results of his x-ray?

## 2020-11-09 ENCOUNTER — LAB (OUTPATIENT)
Dept: LAB | Facility: HOSPITAL | Age: 65
End: 2020-11-09

## 2020-11-09 ENCOUNTER — OFFICE VISIT (OUTPATIENT)
Dept: CARDIOLOGY | Facility: CLINIC | Age: 65
End: 2020-11-09

## 2020-11-09 VITALS
SYSTOLIC BLOOD PRESSURE: 122 MMHG | OXYGEN SATURATION: 98 % | WEIGHT: 198 LBS | HEIGHT: 66 IN | DIASTOLIC BLOOD PRESSURE: 82 MMHG | BODY MASS INDEX: 31.82 KG/M2

## 2020-11-09 DIAGNOSIS — I48.0 PAROXYSMAL ATRIAL FIBRILLATION (HCC): ICD-10-CM

## 2020-11-09 DIAGNOSIS — Z01.818 PREOP TESTING: Primary | ICD-10-CM

## 2020-11-09 DIAGNOSIS — I48.0 PAROXYSMAL ATRIAL FIBRILLATION (HCC): Primary | ICD-10-CM

## 2020-11-09 LAB
ANION GAP SERPL CALCULATED.3IONS-SCNC: 9.3 MMOL/L (ref 5–15)
APTT PPP: 36.2 SECONDS (ref 20–40.3)
BUN SERPL-MCNC: 11 MG/DL (ref 8–23)
BUN/CREAT SERPL: 13.9 (ref 7–25)
CALCIUM SPEC-SCNC: 8.7 MG/DL (ref 8.6–10.5)
CHLORIDE SERPL-SCNC: 107 MMOL/L (ref 98–107)
CO2 SERPL-SCNC: 25.7 MMOL/L (ref 22–29)
CREAT SERPL-MCNC: 0.79 MG/DL (ref 0.76–1.27)
GFR SERPL CREATININE-BSD FRML MDRD: 99 ML/MIN/1.73
GLUCOSE SERPL-MCNC: 102 MG/DL (ref 65–99)
MAGNESIUM SERPL-MCNC: 2.3 MG/DL (ref 1.6–2.4)
NT-PROBNP SERPL-MCNC: 989.1 PG/ML (ref 0–900)
POTASSIUM SERPL-SCNC: 3.8 MMOL/L (ref 3.5–5.2)
SODIUM SERPL-SCNC: 142 MMOL/L (ref 136–145)

## 2020-11-09 PROCEDURE — U0003 INFECTIOUS AGENT DETECTION BY NUCLEIC ACID (DNA OR RNA); SEVERE ACUTE RESPIRATORY SYNDROME CORONAVIRUS 2 (SARS-COV-2) (CORONAVIRUS DISEASE [COVID-19]), AMPLIFIED PROBE TECHNIQUE, MAKING USE OF HIGH THROUGHPUT TECHNOLOGIES AS DESCRIBED BY CMS-2020-01-R: HCPCS | Performed by: INTERNAL MEDICINE

## 2020-11-09 PROCEDURE — 83880 ASSAY OF NATRIURETIC PEPTIDE: CPT

## 2020-11-09 PROCEDURE — 80048 BASIC METABOLIC PNL TOTAL CA: CPT

## 2020-11-09 PROCEDURE — 36415 COLL VENOUS BLD VENIPUNCTURE: CPT

## 2020-11-09 PROCEDURE — 85730 THROMBOPLASTIN TIME PARTIAL: CPT

## 2020-11-09 PROCEDURE — C9803 HOPD COVID-19 SPEC COLLECT: HCPCS

## 2020-11-09 PROCEDURE — 99214 OFFICE O/P EST MOD 30 MIN: CPT | Performed by: INTERNAL MEDICINE

## 2020-11-09 PROCEDURE — 83735 ASSAY OF MAGNESIUM: CPT

## 2020-11-09 NOTE — PROGRESS NOTES
.DMD  Pavan Mccauley  65 y.o. male    11/09/2020  Chief Complaint   Patient presents with   • Follow-up       History of Present Illness  Patient with atrial flutter flutter    -        SUBJECTIVE    We had increased his his calcium channel at beta-blockers and was monitoring his breakfast.  He has over the weekend converted to sinus rhythm with PACs.  He is planned for cardioversion Thursday.  He feels a little tired and sluggish but as the top part of this could be prolonged atrial for flutter.  He has had no chest pain.  He has some shortness of air and his proBNP was elevated.  No Known Allergies      Past Medical History:   Diagnosis Date   • Bronchopneumonia    • Chest pain    • Corneal foreign body     both eyes   • Neck pain    • Perforation of left tympanic membrane     history of   • Prashant Mountain spotted fever    • Wheezing          Past Surgical History:   Procedure Laterality Date   • EYE FOREIGN BODY REMOVAL  07/29/2013    REMOVE FOREIGN BODY CORNEA W/SLIT LAMP 36503 (1)     • INJECTION OF MEDICATION  07/12/2011    Kenalog (1)      • SPINE SURGERY  03/12/1991    Exploration of the previous C5-6 fusion with refusion, exploration of the C5 nerve root, left side   • TYMPANOPLASTY Left 05/16/1972    Perforation of the left tympanic membrane         History reviewed. No pertinent family history.      Social History     Socioeconomic History   • Marital status: Single     Spouse name: Not on file   • Number of children: Not on file   • Years of education: Not on file   • Highest education level: Not on file   Tobacco Use   • Smoking status: Current Every Day Smoker     Types: Cigarettes   • Smokeless tobacco: Never Used   Substance and Sexual Activity   • Alcohol use: Yes     Comment: nominal   • Drug use: No   • Sexual activity: Defer         Prior to Admission medications    Medication Sig Start Date End Date Taking? Authorizing Provider   aspirin 81 MG EC tablet Take 1 tablet by mouth Daily. 9/2/20    "Ryan Toney MD   dabigatran etexilate (Pradaxa) 150 MG capsu Take 1 capsule by mouth 2 (Two) Times a Day. 10/26/20   Hilary Sims MD   dilTIAZem CD (CARDIZEM CD) 240 MG 24 hr capsule Take 1 capsule by mouth Daily. 10/26/20   Hilary Sims MD   metoprolol succinate XL (TOPROL-XL) 100 MG 24 hr tablet Take 1.5 tablets by mouth Daily. 11/2/20   Hilary Sims MD         OBJECTIVE    /82 (BP Location: Right arm, Patient Position: Sitting)   Ht 167.6 cm (66\")   Wt 89.8 kg (198 lb)   SpO2 98%   BMI 31.96 kg/m²         Review of Systems     Constitutional:  Denies recent weight loss, weight gain, fever or chills, no change in exercise tolerance     HENT:  Denies any hearing loss, epistaxis, hoarseness, or difficulty speaking.     Eyes: Wears eyeglasses or contact lenses     Respiratory:  Denies dyspnea with exertion,no cough, wheezing, or hemoptysis.     Cardiovascular: Negative for palpations, chest pain, orthopnea, PND, peripheral edema, syncope, or claudication.     Gastrointestinal:  Denies change in bowel habits, dyspepsia, ulcer disease, hematochezia, or melena.     Endocrine: Negative for cold intolerance, heat intolerance, polydipsia, polyphagia and polyuria. Denies any history of weight change, heat/cold intolerance, polydipsia, polyuria     Genitourinary: Negative.      Musculoskeletal: Denies any history of arthritic symptoms or back problems     Skin:  Denies any change in hair or nails, rashes, or skin lesions.     Allergic/Immunologic: Negative.  Negative for environmental allergies, food allergies and immunocompromised state.     Neurological:  Denies any history of recurrent headaches, strokes, TIA, or seizure disorder.     Hematological: Denies any food allergies, seasonal allergies, bleeding disorders, or lymphadenopathy.     Psychiatric/Behavioral: Denies any history of depression, substance abuse, or change in cognitive function.         Physical Exam     "     Constitutional: Cooperative, alert and oriented, well-developed, well-nourished, in no acute distress.     HENT:   Head: Normocephalic, normal hair patterns, no masses or tenderness.  Ears, Nose, and Throat: No gross abnormalities. No pallor or cyanosis. Dentition good.   Eyes: EOMS intact, PERRL, conjunctivae and lids unremarkable. Fundoscopic exam and visual fields not performed.   Neck: No palpable masses or adenopathy, no thyromegaly, no JVD, carotid pulses are full and equal bilaterally and without  Bruits.     Cardiovascular: Regular rhythm, S1 and S2 normal, no S3 or S4. Apical impulse not displaced. No murmurs, gallops, or rubs detected.     Pulmonary/Chest: Chest: normal symmetry, no tenderness to palpation, normal respiratory excursion, no intercostal retraction, no use of accessory muscles.            Pulmonary: Normal breath sounds. No rales or ronchi.    Abdominal: Abdomen soft, bowel sounds normoactive, no masses, no hepatosplenomegaly, non-tender, no bruits.     Musculoskeletal: No deformities, clubbing, cyanosis, erythema, or edema observed. There are no spinal abnormalities noted. Normal muscle strength and tone. Pulses full and equal in all extremities, no bruits auscultated.     Neurological: No gross motor or sensory deficits noted, affect appropriate, oriented to time, person, place.     Skin: Warm and dry to the touch, no apparent skin lesions or masses noted.     Psychiatric: He has a normal mood and affect. His behavior is normal. Judgment and thought content normal.         Procedures      Lab Results   Component Value Date    WBC 8.67 09/01/2020    HGB 15.5 09/01/2020    HCT 45.6 09/01/2020    MCV 90.7 09/01/2020     09/01/2020     Lab Results   Component Value Date    GLUCOSE 103 (H) 09/01/2020    BUN 13 09/01/2020    CREATININE 0.85 09/01/2020    EGFRIFNONA 91 09/01/2020    BCR 15.3 09/01/2020    CO2 21.0 (L) 09/01/2020    CALCIUM 8.7 09/01/2020    ALBUMIN 4.30 08/31/2020     AST 18 08/31/2020    ALT 20 08/31/2020     Lab Results   Component Value Date    CHOL 194 08/31/2020    CHOL 153 07/10/2017     Lab Results   Component Value Date    TRIG 64 08/31/2020    TRIG 59 07/10/2017     Lab Results   Component Value Date    HDL 72 (H) 08/31/2020    HDL 54 (L) 07/10/2017     No components found for: LDLCALC  Lab Results   Component Value Date     (H) 08/31/2020    LDL 96 07/10/2017     No results found for: HDLLDLRATIO  No components found for: CHOLHDL  Lab Results   Component Value Date    HGBA1C 5.51 07/10/2017     Lab Results   Component Value Date    TSH 2.670 08/31/2020       ECG sinus rhythm with PACs    ASSESSMENT AND PLAN      Diagnoses and all orders for this visit:    1. Paroxysmal atrial fibrillation (CMS/HCC) (Primary)  -     Basic Metabolic Panel; Future  -     Magnesium; Future  -     aPTT; Future  -     Mobile Cardiac Outpatient Telemetry; Future  -     proBNP; Future  Due to his fatigue and bradycardia we will decrease his Toprol-XL from 150 mg daily.  Patient is in sinus rhythm with PACs today.  We are going to ahead and draw his labs and he is to proceed on with his Covid testing.  He is going to have a monitor placed and we will plan on having him come in Thursday for ECG.  The second flutter we will proceed on with a KRYSTYNA cardioversion the risks and benefits of been explained.    If he is still in sinus rhythm we will continue to monitor.    Patient's Body mass index is 31.96 kg/m². BMI is above normal parameters. Recommendations include: referral to primary care.      Pavan Mccauley  reports that he has been smoking cigarettes. He has never used smokeless tobacco.. I have educated him on the risk of diseases from using tobacco products such as cancer, COPD and heart disease.     I advised him to quit and he is not willing to quit.    I spent 3  minutes counseling the patient.     [id number] mal2          Hilary Sims MD  11/9/2020  08:49 CST

## 2020-11-10 LAB
COVID LABCORP PRIORITY: NORMAL
SARS-COV-2 RNA RESP QL NAA+PROBE: NOT DETECTED

## 2020-11-12 ENCOUNTER — HOSPITAL ENCOUNTER (OUTPATIENT)
Dept: CARDIOLOGY | Facility: HOSPITAL | Age: 65
End: 2020-11-12

## 2020-11-12 DIAGNOSIS — I48.0 PAROXYSMAL ATRIAL FIBRILLATION (HCC): Primary | ICD-10-CM

## 2020-11-12 RX ORDER — METOPROLOL SUCCINATE 100 MG/1
100 TABLET, EXTENDED RELEASE ORAL
Qty: 30 TABLET | Refills: 11 | Status: SHIPPED | OUTPATIENT
Start: 2020-11-12 | End: 2021-10-21 | Stop reason: SDUPTHER

## 2020-11-12 RX ORDER — DILTIAZEM HYDROCHLORIDE 240 MG/1
240 CAPSULE, COATED, EXTENDED RELEASE ORAL
Qty: 30 CAPSULE | Refills: 11 | Status: SHIPPED | OUTPATIENT
Start: 2020-11-12 | End: 2021-10-21 | Stop reason: SDUPTHER

## 2020-11-12 RX ORDER — DABIGATRAN ETEXILATE 150 MG/1
150 CAPSULE ORAL 2 TIMES DAILY
Qty: 60 CAPSULE | Refills: 11 | Status: SHIPPED | OUTPATIENT
Start: 2020-11-12 | End: 2021-01-07

## 2020-11-25 ENCOUNTER — TELEPHONE (OUTPATIENT)
Dept: CARDIOLOGY | Facility: CLINIC | Age: 65
End: 2020-11-25

## 2020-11-25 NOTE — TELEPHONE ENCOUNTER
Called patient, there was no answer     ----- Message from Hilary Sims MD sent at 11/25/2020 12:20 PM CST -----  Regarding: Monitor  Let him know that his monitor looks good there is no atrial fibrillation  ----- Message -----  From: Hilary Sims MD  Sent: 11/25/2020  11:21 AM CST  To: Hilary Sims MD

## 2020-11-25 NOTE — TELEPHONE ENCOUNTER
Gave patients results.   He voiced understanding     ----- Message from Hilary Sims MD sent at 11/25/2020 12:20 PM CST -----  Regarding: Monitor  Let him know that his monitor looks good there is no atrial fibrillation  ----- Message -----  From: Hilary Sims MD  Sent: 11/25/2020  11:21 AM CST  To: Hilary Sims MD

## 2020-12-22 ENCOUNTER — IMMUNIZATION (OUTPATIENT)
Dept: VACCINE CLINIC | Facility: HOSPITAL | Age: 65
End: 2020-12-22

## 2020-12-22 PROCEDURE — 91300 HC SARSCOV02 VAC 30MCG/0.3ML IM: CPT | Performed by: THORACIC SURGERY (CARDIOTHORACIC VASCULAR SURGERY)

## 2020-12-22 PROCEDURE — 0001A: CPT | Performed by: THORACIC SURGERY (CARDIOTHORACIC VASCULAR SURGERY)

## 2021-01-07 ENCOUNTER — OFFICE VISIT (OUTPATIENT)
Dept: CARDIOLOGY | Facility: CLINIC | Age: 66
End: 2021-01-07

## 2021-01-07 VITALS
OXYGEN SATURATION: 97 % | SYSTOLIC BLOOD PRESSURE: 138 MMHG | BODY MASS INDEX: 31.82 KG/M2 | HEIGHT: 66 IN | HEART RATE: 52 BPM | WEIGHT: 198 LBS | DIASTOLIC BLOOD PRESSURE: 82 MMHG

## 2021-01-07 DIAGNOSIS — I48.0 PAROXYSMAL ATRIAL FIBRILLATION (HCC): Primary | ICD-10-CM

## 2021-01-07 LAB
QT INTERVAL: 456 MS
QTC INTERVAL: 424 MS

## 2021-01-07 PROCEDURE — 93000 ELECTROCARDIOGRAM COMPLETE: CPT | Performed by: INTERNAL MEDICINE

## 2021-01-07 PROCEDURE — 99214 OFFICE O/P EST MOD 30 MIN: CPT | Performed by: INTERNAL MEDICINE

## 2021-01-07 RX ORDER — DABIGATRAN ETEXILATE 150 MG/1
150 CAPSULE ORAL 2 TIMES DAILY
Qty: 60 CAPSULE | Refills: 11 | Status: SHIPPED | OUTPATIENT
Start: 2021-01-07 | End: 2021-01-25 | Stop reason: CLARIF

## 2021-01-07 NOTE — PROGRESS NOTES
Pavan Mccauley  65 y.o. male    01/07/2021  Chief Complaint   Patient presents with   • Atrial Fibrillation       History of Present Illness  Patient with history of paroxysmal atrial fibrillation    -        SUBJECTIVE  Patient is doing well.  He is maintaining sinus rhythm.  He says the swelling is better and he feels better.  He denies any chest pain or shortness of air.  He is only taking his Pradaxa once a day.  He is having no bleeding from this.  He says he is feeling well.  No Known Allergies      Past Medical History:   Diagnosis Date   • Bronchopneumonia    • Chest pain    • Corneal foreign body     both eyes   • Neck pain    • Perforation of left tympanic membrane     history of   • Prashatn Mountain spotted fever    • Wheezing          Past Surgical History:   Procedure Laterality Date   • EYE FOREIGN BODY REMOVAL  07/29/2013    REMOVE FOREIGN BODY CORNEA W/SLIT LAMP 94358 (1)     • INJECTION OF MEDICATION  07/12/2011    Kenalog (1)      • SPINE SURGERY  03/12/1991    Exploration of the previous C5-6 fusion with refusion, exploration of the C5 nerve root, left side   • TYMPANOPLASTY Left 05/16/1972    Perforation of the left tympanic membrane         History reviewed. No pertinent family history.      Social History     Socioeconomic History   • Marital status: Single     Spouse name: Not on file   • Number of children: Not on file   • Years of education: Not on file   • Highest education level: Not on file   Tobacco Use   • Smoking status: Current Every Day Smoker     Types: Cigarettes   • Smokeless tobacco: Never Used   Substance and Sexual Activity   • Alcohol use: Yes     Comment: nominal   • Drug use: No   • Sexual activity: Defer         Prior to Admission medications    Medication Sig Start Date End Date Taking? Authorizing Provider   dilTIAZem CD (CARDIZEM CD) 240 MG 24 hr capsule Take 1 capsule by mouth Daily. 11/12/20  Yes Hilary Sims MD   metoprolol succinate XL (TOPROL-XL) 100 MG 24  "hr tablet Take 1 tablet by mouth Daily. 11/12/20  Yes Hilary Sims MD   aspirin 81 MG EC tablet Take 1 tablet by mouth Daily. 9/2/20   Ryan Toney MD   dabigatran etexilate (PRADAXA) 150 MG capsu Take 1 capsule by mouth 2 (Two) Times a Day. 1/7/21   Hilary Sims MD   dabigatran etexilate (Pradaxa) 150 MG capsu Take 1 capsule by mouth 2 (Two) Times a Day. 11/12/20 1/7/21  Hilary Sims MD   rivaroxaban (Xarelto) 20 MG tablet Take 1 tablet by mouth Daily. 12/3/20 1/7/21  Hilary Sims MD         OBJECTIVE    /82 (BP Location: Left arm, Patient Position: Sitting, Cuff Size: Adult)   Pulse 52   Ht 167.6 cm (65.98\")   Wt 89.8 kg (198 lb)   SpO2 97%   BMI 31.97 kg/m²         Review of Systems     Constitutional:  Denies recent weight loss, weight gain, fever or chills, no change in exercise tolerance     HENT:  Denies any hearing loss, epistaxis, hoarseness, or difficulty speaking.     Eyes: Wears eyeglasses or contact lenses     Respiratory:  Denies dyspnea with exertion,no cough, wheezing, or hemoptysis.     Cardiovascular: Negative for palpations, chest pain, orthopnea, PND, peripheral edema, syncope, or claudication.     Gastrointestinal:  Denies change in bowel habits, dyspepsia, ulcer disease, hematochezia, or melena.     Endocrine: Negative for cold intolerance, heat intolerance, polydipsia, polyphagia and polyuria. Denies any history of weight change, heat/cold intolerance, polydipsia, polyuria     Genitourinary: Negative.      Musculoskeletal: Denies any history of arthritic symptoms or back problems     Skin:  Denies any change in hair or nails, rashes, or skin lesions.     Allergic/Immunologic: Negative.  Negative for environmental allergies, food allergies and immunocompromised state.     Neurological:  Denies any history of recurrent headaches, strokes, TIA, or seizure disorder.     Hematological: Denies any food allergies, seasonal allergies, bleeding " disorders, or lymphadenopathy.     Psychiatric/Behavioral: Denies any history of depression, substance abuse, or change in cognitive function.         Physical Exam     Constitutional: Cooperative, alert and oriented, well-developed, well-nourished, in no acute distress.     HENT:   Head: Normocephalic, normal hair patterns, no masses or tenderness.  Ears, Nose, and Throat: No gross abnormalities. No pallor or cyanosis. Dentition good.   Eyes: EOMS intact, PERRL, conjunctivae and lids unremarkable. Fundoscopic exam and visual fields not performed.   Neck: No palpable masses or adenopathy, no thyromegaly, no JVD, carotid pulses are full and equal bilaterally and without  Bruits.     Cardiovascular: Regular rhythm, S1 and S2 normal, no S3 or S4. Apical impulse not displaced. No murmurs, gallops, or rubs detected.     Pulmonary/Chest: Chest: normal symmetry, no tenderness to palpation, normal respiratory excursion, no intercostal retraction, no use of accessory muscles.            Pulmonary: Normal breath sounds. No rales or ronchi.    Abdominal: Abdomen soft, bowel sounds normoactive, no masses, no hepatosplenomegaly, non-tender, no bruits.     Musculoskeletal: No deformities, clubbing, cyanosis, erythema, or edema observed. There are no spinal abnormalities noted. Normal muscle strength and tone. Pulses full and equal in all extremities, no bruits auscultated.     Neurological: No gross motor or sensory deficits noted, affect appropriate, oriented to time, person, place.     Skin: Warm and dry to the touch, no apparent skin lesions or masses noted.     Psychiatric: He has a normal mood and affect. His behavior is normal. Judgment and thought content normal.         Procedures      Lab Results   Component Value Date    WBC 8.67 09/01/2020    HGB 15.5 09/01/2020    HCT 45.6 09/01/2020    MCV 90.7 09/01/2020     09/01/2020     Lab Results   Component Value Date    GLUCOSE 102 (H) 11/09/2020    BUN 11 11/09/2020       CREATININE 0.79 11/09/2020    EGFRIFNONA 99 11/09/2020    BCR 13.9 11/09/2020    CO2 25.7 11/09/2020    CALCIUM 8.7 11/09/2020    ALBUMIN 4.30 08/31/2020    AST 18 08/31/2020    ALT 20 08/31/2020     Lab Results   Component Value Date    CHOL 194 08/31/2020    CHOL 153 07/10/2017     Lab Results   Component Value Date    TRIG 64 08/31/2020    TRIG 59 07/10/2017     Lab Results   Component Value Date    HDL 72 (H) 08/31/2020    HDL 54 (L) 07/10/2017     No components found for: LDLCALC  Lab Results   Component Value Date     (H) 08/31/2020    LDL 96 07/10/2017     No results found for: HDLLDLRATIO  No components found for: CHOLHDL  Lab Results   Component Value Date    HGBA1C 5.51 07/10/2017     Lab Results   Component Value Date    TSH 2.670 08/31/2020           ASSESSMENT AND PLAN      Diagnoses and all orders for this visit:    1. Paroxysmal atrial fibrillation (CMS/HCC) (Primary)  -     ECG 12 Lead  He is currently in sinus rhythm.  We will make no change in his medicines except for him to take the Pradaxa twice a day which he is understands.    Our plan will be to see him back in approximately 6 months.  He is to call if there is further problems.  Other orders  -     dabigatran etexilate (PRADAXA) 150 MG capsu; Take 1 capsule by mouth 2 (Two) Times a Day.  Dispense: 60 capsule; Refill: 11        Patient's Body mass index is 31.97 kg/m². BMI is above normal parameters. Recommendations include: referral to primary care.      Pavan Mccauley  reports that he has been smoking cigarettes. He has never used smokeless tobacco.. I have educated him on the risk of diseases from using tobacco products such as cancer, COPD and heart disease.     I advised him to quit and he is not willing to quit.    I spent 3  minutes counseling the patient.               Hilary Sims MD  1/7/2021  09:01 CST

## 2021-01-12 ENCOUNTER — IMMUNIZATION (OUTPATIENT)
Dept: VACCINE CLINIC | Facility: HOSPITAL | Age: 66
End: 2021-01-12

## 2021-01-12 PROCEDURE — 91300 HC SARSCOV02 VAC 30MCG/0.3ML IM: CPT | Performed by: THORACIC SURGERY (CARDIOTHORACIC VASCULAR SURGERY)

## 2021-01-12 PROCEDURE — 0001A: CPT | Performed by: THORACIC SURGERY (CARDIOTHORACIC VASCULAR SURGERY)

## 2021-01-12 PROCEDURE — 0002A: CPT | Performed by: THORACIC SURGERY (CARDIOTHORACIC VASCULAR SURGERY)

## 2021-01-15 ENCOUNTER — TELEPHONE (OUTPATIENT)
Dept: CARDIOLOGY | Facility: CLINIC | Age: 66
End: 2021-01-15

## 2021-01-15 NOTE — TELEPHONE ENCOUNTER
Spoke with patient,     He is going to start taking xarelto 20mg once he finishes his pradaxa samples.     Patient was understanding     ----- Message from Chantel Ferreira MA sent at 1/15/2021  2:32 PM CST -----  Regarding: RE: JOSE PT  Contact: [phone]    ----- Message -----  From: Latisha Og  Sent: 2021  11:45 AM CST  To: Chantel Ferreira MA  Subject: RE: JOSE KOCH                                     I am assuming he is from the PA dept of his RX inusrance  ----- Message -----  From: Chantel Ferreira MA  Sent: 2021   2:51 PM CST  To: Latisha Og  Subject: RE: JOSE PT                                     Whose Jc and I didn't put a PA for the Pradaxa? I couldn't see one was started  ----- Message -----  From: Latisha Og  Sent: 2021  11:34 AM CST  To: Chantel Ferreira MA  Subject: JOSE KOCH                                         Please call Jc  to complete the PA on his Pradaxa    No ref # just give name and

## 2021-02-09 DIAGNOSIS — R42 DIZZINESS: Primary | ICD-10-CM

## 2021-02-12 ENCOUNTER — CLINICAL SUPPORT (OUTPATIENT)
Dept: CARDIOLOGY | Facility: CLINIC | Age: 66
End: 2021-02-12

## 2021-02-12 VITALS — SYSTOLIC BLOOD PRESSURE: 122 MMHG | HEART RATE: 57 BPM | DIASTOLIC BLOOD PRESSURE: 84 MMHG

## 2021-02-12 DIAGNOSIS — R42 DIZZINESS: Primary | ICD-10-CM

## 2021-02-12 LAB
QT INTERVAL: 454 MS
QTC INTERVAL: 434 MS

## 2021-02-12 PROCEDURE — 93000 ELECTROCARDIOGRAM COMPLETE: CPT | Performed by: INTERNAL MEDICINE

## 2021-03-17 DIAGNOSIS — I48.0 PAROXYSMAL ATRIAL FIBRILLATION (HCC): ICD-10-CM

## 2021-03-17 RX ORDER — METOPROLOL SUCCINATE 100 MG/1
100 TABLET, EXTENDED RELEASE ORAL
Qty: 90 TABLET | Refills: 3 | Status: SHIPPED | OUTPATIENT
Start: 2021-03-17 | End: 2022-05-11 | Stop reason: SDUPTHER

## 2021-03-17 RX ORDER — DILTIAZEM HYDROCHLORIDE 240 MG/1
240 CAPSULE, COATED, EXTENDED RELEASE ORAL
Qty: 90 CAPSULE | Refills: 3 | Status: SHIPPED | OUTPATIENT
Start: 2021-03-17 | End: 2022-05-11 | Stop reason: SDUPTHER

## 2021-07-06 ENCOUNTER — TELEPHONE (OUTPATIENT)
Dept: CARDIOLOGY | Facility: CLINIC | Age: 66
End: 2021-07-06

## 2021-07-06 NOTE — TELEPHONE ENCOUNTER
Called patient about his medication, he lost his job and can not afford his medication.     Will work on getting him some piror auth,   And some samples.     He was understanding     ----- Message from Kellen Nunez sent at 7/6/2021  8:34 AM CDT -----  Regarding: mike  Contact: [phone]  Please call pt regarding medications. He has questions.

## 2021-12-07 ENCOUNTER — TELEPHONE (OUTPATIENT)
Dept: CARDIOLOGY | Facility: CLINIC | Age: 66
End: 2021-12-07

## 2021-12-07 NOTE — TELEPHONE ENCOUNTER
Contacted PT to let know samples at the .        ----- Message from Kellen Vera sent at 12/7/2021 10:00 AM CST -----  Regarding: mike pt  Pt said that he was given Xarelto and he can not afford to pay for this monthly out of pocket.  He has 2 days left    Can reach pt at [phone]

## 2022-01-05 ENCOUNTER — TELEPHONE (OUTPATIENT)
Dept: CARDIOLOGY | Facility: CLINIC | Age: 67
End: 2022-01-05

## 2022-03-02 ENCOUNTER — TELEPHONE (OUTPATIENT)
Dept: CARDIOLOGY | Facility: CLINIC | Age: 67
End: 2022-03-02

## 2022-04-14 ENCOUNTER — TELEPHONE (OUTPATIENT)
Dept: CARDIOLOGY | Facility: CLINIC | Age: 67
End: 2022-04-14

## 2022-04-14 NOTE — TELEPHONE ENCOUNTER
Contacted pt to let him know samples of xarelto 20mg and giving patient assistance forms.  Pt voiced understanding.

## 2022-05-11 DIAGNOSIS — I48.0 PAROXYSMAL ATRIAL FIBRILLATION: ICD-10-CM

## 2022-05-11 RX ORDER — DILTIAZEM HYDROCHLORIDE 240 MG/1
240 CAPSULE, COATED, EXTENDED RELEASE ORAL
Qty: 90 CAPSULE | Refills: 3 | Status: ON HOLD | OUTPATIENT
Start: 2022-05-11 | End: 2023-04-04

## 2022-05-11 RX ORDER — METOPROLOL SUCCINATE 100 MG/1
100 TABLET, EXTENDED RELEASE ORAL
Qty: 90 TABLET | Refills: 3 | Status: ON HOLD | OUTPATIENT
Start: 2022-05-11 | End: 2023-04-04

## 2022-06-09 ENCOUNTER — TELEPHONE (OUTPATIENT)
Dept: CARDIOLOGY | Facility: CLINIC | Age: 67
End: 2022-06-09

## 2022-06-09 NOTE — TELEPHONE ENCOUNTER
Contacted PT about samples and gave assistance forms.  PT voiced understanding.        ----- Message from Emily Mike sent at 2022  1:10 PM CDT -----  Regarding: SAMPLES  Contact: [phone]  Pavan estevez [birth year] wanted some samples of Xarelto 20 mg, he wanted a call back @ [phone] to talk about this. Thxs

## 2022-07-08 ENCOUNTER — TELEPHONE (OUTPATIENT)
Dept: CARDIOLOGY | Facility: CLINIC | Age: 67
End: 2022-07-08

## 2022-07-08 NOTE — TELEPHONE ENCOUNTER
Samples of xarelto 20 mg given to the patient. He was advised he needs an appt to establish care with a new cardiologist. He was also instructed to bring in his patient assistance forms.

## 2022-08-19 ENCOUNTER — TELEPHONE (OUTPATIENT)
Dept: CARDIOLOGY | Facility: CLINIC | Age: 67
End: 2022-08-19

## 2022-08-30 ENCOUNTER — OFFICE VISIT (OUTPATIENT)
Dept: CARDIOLOGY | Facility: CLINIC | Age: 67
End: 2022-08-30

## 2022-08-30 ENCOUNTER — LAB (OUTPATIENT)
Dept: LAB | Facility: HOSPITAL | Age: 67
End: 2022-08-30

## 2022-08-30 VITALS
WEIGHT: 197.7 LBS | OXYGEN SATURATION: 98 % | HEIGHT: 67 IN | BODY MASS INDEX: 31.03 KG/M2 | TEMPERATURE: 96.8 F | HEART RATE: 57 BPM | SYSTOLIC BLOOD PRESSURE: 132 MMHG | DIASTOLIC BLOOD PRESSURE: 76 MMHG

## 2022-08-30 DIAGNOSIS — I48.0 PAROXYSMAL ATRIAL FIBRILLATION: ICD-10-CM

## 2022-08-30 DIAGNOSIS — R06.09 DYSPNEA ON EXERTION: ICD-10-CM

## 2022-08-30 DIAGNOSIS — I48.0 PAROXYSMAL ATRIAL FIBRILLATION: Primary | ICD-10-CM

## 2022-08-30 DIAGNOSIS — I47.1 SVT (SUPRAVENTRICULAR TACHYCARDIA): ICD-10-CM

## 2022-08-30 LAB
ALBUMIN SERPL-MCNC: 4 G/DL (ref 3.5–5.2)
ALBUMIN/GLOB SERPL: 1.4 G/DL
ALP SERPL-CCNC: 77 U/L (ref 39–117)
ALT SERPL W P-5'-P-CCNC: 16 U/L (ref 1–41)
ANION GAP SERPL CALCULATED.3IONS-SCNC: 8 MMOL/L (ref 5–15)
AST SERPL-CCNC: 16 U/L (ref 1–40)
BASOPHILS # BLD AUTO: 0.05 10*3/MM3 (ref 0–0.2)
BASOPHILS NFR BLD AUTO: 0.9 % (ref 0–1.5)
BILIRUB SERPL-MCNC: 1.2 MG/DL (ref 0–1.2)
BUN SERPL-MCNC: 11 MG/DL (ref 8–23)
BUN/CREAT SERPL: 9.8 (ref 7–25)
CALCIUM SPEC-SCNC: 9.3 MG/DL (ref 8.6–10.5)
CHLORIDE SERPL-SCNC: 104 MMOL/L (ref 98–107)
CHOLEST SERPL-MCNC: 218 MG/DL (ref 0–200)
CO2 SERPL-SCNC: 28 MMOL/L (ref 22–29)
CREAT SERPL-MCNC: 1.12 MG/DL (ref 0.76–1.27)
DEPRECATED RDW RBC AUTO: 47.4 FL (ref 37–54)
EGFRCR SERPLBLD CKD-EPI 2021: 72.9 ML/MIN/1.73
EOSINOPHIL # BLD AUTO: 0.1 10*3/MM3 (ref 0–0.4)
EOSINOPHIL NFR BLD AUTO: 1.8 % (ref 0.3–6.2)
ERYTHROCYTE [DISTWIDTH] IN BLOOD BY AUTOMATED COUNT: 14 % (ref 12.3–15.4)
GLOBULIN UR ELPH-MCNC: 2.9 GM/DL
GLUCOSE SERPL-MCNC: 117 MG/DL (ref 65–99)
HCT VFR BLD AUTO: 49.5 % (ref 37.5–51)
HDLC SERPL-MCNC: 69 MG/DL (ref 40–60)
HGB BLD-MCNC: 17 G/DL (ref 13–17.7)
IMM GRANULOCYTES # BLD AUTO: 0.02 10*3/MM3 (ref 0–0.05)
IMM GRANULOCYTES NFR BLD AUTO: 0.4 % (ref 0–0.5)
LDLC SERPL CALC-MCNC: 135 MG/DL (ref 0–100)
LDLC/HDLC SERPL: 1.93 {RATIO}
LYMPHOCYTES # BLD AUTO: 1.9 10*3/MM3 (ref 0.7–3.1)
LYMPHOCYTES NFR BLD AUTO: 33.8 % (ref 19.6–45.3)
MCH RBC QN AUTO: 31.7 PG (ref 26.6–33)
MCHC RBC AUTO-ENTMCNC: 34.3 G/DL (ref 31.5–35.7)
MCV RBC AUTO: 92.2 FL (ref 79–97)
MONOCYTES # BLD AUTO: 0.68 10*3/MM3 (ref 0.1–0.9)
MONOCYTES NFR BLD AUTO: 12.1 % (ref 5–12)
NEUTROPHILS NFR BLD AUTO: 2.87 10*3/MM3 (ref 1.7–7)
NEUTROPHILS NFR BLD AUTO: 51 % (ref 42.7–76)
NRBC BLD AUTO-RTO: 0 /100 WBC (ref 0–0.2)
PLATELET # BLD AUTO: 171 10*3/MM3 (ref 140–450)
PMV BLD AUTO: 10.1 FL (ref 6–12)
POTASSIUM SERPL-SCNC: 4.9 MMOL/L (ref 3.5–5.2)
PROT SERPL-MCNC: 6.9 G/DL (ref 6–8.5)
QT INTERVAL: 476 MS
QTC INTERVAL: 463 MS
RBC # BLD AUTO: 5.37 10*6/MM3 (ref 4.14–5.8)
SODIUM SERPL-SCNC: 140 MMOL/L (ref 136–145)
TRIGL SERPL-MCNC: 79 MG/DL (ref 0–150)
VLDLC SERPL-MCNC: 14 MG/DL (ref 5–40)
WBC NRBC COR # BLD: 5.62 10*3/MM3 (ref 3.4–10.8)

## 2022-08-30 PROCEDURE — 80053 COMPREHEN METABOLIC PANEL: CPT | Performed by: INTERNAL MEDICINE

## 2022-08-30 PROCEDURE — 80061 LIPID PANEL: CPT

## 2022-08-30 PROCEDURE — 85025 COMPLETE CBC W/AUTO DIFF WBC: CPT | Performed by: INTERNAL MEDICINE

## 2022-08-30 PROCEDURE — 99214 OFFICE O/P EST MOD 30 MIN: CPT | Performed by: INTERNAL MEDICINE

## 2022-08-30 PROCEDURE — 93000 ELECTROCARDIOGRAM COMPLETE: CPT | Performed by: INTERNAL MEDICINE

## 2022-08-30 PROCEDURE — 36415 COLL VENOUS BLD VENIPUNCTURE: CPT | Performed by: INTERNAL MEDICINE

## 2022-08-30 NOTE — PROGRESS NOTES
Pavan Mccauley  67 y.o. male    08/30/2022  1. Paroxysmal atrial fibrillation (HCC)    2. SVT (supraventricular tachycardia) (HCC)    3. Dyspnea on exertion        History of Present Illness  Pavna Mccauley is a 67-year-old male who is being seen by me for the first time.  He was a patient of Dr. Sims in the past.  His history is remarkable for SVT in the past and he presented with atrial fibrillation/flutter in August 2020.  He spontaneously converted back to sinus rhythm and was placed on a combination of metoprolol succinate and diltiazem CD and anticoagulation with Xarelto.  It appears that he is remained in sinus rhythm since then.    Lexiscan Cardiolite stress test in October 2020 showed:  · Findings consistent with an indeterminate ECG stress test.  · Left ventricular ejection fraction is borderline normal. (Calculated EF = 49%).  · Myocardial perfusion imaging indicates a normal myocardial perfusion study with no evidence of ischemia.  · Impressions are consistent with a low risk study.    Echocardiogram in September 2020 showed:  · Right ventricular cavity is moderately dilated.  · Left atrial cavity size is moderately dilated.  · Mild tricuspid valve regurgitation is present.  · Mild pulmonic valve regurgitation is present.  · Normal EF. 55-60%.     The patient has progressed reasonably well and denied any chest pain but does have NYHA class II dyspnea on exertion.  There is longstanding history of tobacco use.  He believes that his dyspnea has gotten worse since he had COVID more than a year ago.    EKG today showed sinus rhythm with heart rate of 57 bpm.  MI interval 190 ms.  QTc interval 463 ms.  Rightward axis.    SUBJECTIVE    No Known Allergies      Past Medical History:   Diagnosis Date   • Bronchopneumonia    • Chest pain    • Corneal foreign body     both eyes   • Neck pain    • Perforation of left tympanic membrane     history of   • Prashant [place] spotted fever    • Wheezing          Past  "Surgical History:   Procedure Laterality Date   • EYE FOREIGN BODY REMOVAL  07/29/2013    REMOVE FOREIGN BODY CORNEA W/SLIT LAMP 08114 (1)     • INJECTION OF MEDICATION  07/12/2011    Kenalog (1)      • SPINE SURGERY  03/12/1991    Exploration of the previous C5-6 fusion with refusion, exploration of the C5 nerve root, left side   • TYMPANOPLASTY Left 05/16/1972    Perforation of the left tympanic membrane         History reviewed. No pertinent family history.      Social History     Socioeconomic History   • Marital status: Single   Tobacco Use   • Smoking status: Current Every Day Smoker     Types: Cigarettes   • Smokeless tobacco: Never Used   Substance and Sexual Activity   • Alcohol use: Yes     Comment: nominal   • Drug use: No   • Sexual activity: Defer         Current Outpatient Medications   Medication Sig Dispense Refill   • dilTIAZem CD (CARDIZEM CD) 240 MG 24 hr capsule Take 1 capsule by mouth Daily. 90 capsule 3   • metoprolol succinate XL (TOPROL-XL) 100 MG 24 hr tablet Take 1 tablet by mouth Daily. 90 tablet 3   • rivaroxaban (XARELTO) 20 MG tablet Take 1 tablet by mouth once daily. 90 tablet 3     No current facility-administered medications for this visit.         OBJECTIVE    /76 (BP Location: Left arm, Patient Position: Sitting, Cuff Size: Adult)   Pulse 57   Temp 96.8 °F (36 °C)   Ht 170.2 cm (67\")   Wt 89.7 kg (197 lb 11.2 oz)   SpO2 98%   BMI 30.96 kg/m²         Review of Systems     Constitutional:  Denies recent weight loss, weight gain, fever or chills     HENT:  Denies any hearing loss, epistaxis, hoarseness, or difficulty speaking.     Eyes: Wears eyeglasses or contact lenses     Respiratory:  Dyspnea with exertion, no cough, wheezing, or hemoptysis.     Cardiovascular: Negative for palpitations, chest pain, orthopnea, PND    Gastrointestinal:  Denies change in bowel habits, dyspepsia, ulcer disease, hematochezia, or melena.     Endocrine: Negative for cold intolerance, heat " intolerance, polydipsia, polyphagia and polyuria.     Genitourinary: Negative.      Musculoskeletal: Denies any history of arthritic symptoms or back problems     Skin:  Denies any change in hair or nails, rashes, or skin lesions.     Allergic/Immunologic: Negative.  Negative for environmental allergies, food allergies and/or immunocompromised state.     Neurological:  Denies any history of recurrent headaches, strokes, TIA, or seizure disorder.     Hematological: Denies any food allergies, seasonal allergies, bleeding disorders, or lymphadenopathy.     Psychiatric/Behavioral: Denies any history of depression, substance abuse, or change in cognitive function.         Physical Exam     Constitutional: Cooperative, alert and oriented, in no acute distress.     HENT:   Head: Normocephalic, normal hair patterns, no masses or tenderness.  Ears, Nose, and Throat: No gross abnormalities. No pallor or cyanosis.   Eyes: EOMS intact, PERRL, conjunctivae and lids unremarkable. Fundoscopic exam and visual fields not performed.   Neck: No palpable masses or adenopathy, no thyromegaly, no JVD, carotid pulses are full and equal bilaterally and without bruit.     Cardiovascular: Regular rhythm, S1 and S2 normal, no S3 or S4.  No murmurs, gallops, or rubs detected.     Pulmonary/Chest: Chest: normal symmetry, normal respiratory excursion, no intercostal retraction, no use of accessory muscles.     Pulmonary: Normal breath sounds. No rales or rhonchi.    Abdominal: Abdomen soft, bowel sounds normoactive, no masses, no hepatosplenomegaly, nontender, no bruit.     Musculoskeletal: No deformities, clubbing, cyanosis, erythema, or edema observed.     Neurological: No gross motor or sensory deficits noted, affect appropriate, oriented to time, person, place.     Skin: Warm and dry to the touch, no apparent skin lesion or mass noted.     Psychiatric: He has a normal mood and affect. His behavior is normal. Judgment and thought content  normal.         Procedures      Lab Results   Component Value Date    WBC 8.67 09/01/2020    HGB 15.5 09/01/2020    HCT 45.6 09/01/2020    MCV 90.7 09/01/2020     09/01/2020     Lab Results   Component Value Date    GLUCOSE 102 (H) 11/09/2020    BUN 11 11/09/2020    CREATININE 0.79 11/09/2020    EGFRIFNONA 99 11/09/2020    BCR 13.9 11/09/2020    CO2 25.7 11/09/2020    CALCIUM 8.7 11/09/2020    ALBUMIN 4.30 08/31/2020    AST 18 08/31/2020    ALT 20 08/31/2020     Lab Results   Component Value Date    CHOL 194 08/31/2020    CHOL 153 07/10/2017     Lab Results   Component Value Date    TRIG 64 08/31/2020    TRIG 59 07/10/2017     Lab Results   Component Value Date    HDL 72 (H) 08/31/2020    HDL 54 (L) 07/10/2017     No components found for: LDLCALC  Lab Results   Component Value Date     (H) 08/31/2020    LDL 96 07/10/2017     No results found for: HDLLDLRATIO  No components found for: CHOLHDL  Lab Results   Component Value Date    HGBA1C 5.51 07/10/2017     Lab Results   Component Value Date    TSH 2.670 08/31/2020           ASSESSMENT AND PLAN  Pavan Mccauley is a 67-year-old male with history of paroxysmal atrial fibrillation, tobacco use.  He appears to have remained in sinus rhythm.  He believes that his dyspnea has gotten worse since he had COVID more than a year ago and to reassess his left ventricular function, and echocardiogram is being arranged.  Tobacco cessation has been stressed.  Lab tests indicated below have been ordered.  I did note that he does not follow-up with primary care and he has been strongly encouraged to do so.    Diagnoses and all orders for this visit:    1. Paroxysmal atrial fibrillation (HCC) (Primary)  -     ECG 12 Lead  -     Adult Transthoracic Echo Complete w/ Color, Spectral and Contrast if Necessary Per Protocol; Future  -     Lipid Panel; Future  -     Comprehensive Metabolic Panel  -     CBC & Differential    2. SVT (supraventricular tachycardia) (HCC)  -     Adult  Transthoracic Echo Complete w/ Color, Spectral and Contrast if Necessary Per Protocol; Future  -     Lipid Panel; Future  -     Comprehensive Metabolic Panel  -     CBC & Differential    3. Dyspnea on exertion  -     Adult Transthoracic Echo Complete w/ Color, Spectral and Contrast if Necessary Per Protocol; Future  -     Lipid Panel; Future  -     Comprehensive Metabolic Panel  -     CBC & Differential        BMI is >= 30 and <35. (Class 1 Obesity). The following options were offered after discussion;: nutrition counseling/recommendations      Pavan Mccauley  reports that he has been smoking cigarettes. He has never used smokeless tobacco.. I have educated him on the risk of diseases from using tobacco products such as cancer, COPD and heart disease.     I advised him to quit and he is not willing to quit.    I spent 3  minutes counseling the patient.               Nannette Walters MD  8/30/2022  09:07 CDT

## 2022-09-02 ENCOUNTER — TELEPHONE (OUTPATIENT)
Dept: CARDIOLOGY | Facility: CLINIC | Age: 67
End: 2022-09-02

## 2022-09-02 NOTE — TELEPHONE ENCOUNTER
Called pt no answer left vm to call the office  Per Dr Brothers----- Message from Nannette Walters MD sent at 8/30/2022  7:34 PM CDT -----  CBC, CMP within normal limits.  LDL elevated at 135.  Watch diet closely.  If already watching diet closely, start Lipitor 20 mg daily  ----- Message -----  From: Lab, Background User  Sent: 8/30/2022  10:03 AM CDT  To: Nannette Walters MD

## 2022-10-10 ENCOUNTER — TELEPHONE (OUTPATIENT)
Dept: CARDIOLOGY | Facility: CLINIC | Age: 67
End: 2022-10-10

## 2022-10-10 NOTE — TELEPHONE ENCOUNTER
Contacted Patient about samples at . Needs to fill out patient assistance forms.  Patient voiced understanding.

## 2023-03-20 ENCOUNTER — APPOINTMENT (OUTPATIENT)
Dept: CT IMAGING | Facility: HOSPITAL | Age: 68
DRG: 280 | End: 2023-03-20
Payer: MEDICARE

## 2023-03-20 ENCOUNTER — OFFICE VISIT (OUTPATIENT)
Dept: FAMILY MEDICINE CLINIC | Facility: CLINIC | Age: 68
End: 2023-03-20
Payer: MEDICARE

## 2023-03-20 ENCOUNTER — APPOINTMENT (OUTPATIENT)
Dept: GENERAL RADIOLOGY | Facility: HOSPITAL | Age: 68
DRG: 280 | End: 2023-03-20
Payer: MEDICARE

## 2023-03-20 ENCOUNTER — HOSPITAL ENCOUNTER (INPATIENT)
Facility: HOSPITAL | Age: 68
LOS: 15 days | Discharge: NURSING, REHAB OR LONG-TERM CARE | DRG: 280 | End: 2023-04-04
Attending: STUDENT IN AN ORGANIZED HEALTH CARE EDUCATION/TRAINING PROGRAM | Admitting: HOSPITALIST
Payer: MEDICARE

## 2023-03-20 VITALS
HEART RATE: 77 BPM | RESPIRATION RATE: 20 BRPM | WEIGHT: 225 LBS | DIASTOLIC BLOOD PRESSURE: 66 MMHG | OXYGEN SATURATION: 97 % | SYSTOLIC BLOOD PRESSURE: 110 MMHG | HEIGHT: 67 IN | BODY MASS INDEX: 35.31 KG/M2

## 2023-03-20 DIAGNOSIS — I49.9 IRREGULAR HEART BEAT: ICD-10-CM

## 2023-03-20 DIAGNOSIS — Z78.9 IMPAIRED MOBILITY AND ACTIVITIES OF DAILY LIVING: ICD-10-CM

## 2023-03-20 DIAGNOSIS — R09.89 PULMONARY VASCULAR CONGESTION: ICD-10-CM

## 2023-03-20 DIAGNOSIS — Z74.09 IMPAIRED MOBILITY AND ADLS: ICD-10-CM

## 2023-03-20 DIAGNOSIS — I50.82 BIVENTRICULAR CHF (CONGESTIVE HEART FAILURE): ICD-10-CM

## 2023-03-20 DIAGNOSIS — R06.02 SHORTNESS OF BREATH: Primary | ICD-10-CM

## 2023-03-20 DIAGNOSIS — Z74.09 IMPAIRED MOBILITY AND ACTIVITIES OF DAILY LIVING: ICD-10-CM

## 2023-03-20 DIAGNOSIS — N39.0 URINARY TRACT INFECTION WITHOUT HEMATURIA, SITE UNSPECIFIED: ICD-10-CM

## 2023-03-20 DIAGNOSIS — I48.91 ATRIAL FIBRILLATION WITH RVR: Primary | ICD-10-CM

## 2023-03-20 DIAGNOSIS — R17 JAUNDICE: ICD-10-CM

## 2023-03-20 DIAGNOSIS — Z74.09 IMPAIRED FUNCTIONAL MOBILITY, BALANCE, GAIT, AND ENDURANCE: ICD-10-CM

## 2023-03-20 DIAGNOSIS — Z78.9 IMPAIRED MOBILITY AND ADLS: ICD-10-CM

## 2023-03-20 DIAGNOSIS — I21.4 NSTEMI, INITIAL EPISODE OF CARE: ICD-10-CM

## 2023-03-20 LAB
ABO GROUP BLD: NORMAL
ABO GROUP BLD: NORMAL
ACANTHOCYTES BLD QL SMEAR: NORMAL
ALBUMIN SERPL-MCNC: 3.2 G/DL (ref 3.5–5.2)
ALBUMIN/GLOB SERPL: 1.2 G/DL
ALP SERPL-CCNC: 115 U/L (ref 39–117)
ALT SERPL W P-5'-P-CCNC: 38 U/L (ref 1–41)
AMMONIA BLD-SCNC: 34 UMOL/L (ref 16–60)
AMPHET+METHAMPHET UR QL: NEGATIVE
AMPHETAMINES UR QL: NEGATIVE
ANION GAP SERPL CALCULATED.3IONS-SCNC: 11 MMOL/L (ref 5–15)
APAP SERPL-MCNC: <5 MCG/ML (ref 0–30)
APTT PPP: 36 SECONDS (ref 20–40.3)
AST SERPL-CCNC: 52 U/L (ref 1–40)
BACTERIA UR QL AUTO: ABNORMAL /HPF
BARBITURATES UR QL SCN: NEGATIVE
BASOPHILS # BLD AUTO: 0.03 10*3/MM3 (ref 0–0.2)
BASOPHILS NFR BLD AUTO: 0.4 % (ref 0–1.5)
BENZODIAZ UR QL SCN: NEGATIVE
BILIRUB SERPL-MCNC: 8.1 MG/DL (ref 0–1.2)
BILIRUB UR QL STRIP: ABNORMAL
BLD GP AB SCN SERPL QL: NEGATIVE
BUN SERPL-MCNC: 17 MG/DL (ref 8–23)
BUN/CREAT SERPL: 15.2 (ref 7–25)
BUPRENORPHINE SERPL-MCNC: NEGATIVE NG/ML
CALCIUM SPEC-SCNC: 9.3 MG/DL (ref 8.6–10.5)
CANNABINOIDS SERPL QL: POSITIVE
CHLORIDE SERPL-SCNC: 95 MMOL/L (ref 98–107)
CLARITY UR: CLEAR
CLUMPED PLATELETS: NORMAL
CO2 SERPL-SCNC: 25 MMOL/L (ref 22–29)
COCAINE UR QL: NEGATIVE
COLOR UR: ABNORMAL
CREAT SERPL-MCNC: 1.12 MG/DL (ref 0.76–1.27)
D-LACTATE SERPL-SCNC: 3.6 MMOL/L (ref 0.5–2)
D-LACTATE SERPL-SCNC: 3.8 MMOL/L (ref 0.5–2)
D-LACTATE SERPL-SCNC: 4 MMOL/L (ref 0.5–2)
DEPRECATED RDW RBC AUTO: 56.6 FL (ref 37–54)
EGFRCR SERPLBLD CKD-EPI 2021: 72.9 ML/MIN/1.73
EOSINOPHIL # BLD AUTO: 0.03 10*3/MM3 (ref 0–0.4)
EOSINOPHIL NFR BLD AUTO: 0.4 % (ref 0.3–6.2)
ERYTHROCYTE [DISTWIDTH] IN BLOOD BY AUTOMATED COUNT: 20.5 % (ref 12.3–15.4)
ETHANOL BLD-MCNC: <10 MG/DL (ref 0–10)
ETHANOL UR QL: <0.01 %
GLOBULIN UR ELPH-MCNC: 2.6 GM/DL
GLUCOSE SERPL-MCNC: 84 MG/DL (ref 65–99)
GLUCOSE UR STRIP-MCNC: NEGATIVE MG/DL
HCT VFR BLD AUTO: 51.2 % (ref 37.5–51)
HGB BLD-MCNC: 18.8 G/DL (ref 13–17.7)
HGB UR QL STRIP.AUTO: NEGATIVE
HOLD SPECIMEN: NORMAL
HYALINE CASTS UR QL AUTO: ABNORMAL /LPF
IMM GRANULOCYTES # BLD AUTO: 0.1 10*3/MM3 (ref 0–0.05)
IMM GRANULOCYTES NFR BLD AUTO: 1.5 % (ref 0–0.5)
INR PPP: 1.86 (ref 0.8–1.2)
KETONES UR QL STRIP: NEGATIVE
LEUKOCYTE ESTERASE UR QL STRIP.AUTO: ABNORMAL
LIPASE SERPL-CCNC: 40 U/L (ref 13–60)
LYMPHOCYTES # BLD AUTO: 1.61 10*3/MM3 (ref 0.7–3.1)
LYMPHOCYTES NFR BLD AUTO: 23.9 % (ref 19.6–45.3)
Lab: NORMAL
MAGNESIUM SERPL-MCNC: 2.1 MG/DL (ref 1.6–2.4)
MCH RBC QN AUTO: 31.2 PG (ref 26.6–33)
MCHC RBC AUTO-ENTMCNC: 36.7 G/DL (ref 31.5–35.7)
MCV RBC AUTO: 84.9 FL (ref 79–97)
METHADONE UR QL SCN: NEGATIVE
MONOCYTES # BLD AUTO: 0.96 10*3/MM3 (ref 0.1–0.9)
MONOCYTES NFR BLD AUTO: 14.2 % (ref 5–12)
NEUTROPHILS NFR BLD AUTO: 4.01 10*3/MM3 (ref 1.7–7)
NEUTROPHILS NFR BLD AUTO: 59.6 % (ref 42.7–76)
NITRITE UR QL STRIP: POSITIVE
NRBC BLD AUTO-RTO: 0 /100 WBC (ref 0–0.2)
NT-PROBNP SERPL-MCNC: 4988 PG/ML (ref 0–900)
OPIATES UR QL: NEGATIVE
OXYCODONE UR QL SCN: NEGATIVE
PCP UR QL SCN: NEGATIVE
PH UR STRIP.AUTO: 5.5 [PH] (ref 5–9)
PLATELET # BLD AUTO: 74 10*3/MM3 (ref 140–450)
PMV BLD AUTO: 10 FL (ref 6–12)
POLYCHROMASIA BLD QL SMEAR: NORMAL
POTASSIUM SERPL-SCNC: 4 MMOL/L (ref 3.5–5.2)
PROPOXYPH UR QL: NEGATIVE
PROT SERPL-MCNC: 5.8 G/DL (ref 6–8.5)
PROT UR QL STRIP: ABNORMAL
PROTHROMBIN TIME: 21.5 SECONDS (ref 11.1–15.3)
RBC # BLD AUTO: 6.03 10*6/MM3 (ref 4.14–5.8)
RBC # UR STRIP: ABNORMAL /HPF
REF LAB TEST METHOD: ABNORMAL
RH BLD: POSITIVE
RH BLD: POSITIVE
SMALL PLATELETS BLD QL SMEAR: NORMAL
SODIUM SERPL-SCNC: 131 MMOL/L (ref 136–145)
SP GR UR STRIP: 1.02 (ref 1–1.03)
SQUAMOUS #/AREA URNS HPF: ABNORMAL /HPF
T&S EXPIRATION DATE: NORMAL
TRICYCLICS UR QL SCN: NEGATIVE
TROPONIN T SERPL HS-MCNC: 62 NG/L
UROBILINOGEN UR QL STRIP: ABNORMAL
WBC # UR STRIP: ABNORMAL /HPF
WBC MORPH BLD: NORMAL
WBC NRBC COR # BLD: 6.74 10*3/MM3 (ref 3.4–10.8)

## 2023-03-20 PROCEDURE — 80306 DRUG TEST PRSMV INSTRMNT: CPT | Performed by: STUDENT IN AN ORGANIZED HEALTH CARE EDUCATION/TRAINING PROGRAM

## 2023-03-20 PROCEDURE — 87086 URINE CULTURE/COLONY COUNT: CPT | Performed by: STUDENT IN AN ORGANIZED HEALTH CARE EDUCATION/TRAINING PROGRAM

## 2023-03-20 PROCEDURE — 25010000002 FUROSEMIDE PER 20 MG: Performed by: STUDENT IN AN ORGANIZED HEALTH CARE EDUCATION/TRAINING PROGRAM

## 2023-03-20 PROCEDURE — 93010 ELECTROCARDIOGRAM REPORT: CPT | Performed by: INTERNAL MEDICINE

## 2023-03-20 PROCEDURE — 85610 PROTHROMBIN TIME: CPT | Performed by: STUDENT IN AN ORGANIZED HEALTH CARE EDUCATION/TRAINING PROGRAM

## 2023-03-20 PROCEDURE — 87077 CULTURE AEROBIC IDENTIFY: CPT | Performed by: STUDENT IN AN ORGANIZED HEALTH CARE EDUCATION/TRAINING PROGRAM

## 2023-03-20 PROCEDURE — 36415 COLL VENOUS BLD VENIPUNCTURE: CPT

## 2023-03-20 PROCEDURE — 93005 ELECTROCARDIOGRAM TRACING: CPT | Performed by: STUDENT IN AN ORGANIZED HEALTH CARE EDUCATION/TRAINING PROGRAM

## 2023-03-20 PROCEDURE — 82140 ASSAY OF AMMONIA: CPT | Performed by: STUDENT IN AN ORGANIZED HEALTH CARE EDUCATION/TRAINING PROGRAM

## 2023-03-20 PROCEDURE — 85025 COMPLETE CBC W/AUTO DIFF WBC: CPT | Performed by: STUDENT IN AN ORGANIZED HEALTH CARE EDUCATION/TRAINING PROGRAM

## 2023-03-20 PROCEDURE — 86900 BLOOD TYPING SEROLOGIC ABO: CPT

## 2023-03-20 PROCEDURE — 71250 CT THORAX DX C-: CPT

## 2023-03-20 PROCEDURE — 82077 ASSAY SPEC XCP UR&BREATH IA: CPT | Performed by: STUDENT IN AN ORGANIZED HEALTH CARE EDUCATION/TRAINING PROGRAM

## 2023-03-20 PROCEDURE — 36415 COLL VENOUS BLD VENIPUNCTURE: CPT | Performed by: STUDENT IN AN ORGANIZED HEALTH CARE EDUCATION/TRAINING PROGRAM

## 2023-03-20 PROCEDURE — 71045 X-RAY EXAM CHEST 1 VIEW: CPT

## 2023-03-20 PROCEDURE — 83690 ASSAY OF LIPASE: CPT | Performed by: STUDENT IN AN ORGANIZED HEALTH CARE EDUCATION/TRAINING PROGRAM

## 2023-03-20 PROCEDURE — 1159F MED LIST DOCD IN RCRD: CPT | Performed by: NURSE PRACTITIONER

## 2023-03-20 PROCEDURE — 1160F RVW MEDS BY RX/DR IN RCRD: CPT | Performed by: NURSE PRACTITIONER

## 2023-03-20 PROCEDURE — 86900 BLOOD TYPING SEROLOGIC ABO: CPT | Performed by: STUDENT IN AN ORGANIZED HEALTH CARE EDUCATION/TRAINING PROGRAM

## 2023-03-20 PROCEDURE — 99285 EMERGENCY DEPT VISIT HI MDM: CPT

## 2023-03-20 PROCEDURE — 86901 BLOOD TYPING SEROLOGIC RH(D): CPT | Performed by: STUDENT IN AN ORGANIZED HEALTH CARE EDUCATION/TRAINING PROGRAM

## 2023-03-20 PROCEDURE — 85007 BL SMEAR W/DIFF WBC COUNT: CPT | Performed by: STUDENT IN AN ORGANIZED HEALTH CARE EDUCATION/TRAINING PROGRAM

## 2023-03-20 PROCEDURE — 87186 SC STD MICRODIL/AGAR DIL: CPT | Performed by: STUDENT IN AN ORGANIZED HEALTH CARE EDUCATION/TRAINING PROGRAM

## 2023-03-20 PROCEDURE — 80143 DRUG ASSAY ACETAMINOPHEN: CPT | Performed by: STUDENT IN AN ORGANIZED HEALTH CARE EDUCATION/TRAINING PROGRAM

## 2023-03-20 PROCEDURE — 83880 ASSAY OF NATRIURETIC PEPTIDE: CPT | Performed by: STUDENT IN AN ORGANIZED HEALTH CARE EDUCATION/TRAINING PROGRAM

## 2023-03-20 PROCEDURE — 83605 ASSAY OF LACTIC ACID: CPT | Performed by: STUDENT IN AN ORGANIZED HEALTH CARE EDUCATION/TRAINING PROGRAM

## 2023-03-20 PROCEDURE — 80053 COMPREHEN METABOLIC PANEL: CPT | Performed by: STUDENT IN AN ORGANIZED HEALTH CARE EDUCATION/TRAINING PROGRAM

## 2023-03-20 PROCEDURE — 85730 THROMBOPLASTIN TIME PARTIAL: CPT | Performed by: STUDENT IN AN ORGANIZED HEALTH CARE EDUCATION/TRAINING PROGRAM

## 2023-03-20 PROCEDURE — 84484 ASSAY OF TROPONIN QUANT: CPT | Performed by: STUDENT IN AN ORGANIZED HEALTH CARE EDUCATION/TRAINING PROGRAM

## 2023-03-20 PROCEDURE — 86850 RBC ANTIBODY SCREEN: CPT | Performed by: STUDENT IN AN ORGANIZED HEALTH CARE EDUCATION/TRAINING PROGRAM

## 2023-03-20 PROCEDURE — 81001 URINALYSIS AUTO W/SCOPE: CPT | Performed by: STUDENT IN AN ORGANIZED HEALTH CARE EDUCATION/TRAINING PROGRAM

## 2023-03-20 PROCEDURE — 86901 BLOOD TYPING SEROLOGIC RH(D): CPT

## 2023-03-20 PROCEDURE — 83735 ASSAY OF MAGNESIUM: CPT | Performed by: STUDENT IN AN ORGANIZED HEALTH CARE EDUCATION/TRAINING PROGRAM

## 2023-03-20 PROCEDURE — 74176 CT ABD & PELVIS W/O CONTRAST: CPT

## 2023-03-20 PROCEDURE — 99213 OFFICE O/P EST LOW 20 MIN: CPT | Performed by: NURSE PRACTITIONER

## 2023-03-20 RX ORDER — DILTIAZEM HYDROCHLORIDE 5 MG/ML
10 INJECTION INTRAVENOUS ONCE
Status: COMPLETED | OUTPATIENT
Start: 2023-03-20 | End: 2023-03-20

## 2023-03-20 RX ORDER — SODIUM CHLORIDE 0.9 % (FLUSH) 0.9 %
10 SYRINGE (ML) INJECTION EVERY 12 HOURS SCHEDULED
Status: DISCONTINUED | OUTPATIENT
Start: 2023-03-20 | End: 2023-04-04 | Stop reason: HOSPADM

## 2023-03-20 RX ORDER — FUROSEMIDE 10 MG/ML
80 INJECTION INTRAMUSCULAR; INTRAVENOUS ONCE
Status: COMPLETED | OUTPATIENT
Start: 2023-03-20 | End: 2023-03-20

## 2023-03-20 RX ORDER — ACETAMINOPHEN 160 MG/5ML
650 SOLUTION ORAL EVERY 4 HOURS PRN
Status: DISCONTINUED | OUTPATIENT
Start: 2023-03-20 | End: 2023-03-23

## 2023-03-20 RX ORDER — FUROSEMIDE 10 MG/ML
40 INJECTION INTRAMUSCULAR; INTRAVENOUS EVERY 12 HOURS
Status: DISCONTINUED | OUTPATIENT
Start: 2023-03-21 | End: 2023-03-23

## 2023-03-20 RX ORDER — ENOXAPARIN SODIUM 100 MG/ML
40 INJECTION SUBCUTANEOUS DAILY
Status: DISCONTINUED | OUTPATIENT
Start: 2023-03-20 | End: 2023-03-20

## 2023-03-20 RX ORDER — SODIUM CHLORIDE 9 MG/ML
40 INJECTION, SOLUTION INTRAVENOUS AS NEEDED
Status: DISCONTINUED | OUTPATIENT
Start: 2023-03-20 | End: 2023-04-04 | Stop reason: HOSPADM

## 2023-03-20 RX ORDER — ACETAMINOPHEN 325 MG/1
650 TABLET ORAL EVERY 4 HOURS PRN
Status: DISCONTINUED | OUTPATIENT
Start: 2023-03-20 | End: 2023-03-27 | Stop reason: SDUPTHER

## 2023-03-20 RX ORDER — SODIUM CHLORIDE 0.9 % (FLUSH) 0.9 %
10 SYRINGE (ML) INJECTION AS NEEDED
Status: DISCONTINUED | OUTPATIENT
Start: 2023-03-20 | End: 2023-04-04 | Stop reason: HOSPADM

## 2023-03-20 RX ORDER — ACETAMINOPHEN 650 MG/1
650 SUPPOSITORY RECTAL EVERY 4 HOURS PRN
Status: DISCONTINUED | OUTPATIENT
Start: 2023-03-20 | End: 2023-04-04 | Stop reason: HOSPADM

## 2023-03-20 RX ORDER — ONDANSETRON 2 MG/ML
4 INJECTION INTRAMUSCULAR; INTRAVENOUS EVERY 6 HOURS PRN
Status: DISCONTINUED | OUTPATIENT
Start: 2023-03-20 | End: 2023-04-04 | Stop reason: HOSPADM

## 2023-03-20 RX ADMIN — DILTIAZEM HYDROCHLORIDE 90 MG: 30 TABLET ORAL at 22:16

## 2023-03-20 RX ADMIN — SODIUM CHLORIDE 5 MG/HR: 900 INJECTION, SOLUTION INTRAVENOUS at 17:08

## 2023-03-20 RX ADMIN — DILTIAZEM HYDROCHLORIDE 10 MG: 5 INJECTION INTRAVENOUS at 17:07

## 2023-03-20 RX ADMIN — Medication 10 ML: at 22:17

## 2023-03-20 RX ADMIN — METOPROLOL TARTRATE 25 MG: 25 TABLET, FILM COATED ORAL at 22:16

## 2023-03-20 RX ADMIN — RIVAROXABAN 20 MG: 10 TABLET, FILM COATED ORAL at 22:16

## 2023-03-20 RX ADMIN — FUROSEMIDE 80 MG: 10 INJECTION, SOLUTION INTRAVENOUS at 20:37

## 2023-03-20 RX ADMIN — SODIUM CHLORIDE 1000 ML: 9 INJECTION, SOLUTION INTRAVENOUS at 16:43

## 2023-03-20 NOTE — PROGRESS NOTES
Chief Complaint  Shortness of Breath and Edema (Bilateral leg)    Subjective          Pavan Mccauley presents to [facility] [place] with worsening shortness of breath, distended abdomen, fatigue, swelling and states he does not feel good and needs relief. He admits to stop taking all medications about 6 months ago due to cost.     Shortness of Breath  This is a new problem. The current episode started 1 to 4 weeks ago. The problem occurs constantly. The problem has been rapidly worsening. Associated symptoms include abdominal pain, leg pain, leg swelling and wheezing. Nothing aggravates the symptoms. Risk factors include smoking. He has tried nothing for the symptoms.   Leg Swelling  This is a recurrent problem. The current episode started 1 to 4 weeks ago. The problem occurs constantly. The problem has been gradually worsening. Associated symptoms include abdominal pain, arthralgias, fatigue, myalgias and weakness. Nothing aggravates the symptoms. He has tried nothing for the symptoms. The treatment provided no relief.     No facility-administered medications prior to visit.     Outpatient Medications Prior to Visit   Medication Sig Dispense Refill   • dilTIAZem CD (CARDIZEM CD) 240 MG 24 hr capsule Take 1 capsule by mouth Daily. (Patient not taking: Reported on 3/20/2023) 90 capsule 3   • metoprolol succinate XL (TOPROL-XL) 100 MG 24 hr tablet Take 1 tablet by mouth Daily. 90 tablet 3   • rivaroxaban (XARELTO) 20 MG tablet Take 1 tablet by mouth once daily. (Patient not taking: Reported on 3/20/2023) 90 tablet 3       Review of Systems   Constitutional: Positive for fatigue.   Respiratory: Positive for shortness of breath and wheezing.    Cardiovascular: Positive for leg swelling.   Gastrointestinal: Positive for abdominal pain.   Musculoskeletal: Positive for arthralgias and myalgias.   Neurological: Positive for weakness.         Objective   Vital Signs:   Visit  "Vitals  /66   Pulse 77   Resp 20   Ht 170.2 cm (67\")   Wt 102 kg (225 lb)   SpO2 97%   BMI 35.24 kg/m²     Physical Exam  Vitals and nursing note reviewed.   Constitutional:       Appearance: He is well-developed. He is ill-appearing.   HENT:      Head: Normocephalic and atraumatic.      Right Ear: Hearing normal.      Left Ear: Hearing normal.      Nose: Nose normal. No mucosal edema or rhinorrhea.   Eyes:      General: Lids are normal.      Conjunctiva/sclera: Conjunctivae normal.      Pupils: Pupils are equal, round, and reactive to light.   Neck:      Thyroid: No thyroid mass or thyromegaly.      Trachea: Trachea normal. No tracheal tenderness or tracheal deviation.   Cardiovascular:      Rate and Rhythm: Tachycardia present. Rhythm irregular.      Pulses: Normal pulses.      Heart sounds: Normal heart sounds.   Pulmonary:      Effort: Pulmonary effort is normal. No respiratory distress.      Breath sounds: Normal breath sounds. No stridor. No wheezing or rales.   Abdominal:      General: There is distension.      Palpations: Abdomen is soft.      Tenderness: There is abdominal tenderness.       Musculoskeletal:         General: Normal range of motion.      Cervical back: Normal range of motion.      Right lower leg: 3+ Edema present.      Left lower leg: 3+ Edema present.   Skin:     General: Skin is warm and dry.      Coloration: Skin is jaundiced, pale and sallow.   Neurological:      Mental Status: He is alert and oriented to person, place, and time.   Psychiatric:         Mood and Affect: Mood is not anxious. Affect is not inappropriate.         Speech: Speech normal.         Behavior: Behavior normal. Behavior is not agitated or hyperactive.         Thought Content: Thought content normal.         Judgment: Judgment normal.        Result Review :                 Assessment and Plan    Diagnoses and all orders for this visit:    1. Shortness of breath (Primary)    2. Jaundice    3. Irregular heart " beat      Based on my initial assessment, and this patients appearance, it is very evident he needs to be seen in ER    Patient agrees to go, I will call ER and give them an update     Patients friend that brought him today will take him to ER           Follow Up   Return if symptoms worsen or fail to improve, for Next scheduled follow up.  Patient was given instructions and counseling regarding his condition or for health maintenance advice. Please see specific information pulled into the AVS if appropriate.           This document has been electronically signed by ARY Medina on March 21, 2023 08:14 CDT

## 2023-03-20 NOTE — ED PROVIDER NOTES
Subjective   History of Present Illness  67-year-old male comes to the ER stating he does not feel well.  He has not felt well for a while.  Patient states he has not taken any of his medicines for the last several months.  He reports having history of A-fib.  Patient is a poor historian, but states he is short of breath that is worse with exertion.    History provided by:  Patient  History limited by: Poor historian.   used: No        Review of Systems   Constitutional: Positive for activity change. Negative for chills and fever.   HENT: Negative for drooling.    Eyes: Negative for redness.   Respiratory: Positive for shortness of breath. Negative for cough, chest tightness and wheezing.    Cardiovascular: Positive for leg swelling. Negative for chest pain.   Gastrointestinal: Negative for abdominal pain, nausea and vomiting.   Genitourinary: Negative for flank pain.   Skin: Negative for color change.   Neurological: Negative for seizures.   Psychiatric/Behavioral: Negative for confusion.       Past Medical History:   Diagnosis Date   • Bronchopneumonia    • Chest pain    • Corneal foreign body     both eyes   • Neck pain    • Perforation of left tympanic membrane     history of   • Prashant Mountain spotted fever    • Wheezing        No Known Allergies    Past Surgical History:   Procedure Laterality Date   • EYE FOREIGN BODY REMOVAL  07/29/2013    REMOVE FOREIGN BODY CORNEA W/SLIT LAMP 09563 (1)     • INJECTION OF MEDICATION  07/12/2011    Kenalog (1)      • SPINE SURGERY  03/12/1991    Exploration of the previous C5-6 fusion with refusion, exploration of the C5 nerve root, left side   • TYMPANOPLASTY Left 05/16/1972    Perforation of the left tympanic membrane       History reviewed. No pertinent family history.    Social History     Socioeconomic History   • Marital status: Single   Tobacco Use   • Smoking status: Every Day     Types: Cigarettes   • Smokeless tobacco: Never   Vaping Use   •  Vaping Use: Never used   Substance and Sexual Activity   • Alcohol use: Yes     Comment: nominal   • Drug use: No   • Sexual activity: Defer           Objective    Vitals:    03/20/23 1800 03/20/23 1837 03/20/23 1840 03/20/23 1842   BP:  114/61 115/85    Pulse: 118 (!) 128 120 116   Resp:       Temp:       TempSrc:       SpO2: 96%  93% 93%   Weight:       Height:           Physical Exam  Vitals and nursing note reviewed.   Constitutional:       General: He is not in acute distress.     Appearance: He is well-developed. He is obese. He is ill-appearing. He is not toxic-appearing or diaphoretic.   Eyes:      Conjunctiva/sclera: Conjunctivae normal.   Cardiovascular:      Rate and Rhythm: Tachycardia present. Rhythm irregular.   Pulmonary:      Effort: Pulmonary effort is normal. No accessory muscle usage or respiratory distress.      Breath sounds: Normal breath sounds.   Chest:      Chest wall: No tenderness.   Abdominal:      General: Bowel sounds are normal. There is distension.      Palpations: Abdomen is soft.      Tenderness: There is no abdominal tenderness (deep palpation).   Skin:     General: Skin is warm and dry.      Capillary Refill: Capillary refill takes less than 2 seconds.   Neurological:      General: No focal deficit present.      Mental Status: He is alert and oriented to person, place, and time.         ECG 12 Lead      Date/Time: 3/20/2023 7:58 PM  Performed by: Issa Amaral MD  Authorized by: Magen Lagos MD   Interpreted by physician  Rhythm: atrial fibrillation  Rate: tachycardic  Clinical impression: abnormal ECG                 ED Course      Results for orders placed or performed during the hospital encounter of 03/20/23   Comprehensive Metabolic Panel    Specimen: Blood   Result Value Ref Range    Glucose 84 65 - 99 mg/dL    BUN 17 8 - 23 mg/dL    Creatinine 1.12 0.76 - 1.27 mg/dL    Sodium 131 (L) 136 - 145 mmol/L    Potassium 4.0 3.5 - 5.2 mmol/L    Chloride 95 (L) 98 - 107  mmol/L    CO2 25.0 22.0 - 29.0 mmol/L    Calcium 9.3 8.6 - 10.5 mg/dL    Total Protein 5.8 (L) 6.0 - 8.5 g/dL    Albumin 3.2 (L) 3.5 - 5.2 g/dL    ALT (SGPT) 38 1 - 41 U/L    AST (SGOT) 52 (H) 1 - 40 U/L    Alkaline Phosphatase 115 39 - 117 U/L    Total Bilirubin 8.1 (H) 0.0 - 1.2 mg/dL    Globulin 2.6 gm/dL    A/G Ratio 1.2 g/dL    BUN/Creatinine Ratio 15.2 7.0 - 25.0    Anion Gap 11.0 5.0 - 15.0 mmol/L    eGFR 72.9 >60.0 mL/min/1.73   Urinalysis With Microscopic If Indicated (No Culture) - Urine, Clean Catch    Specimen: Urine, Clean Catch   Result Value Ref Range    Color, UA Orange (A) Yellow, Straw, Dark Yellow, Jessica    Appearance, UA Clear Clear    pH, UA 5.5 5.0 - 9.0    Specific Gravity, UA 1.025 1.003 - 1.030    Glucose, UA Negative Negative    Ketones, UA Negative Negative    Bilirubin, UA Moderate (2+) (A) Negative    Blood, UA Negative Negative    Protein, UA 30 mg/dL (1+) (A) Negative    Leuk Esterase, UA Small (1+) (A) Negative    Nitrite, UA Positive (A) Negative    Urobilinogen, UA 2.0 E.U./dL (A) 0.2 - 1.0 E.U./dL   Protime-INR    Specimen: Blood   Result Value Ref Range    Protime 21.5 (H) 11.1 - 15.3 Seconds    INR 1.86 (H) 0.80 - 1.20   aPTT    Specimen: Blood   Result Value Ref Range    PTT 36.0 20.0 - 40.3 seconds   Lipase    Specimen: Blood   Result Value Ref Range    Lipase 40 13 - 60 U/L   BNP    Specimen: Blood   Result Value Ref Range    proBNP 4,988.0 (H) 0.0 - 900.0 pg/mL   Single High Sensitivity Troponin T    Specimen: Blood   Result Value Ref Range    HS Troponin T 62 (C) <15 ng/L   Lactic Acid, Plasma    Specimen: Blood   Result Value Ref Range    Lactate 3.6 (C) 0.5 - 2.0 mmol/L   Acetaminophen Level    Specimen: Blood   Result Value Ref Range    Acetaminophen <5.0 0.0 - 30.0 mcg/mL   Ethanol    Specimen: Blood   Result Value Ref Range    Ethanol <10 0 - 10 mg/dL    Ethanol % <0.010 %   Urine Drug Screen - Urine, Clean Catch    Specimen: Urine, Clean Catch   Result Value Ref Range     THC, Screen, Urine Positive (A) Negative    Phencyclidine (PCP), Urine Negative Negative    Cocaine Screen, Urine Negative Negative    Methamphetamine, Ur Negative Negative    Opiate Screen Negative Negative    Amphetamine Screen, Urine Negative Negative    Benzodiazepine Screen, Urine Negative Negative    Tricyclic Antidepressants Screen Negative Negative    Methadone Screen, Urine Negative Negative    Barbiturates Screen, Urine Negative Negative    Oxycodone Screen, Urine Negative Negative    Propoxyphene Screen Negative Negative    Buprenorphine, Screen, Urine Negative Negative   Ammonia    Specimen: Blood   Result Value Ref Range    Ammonia 34 16 - 60 umol/L   Magnesium    Specimen: Blood   Result Value Ref Range    Magnesium 2.1 1.6 - 2.4 mg/dL   CBC Auto Differential    Specimen: Blood   Result Value Ref Range    WBC 6.74 3.40 - 10.80 10*3/mm3    RBC 6.03 (H) 4.14 - 5.80 10*6/mm3    Hemoglobin 18.8 (H) 13.0 - 17.7 g/dL    Hematocrit 51.2 (H) 37.5 - 51.0 %    MCV 84.9 79.0 - 97.0 fL    MCH 31.2 26.6 - 33.0 pg    MCHC 36.7 (H) 31.5 - 35.7 g/dL    RDW 20.5 (H) 12.3 - 15.4 %    RDW-SD 56.6 (H) 37.0 - 54.0 fl    MPV 10.0 6.0 - 12.0 fL    Platelets 74 (L) 140 - 450 10*3/mm3    Neutrophil % 59.6 42.7 - 76.0 %    Lymphocyte % 23.9 19.6 - 45.3 %    Monocyte % 14.2 (H) 5.0 - 12.0 %    Eosinophil % 0.4 0.3 - 6.2 %    Basophil % 0.4 0.0 - 1.5 %    Immature Grans % 1.5 (H) 0.0 - 0.5 %    Neutrophils, Absolute 4.01 1.70 - 7.00 10*3/mm3    Lymphocytes, Absolute 1.61 0.70 - 3.10 10*3/mm3    Monocytes, Absolute 0.96 (H) 0.10 - 0.90 10*3/mm3    Eosinophils, Absolute 0.03 0.00 - 0.40 10*3/mm3    Basophils, Absolute 0.03 0.00 - 0.20 10*3/mm3    Immature Grans, Absolute 0.10 (H) 0.00 - 0.05 10*3/mm3    nRBC 0.0 0.0 - 0.2 /100 WBC   Scan Slide    Specimen: Blood   Result Value Ref Range    Acanthocytes Large/3+ None Seen    Polychromasia Slight/1+ None Seen    WBC Morphology Normal Normal    Platelet Estimate Decreased Normal     Clumped Platelets     Urinalysis, Microscopic Only - Urine, Clean Catch    Specimen: Urine, Clean Catch   Result Value Ref Range    RBC, UA 0-2 (A) None Seen /HPF    WBC, UA 0-2 None Seen, 0-2, 3-5 /HPF    Bacteria, UA None Seen None Seen /HPF    Squamous Epithelial Cells, UA 0-2 None Seen, 0-2 /HPF    Hyaline Casts, UA 0-2 None Seen /LPF    Methodology Automated Microscopy    ECG 12 Lead Tachycardia   Result Value Ref Range    QT Interval 256 ms    QTC Interval 403 ms   Type & Screen    Specimen: Blood   Result Value Ref Range    ABO Type A     RH type Positive     Antibody Screen Negative     T&S Expiration Date 3/23/2023 11:59:59 PM    PREVIOUS HISTORY    Specimen: Blood   Result Value Ref Range    Previous History No record on File    Gold Top - SST   Result Value Ref Range    Extra Tube Hold for add-ons.      XR Chest 1 View   Final Result   Right basilar pleural effusion, right lower lobe   compressive atelectasis, cardiomegaly and mild central vascular   congestion.      Electronically signed by:  Hollis Sousa MD  3/20/2023 6:26 PM CDT   Workstation: [id number]      CT Abdomen Pelvis Without Contrast    (Results Pending)   CT Chest Without Contrast Diagnostic    (Results Pending)             Medical Decision Making  Vital signs are stable, afebrile.  Patient found to be in A-fib RVR.  Patient received a diltiazem bolus and started on a drip.  Lab significant for leukocyte nitrate positive, normal white count, lactic acid of 3.6, UDS positive for marijuana.  BNP is 5000.  Chest x-ray shows an effusion with pulmonary vascular congestion.  Patient has no abdominal complaints.  Will order IV Lasix.  Spoke with the on-call hospitalist who recommends against antibiotics at this point.    Atrial fibrillation with RVR (HCC): acute illness or injury  Pulmonary vascular congestion: acute illness or injury  Urinary tract infection without hematuria, site unspecified: acute illness or injury  Amount and/or Complexity  of Data Reviewed  Labs: ordered.  Radiology: ordered.  ECG/medicine tests: ordered.      Risk  Prescription drug management.  Decision regarding hospitalization.      Critical Care  Total time providing critical care: 66 minutes      Final diagnoses:   Atrial fibrillation with RVR (HCC)   Urinary tract infection without hematuria, site unspecified   Pulmonary vascular congestion       ED Disposition  ED Disposition     ED Disposition   Decision to Admit    Condition   --    Comment   Level of Care: Stepdown [25]   Diagnosis: Atrial fibrillation with RVR (HCC) [[id number]]   Admitting Physician: RUDDY TELLEZ [[id number]]   Attending Physician: RUDDY TELLEZ [[id number]]               No follow-up provider specified.       Medication List      No changes were made to your prescriptions during this visit.             Issa Amaral MD  03/20/23 1958

## 2023-03-20 NOTE — Clinical Note
Allergies reviewed.  H&P note has been confirmed for the patient. Procedural consent has been signed.  Staff has reviewed the patient's labs. O-L Flap Text: The defect edges were debeveled with a #15 scalpel blade.  Given the location of the defect, shape of the defect and the proximity to free margins an O-L flap was deemed most appropriate.  Using a sterile surgical marker, an appropriate advancement flap was drawn incorporating the defect and placing the expected incisions within the relaxed skin tension lines where possible.    The area thus outlined was incised deep to adipose tissue with a #15 scalpel blade.  The skin margins were undermined to an appropriate distance in all directions utilizing iris scissors.

## 2023-03-20 NOTE — Clinical Note
Hemostasis started on the right radial artery. R-Band was used in achieving hemostasis. Radial compression device applied to vessel. Hemostasis achieved successfully. Closure device additional comment: 13 CC AIR no

## 2023-03-20 NOTE — Clinical Note
Level of Care: Stepdown [25]   Diagnosis: Atrial fibrillation with RVR (HCC) [[id number]]   Admitting Physician: RUDDY TELLEZ [[id number]]   Attending Physician: RUDDY TELLEZ [[id number]]   Certification: I Certify That Inpatient Hospital Services Are Medically Necessary For Greater Than 2 Midnights

## 2023-03-21 ENCOUNTER — APPOINTMENT (OUTPATIENT)
Dept: CARDIOLOGY | Facility: HOSPITAL | Age: 68
DRG: 280 | End: 2023-03-21
Payer: MEDICARE

## 2023-03-21 LAB
BH CV ECHO MEAS - ACS: 2.03 CM
BH CV ECHO MEAS - AO MAX PG: 3.5 MMHG
BH CV ECHO MEAS - AO MEAN PG: 1.74 MMHG
BH CV ECHO MEAS - AO ROOT DIAM: 3.7 CM
BH CV ECHO MEAS - AO V2 MAX: 93.7 CM/SEC
BH CV ECHO MEAS - AO V2 VTI: 10.6 CM
BH CV ECHO MEAS - AVA(I,D): 2 CM2
BH CV ECHO MEAS - EDV(CUBED): 126.9 ML
BH CV ECHO MEAS - EDV(MOD-SP2): 153 ML
BH CV ECHO MEAS - EDV(MOD-SP4): 108 ML
BH CV ECHO MEAS - EF(MOD-BP): 25 %
BH CV ECHO MEAS - EF(MOD-SP2): 25.5 %
BH CV ECHO MEAS - EF(MOD-SP4): 27.2 %
BH CV ECHO MEAS - ESV(CUBED): 79.8 ML
BH CV ECHO MEAS - ESV(MOD-SP2): 114 ML
BH CV ECHO MEAS - ESV(MOD-SP4): 78.6 ML
BH CV ECHO MEAS - FS: 14.3 %
BH CV ECHO MEAS - IVS/LVPW: 1.39 CM
BH CV ECHO MEAS - IVSD: 1.64 CM
BH CV ECHO MEAS - LA DIMENSION: 5.8 CM
BH CV ECHO MEAS - LAT PEAK E' VEL: 8.8 CM/SEC
BH CV ECHO MEAS - LV DIASTOLIC VOL/BSA (35-75): 51.1 CM2
BH CV ECHO MEAS - LV MASS(C)D: 296.3 GRAMS
BH CV ECHO MEAS - LV MAX PG: 1.18 MMHG
BH CV ECHO MEAS - LV MEAN PG: 0.63 MMHG
BH CV ECHO MEAS - LV SYSTOLIC VOL/BSA (12-30): 37.2 CM2
BH CV ECHO MEAS - LV V1 MAX: 54.4 CM/SEC
BH CV ECHO MEAS - LV V1 VTI: 6.8 CM
BH CV ECHO MEAS - LVIDD: 5 CM
BH CV ECHO MEAS - LVIDS: 4.3 CM
BH CV ECHO MEAS - LVOT AREA: 3.1 CM2
BH CV ECHO MEAS - LVOT DIAM: 1.98 CM
BH CV ECHO MEAS - LVPWD: 1.18 CM
BH CV ECHO MEAS - MED PEAK E' VEL: 6.2 CM/SEC
BH CV ECHO MEAS - MR MAX PG: 75.5 MMHG
BH CV ECHO MEAS - MR MAX VEL: 434.4 CM/SEC
BH CV ECHO MEAS - MV DEC SLOPE: 575.3 CM/SEC2
BH CV ECHO MEAS - MV E MAX VEL: 92.5 CM/SEC
BH CV ECHO MEAS - MV MAX PG: 7 MMHG
BH CV ECHO MEAS - MV MEAN PG: 2.49 MMHG
BH CV ECHO MEAS - MV P1/2T: 67.4 MSEC
BH CV ECHO MEAS - MV V2 VTI: 22.2 CM
BH CV ECHO MEAS - MVA(P1/2T): 3.3 CM2
BH CV ECHO MEAS - MVA(VTI): 0.95 CM2
BH CV ECHO MEAS - PA V2 MAX: 102.6 CM/SEC
BH CV ECHO MEAS - RAP SYSTOLE: 10 MMHG
BH CV ECHO MEAS - RVDD: 4.9 CM
BH CV ECHO MEAS - RVSP: 43.9 MMHG
BH CV ECHO MEAS - SI(MOD-SP2): 18.4 ML/M2
BH CV ECHO MEAS - SI(MOD-SP4): 13.9 ML/M2
BH CV ECHO MEAS - SV(LVOT): 21.1 ML
BH CV ECHO MEAS - SV(MOD-SP2): 39 ML
BH CV ECHO MEAS - SV(MOD-SP4): 29.4 ML
BH CV ECHO MEAS - TR MAX PG: 33.9 MMHG
BH CV ECHO MEAS - TR MAX VEL: 291.1 CM/SEC
BH CV ECHO MEASUREMENTS AVERAGE E/E' RATIO: 12.33
D-LACTATE SERPL-SCNC: 3.9 MMOL/L (ref 0.5–2)
D-LACTATE SERPL-SCNC: 4 MMOL/L (ref 0.5–2)
D-LACTATE SERPL-SCNC: 4.1 MMOL/L (ref 0.5–2)
GEN 5 2HR TROPONIN T REFLEX: 65 NG/L
LEFT ATRIUM VOLUME INDEX: 71.1 ML/M2
MAXIMAL PREDICTED HEART RATE: 155 BPM
QT INTERVAL: 256 MS
QTC INTERVAL: 403 MS
STJ: 3.1 CM
STRESS TARGET HR: 132 BPM
TROPONIN T DELTA: -5 NG/L
TROPONIN T SERPL HS-MCNC: 70 NG/L
WHOLE BLOOD HOLD SPECIMEN: NORMAL

## 2023-03-21 PROCEDURE — 93306 TTE W/DOPPLER COMPLETE: CPT

## 2023-03-21 PROCEDURE — 93306 TTE W/DOPPLER COMPLETE: CPT | Performed by: INTERNAL MEDICINE

## 2023-03-21 PROCEDURE — 84484 ASSAY OF TROPONIN QUANT: CPT | Performed by: INTERNAL MEDICINE

## 2023-03-21 PROCEDURE — 25010000002 ONDANSETRON PER 1 MG: Performed by: INTERNAL MEDICINE

## 2023-03-21 PROCEDURE — 83605 ASSAY OF LACTIC ACID: CPT | Performed by: STUDENT IN AN ORGANIZED HEALTH CARE EDUCATION/TRAINING PROGRAM

## 2023-03-21 PROCEDURE — 25010000002 FUROSEMIDE PER 20 MG: Performed by: INTERNAL MEDICINE

## 2023-03-21 RX ORDER — MIDODRINE HYDROCHLORIDE 5 MG/1
10 TABLET ORAL ONCE
Status: COMPLETED | OUTPATIENT
Start: 2023-03-21 | End: 2023-03-21

## 2023-03-21 RX ADMIN — FUROSEMIDE 40 MG: 10 INJECTION, SOLUTION INTRAVENOUS at 12:52

## 2023-03-21 RX ADMIN — FUROSEMIDE 40 MG: 10 INJECTION, SOLUTION INTRAVENOUS at 00:13

## 2023-03-21 RX ADMIN — ONDANSETRON 4 MG: 2 INJECTION INTRAMUSCULAR; INTRAVENOUS at 01:03

## 2023-03-21 RX ADMIN — METOPROLOL TARTRATE 25 MG: 25 TABLET, FILM COATED ORAL at 20:03

## 2023-03-21 RX ADMIN — DILTIAZEM HYDROCHLORIDE 90 MG: 30 TABLET ORAL at 18:00

## 2023-03-21 RX ADMIN — DILTIAZEM HYDROCHLORIDE 90 MG: 30 TABLET ORAL at 20:03

## 2023-03-21 RX ADMIN — Medication 10 ML: at 20:05

## 2023-03-21 RX ADMIN — RIVAROXABAN 20 MG: 10 TABLET, FILM COATED ORAL at 17:37

## 2023-03-21 RX ADMIN — MIDODRINE HYDROCHLORIDE 10 MG: 5 TABLET ORAL at 02:58

## 2023-03-21 NOTE — ED NOTES
"Pt found sitting on side of bed with O2 out of nares. Replaced O2 and repositioned pulse ox. Pt states \"I'm just so uncomfortable in this bed.\" Explained currently no beds in step-down, but hopes for bed before end of shift. Pt A&O x 4.  "

## 2023-03-21 NOTE — NURSING NOTE
Report was just given to a nurse on [address] west, and tele is being applied now. He is eating his night meal, and will transfer soon to the floor.

## 2023-03-21 NOTE — ED NOTES
Nursing report ED to floor  Pavan Mccauley  67 y.o.  male    HPI:   Chief Complaint   Patient presents with    Shortness of Breath    Jaundice    Edema     feet       Admitting doctor:   Magen Lagos MD    Consulting provider(s):  Consults       No orders found for last 30 day(s).             Admitting diagnosis:   The primary encounter diagnosis was Atrial fibrillation with RVR (HCC). Diagnoses of Urinary tract infection without hematuria, site unspecified and Pulmonary vascular congestion were also pertinent to this visit.    Code status:   Current Code Status       Date Active Code Status Order ID Comments User Context       3/20/2023 2013 CPR (Attempt to Resuscitate) [id number]  Magen Lagos MD ED        Question Answer    Code Status (Patient has no pulse and is not breathing) CPR (Attempt to Resuscitate)    Medical Interventions (Patient has pulse or is breathing) Full Support    Level Of Support Discussed With Patient                    Allergies:   Patient has no known allergies.    Intake and Output    Intake/Output Summary (Last 24 hours) at 3/21/2023 0616  Last data filed at 3/21/2023 0538  Gross per 24 hour   Intake 1480 ml   Output 950 ml   Net 530 ml       Weight:       03/21/23  0538   Weight: 103 kg (228 lb 1.6 oz)       Most recent vitals:   Vitals:    03/21/23 0511 03/21/23 0517 03/21/23 0531 03/21/23 0538   BP:  106/80     BP Location:       Patient Position:       Pulse: 62 68 72    Resp:    20   Temp:    97.4 °F (36.3 °C)   TempSrc:    Oral   SpO2: 93%  93%    Weight:    103 kg (228 lb 1.6 oz)   Height:         Oxygen Therapy: O2 at 2L PNC    Active LDAs/IV Access:   Lines, Drains & Airways       Active LDAs       Name Placement date Placement time Site Days    Peripheral IV 03/20/23 1639 Left Antecubital 03/20/23  1639  Antecubital  less than 1    Peripheral IV 03/20/23 1932 Right Antecubital 03/20/23  1932  Antecubital  less than 1                    Labs (abnormal labs have a  star):   Labs Reviewed   COMPREHENSIVE METABOLIC PANEL - Abnormal; Notable for the following components:       Result Value    Sodium 131 (*)     Chloride 95 (*)     Total Protein 5.8 (*)     Albumin 3.2 (*)     AST (SGOT) 52 (*)     Total Bilirubin 8.1 (*)     All other components within normal limits    Narrative:     GFR Normal >60  Chronic Kidney Disease <60  Kidney Failure <15     URINALYSIS W/ MICROSCOPIC IF INDICATED (NO CULTURE) - Abnormal; Notable for the following components:    Color, UA Orange (*)     Bilirubin, UA Moderate (2+) (*)     Protein, UA 30 mg/dL (1+) (*)     Leuk Esterase, UA Small (1+) (*)     Nitrite, UA Positive (*)     Urobilinogen, UA 2.0 E.U./dL (*)     All other components within normal limits   PROTIME-INR - Abnormal; Notable for the following components:    Protime 21.5 (*)     INR 1.86 (*)     All other components within normal limits    Narrative:     Therapeutic range for most indications is 2.0-3.0 INR,  or 2.5-3.5 for mechanical heart valves.   BNP (IN-HOUSE) - Abnormal; Notable for the following components:    proBNP 4,988.0 (*)     All other components within normal limits    Narrative:     Among patients with dyspnea, NT-proBNP is highly sensitive for the detection of acute congestive heart failure. In addition NT-proBNP of <300 pg/ml effectively rules out acute congestive heart failure with 99% negative predictive value.     SINGLE HSTROPONIN T - Abnormal; Notable for the following components:    HS Troponin T 62 (*)     All other components within normal limits    Narrative:     High Sensitive Troponin T Reference Range:  <10.0 ng/L- Negative Female for AMI  <15.0 ng/L- Negative Male for AMI  >=10 - Abnormal Female indicating possible myocardial injury.  >=15 - Abnormal Male indicating possible myocardial injury.   Clinicians would have to utilize clinical acumen, EKG, Troponin, and serial changes to determine if it is an Acute Myocardial Infarction or myocardial injury due  to an underlying chronic condition.        LACTIC ACID, PLASMA - Abnormal; Notable for the following components:    Lactate 3.6 (*)     All other components within normal limits   URINE DRUG SCREEN - Abnormal; Notable for the following components:    THC, Screen, Urine Positive (*)     All other components within normal limits    Narrative:     Cutoff For Drugs Screened:    Amphetamines               500 ng/ml  Barbiturates               200 ng/ml  Benzodiazepines            150 ng/ml  Cocaine                    150 ng/ml  Methadone                  200 ng/ml  Opiates                    100 ng/ml  Phencyclidine               25 ng/ml  THC                            50 ng/ml  Methamphetamine            500 ng/ml  Tricyclic Antidepressants  300 ng/ml  Oxycodone                  100 ng/ml  Propoxyphene               300 ng/ml  Buprenorphine               10 ng/ml    The normal value for all drugs tested is negative. This report includes unconfirmed screening results, with the cutoff values listed, to be used for medical treatment purposes only.  Unconfirmed results must not be used for non-medical purposes such as employment or legal testing.  Clinical consideration should be applied to any drug of abuse test, particularly when unconfirmed results are used.     CBC WITH AUTO DIFFERENTIAL - Abnormal; Notable for the following components:    RBC 6.03 (*)     Hemoglobin 18.8 (*)     Hematocrit 51.2 (*)     MCHC 36.7 (*)     RDW 20.5 (*)     RDW-SD 56.6 (*)     Platelets 74 (*)     Monocyte % 14.2 (*)     Immature Grans % 1.5 (*)     Monocytes, Absolute 0.96 (*)     Immature Grans, Absolute 0.10 (*)     All other components within normal limits   LACTIC ACID, REFLEX - Abnormal; Notable for the following components:    Lactate 3.8 (*)     All other components within normal limits   URINALYSIS, MICROSCOPIC ONLY - Abnormal; Notable for the following components:    RBC, UA 0-2 (*)     All other components within normal limits    LACTIC ACID, REFLEX - Abnormal; Notable for the following components:    Lactate 4.0 (*)     All other components within normal limits   LACTIC ACID, REFLEX - Abnormal; Notable for the following components:    Lactate 4.1 (*)     All other components within normal limits   APTT - Normal    Narrative:     The recommended Heparin therapeutic range is 68-97 seconds.   LIPASE - Normal   ACETAMINOPHEN LEVEL - Normal   AMMONIA - Normal   MAGNESIUM - Normal   URINE CULTURE   ETHANOL   SCAN SLIDE   TROPONIN   LACTIC ACID, REFLEX   TYPE AND SCREEN   PREVIOUS HISTORY   ABORH 2ND SPECIMEN VERIFICATION   CBC AND DIFFERENTIAL    Narrative:     The following orders were created for panel order CBC & Differential.  Procedure                               Abnormality         Status                     ---------                               -----------         ------                     CBC Auto Differential[[id number]]        Abnormal            Final result               Scan Slide[[id number]]                                       Final result                 Please view results for these tests on the individual orders.   EXTRA TUBES    Narrative:     The following orders were created for panel order Extra Tubes.  Procedure                               Abnormality         Status                     ---------                               -----------         ------                     Gold Top - SST[[id number]]                                   Final result                 Please view results for these tests on the individual orders.   GOLD TOP - SST       Meds given in ED:   Medications   dilTIAZem (CARDIZEM) 100 mg in 100 mL NS infusion (ADV) (0 mg/hr Intravenous Stopped 3/21/23 0041)   sodium chloride 0.9 % flush 10 mL (10 mL Intravenous Given 3/20/23 2217)   sodium chloride 0.9 % flush 10 mL (has no administration in time range)   sodium chloride 0.9 % infusion 40 mL (has no administration in time range)   acetaminophen  (TYLENOL) tablet 650 mg (has no administration in time range)     Or   acetaminophen (TYLENOL) 160 MG/5ML solution 650 mg (has no administration in time range)     Or   acetaminophen (TYLENOL) suppository 650 mg (has no administration in time range)   ondansetron (ZOFRAN) injection 4 mg (4 mg Intravenous Given 3/21/23 0103)   furosemide (LASIX) injection 40 mg (40 mg Intravenous Given 3/21/23 0013)   rivaroxaban (XARELTO) tablet 20 mg (20 mg Oral Given 3/20/23 2216)   metoprolol tartrate (LOPRESSOR) tablet 25 mg (25 mg Oral Given 3/20/23 2216)   dilTIAZem (CARDIZEM) tablet 90 mg (90 mg Oral Given 3/20/23 2216)   sodium chloride 0.9 % bolus 1,000 mL (0 mL Intravenous Stopped 3/20/23 1837)   dilTIAZem (CARDIZEM) injection 10 mg (10 mg Intravenous Given 3/20/23 1707)   furosemide (LASIX) injection 80 mg (80 mg Intravenous Given 3/20/23 2037)   midodrine (PROAMATINE) tablet 10 mg (10 mg Oral Given 3/21/23 0258)     dilTIAZem, 5-15 mg/hr, Last Rate: Stopped (03/21/23 0041)         NIH Stroke Scale:       Isolation/Infection(s):  No active isolations   No active infections     COVID Testing  Collected: no  Resulted: no    Nursing report ED to floor:  Mental status: A&O x 4  Ambulatory status: Independent at home. Up with SBA due to hypotension earlier.  Precautions: N/A    ED nurse phone extentsion- [phone]

## 2023-03-21 NOTE — ED NOTES
Pt restless. Reminded multiple times of need for accurate blood pressure monitoring. Continues to sit up, then lie down, inhibiting BP monitor. Spot checking BP with manual cuff. Encouraged to leave pulse ox on. Pt A&O x 4.

## 2023-03-21 NOTE — NURSING NOTE
Bed alarm is on and patient has been educated on calling for help when getting up. He said he would not get up without assistance, and then was standing beside his bed. He is slow to respond and give feedback so he may need to be re-educated several times for safety.

## 2023-03-21 NOTE — PROGRESS NOTES
[facility] Medicine Services  INPATIENT PROGRESS NOTE      Length of Stay: 1  Date of Admission: 3/20/2023  Primary Care Physician: Dilcia Kapoor APRN    Subjective   Chief Complaint:  Shortness of breath with heart palpitations  HPI:  67-year-old male comes to the ER stating he does not feel well.  He has not felt well for a while.  Patient states he has not taken any of his medicines for the last several months.  He reports having history of A-fib.  Patient is a poor historian, but states he is short of breath that is worse with exertion.     Patient seen and examined 3/21/2023.  On evaluation the patient is currently lying in bed.  He is stable on exam.  Not having any new problems or complaints.  Continues to remain in atrial fibrillation.  Did convert and then went back into atrial fibs.  Has no new chest pain.  No fevers or chills.  He remains short of breath on exertion.  No nausea or vomiting.  Vital signs are stable he is afebrile.        Review of Systems   Constitutional: Positive for fatigue.   HENT: Negative.    Eyes: Negative.    Respiratory: Positive for shortness of breath.    Cardiovascular: Negative.    Gastrointestinal: Negative.    Endocrine: Negative.    Genitourinary: Negative.    Musculoskeletal: Negative.    Skin: Negative.    Neurological: Positive for weakness.   Hematological: Negative.    Psychiatric/Behavioral: Negative.         All pertinent negatives and positives are as above. All other systems have been reviewed and are negative unless otherwise stated.     Objective    As of today 03/21/23  Temp:  [96.6 °F (35.9 °C)-98.9 °F (37.2 °C)] 97.2 °F (36.2 °C)  Heart Rate:  [] 73  Resp:  [20-24] 20  BP: ()/(44-99) 101/81    Physical Exam  Vitals and nursing note reviewed.   HENT:      Head: Normocephalic and atraumatic.      Right Ear: External ear normal.      Left Ear: External ear normal.      Nose: Nose normal.       Mouth/Throat:      Mouth: Mucous membranes are dry.      Pharynx: Oropharynx is clear.   Eyes:      General: No scleral icterus.     Extraocular Movements: Extraocular movements intact.      Conjunctiva/sclera: Conjunctivae normal.      Pupils: Pupils are equal, round, and reactive to light.   Cardiovascular:      Rate and Rhythm: Bradycardia present. Rhythm irregular.      Pulses: Normal pulses.      Heart sounds: Normal heart sounds.   Pulmonary:      Effort: Pulmonary effort is normal.      Breath sounds: Normal breath sounds.      Comments: Decreased breath sounds at the bases otherwise clear  Abdominal:      General: Bowel sounds are normal. There is no distension.      Palpations: Abdomen is soft.      Tenderness: There is no abdominal tenderness.      Comments: Soft nontender nondistended normal active bowel sounds   Musculoskeletal:         General: Normal range of motion.      Cervical back: Normal range of motion and neck supple.      Comments: Venous stasis changes bilateral lower extremities   Skin:     General: Skin is warm and dry.      Coloration: Skin is not jaundiced.   Neurological:      General: No focal deficit present.      Mental Status: He is alert and oriented to person, place, and time.      Motor: Weakness present.   Psychiatric:         Mood and Affect: Mood normal.         Behavior: Behavior normal.           dilTIAZem, 90 mg, Oral, 4x Daily  furosemide, 40 mg, Intravenous, Q12H  metoprolol tartrate, 25 mg, Oral, Q12H  rivaroxaban, 20 mg, Oral, Daily With Dinner  sodium chloride, 10 mL, Intravenous, Q12H         Results Review:  I have reviewed the labs, radiology results, and diagnostic studies.    Laboratory Data:   Results from last 7 days   Lab Units 03/20/23  1642   SODIUM mmol/L 131*   POTASSIUM mmol/L 4.0   CHLORIDE mmol/L 95*   CO2 mmol/L 25.0   BUN mg/dL 17   CREATININE mg/dL 1.12   GLUCOSE mg/dL 84   CALCIUM mg/dL 9.3   BILIRUBIN mg/dL 8.1*   ALK PHOS U/L 115   ALT (SGPT) U/L  38   AST (SGOT) U/L 52*   ANION GAP mmol/L 11.0     Estimated Creatinine Clearance: 73.9 mL/min (by C-G formula based on SCr of 1.12 mg/dL).  Results from last 7 days   Lab Units 03/20/23  1642   MAGNESIUM mg/dL 2.1         Results from last 7 days   Lab Units 03/20/23  1642   WBC 10*3/mm3 6.74   HEMOGLOBIN g/dL 18.8*   HEMATOCRIT % 51.2*   PLATELETS 10*3/mm3 74*     Results from last 7 days   Lab Units 03/20/23  1642   INR  1.86*       Culture Data:   No results found for: BLOODCX  No results found for: URINECX  No results found for: RESPCX  No results found for: WOUNDCX  No results found for: STOOLCX  No components found for: BODYFLD    Radiology Data:   Imaging Results (Last 24 Hours)     Procedure Component Value Units Date/Time    CT Abdomen Pelvis Without Contrast [[id number]] Collected: 03/20/23 2011     Updated: 03/20/23 2138    Narrative:        lINDICATION: sob, pulm edema, I48.91 Unspecified atrial  fibrillation N39.0 Urinary tract infection, site not specified  R09.89 Other specified symptoms and signs involving the  circulatory and respiratory systems    EXAMINATION: CT CHEST, ABDOMEN AND PELVIS     TECHNIQUE:  Helically acquired images were obtained of the chest,  abdomen and pelvis. A radiation dose optimization technique was  used for this scan.    IV Contrast dosage and agent: None    Oral contrast: None.    COMPARISON: None.    FINDINGS:    ----Chest:   LUNGS, PLEURA: There is a right lower lobe consolidation  suggesting atelectasis or pneumonia. This is associated with a  large pleural effusion. This appears slightly loculated. Left  lung is clear. There is no left-sided effusion. No pneumothorax     SOFT TISSUES: There is diffuse subcutaneous edema within the  lower chest wall and upper abdominal wall suggesting anasarca.  There is no evidence of adenopathy or soft tissue gas.     HEART AND PERICARDIUM: Heart is prominent. There is no  pericardial effusion.     VESSELS: Main pulmonary artery is  prominent measuring 4.2 cm  suggesting pulmonary arterial hypertension. The aorta is normal  in course and caliber on this noncontrast study.     MEDIASTINUM AND MICHAEL: No mediastinal or hilar adenopathy.  Esophagus is unremarkable. No hiatal hernia.     BONES: No lytic or blastic abnormality.  No fractures are  identified.     ----Abdomen/Pelvis:    LIVER: Homogeneous.  No focal lesion is identified on this  noncontrast study.    GALLBLADDER AND BILIARY TREE: There appears to be dependent  sludge. There are no calcified stones. The gallbladder is not  dilated.    No intra- or extrahepatic biliary ductal dilation.    PANCREAS: There is no evidence of mass or inflammatory process.    SPLEEN: Spleen is normal in size with no focal lesion.    ADRENAL GLANDS: Normal in appearance.      KIDNEYS AND URETERS: Normal renal size and position.  There is no  evidence of renal mass or calcification. No hydronephrosis.    URINARY BLADDER: Unremarkable.     PERITONEUM: There is a small amount of ascites.     BOWEL: The appendix is normal. There is no bowel distention or  evidence of obstruction. There is questionable edema in the  region of the third portion of the duodenum. Possibility of mild  duodenitis  is not excluded.    VASCULAR STRUCTURES AND RETROPERITONEUM: Aorta and IVC are normal  in caliber.  There is no evidence of aneurysm. There is no  evidence of retroperitoneal lymphadenopathy, mass, or fluid  collection    REPRODUCTIVE ORGANS: There is no evidence of pelvic mass.     ABDOMINAL WALL: There is diffuse anasarca. There is no hernia.     BONES: There is no acute fracture or focal bone lesion.       Impression:      1.  Moderate to large right effusion which appears loculated.  There is associated right lower lobe consolidation suggesting  pneumonia.  2.  Cardiomegaly.  3.  Prominent main pulmonary artery suggesting pulmonary arterial  hypertension.  4.  Small amount of ascites. There appears to be some edema  and  wall thickening associated with the third portion of the duodenum  suggesting duodenitis.     Electronically signed by:  Krzysztof Gomez MD  3/20/2023 9:36 PM CDT  Workstation: [id number]    CT Chest Without Contrast Diagnostic [[id number]] Collected: 03/20/23 2011     Updated: 03/20/23 2137    Narrative:        lINDICATION: sob, pulm edema, I48.91 Unspecified atrial  fibrillation N39.0 Urinary tract infection, site not specified  R09.89 Other specified symptoms and signs involving the  circulatory and respiratory systems    EXAMINATION: CT CHEST, ABDOMEN AND PELVIS     TECHNIQUE:  Helically acquired images were obtained of the chest,  abdomen and pelvis. A radiation dose optimization technique was  used for this scan.    IV Contrast dosage and agent: None    Oral contrast: None.    COMPARISON: None.    FINDINGS:    ----Chest:   LUNGS, PLEURA: There is a right lower lobe consolidation  suggesting atelectasis or pneumonia. This is associated with a  large pleural effusion. This appears slightly loculated. Left  lung is clear. There is no left-sided effusion. No pneumothorax     SOFT TISSUES: There is diffuse subcutaneous edema within the  lower chest wall and upper abdominal wall suggesting anasarca.  There is no evidence of adenopathy or soft tissue gas.     HEART AND PERICARDIUM: Heart is prominent. There is no  pericardial effusion.     VESSELS: Main pulmonary artery is prominent measuring 4.2 cm  suggesting pulmonary arterial hypertension. The aorta is normal  in course and caliber on this noncontrast study.     MEDIASTINUM AND MICHAEL: No mediastinal or hilar adenopathy.  Esophagus is unremarkable. No hiatal hernia.     BONES: No lytic or blastic abnormality.  No fractures are  identified.     ----Abdomen/Pelvis:    LIVER: Homogeneous.  No focal lesion is identified on this  noncontrast study.    GALLBLADDER AND BILIARY TREE: There appears to be dependent  sludge. There are no calcified stones. The gallbladder is  not  dilated.    No intra- or extrahepatic biliary ductal dilation.    PANCREAS: There is no evidence of mass or inflammatory process.    SPLEEN: Spleen is normal in size with no focal lesion.    ADRENAL GLANDS: Normal in appearance.      KIDNEYS AND URETERS: Normal renal size and position.  There is no  evidence of renal mass or calcification. No hydronephrosis.    URINARY BLADDER: Unremarkable.     PERITONEUM: There is a small amount of ascites.     BOWEL: The appendix is normal. There is no bowel distention or  evidence of obstruction. There is questionable edema in the  region of the third portion of the duodenum. Possibility of mild  duodenitis  is not excluded.    VASCULAR STRUCTURES AND RETROPERITONEUM: Aorta and IVC are normal  in caliber.  There is no evidence of aneurysm. There is no  evidence of retroperitoneal lymphadenopathy, mass, or fluid  collection    REPRODUCTIVE ORGANS: There is no evidence of pelvic mass.     ABDOMINAL WALL: There is diffuse anasarca. There is no hernia.     BONES: There is no acute fracture or focal bone lesion.       Impression:      1.  Moderate to large right effusion which appears loculated.  There is associated right lower lobe consolidation suggesting  pneumonia.  2.  Cardiomegaly.  3.  Prominent main pulmonary artery suggesting pulmonary arterial  hypertension.  4.  Small amount of ascites. There appears to be some edema and  wall thickening associated with the third portion of the duodenum  suggesting duodenitis.     Electronically signed by:  Krzysztof Gomez MD  3/20/2023 9:35 PM CDT  Workstation: [id number]    XR Chest 1 View [[id number]] Collected: 03/20/23 1651     Updated: 03/20/23 1828    Narrative:      EXAMINATION: XR CHEST 1 VIEW    COMPARISON: Chest 2 views November 5, 2020    INDICATION: tachycardia    FINDINGS: Portable AP upright imaging of the chest is submitted -  1 view. A moderate size basilar right pleural effusion is new.  Some degree of right basilar  compressive atelectasis is  suspected. Heart size is difficult to assess but appears at least  mildly enlarged. Mild central vascular prominence is noted but no  subpleural interstitial edema is evident. No pneumothorax.       Impression:      Right basilar pleural effusion, right lower lobe  compressive atelectasis, cardiomegaly and mild central vascular  congestion.    Electronically signed by:  Hollis Sousa MD  3/20/2023 6:26 PM CDT  Workstation: [id number]          I have reviewed the patient's current medications.     Assessment/Plan     Principal Problem:    Atrial fibrillation with RVR (HCC)  Active Problems:    Atrial fibrillation (HCC)    Assessment and Plan     Acute on chronic, suspected diastolic CHF  Will continue with lasix bid; monitor intake and output  Follow echocardiogram to be completed today     Atrial fibrillation with RVR  Paroxysmal atrial fibrillation by history  Continue metoprolol for rate control  Continue Xarelto for anticoagulation  Cardizem drip has been discontinued  Cardiologist will be consulted as needed.     Troponin elevated  Demand ischemia secondary to atrial fibrillation    Hypertension  Current blood pressure 101/81  O2 saturation is 91%  Continue Cardizem and metoprolol    CODE STATUS full code  DVT prophylaxis Xarelto    Patient currently stable will transfer to medical floor.  With cardiac monitoring.    Medical Decision Making  Number and Complexity of problems: 3 complex problems  Differential Diagnosis: Atrial fibrillation versus acute coronary syndrome    Conditions and Status:        Condition is unchanged.     [facility] Data  External documents reviewed: Hospital charts  My EKG interpretation:  EKG completed 3/20/2023.  Rhythm: atrial fibrillation   Rate: tachycardic   Clinical impression: abnormal ECG    My plain film interpretation:  Chest x-ray completed 3/20/2023.  IMPRESSION:  Right basilar pleural effusion, right lower lobe  compressive atelectasis, cardiomegaly  and mild central vascular  Congestion.   Tests considered but not ordered: None     Decision rules/scores evaluated (example [id number], Wells, etc):      Discussed with: Patient and agrees to current treatment plan     Treatment Plan  As above    Care Planning  Shared decision making: Patient updated on current status and informed of proposed care plan; is in agreement with plan  Code status and discussions: Full code    Disposition  Social Determinants of Health that impact treatment or disposition: N/A  I expect the patient to be discharged to home to be determined      I have utilized all available immediate resources to obtain, update, or review the patient's current medications (including all prescriptions, over-the-counter products, herbals, cannabis/cannabidiol products, and vitamin/mineral/dietary (nutritional) supplements).     I confirmed that the patient's Advance Care Plan is present, code status is documented, or surrogate decision maker is listed in the patient's medical record.    Discharge Planning: I expect patient to be discharged to home once stabilized    Gurvinder David DO   Admission

## 2023-03-21 NOTE — H&P
[facility] Medicine Admission      Date of Admission: 3/20/2023      Primary Care Physician: Dilcia Kapoor APRN      Chief Complaint: shortness of breath    HPI: Patient male with history of atrial fibrillation that has been on xarelto and rate control but due to cost, he has stopped taking his meds for about 6 months. For the last 3 weeks he has been complaining of progressive shortness of breath and bilateral lower extremities edema  For the last 2 weeks complaining of palpitations and weight gain, from 212 and 231 lbs    Concurrent Medical History:  has a past medical history of Bronchopneumonia, Chest pain, Corneal foreign body, Neck pain, Perforation of left tympanic membrane, Prashant Mountain spotted fever, and Wheezing.    Past Surgical History:  has a past surgical history that includes Spine surgery (1991); Injection of Medication (2011); Tympanoplasty (Left, 1972); and Eye foreign body removal (2013).    Family History: Mom  of renal failure and Dad  of unknown causes; they were both in advance age; he does not remember their age     Social History:  reports that he has been smoking cigarettes. He has never used smokeless tobacco. He reports current alcohol use. He reports that he does not use drugs.   PORADHA is his sister Jennifer  He is full code  He quit smoking 5 months ago    Allergies: No Known Allergies    Medications:   Prior to Admission medications    Medication Sig Start Date End Date Taking? Authorizing Provider   dilTIAZem CD (CARDIZEM CD) 240 MG 24 hr capsule Take 1 capsule by mouth Daily.  Patient not taking: Reported on 3/20/2023 5/11/22   Hilary Sims MD   metoprolol succinate XL (TOPROL-XL) 100 MG 24 hr tablet Take 1 tablet by mouth Daily. 22   Hilary Sims MD   rivaroxaban (XARELTO) 20 MG tablet Take 1 tablet by mouth once daily.  Patient not taking: Reported on 3/20/2023 3/17/21   Levi  Hilary Chopra MD       furosemide, 80 mg, Intravenous, Once      dilTIAZem, 5-15 mg/hr, Last Rate: 7.5 mg/hr (03/20/23 1837)        Review of Systems:  Review of Systems   All other systems reviewed and are negative.     Otherwise complete ROS is negative except as mentioned above.    Physical Exam:   Temp:  [96.6 °F (35.9 °C)] 96.6 °F (35.9 °C)  Heart Rate:  [] 118  Resp:  [20-24] 20  BP: ()/(56-97) 130/90  Physical Exam  Constitutional:       Appearance: He is ill-appearing.   HENT:      Head: Normocephalic.      Nose: Nose normal.      Mouth/Throat:      Mouth: Mucous membranes are moist.   Eyes:      Extraocular Movements: Extraocular movements intact.   Cardiovascular:      Rate and Rhythm: Tachycardia present. Rhythm irregular.      Heart sounds: Normal heart sounds.   Pulmonary:      Breath sounds: Rales present.   Abdominal:      General: Bowel sounds are normal.      Palpations: Abdomen is soft.   Musculoskeletal:      Cervical back: Normal range of motion and neck supple.      Right lower leg: Edema present.      Left lower leg: Edema present.   Skin:     General: Skin is warm.   Neurological:      General: No focal deficit present.      Mental Status: He is alert and oriented to person, place, and time. Mental status is at baseline.   Psychiatric:         Behavior: Behavior normal.           Results Reviewed:  I have personally reviewed current lab, radiology, and data and agree with results.  Lab Results (last 24 hours)     Procedure Component Value Units Date/Time    Urine Culture - Urine, Urine, Clean Catch [[id number]] Collected: 03/20/23 1900    Specimen: Urine, Clean Catch Updated: 03/20/23 1919    Urine Drug Screen - Urine, Clean Catch [[id number]]  (Abnormal) Collected: 03/20/23 1900    Specimen: Urine, Clean Catch Updated: 03/20/23 1917     THC, Screen, Urine Positive     Phencyclidine (PCP), Urine Negative     Cocaine Screen, Urine Negative     Methamphetamine, Ur Negative     Opiate  Screen Negative     Amphetamine Screen, Urine Negative     Benzodiazepine Screen, Urine Negative     Tricyclic Antidepressants Screen Negative     Methadone Screen, Urine Negative     Barbiturates Screen, Urine Negative     Oxycodone Screen, Urine Negative     Propoxyphene Screen Negative     Buprenorphine, Screen, Urine Negative    Narrative:      Cutoff For Drugs Screened:    Amphetamines               500 ng/ml  Barbiturates               200 ng/ml  Benzodiazepines            150 ng/ml  Cocaine                    150 ng/ml  Methadone                  200 ng/ml  Opiates                    100 ng/ml  Phencyclidine               25 ng/ml  THC                            50 ng/ml  Methamphetamine            500 ng/ml  Tricyclic Antidepressants  300 ng/ml  Oxycodone                  100 ng/ml  Propoxyphene               300 ng/ml  Buprenorphine               10 ng/ml    The normal value for all drugs tested is negative. This report includes unconfirmed screening results, with the cutoff values listed, to be used for medical treatment purposes only.  Unconfirmed results must not be used for non-medical purposes such as employment or legal testing.  Clinical consideration should be applied to any drug of abuse test, particularly when unconfirmed results are used.      Urinalysis, Microscopic Only - Urine, Clean Catch [[id number]]  (Abnormal) Collected: 03/20/23 1900    Specimen: Urine, Clean Catch Updated: 03/20/23 1911     RBC, UA 0-2 /HPF      WBC, UA 0-2 /HPF      Bacteria, UA None Seen /HPF      Squamous Epithelial Cells, UA 0-2 /HPF      Hyaline Casts, UA 0-2 /LPF      Methodology Automated Microscopy    Urinalysis With Microscopic If Indicated (No Culture) - Urine, Clean Catch [[id number]]  (Abnormal) Collected: 03/20/23 1900    Specimen: Urine, Clean Catch Updated: 03/20/23 1911     Color, UA [place]     Appearance, UA Clear     pH, UA 5.5     Specific Gravity, UA 1.025     Glucose, UA Negative     Ketones, UA  Negative     Bilirubin, UA Moderate (2+)     Blood, UA Negative     Protein, UA 30 mg/dL (1+)     Leuk Esterase, UA Small (1+)     Nitrite, UA Positive     Urobilinogen, UA 2.0 E.U./dL    Extra Tubes [[id number]] Collected: 03/20/23 1702    Specimen: Blood, Venous Line Updated: 03/20/23 1815    Narrative:      The following orders were created for panel order Extra Tubes.  Procedure                               Abnormality         Status                     ---------                               -----------         ------                     Gold Top - SST[[id number]]                                   Final result                 Please view results for these tests on the individual orders.    Gold Top - SST [[id number]] Collected: 03/20/23 1702    Specimen: Blood Updated: 03/20/23 1815     Extra Tube Hold for add-ons.     Comment: Auto resulted.       CBC & Differential [[id number]]  (Abnormal) Collected: 03/20/23 1642    Specimen: Blood Updated: 03/20/23 1736    Narrative:      The following orders were created for panel order CBC & Differential.  Procedure                               Abnormality         Status                     ---------                               -----------         ------                     CBC Auto Differential[[id number]]        Abnormal            Final result               Scan Slide[[id number]]                                       Final result                 Please view results for these tests on the individual orders.    Scan Slide [[id number]] Collected: 03/20/23 1642    Specimen: Blood Updated: 03/20/23 1736     Acanthocytes Large/3+     Polychromasia Slight/1+     WBC Morphology Normal     Platelet Estimate Decreased     Clumped Platelets --     Comment: NONE SEEN       Protime-INR [[id number]]  (Abnormal) Collected: 03/20/23 1642    Specimen: Blood Updated: 03/20/23 1727     Protime 21.5 Seconds      INR 1.86    Narrative:      Therapeutic range for most indications is 2.0-3.0  INR,  or 2.5-3.5 for mechanical heart valves.    aPTT [[id number]]  (Normal) Collected: 03/20/23 1642    Specimen: Blood Updated: 03/20/23 1727     PTT 36.0 seconds     Narrative:      The recommended Heparin therapeutic range is 68-97 seconds.    Comprehensive Metabolic Panel [[id number]]  (Abnormal) Collected: 03/20/23 1642    Specimen: Blood Updated: 03/20/23 1723     Glucose 84 mg/dL      BUN 17 mg/dL      Creatinine 1.12 mg/dL      Sodium 131 mmol/L      Potassium 4.0 mmol/L      Chloride 95 mmol/L      CO2 25.0 mmol/L      Calcium 9.3 mg/dL      Total Protein 5.8 g/dL      Albumin 3.2 g/dL      ALT (SGPT) 38 U/L      AST (SGOT) 52 U/L      Alkaline Phosphatase 115 U/L      Total Bilirubin 8.1 mg/dL      Globulin 2.6 gm/dL      A/G Ratio 1.2 g/dL      BUN/Creatinine Ratio 15.2     Anion Gap 11.0 mmol/L      eGFR 72.9 mL/min/1.73     Narrative:      GFR Normal >60  Chronic Kidney Disease <60  Kidney Failure <15      Lipase [[id number]]  (Normal) Collected: 03/20/23 1642    Specimen: Blood Updated: 03/20/23 1722     Lipase 40 U/L     Acetaminophen Level [[id number]]  (Normal) Collected: 03/20/23 1642    Specimen: Blood Updated: 03/20/23 1722     Acetaminophen <5.0 mcg/mL     Ethanol [[id number]] Collected: 03/20/23 1642    Specimen: Blood Updated: 03/20/23 1722     Ethanol <10 mg/dL      Ethanol % <0.010 %     Magnesium [[id number]]  (Normal) Collected: 03/20/23 1642    Specimen: Blood Updated: 03/20/23 1722     Magnesium 2.1 mg/dL     Single High Sensitivity Troponin T [[id number]]  (Abnormal) Collected: 03/20/23 1642    Specimen: Blood Updated: 03/20/23 1722     HS Troponin T 62 ng/L     Narrative:      High Sensitive Troponin T Reference Range:  <10.0 ng/L- Negative Female for AMI  <15.0 ng/L- Negative Male for AMI  >=10 - Abnormal Female indicating possible myocardial injury.  >=15 - Abnormal Male indicating possible myocardial injury.   Clinicians would have to utilize clinical acumen, EKG, Troponin, and serial  changes to determine if it is an Acute Myocardial Infarction or myocardial injury due to an underlying chronic condition.         Ammonia [[id number]]  (Normal) Collected: 03/20/23 1642    Specimen: Blood Updated: 03/20/23 1721     Ammonia 34 umol/L     Lactic Acid, Plasma [[id number]]  (Abnormal) Collected: 03/20/23 1642    Specimen: Blood Updated: 03/20/23 1720     Lactate 3.6 mmol/L     BNP [[id number]]  (Abnormal) Collected: 03/20/23 1642    Specimen: Blood Updated: 03/20/23 1720     proBNP 4,988.0 pg/mL     Narrative:      Among patients with dyspnea, NT-proBNP is highly sensitive for the detection of acute congestive heart failure. In addition NT-proBNP of <300 pg/ml effectively rules out acute congestive heart failure with 99% negative predictive value.      CBC Auto Differential [[id number]]  (Abnormal) Collected: 03/20/23 1642    Specimen: Blood Updated: 03/20/23 1708     WBC 6.74 10*3/mm3      RBC 6.03 10*6/mm3      Hemoglobin 18.8 g/dL      Hematocrit 51.2 %      MCV 84.9 fL      MCH 31.2 pg      MCHC 36.7 g/dL      RDW 20.5 %      RDW-SD 56.6 fl      MPV 10.0 fL      Platelets 74 10*3/mm3      Neutrophil % 59.6 %      Lymphocyte % 23.9 %      Monocyte % 14.2 %      Eosinophil % 0.4 %      Basophil % 0.4 %      Immature Grans % 1.5 %      Neutrophils, Absolute 4.01 10*3/mm3      Lymphocytes, Absolute 1.61 10*3/mm3      Monocytes, Absolute 0.96 10*3/mm3      Eosinophils, Absolute 0.03 10*3/mm3      Basophils, Absolute 0.03 10*3/mm3      Immature Grans, Absolute 0.10 10*3/mm3      nRBC 0.0 /100 WBC         Imaging Results (Last 24 Hours)     Procedure Component Value Units Date/Time    XR Chest 1 View [[id number]] Collected: 03/20/23 1651     Updated: 03/20/23 1828    Narrative:      EXAMINATION: XR CHEST 1 VIEW    COMPARISON: Chest 2 views November 5, 2020    INDICATION: tachycardia    FINDINGS: Portable AP upright imaging of the chest is submitted -  1 view. A moderate size basilar right pleural effusion is  new.  Some degree of right basilar compressive atelectasis is  suspected. Heart size is difficult to assess but appears at least  mildly enlarged. Mild central vascular prominence is noted but no  subpleural interstitial edema is evident. No pneumothorax.       Impression:      Right basilar pleural effusion, right lower lobe  compressive atelectasis, cardiomegaly and mild central vascular  congestion.    Electronically signed by:  Hollis oSusa MD  3/20/2023 6:26 PM CDT  Workstation: [id number]        Assessment and Plan    Acute on chronic, suspected diastolic CHF     Will continue with lasix bid; monitor intake and output     Weight daily     Atrial fibrillation with RVR     On cardizem drip; will continue with his home meds and continue with his OACs, xarelto    Troponin elevated     Likely type II MI, related to above; monitor    Pending CT chest, abdomen and pelvis      Medical Decision Making  Number and Complexity of problems: one major  Differential Diagnosis: ACS    Conditions and Status:        Condition is unchanged.     MDM Data  External documents reviewed: previous medical records  My EKG interpretation: atrial fib  My plain film interpretation: CHF     Decision rules/scores evaluated (example [id number], Wells, etc): n/a     Discussed with: patient     Treatment Plan  Diuretics, rate control; icu admission    Care Planning  Shared decision making: his sister  Code status and discussions: full code    Disposition  Social Determinants of Health that impact treatment or disposition: none  I expect the patient to be discharged to home in 2 to 3 days     I confirmed that the patient's Advance Care Plan is present, code status is documented, or surrogate decision maker is listed in the patient's medical record.     Magen Lagos MD

## 2023-03-21 NOTE — ED NOTES
Discussed pt's BP, HR, and restlessness with Dr. London. MD states will order Midodrine. Discussed level of care and questioned if appropriate for tele floor. States leave as step-down pt for now.

## 2023-03-22 ENCOUNTER — APPOINTMENT (OUTPATIENT)
Dept: ULTRASOUND IMAGING | Facility: HOSPITAL | Age: 68
DRG: 280 | End: 2023-03-22
Payer: MEDICARE

## 2023-03-22 ENCOUNTER — APPOINTMENT (OUTPATIENT)
Dept: INTERVENTIONAL RADIOLOGY/VASCULAR | Facility: HOSPITAL | Age: 68
DRG: 280 | End: 2023-03-22
Payer: MEDICARE

## 2023-03-22 ENCOUNTER — APPOINTMENT (OUTPATIENT)
Dept: GENERAL RADIOLOGY | Facility: HOSPITAL | Age: 68
DRG: 280 | End: 2023-03-22
Payer: MEDICARE

## 2023-03-22 LAB
ALBUMIN SERPL-MCNC: 2.9 G/DL (ref 3.5–5.2)
ALBUMIN/GLOB SERPL: 1.1 G/DL
ALP SERPL-CCNC: 105 U/L (ref 39–117)
ALT SERPL W P-5'-P-CCNC: 42 U/L (ref 1–41)
ANION GAP SERPL CALCULATED.3IONS-SCNC: 13 MMOL/L (ref 5–15)
AST SERPL-CCNC: 65 U/L (ref 1–40)
BACTERIA SPEC AEROBE CULT: ABNORMAL
BASOPHILS # BLD AUTO: 0.02 10*3/MM3 (ref 0–0.2)
BASOPHILS NFR BLD AUTO: 0.3 % (ref 0–1.5)
BILIRUB SERPL-MCNC: 7 MG/DL (ref 0–1.2)
BUN SERPL-MCNC: 25 MG/DL (ref 8–23)
BUN/CREAT SERPL: 14.4 (ref 7–25)
CALCIUM SPEC-SCNC: 9 MG/DL (ref 8.6–10.5)
CHLORIDE SERPL-SCNC: 92 MMOL/L (ref 98–107)
CO2 SERPL-SCNC: 21 MMOL/L (ref 22–29)
CREAT SERPL-MCNC: 1.74 MG/DL (ref 0.76–1.27)
DEPRECATED RDW RBC AUTO: 57.3 FL (ref 37–54)
EGFRCR SERPLBLD CKD-EPI 2021: 43 ML/MIN/1.73
EOSINOPHIL # BLD AUTO: 0.08 10*3/MM3 (ref 0–0.4)
EOSINOPHIL NFR BLD AUTO: 1 % (ref 0.3–6.2)
ERYTHROCYTE [DISTWIDTH] IN BLOOD BY AUTOMATED COUNT: 20.2 % (ref 12.3–15.4)
GEN 5 2HR TROPONIN T REFLEX: 79 NG/L
GLOBULIN UR ELPH-MCNC: 2.7 GM/DL
GLUCOSE SERPL-MCNC: 115 MG/DL (ref 65–99)
HCT VFR BLD AUTO: 50 % (ref 37.5–51)
HGB BLD-MCNC: 18.2 G/DL (ref 13–17.7)
IMM GRANULOCYTES # BLD AUTO: 0.05 10*3/MM3 (ref 0–0.05)
IMM GRANULOCYTES NFR BLD AUTO: 0.7 % (ref 0–0.5)
LYMPHOCYTES # BLD AUTO: 1.36 10*3/MM3 (ref 0.7–3.1)
LYMPHOCYTES NFR BLD AUTO: 17.7 % (ref 19.6–45.3)
MCH RBC QN AUTO: 31.5 PG (ref 26.6–33)
MCHC RBC AUTO-ENTMCNC: 36.4 G/DL (ref 31.5–35.7)
MCV RBC AUTO: 86.7 FL (ref 79–97)
MONOCYTES # BLD AUTO: 1.1 10*3/MM3 (ref 0.1–0.9)
MONOCYTES NFR BLD AUTO: 14.3 % (ref 5–12)
NEUTROPHILS NFR BLD AUTO: 5.06 10*3/MM3 (ref 1.7–7)
NEUTROPHILS NFR BLD AUTO: 66 % (ref 42.7–76)
NRBC BLD AUTO-RTO: 0 /100 WBC (ref 0–0.2)
PLATELET # BLD AUTO: 93 10*3/MM3 (ref 140–450)
PMV BLD AUTO: 10.6 FL (ref 6–12)
POTASSIUM SERPL-SCNC: 4.8 MMOL/L (ref 3.5–5.2)
PROT SERPL-MCNC: 5.6 G/DL (ref 6–8.5)
RBC # BLD AUTO: 5.77 10*6/MM3 (ref 4.14–5.8)
SODIUM SERPL-SCNC: 126 MMOL/L (ref 136–145)
TROPONIN T DELTA: 5 NG/L
TROPONIN T SERPL HS-MCNC: 74 NG/L
WBC NRBC COR # BLD: 7.67 10*3/MM3 (ref 3.4–10.8)

## 2023-03-22 PROCEDURE — 71045 X-RAY EXAM CHEST 1 VIEW: CPT

## 2023-03-22 PROCEDURE — 93005 ELECTROCARDIOGRAM TRACING: CPT | Performed by: HOSPITALIST

## 2023-03-22 PROCEDURE — 25010000002 FUROSEMIDE PER 20 MG: Performed by: INTERNAL MEDICINE

## 2023-03-22 PROCEDURE — 93010 ELECTROCARDIOGRAM REPORT: CPT | Performed by: INTERNAL MEDICINE

## 2023-03-22 PROCEDURE — 94760 N-INVAS EAR/PLS OXIMETRY 1: CPT

## 2023-03-22 PROCEDURE — 80053 COMPREHEN METABOLIC PANEL: CPT | Performed by: HOSPITALIST

## 2023-03-22 PROCEDURE — 25010000002 DOBUTAMINE PER 250 MG: Performed by: INTERNAL MEDICINE

## 2023-03-22 PROCEDURE — 76700 US EXAM ABDOM COMPLETE: CPT

## 2023-03-22 PROCEDURE — 76937 US GUIDE VASCULAR ACCESS: CPT

## 2023-03-22 PROCEDURE — 25010000002 ONDANSETRON PER 1 MG: Performed by: INTERNAL MEDICINE

## 2023-03-22 PROCEDURE — 85025 COMPLETE CBC W/AUTO DIFF WBC: CPT | Performed by: HOSPITALIST

## 2023-03-22 PROCEDURE — 36410 VNPNXR 3YR/> PHY/QHP DX/THER: CPT

## 2023-03-22 PROCEDURE — C1751 CATH, INF, PER/CENT/MIDLINE: HCPCS

## 2023-03-22 PROCEDURE — 94799 UNLISTED PULMONARY SVC/PX: CPT

## 2023-03-22 PROCEDURE — 05HB33Z INSERTION OF INFUSION DEVICE INTO RIGHT BASILIC VEIN, PERCUTANEOUS APPROACH: ICD-10-PCS | Performed by: HOSPITALIST

## 2023-03-22 PROCEDURE — 99232 SBSQ HOSP IP/OBS MODERATE 35: CPT | Performed by: INTERNAL MEDICINE

## 2023-03-22 PROCEDURE — 84484 ASSAY OF TROPONIN QUANT: CPT | Performed by: HOSPITALIST

## 2023-03-22 RX ORDER — DOBUTAMINE HYDROCHLORIDE 100 MG/100ML
5 INJECTION INTRAVENOUS CONTINUOUS
Status: DISCONTINUED | OUTPATIENT
Start: 2023-03-22 | End: 2023-03-28

## 2023-03-22 RX ORDER — SODIUM CHLORIDE 9 MG/ML
INJECTION, SOLUTION INTRAVENOUS
Status: DISPENSED
Start: 2023-03-22 | End: 2023-03-23

## 2023-03-22 RX ADMIN — METOPROLOL TARTRATE 25 MG: 25 TABLET, FILM COATED ORAL at 21:10

## 2023-03-22 RX ADMIN — METOPROLOL TARTRATE 25 MG: 25 TABLET, FILM COATED ORAL at 08:51

## 2023-03-22 RX ADMIN — RIVAROXABAN 20 MG: 10 TABLET, FILM COATED ORAL at 18:30

## 2023-03-22 RX ADMIN — FUROSEMIDE 40 MG: 10 INJECTION, SOLUTION INTRAVENOUS at 05:37

## 2023-03-22 RX ADMIN — ONDANSETRON 4 MG: 2 INJECTION INTRAMUSCULAR; INTRAVENOUS at 02:08

## 2023-03-22 RX ADMIN — Medication 10 ML: at 08:52

## 2023-03-22 RX ADMIN — FUROSEMIDE 40 MG: 10 INJECTION, SOLUTION INTRAVENOUS at 23:31

## 2023-03-22 RX ADMIN — SODIUM CHLORIDE 500 ML: 9 INJECTION, SOLUTION INTRAVENOUS at 13:07

## 2023-03-22 RX ADMIN — DOBUTAMINE HYDROCHLORIDE 5 MCG/KG/MIN: 100 INJECTION INTRAVENOUS at 16:14

## 2023-03-22 RX ADMIN — DILTIAZEM HYDROCHLORIDE 90 MG: 30 TABLET ORAL at 08:51

## 2023-03-22 RX ADMIN — Medication 10 ML: at 21:10

## 2023-03-22 NOTE — NURSING NOTE
Patient arrived with Doctor at bedside /73. HR 64 all other vitals are okay, Patient is alert and asking for his phone will monitor.

## 2023-03-22 NOTE — PROGRESS NOTES
[facility] Medicine Services  INPATIENT PROGRESS NOTE      Length of Stay: 2  Date of Admission: 3/20/2023  Primary Care Physician: Dilcia Kapoor APRN    Subjective   Chief Complaint:  Shortness of breath with heart palpitations  HPI:  67-year-old male comes to the ER stating he does not feel well.  He has not felt well for a while.  Patient states he has not taken any of his medicines for the last several months.  He reports having history of A-fib.  Patient is a poor historian, but states he is short of breath that is worse with exertion.     Patient seen and examined 3/22/2023.  The patient has been transferred down to the CCU.  The patient was noted to be hypotensive with a blood pressure in the 70s.  Fluid bolus was given and patient was also complaining of chest pain.  Troponin was obtained and noted to be elevated.  The patient's cardiologist has been notified of the situation and consult has been placed.  The patient blood pressure improved with bolus.  Apparently the patient had Cardizem given this morning when his current blood pressure was in the 90s.  Patient's symptoms resolved he is feeling much better.  Patient denies any fevers or chills.  No headache.  No nausea or vomiting no GI or urinary complaints.  EKG completed showed patient in atrial fib with rate controlled        Review of Systems   Constitutional: Positive for fatigue.   HENT: Negative.    Eyes: Negative.    Respiratory: Positive for shortness of breath.    Cardiovascular: Positive for chest pain.   Gastrointestinal: Negative.    Endocrine: Negative.    Genitourinary: Negative.    Musculoskeletal: Negative.    Skin: Negative.    Neurological: Positive for weakness.   Hematological: Negative.    Psychiatric/Behavioral: Negative.         All pertinent negatives and positives are as above. All other systems have been reviewed and are negative unless otherwise stated.     Objective     As of today 03/22/23  Temp:  [97 °F (36.1 °C)-98.6 °F (37 °C)] 97.4 °F (36.3 °C)  Heart Rate:  [] 60  Resp:  [18-22] 18  BP: ()/(53-95) 100/66    Physical Exam  Vitals and nursing note reviewed.   HENT:      Head: Normocephalic and atraumatic.      Right Ear: External ear normal.      Left Ear: External ear normal.      Nose: Nose normal.      Mouth/Throat:      Mouth: Mucous membranes are dry.      Pharynx: Oropharynx is clear.   Eyes:      General: No scleral icterus.     Extraocular Movements: Extraocular movements intact.      Conjunctiva/sclera: Conjunctivae normal.      Pupils: Pupils are equal, round, and reactive to light.   Cardiovascular:      Rate and Rhythm: Bradycardia present. Rhythm irregular.      Pulses: Normal pulses.      Heart sounds: Normal heart sounds.   Pulmonary:      Effort: Pulmonary effort is normal.      Breath sounds: Normal breath sounds.      Comments: Decreased breath sounds at the bases otherwise clear  Abdominal:      General: Bowel sounds are normal. There is distension.      Palpations: Abdomen is soft.      Tenderness: There is no abdominal tenderness.      Comments: Soft nontender nondistended normal active bowel sounds   Musculoskeletal:         General: Normal range of motion.      Cervical back: Normal range of motion and neck supple.      Right lower leg: Edema present.      Left lower leg: Edema present.      Comments: Venous stasis changes bilateral lower extremities   Skin:     General: Skin is warm and dry.      Coloration: Skin is not jaundiced.   Neurological:      General: No focal deficit present.      Mental Status: He is alert and oriented to person, place, and time.      Motor: Weakness present.   Psychiatric:         Mood and Affect: Mood normal.         Behavior: Behavior normal.           dilTIAZem, 90 mg, Oral, 4x Daily  furosemide, 40 mg, Intravenous, Q12H  metoprolol tartrate, 25 mg, Oral, Q12H  rivaroxaban, 20 mg, Oral, Daily With  Dinner  sodium chloride, 10 mL, Intravenous, Q12H         Results Review:  I have reviewed the labs, radiology results, and diagnostic studies.    Laboratory Data:   Results from last 7 days   Lab Units 03/20/23  1642   SODIUM mmol/L 131*   POTASSIUM mmol/L 4.0   CHLORIDE mmol/L 95*   CO2 mmol/L 25.0   BUN mg/dL 17   CREATININE mg/dL 1.12   GLUCOSE mg/dL 84   CALCIUM mg/dL 9.3   BILIRUBIN mg/dL 8.1*   ALK PHOS U/L 115   ALT (SGPT) U/L 38   AST (SGOT) U/L 52*   ANION GAP mmol/L 11.0     Estimated Creatinine Clearance: 73.2 mL/min (by C-G formula based on SCr of 1.12 mg/dL).  Results from last 7 days   Lab Units 03/20/23  1642   MAGNESIUM mg/dL 2.1         Results from last 7 days   Lab Units 03/22/23  1009 03/20/23  1642   WBC 10*3/mm3 7.67 6.74   HEMOGLOBIN g/dL 18.2* 18.8*   HEMATOCRIT % 50.0 51.2*   PLATELETS 10*3/mm3 93* 74*     Results from last 7 days   Lab Units 03/20/23  1642   INR  1.86*       Culture Data:   No results found for: BLOODCX  Urine Culture   Date Value Ref Range Status   03/20/2023 25,000 CFU/mL Enterococcus species (A)  Preliminary     No results found for: RESPCX  No results found for: WOUNDCX  No results found for: STOOLCX  No components found for: BODYFLD    Radiology Data:   Imaging Results (Last 24 Hours)     ** No results found for the last 24 hours. **          I have reviewed the patient's current medications.     Assessment/Plan     Principal Problem:    Atrial fibrillation with RVR (HCC)  Active Problems:    Atrial fibrillation (HCC)    Assessment and Plan  Patient had episode of hypotension.  Fluid bolus was given: 500 mL.  Patient responded.  Patient was complaining of chest pain at that time.  Labs were ordered and troponin was ordered as well noted below.  Cardiology has been made aware and consult has been placed.     Acute on chronic, suspected diastolic CHF  Will continue with lasix bid; monitor intake and output  Echocardiogram completed 3/21/2022.  Interpretation  Summary       •  Left ventricular ejection fraction appears to be 21 - 25%.  •  The left ventricular cavity is dilated.  •  Left ventricular wall thickness is consistent with hypertrophy.  •  Left ventricular diastolic dysfunction is noted.  •  Moderately reduced right ventricular systolic function noted.  •  The right ventricular cavity is severely dilated.  •  Left atrial volume is severely increased.  •  The right atrial cavity is severely  dilated.  •  Estimated right ventricular systolic pressure from tricuspid regurgitation is mildly elevated (35-45 mmHg).     Atrial fibrillation with RVR  Paroxysmal atrial fibrillation by history  Continue metoprolol for rate control  Continue Xarelto for anticoagulation  Cardizem drip has been discontinued  Cardiologist will be consulted as needed.     Troponin elevated  Patient developed chest pain today troponin was ordered.  High sensitivity troponin is elevated at 79 and troponin T delta is noted at 5.  Cardiology has been made aware and consult has been placed.  Demand ischemia secondary to atrial fibrillation    Hypertension  Current blood pressure 91/54  O2 saturation is 92% 3 L  Continue Cardizem and metoprolol  Hold for systolic blood pressures less than 110.    CODE STATUS full code  DVT prophylaxis Xarelto    Medical Decision Making  Number and Complexity of problems: 3 complex problems  Differential Diagnosis: Atrial fibrillation versus acute coronary syndrome    Conditions and Status:        Condition is unchanged.     [facility] Data  External documents reviewed: Hospital charts  My EKG interpretation:  EKG completed 3/20/2023.  Rhythm: atrial fibrillation   Rate: tachycardic   Clinical impression: abnormal ECG    My plain film interpretation:  Chest x-ray completed 3/20/2023.  IMPRESSION:  Right basilar pleural effusion, right lower lobe  compressive atelectasis, cardiomegaly and mild central vascular  Congestion.   Tests considered but not ordered: None     Decision  rules/scores evaluated (example [id number], Wells, etc):      Discussed with: Patient and agrees to current treatment plan     Treatment Plan  As above    Care Planning  Shared decision making: Patient updated on current status and informed of proposed care plan; is in agreement with plan  Code status and discussions: Full code    Disposition  Social Determinants of Health that impact treatment or disposition: N/A  I expect the patient to be discharged to home to be determined      I have utilized all available immediate resources to obtain, update, or review the patient's current medications (including all prescriptions, over-the-counter products, herbals, cannabis/cannabidiol products, and vitamin/mineral/dietary (nutritional) supplements).     I confirmed that the patient's Advance Care Plan is present, code status is documented, or surrogate decision maker is listed in the patient's medical record.    Discharge Planning: I expect patient to be discharged to home once stabilized    Gurvinder David,

## 2023-03-22 NOTE — CONSULTS
CARDIOLOGY CONSULTATION NOTE    Referring Provider: Issa Amaral MD/ Hospitalist service    Reason for Consultation: Evaluation of atrial fibrillation with RVR, congestive heart failure, abnormal troponins    Pavan Mccauley  1955  67 y.o. male      HPI  Pavan Mccauley is a 67-year-old male who has history of paroxysmal atrial fibrillation, supraventricular tachycardia in the past.  He presented in August 2020 with palpitation and noted to have atrial fibrillation with RVR and spontaneously converted to sinus rhythm and was placed on a combination of metoprolol succinate and diltiazem CD along with anticoagulation with Xarelto.  He has seen me once in office back in August 2022 when he was in sinus rhythm and compliant with his medications.    He presented to the emergency room on 3/20/2023 with increasing dyspnea on exertion, weight gain, bilateral leg edema of at least 3 to 4 weeks duration and noted to have atrial fibrillation with rapid ventricular response and evidence of congestive heart failure.  Troponins were noted to be elevated at 79, 74.    The patient was not very communicative/withdrawn and drowsy at the time of my examination and not very forthcoming with information.  A good part of the history was obtained from his current medical records.  It appears that the patient could not afford his medications and taking all his medications for at least 6 months.  He indicated that all his symptoms have been going on for several months with fatigue, tiredness, shortness of breath, weight gain, chest pressure, palpitation.  He denies any fevers or chills.  He quit smoking about 5 months prior but but apparently has been using alcohol.    Following admission to the hospital, he was started on IV Cardizem which did result in improvement in his heart rate and he was given IV diuretics. In fact he was transferred from the ICU up to the floor.  However after p.o. Cardizem was initiated, his heart rate  and blood pressure dropped to the 50s and 60s with a systolic blood pressure of 70 to 80 mmHg.  He was then transferred back to the ICU.  All medications except anticoagulation with Xarelto were discontinued.    When seen by me, his heart rate was 58-65 BPM and blood pressure 100/60 mmHg. showed atrial fibrillation with controlled ventricular response.    Echocardiogram performed yesterday showed:  Left ventricular ejection fraction appears to be 21 - 25%.  •  The left ventricular cavity is dilated.  •  Left ventricular wall thickness is consistent with hypertrophy.  •  Left ventricular diastolic dysfunction is noted.  •  Moderately reduced right ventricular systolic function noted.  •  The right ventricular cavity is severely dilated.  •  Left atrial volume is severely increased.  •  The right atrial cavity is severely  dilated.  •  Estimated right ventricular systolic pressure from tricuspid regurgitation is mildly elevated (35-45 mmHg).    He has no previous documented coronary artery disease and Lexiscan Cardiolite stress test in October 2020 showed an EF of 49% with no evidence of reversible ischemia.     Echocardiogram in September 2020 showed normal LV systolic function with an EF of 55 to 60% with moderate left atrial enlargement and moderate dilatation of the right ventricle.  There was mild TR and mild PI.       SUBJECTIVE    Past Medical History:   Diagnosis Date   • Bronchopneumonia    • Chest pain    • Corneal foreign body     both eyes   • Neck pain    • Perforation of left tympanic membrane     history of   • Prashant Mountain spotted fever    • Wheezing          Past Surgical History:   Procedure Laterality Date   • EYE FOREIGN BODY REMOVAL  07/29/2013    REMOVE FOREIGN BODY CORNEA W/SLIT LAMP 25985 (1)     • INJECTION OF MEDICATION  07/12/2011    Kenalog (1)      • SPINE SURGERY  03/12/1991    Exploration of the previous C5-6 fusion with refusion, exploration of the C5 nerve root, left side   •  TYMPANOPLASTY Left 05/16/1972    Perforation of the left tympanic membrane         History reviewed. No pertinent family history.      Social History     Socioeconomic History   • Marital status: Single   Tobacco Use   • Smoking status: Every Day     Types: Cigarettes   • Smokeless tobacco: Never   Vaping Use   • Vaping Use: Never used   Substance and Sexual Activity   • Alcohol use: Yes     Comment: nominal   • Drug use: No   • Sexual activity: Defer         No Known Allergies      Current Facility-Administered Medications   Medication Dose Route Frequency Provider Last Rate Last Admin   • acetaminophen (TYLENOL) tablet 650 mg  650 mg Oral Q4H PRN Magen Lagos MD        Or   • acetaminophen (TYLENOL) 160 MG/5ML solution 650 mg  650 mg Oral Q4H PRN Magen Lagos MD        Or   • acetaminophen (TYLENOL) suppository 650 mg  650 mg Rectal Q4H PRN Magen Lagos MD       • DOBUTamine (DOBUTREX) 1 mg/mL infusion  5 mcg/kg/min Intravenous Continuous Nannette Walters MD 30.3 mL/hr at 03/22/23 1614 5 mcg/kg/min at 03/22/23 1614   • furosemide (LASIX) injection 40 mg  40 mg Intravenous Q12H Magen Lagos MD   40 mg at 03/22/23 0537   • metoprolol tartrate (LOPRESSOR) tablet 25 mg  25 mg Oral Q12H Nannette Walters MD   25 mg at 03/22/23 0851   • ondansetron (ZOFRAN) injection 4 mg  4 mg Intravenous Q6H PRN Magen Lagos MD   4 mg at 03/22/23 0208   • rivaroxaban (XARELTO) tablet 20 mg  20 mg Oral Daily With Dinner Magen Lagos MD   20 mg at 03/21/23 1737   • sodium chloride 0.9 % flush 10 mL  10 mL Intravenous Q12H Magen Lagos MD   10 mL at 03/22/23 0852   • sodium chloride 0.9 % flush 10 mL  10 mL Intravenous PRN Magen Lagos MD       • sodium chloride 0.9 % infusion  - ADS Override Pull            • sodium chloride 0.9 % infusion 40 mL  40 mL Intravenous PRN Magen Lagos MD             OBJECTIVE    /70   Pulse 72   Temp 97.8 °F  "(36.6 °C)   Resp 18   Ht 167.6 cm (65.98\")   Wt 101 kg (222 lb)   SpO2 91%   BMI 35.85 kg/m²       Review of Systems : Patient not very forthcoming with information.    Constitutional:  weight gain, no fever or chills.  Decrease in exercise tolerance, fatigue     HENT:  Denies any hearing loss, epistaxis, hoarseness, or difficulty speaking.     Eyes: Wears eyeglasses or contact lenses .    Respiratory:  Dyspnea with exertion    Cardiovascular: See HPI    Gastrointestinal: Abdominal distention, no abdominal pain, nausea vomiting    Endocrine: No history of diabetes, hyperlipidemia    Genitourinary: Negative.      Musculoskeletal: Denies any history of arthritic symptoms or back problems.     Neurological: On review of CT angiogram in 2018 there is approximate 3.8 mm aneurysm projecting  medially and inferiorly from the cavernous segment of the left  internal carotid artery    Hematological: No history of anemia/bleeding disorders      Physical Exam:     Constitutional: Drowsy, chronically ill-appearing    HENT:   Head: Normocephalic, normal hair patterns, no masses or tenderness.  Ears, Nose, and Throat: No gross abnormalities. No pallor or cyanosis. Dentition good.   Eyes: EOMS intact, PERRL, conjunctivae and lids unremarkable. Fundoscopic exam and visual fields not performed.   Neck: No palpable masses or adenopathy, no thyromegaly, no JVD, carotid pulses are full and equal bilaterally and without bruit.     Cardiovascular: Irregular rhythm, S1 variable, S2 normal, no S3 or S4. No murmurs, gallops, or rubs detected.     Pulmonary/Chest: Chest: Breath sounds diminished in both lung bases.  Few left basal rales.  No rhonchi    Abdominal: Distended.  Ascites present.  No definite hepatosplenomegaly    Musculoskeletal: 2-3+ pitting edema in both lower extremities    Neurological: No gross motor or sensory deficits noted.  Patient drowsy but responding to questions.      RESULTS  Lab Results (last 24 hours)     " Procedure Component Value Units Date/Time    High Sensitivity Troponin T 2Hr [[id number]]  (Abnormal) Collected: 03/22/23 1412    Specimen: Blood Updated: 03/22/23 1436     HS Troponin T 79 ng/L      Troponin T Delta 5 ng/L     Narrative:      High Sensitive Troponin T Reference Range:  <10.0 ng/L- Negative Female for AMI  <15.0 ng/L- Negative Male for AMI  >=10 - Abnormal Female indicating possible myocardial injury.  >=15 - Abnormal Male indicating possible myocardial injury.   Clinicians would have to utilize clinical acumen, EKG, Troponin, and serial changes to determine if it is an Acute Myocardial Infarction or myocardial injury due to an underlying chronic condition.         High Sensitivity Troponin T [[id number]]  (Abnormal) Collected: 03/22/23 1222    Specimen: Blood Updated: 03/22/23 1300     HS Troponin T 74 ng/L     Narrative:      High Sensitive Troponin T Reference Range:  <10.0 ng/L- Negative Female for AMI  <15.0 ng/L- Negative Male for AMI  >=10 - Abnormal Female indicating possible myocardial injury.  >=15 - Abnormal Male indicating possible myocardial injury.   Clinicians would have to utilize clinical acumen, EKG, Troponin, and serial changes to determine if it is an Acute Myocardial Infarction or myocardial injury due to an underlying chronic condition.         Comprehensive Metabolic Panel [[id number]]  (Abnormal) Collected: 03/22/23 1222    Specimen: Blood Updated: 03/22/23 1250     Glucose 115 mg/dL      BUN 25 mg/dL      Creatinine 1.74 mg/dL      Sodium 126 mmol/L      Potassium 4.8 mmol/L      Comment: Slight hemolysis detected by analyzer. Results may be affected.        Chloride 92 mmol/L      CO2 21.0 mmol/L      Calcium 9.0 mg/dL      Total Protein 5.6 g/dL      Albumin 2.9 g/dL      ALT (SGPT) 42 U/L      AST (SGOT) 65 U/L      Comment: Slight hemolysis detected by analyzer. Results may be affected.        Alkaline Phosphatase 105 U/L      Total Bilirubin 7.0 mg/dL      Globulin 2.7  gm/dL      A/G Ratio 1.1 g/dL      BUN/Creatinine Ratio 14.4     Anion Gap 13.0 mmol/L      eGFR 43.0 mL/min/1.73     Narrative:      GFR Normal >60  Chronic Kidney Disease <60  Kidney Failure <15      Urine Culture - Urine, Urine, Clean Catch [[id number]]  (Abnormal)  (Susceptibility) Collected: 03/20/23 1900    Specimen: Urine, Clean Catch Updated: 03/22/23 1141     Urine Culture 25,000 CFU/mL Enterococcus faecalis    Narrative:      Colonization of the urinary tract without infection is common. Treatment is discouraged unless the patient is symptomatic, pregnant, or undergoing an invasive urologic procedure.    Susceptibility      Enterococcus faecalis      AMINATA      Ampicillin Susceptible      Levofloxacin Susceptible      Nitrofurantoin Susceptible      Tetracycline Susceptible      Vancomycin Susceptible                           CBC & Differential [[id number]]  (Abnormal) Collected: 03/22/23 1009    Specimen: Blood Updated: 03/22/23 1023    Narrative:      The following orders were created for panel order CBC & Differential.  Procedure                               Abnormality         Status                     ---------                               -----------         ------                     CBC Auto Differential[[id number]]        Abnormal            Final result               Scan Slide[[id number]]                                                                    Please view results for these tests on the individual orders.    CBC Auto Differential [[id number]]  (Abnormal) Collected: 03/22/23 1009    Specimen: Blood Updated: 03/22/23 1022     WBC 7.67 10*3/mm3      RBC 5.77 10*6/mm3      Hemoglobin 18.2 g/dL      Hematocrit 50.0 %      MCV 86.7 fL      MCH 31.5 pg      MCHC 36.4 g/dL      RDW 20.2 %      RDW-SD 57.3 fl      MPV 10.6 fL      Platelets 93 10*3/mm3      Neutrophil % 66.0 %      Lymphocyte % 17.7 %      Monocyte % 14.3 %      Eosinophil % 1.0 %      Basophil % 0.3 %      Immature Grans % 0.7 %       Neutrophils, Absolute 5.06 10*3/mm3      Lymphocytes, Absolute 1.36 10*3/mm3      Monocytes, Absolute 1.10 10*3/mm3      Eosinophils, Absolute 0.08 10*3/mm3      Basophils, Absolute 0.02 10*3/mm3      Immature Grans, Absolute 0.05 10*3/mm3      nRBC 0.0 /100 WBC         Imaging Results (Last 24 Hours)     Procedure Component Value Units Date/Time    US Abdomen Complete [[id number]] Collected: 03/22/23 1330     Updated: 03/22/23 1509    Narrative:      Ultrasound abdomen complete.    HISTORY: Abdominal distention.    Prior exam: CT chest March 20, 2023.      Impression:      Findings, impression: Limited examination, portable bedside  technique.    Echogenic sludge within the gallbladder.    Gallbladder  wall thickening. Normal liver.    Common bile duct and pancreas are obscured by overlying bowel  gas.    Normal spleen. Normal right kidney 9.6 x 5.1 x 5.1 cm.    Benign cyst left kidney. Left kidney is otherwise unremarkable  11.2 x 5.9 x 5.1 cm    Normal aorta and inferior vena cava.    Small amount of free fluid, ascites throughout the abdomen.    IMPRESSION: Ascites. Gallbladder wall thickening and echogenic  sludge within the gallbladder.    Electronically signed by:  Clem Balderrama MD  3/22/2023 3:07 PM CDT  Workstation: [id number]    US Guided Vascular Access [[id number]] Resulted: 03/22/23 1429     Updated: 03/22/23 1448    XR Chest 1 View [[id number]] Resulted: 03/22/23 1328     Updated: 03/22/23 1341    IR Insert Midline Without Port Pump 5 Plus [[id number]] Resulted: 03/22/23 1338     Updated: 03/22/23 1338    Narrative:      This procedure was auto-finalized with no dictation required.            ASSESSMENT AND PLAN  Pavan Mccauley is a 67-year-old male who has history of paroxysmal atrial fibrillation/SVT in the past who has presented with progressive fatigue, dyspnea, weight gain, edema, chest pressure/palpitation and noted to have atrial fibrillation with rapid ventricular response and congestive heart  failure consistent with HFrEF.  His symptoms have been going on for the past several months and poor compliance with medications noted.  He has longstanding history of tobacco and alcohol use, though he claims to have quit smoking about 5 months prior.    He has evidence of anasarca, and his blood tests do suggest elevated high-sensitivity troponins.  Lab tests do indicate acute renal insufficiency with a BUN of 25 and creatinine of 1.74, with hyponatremia with a sodium of 126 and hypoalbuminemia with albumin of 2.9.  He does have LFT abnormalities with ALT of 42 and AST of 65.   He has polycythemia with a hemoglobin of 18.2 and thrombocytopenia with a platelet count of 93.    LV dysfunction could be secondary to tachycardia induced cardiomyopathy though other etiology for cardiomyopathy with congestive heart failure including underlying coronary artery disease is a  consideration.  He does have risk factors for CAD including age, tobacco use.  He does have evidence of four-chamber dilatation and mild to moderate pulmonary hypertension.    After reviewing his situation, I would suggest starting him on dobutamine at 5mcg/kg/min, with the hope that this will improve his cardiac output and overall perfusion.  I would suggest discontinuing Cardizem.   Metoprolol tartrate or carvedilol could be considered to optimize his heart rate/improve LV function.  I agree with continuing Xarelto for anticoagulation.  ACE/ARB/Entresto are being withheld at the present time secondary to borderline blood pressure and worsening renal function.    Lasix is currently on hold secondary to borderline blood pressure.  This could be restarted and Lasix drip along with albumin, may be considered if hemodynamics improve.  I believe that he would benefit from nephrology consultation.    The patient will be a management challenge because of compliance issues and once his congestive heart failure and renal function improves, right and left heart  catheterization would be a consideration.  Further recommendations will be based on his progress.  Thank you for asked me to see this patient.    Electronically signed by Nannette Walters MD, 03/22/23, 6:09 PM CDT.    Nannette Walters MD  3/22/2023  17:21 CDT

## 2023-03-22 NOTE — PROGRESS NOTES
Discharge Planning Assessment  [facility]     Patient Name: Pavan Mccauley  MRN: [id number]  Today's Date: 3/22/2023    Admit Date: 3/20/2023    Plan: A-FIB/CHF - Inpatient  R Markus RNCM   Discharge Needs Assessment     Row Name 03/22/23 1108       Living Environment    People in Home alone    Current Living Arrangements apartment    Primary Care Provided by self    Provides Primary Care For no one    Quality of Family Relationships helpful    Able to Return to Prior Arrangements yes       Resource/Environmental Concerns    Resource/Environmental Concerns none    Transportation Concerns none       Food Insecurity    Within the past 12 months, you worried that your food would run out before you got the money to buy more. Never true    Within the past 12 months, the food you bought just didn't last and you didn't have money to get more. Never true       Transition Planning    Patient/Family Anticipates Transition to home    Patient/Family Anticipated Services at Transition none    Transportation Anticipated family or friend will provide       Discharge Needs Assessment    Equipment Currently Used at Home none    Concerns to be Addressed denies needs/concerns at this time    Anticipated Changes Related to Illness none    Equipment Needed After Discharge none    Patient's Choice of Community Agency(s) never had hh,    Current Discharge Risk lives alone    Discharge Coordination/Progress pt states that he lives alone and is independent with his care.   states that he has good support.    states that he has  a copay for his meds and he can not afford.  informed him that his meds will be checked at dc and if high will attempt to obtain coupons for him to use.    he verb understanding.   does not have dme and has never had hh.   no issues with transporation.               Discharge Plan    No documentation.               Continued Care and Services - Admitted Since 3/20/2023    Coordination has not been started for  this encounter.       Expected Discharge Date and Time     Expected Discharge Date Expected Discharge Time    Mar 22, 2023          Demographic Summary     Row Name 03/22/23 1107       General Information    Admission Type inpatient    Arrived From home    Required Notices Provided Important Message from Medicare    Referral Source other (see comments)  per notes, pt not taking his meds    Reason for Consult medication concerns    Preferred Language English    General Information Comments confirmed face sheet and pharmacy               Functional Status    No documentation.                Psychosocial    No documentation.                Abuse/Neglect    No documentation.                Legal    No documentation.                Substance Abuse    No documentation.                Patient Forms    No documentation.                   Anabelle López RN

## 2023-03-22 NOTE — PLAN OF CARE
Problem: Dysrhythmia  Goal: Normalized Cardiac Rhythm  3/22/2023 0337 by Merry Tamayo RN  Outcome: Ongoing, Progressing  3/22/2023 0336 by Merry Tamayo RN  Outcome: Ongoing, Progressing     Problem: Asthma Comorbidity  Goal: Maintenance of Asthma Control  3/22/2023 0337 by Merry Tamayo RN  Outcome: Ongoing, Progressing  3/22/2023 0336 by Merry Tamayo RN  Outcome: Ongoing, Progressing     Problem: Behavioral Health Comorbidity  Goal: Maintenance of Behavioral Health Symptom Control  3/22/2023 0337 by Merry Tamayo RN  Outcome: Ongoing, Progressing  3/22/2023 0336 by Merry Tamayo RN  Outcome: Ongoing, Progressing     Problem: COPD (Chronic Obstructive Pulmonary Disease) Comorbidity  Goal: Maintenance of COPD Symptom Control  3/22/2023 0337 by Merry Tamayo RN  Outcome: Ongoing, Progressing  3/22/2023 0336 by Merry Tamayo RN  Outcome: Ongoing, Progressing   Goal Outcome Evaluation:  Plan of Care Reviewed With: patient        Progress: no change

## 2023-03-22 NOTE — NURSING NOTE
Patient reported to CA that he was having CP and nausea and he needed his nurse. This nurse reported to the room. The patient was sitting edge of bed and complaining of mid sternal CP, weakness and was in and out cognitively. Was unable to obtain B/P on Dinamap or manual. O2 was 86, 2L NC placed, EKG ordered, paged MD. MD ordered 500 cc bolus. Dr. David was at bedside within minutes. Ordered u/s of abd r/t distention and transfer to CCU/stepdown for hemodynamic support. After multiple attempts could faintly hear systolic b/p in the 70's with no audible diastolic. MD also attempted to take manual b/p. Attempted to call report and was placed on hold. Bedside report was given.

## 2023-03-22 NOTE — PROGRESS NOTES
TWO PATIENT IDENTIFIERS WERE USED. THE PATIENT WAS DRAPED WITH A FULL BODY DRAPE AND THE PATIENT'S RIGHT ARM WAS PREPPED WITH CHLORA PREP. ULTRASOUND WAS USED TO LOCALIZE THERIGHT BASILIC VEIN. SUBCUTANEOUS TISSUE AT THE CATHETER SITE WAS INFILTRATED WITH 2% LIDOCAINE. UNDER ULTRASOUND GUIDANCE, THE VEIN WAS ACCESSED WITH A 21 GAUGE  NEEDLE. AN 0.018 WIRE WAS THEN THREADED THROUGH THE NEEDLE. THE 21 GAUGE NEEDLE WAS REMOVED AND A 4 Setswana SHEATH WAS PLACED OVER THE WIRE INTO THE VEIN.THE MIDLINE CATHETER WAS TRIMMED TO 20CM. THE MIDLINE CATHETER WAS THEN PLACED OVER THE WIRE INTO THE VEIN, THE SHEATH WAS PEELED AWAY, WIRE WAS REMOVED. CATHETER WAS FLUSHED WITH NORMAL SALINE AND CATHETER TIP APPLIED. BIOPATCH PLACED. CATHETER SECURED WITH STAT LOCK AND TEGADERM. PATIENT TOLERATED PROCEDURE WELL. THIS WAS DONE AT BEDSIDE      IMPRESSION:SUCCESSFUL PLACEMENT OF DUAL LUMEN MIDLINE.           Jayne Angelo  3/22/2023  13:37 CDT

## 2023-03-23 ENCOUNTER — APPOINTMENT (OUTPATIENT)
Dept: ULTRASOUND IMAGING | Facility: HOSPITAL | Age: 68
DRG: 280 | End: 2023-03-23
Payer: MEDICARE

## 2023-03-23 LAB
ALBUMIN SERPL-MCNC: 2.6 G/DL (ref 3.5–5.2)
ALBUMIN/GLOB SERPL: 1.2 G/DL
ALP SERPL-CCNC: 89 U/L (ref 39–117)
ALT SERPL W P-5'-P-CCNC: 61 U/L (ref 1–41)
ANION GAP SERPL CALCULATED.3IONS-SCNC: 13 MMOL/L (ref 5–15)
AST SERPL-CCNC: 101 U/L (ref 1–40)
BASOPHILS # BLD AUTO: 0.01 10*3/MM3 (ref 0–0.2)
BASOPHILS NFR BLD AUTO: 0.1 % (ref 0–1.5)
BILIRUB SERPL-MCNC: 7 MG/DL (ref 0–1.2)
BUN SERPL-MCNC: 27 MG/DL (ref 8–23)
BUN/CREAT SERPL: 14.5 (ref 7–25)
CALCIUM SPEC-SCNC: 8.9 MG/DL (ref 8.6–10.5)
CHLORIDE SERPL-SCNC: 92 MMOL/L (ref 98–107)
CO2 SERPL-SCNC: 21 MMOL/L (ref 22–29)
CREAT SERPL-MCNC: 1.86 MG/DL (ref 0.76–1.27)
CREAT UR-MCNC: 51.7 MG/DL
DEPRECATED RDW RBC AUTO: 53.5 FL (ref 37–54)
EGFRCR SERPLBLD CKD-EPI 2021: 39.7 ML/MIN/1.73
EOSINOPHIL # BLD AUTO: 0.01 10*3/MM3 (ref 0–0.4)
EOSINOPHIL NFR BLD AUTO: 0.1 % (ref 0.3–6.2)
ERYTHROCYTE [DISTWIDTH] IN BLOOD BY AUTOMATED COUNT: 19.2 % (ref 12.3–15.4)
GLOBULIN UR ELPH-MCNC: 2.2 GM/DL
GLUCOSE SERPL-MCNC: 119 MG/DL (ref 65–99)
HCT VFR BLD AUTO: 43.8 % (ref 37.5–51)
HGB BLD-MCNC: 16.6 G/DL (ref 13–17.7)
IMM GRANULOCYTES # BLD AUTO: 0.06 10*3/MM3 (ref 0–0.05)
IMM GRANULOCYTES NFR BLD AUTO: 0.8 % (ref 0–0.5)
LYMPHOCYTES # BLD AUTO: 0.95 10*3/MM3 (ref 0.7–3.1)
LYMPHOCYTES NFR BLD AUTO: 12.4 % (ref 19.6–45.3)
MCH RBC QN AUTO: 31.7 PG (ref 26.6–33)
MCHC RBC AUTO-ENTMCNC: 37.9 G/DL (ref 31.5–35.7)
MCV RBC AUTO: 83.6 FL (ref 79–97)
MONOCYTES # BLD AUTO: 0.92 10*3/MM3 (ref 0.1–0.9)
MONOCYTES NFR BLD AUTO: 12 % (ref 5–12)
NEUTROPHILS NFR BLD AUTO: 5.71 10*3/MM3 (ref 1.7–7)
NEUTROPHILS NFR BLD AUTO: 74.6 % (ref 42.7–76)
NRBC BLD AUTO-RTO: 0 /100 WBC (ref 0–0.2)
OSMOLALITY UR: 369 MOSM/KG (ref 38–1400)
PLATELET # BLD AUTO: 99 10*3/MM3 (ref 140–450)
PMV BLD AUTO: 10.3 FL (ref 6–12)
POTASSIUM SERPL-SCNC: 4.2 MMOL/L (ref 3.5–5.2)
PROT SERPL-MCNC: 4.8 G/DL (ref 6–8.5)
QT INTERVAL: 470 MS
QT INTERVAL: 490 MS
QTC INTERVAL: 485 MS
QTC INTERVAL: 489 MS
RBC # BLD AUTO: 5.24 10*6/MM3 (ref 4.14–5.8)
SODIUM SERPL-SCNC: 126 MMOL/L (ref 136–145)
SODIUM UR-SCNC: 26 MMOL/L
WBC NRBC COR # BLD: 7.66 10*3/MM3 (ref 3.4–10.8)

## 2023-03-23 PROCEDURE — 25010000002 FUROSEMIDE PER 20 MG: Performed by: INTERNAL MEDICINE

## 2023-03-23 PROCEDURE — 93005 ELECTROCARDIOGRAM TRACING: CPT | Performed by: NURSE PRACTITIONER

## 2023-03-23 PROCEDURE — 25010000002 DOBUTAMINE PER 250 MG: Performed by: INTERNAL MEDICINE

## 2023-03-23 PROCEDURE — 82570 ASSAY OF URINE CREATININE: CPT | Performed by: INTERNAL MEDICINE

## 2023-03-23 PROCEDURE — 83935 ASSAY OF URINE OSMOLALITY: CPT | Performed by: INTERNAL MEDICINE

## 2023-03-23 PROCEDURE — 84300 ASSAY OF URINE SODIUM: CPT | Performed by: INTERNAL MEDICINE

## 2023-03-23 PROCEDURE — 85025 COMPLETE CBC W/AUTO DIFF WBC: CPT | Performed by: HOSPITALIST

## 2023-03-23 PROCEDURE — 93010 ELECTROCARDIOGRAM REPORT: CPT | Performed by: INTERNAL MEDICINE

## 2023-03-23 PROCEDURE — 80053 COMPREHEN METABOLIC PANEL: CPT | Performed by: HOSPITALIST

## 2023-03-23 PROCEDURE — 99232 SBSQ HOSP IP/OBS MODERATE 35: CPT | Performed by: INTERNAL MEDICINE

## 2023-03-23 RX ORDER — FUROSEMIDE 10 MG/ML
40 INJECTION INTRAMUSCULAR; INTRAVENOUS ONCE
Status: COMPLETED | OUTPATIENT
Start: 2023-03-23 | End: 2023-03-23

## 2023-03-23 RX ADMIN — DOBUTAMINE HYDROCHLORIDE 5 MCG/KG/MIN: 100 INJECTION INTRAVENOUS at 09:47

## 2023-03-23 RX ADMIN — METOPROLOL TARTRATE 25 MG: 25 TABLET, FILM COATED ORAL at 09:47

## 2023-03-23 RX ADMIN — Medication 10 ML: at 20:13

## 2023-03-23 RX ADMIN — DOBUTAMINE HYDROCHLORIDE 5 MCG/KG/MIN: 100 INJECTION INTRAVENOUS at 17:40

## 2023-03-23 RX ADMIN — ZINC OXIDE 1 APPLICATION: 200 OINTMENT TOPICAL at 17:40

## 2023-03-23 RX ADMIN — FUROSEMIDE 10 MG/HR: 10 INJECTION, SOLUTION INTRAMUSCULAR; INTRAVENOUS at 10:34

## 2023-03-23 RX ADMIN — FUROSEMIDE 40 MG: 10 INJECTION, SOLUTION INTRAVENOUS at 09:47

## 2023-03-23 NOTE — PROGRESS NOTES
[facility] Medicine Services  INPATIENT PROGRESS NOTE      Length of Stay: 3  Date of Admission: 3/20/2023  Primary Care Physician: Dilcia Kapoor APRN    Subjective   Chief Complaint:  Shortness of breath with heart palpitations  HPI:  67-year-old male comes to the ER stating he does not feel well.  He has not felt well for a while.  Patient states he has not taken any of his medicines for the last several months.  He reports having history of A-fib.  Patient is a poor historian, but states he is short of breath that is worse with exertion.     Patient seen and examined 3/23/2023.  Patient seen and evaluated patient in bed.  Patient remains lethargic and not answering questions.  We will continue ICU status.  Has been seen by cardiology and nephrology.  Recommendations appreciated    Review of Systems   Constitutional: Positive for fatigue.   HENT: Negative.    Eyes: Negative.    Respiratory: Positive for shortness of breath.    Cardiovascular: Positive for chest pain.   Gastrointestinal: Negative.    Endocrine: Negative.    Genitourinary: Negative.    Musculoskeletal: Negative.    Skin: Negative.    Neurological: Positive for weakness.   Hematological: Negative.    Psychiatric/Behavioral: Negative.         All pertinent negatives and positives are as above. All other systems have been reviewed and are negative unless otherwise stated.     Objective    As of today 03/23/23  Temp:  [97.8 °F (36.6 °C)-98.7 °F (37.1 °C)] 98 °F (36.7 °C)  Heart Rate:  [] 102  Resp:  [16-18] 16  BP: ()/(48-87) 109/75    Physical Exam  Vitals and nursing note reviewed.   HENT:      Head: Normocephalic and atraumatic.      Right Ear: External ear normal.      Left Ear: External ear normal.      Nose: Nose normal.      Mouth/Throat:      Mouth: Mucous membranes are dry.      Pharynx: Oropharynx is clear.   Eyes:      General: No scleral icterus.     Extraocular  Movements: Extraocular movements intact.      Conjunctiva/sclera: Conjunctivae normal.      Pupils: Pupils are equal, round, and reactive to light.   Cardiovascular:      Rate and Rhythm: Bradycardia present. Rhythm irregular.      Pulses: Normal pulses.      Heart sounds: Normal heart sounds.   Pulmonary:      Effort: Pulmonary effort is normal.      Breath sounds: Normal breath sounds.      Comments: Decreased breath sounds at the bases otherwise clear  Abdominal:      General: Bowel sounds are normal. There is distension.      Palpations: Abdomen is soft.      Tenderness: There is no abdominal tenderness.      Comments: Soft nontender nondistended normal active bowel sounds   Musculoskeletal:         General: Normal range of motion.      Cervical back: Normal range of motion and neck supple.      Right lower leg: Edema present.      Left lower leg: Edema present.      Comments: Venous stasis changes bilateral lower extremities   Skin:     General: Skin is warm and dry.      Coloration: Skin is not jaundiced.   Neurological:      General: No focal deficit present.      Mental Status: He is alert and oriented to person, place, and time.      Motor: Weakness present.   Psychiatric:         Mood and Affect: Mood normal.         Behavior: Behavior normal.           phytonadione (VITAMIN K) IVPB, 5 mg, Intravenous, Once  rivaroxaban, 20 mg, Oral, Daily With Dinner  sodium chloride, 10 mL, Intravenous, Q12H         Results Review:  I have reviewed the labs, radiology results, and diagnostic studies.    Laboratory Data:   Results from last 7 days   Lab Units 03/23/23  0414 03/22/23  1222 03/20/23  1642   SODIUM mmol/L 126* 126* 131*   POTASSIUM mmol/L 4.2 4.8 4.0   CHLORIDE mmol/L 92* 92* 95*   CO2 mmol/L 21.0* 21.0* 25.0   BUN mg/dL 27* 25* 17   CREATININE mg/dL 1.86* 1.74* 1.12   GLUCOSE mg/dL 119* 115* 84   CALCIUM mg/dL 8.9 9.0 9.3   BILIRUBIN mg/dL 7.0* 7.0* 8.1*   ALK PHOS U/L 89 105 115   ALT (SGPT) U/L 61* 42* 38    AST (SGOT) U/L 101* 65* 52*   ANION GAP mmol/L 13.0 13.0 11.0     Estimated Creatinine Clearance: 44.1 mL/min (A) (by C-G formula based on SCr of 1.86 mg/dL (H)).  Results from last 7 days   Lab Units 03/20/23  1642   MAGNESIUM mg/dL 2.1         Results from last 7 days   Lab Units 03/23/23  0414 03/22/23  1009 03/20/23  1642   WBC 10*3/mm3 7.66 7.67 6.74   HEMOGLOBIN g/dL 16.6 18.2* 18.8*   HEMATOCRIT % 43.8 50.0 51.2*   PLATELETS 10*3/mm3 99* 93* 74*     Results from last 7 days   Lab Units 03/20/23  1642   INR  1.86*       Culture Data:   No results found for: BLOODCX  Urine Culture   Date Value Ref Range Status   03/20/2023 25,000 CFU/mL Enterococcus faecalis (A)  Final     No results found for: RESPCX  No results found for: WOUNDCX  No results found for: STOOLCX  No components found for: BODYFLD    Radiology Data:   Imaging Results (Last 24 Hours)     Procedure Component Value Units Date/Time    US Guided Vascular Access [[id number]] Collected: 03/22/23 1330     Updated: 03/23/23 1550    Narrative:      PROCEDURE: Ultrasound Guidance Vascular Access      Ordering physician(s): NIDHI CARDONA    Clinical Indication: Vascular access      Findings:    The right basilic vein was sonographically evaluated and  determined to be patent. Concurrent realtime ultrasound was used  to visualize needle entry into the right basilic vein  for  midline catheter placement and 2 permanent images acquired for  permanent recording and reporting.         Impression:      Impression:   As above.      Electronically signed by:  Tony Giron MD  3/23/2023 3:48 PM CDT  Workstation: [id number]    XR Chest 1 View [[id number]] Collected: 03/22/23 1327     Updated: 03/22/23 4366    Narrative:      EXAM:  XR CHEST 1 VIEW    TECHNIQUE:   Single frontal radiograph of the chest.    VIEWS:  1 view    CLINICAL HISTORY:   67 years  Male  SOB, I48.91 Unspecified atrial fibrillation N39.0  Urinary tract infection, site not specified R09.89  Other  specified symptoms and signs involving the circulatory and  respiratory systems    COMPARISON:   March 20, 2022 chest x-ray    FINDINGS:   Support lines and tubes: The right PICC tip overlies the right  axillary soft tissues.     Lungs:  Right lung base airspace opacities. The left lung is  clear.  Pleura: No pleural effusion. No pneumothorax.  Heart/mediastinum: The cardiac silhouette is enlarged.  Bones: No acute osseous findings.  Soft tissues: Unremarkable.      Impression:      1. Right pleural effusion and adjacent atelectasis with or  without pneumonia, increased in the interval.  2. Cardiomegaly.    Electronically signed by:  Issa Burch MD  3/22/2023 11:37  PM CDT Workstation: [id number]          I have reviewed the patient's current medications.     Assessment/Plan     Principal Problem:    Atrial fibrillation with RVR (HCC)  Active Problems:    Atrial fibrillation (HCC)    Assessment and Plan  Patient had episode of hypotension.  Fluid bolus was given: 500 mL.  Patient responded.  Patient was complaining of chest pain at that time.  Labs were ordered and troponin was ordered as well noted below.  Cardiology has been made aware and consult completed  Recommendations  Dobutamine 5 mcg/kg/min     BETA-BLOCKER: Held in the setting of hypotension and on inotropic support  ACE/ARB: Hypotensive, RUTH  ENTRESTO: Indicated, RUTH  DIURETIC: Lasix drip 10 mg an hour, nephrology on board  SGL2: RUTH  ALDOSTERONE ANTAGONIST: RUTH   IMDUR/HYDRALAZINE: N/A  DIGOXIN: N/A  Fluid restriction: 1100 ml  Sodium restriction:2 grams      Acute on chronic, suspected diastolic CHF  Will continue with lasix bid; monitor intake and output  Echocardiogram completed 3/21/2022.  Interpretation Summary       •  Left ventricular ejection fraction appears to be 21 - 25%.  •  The left ventricular cavity is dilated.  •  Left ventricular wall thickness is consistent with hypertrophy.  •  Left ventricular diastolic dysfunction is  noted.  •  Moderately reduced right ventricular systolic function noted.  •  The right ventricular cavity is severely dilated.  •  Left atrial volume is severely increased.  •  The right atrial cavity is severely  dilated.  •  Estimated right ventricular systolic pressure from tricuspid regurgitation is mildly elevated (35-45 mmHg).     Atrial fibrillation with RVR  Paroxysmal atrial fibrillation by history  Continue metoprolol for rate control  Continue Xarelto for anticoagulation  Cardizem drip has been discontinued  Cardiologist will be consulted as needed.     Troponin elevated  Patient developed chest pain today troponin was ordered.  High sensitivity troponin is elevated at 79 and troponin T delta is noted at 5.  Cardiology has been made aware and consult has been placed.  Demand ischemia secondary to atrial fibrillation    Hypertension  Current blood pressure 91/54  O2 saturation is 92% 3 L  Continue Cardizem and metoprolol  Hold for systolic blood pressures less than 110.    CODE STATUS full code  DVT prophylaxis Xarelto    Medical Decision Making  Number and Complexity of problems: 3 complex problems  Differential Diagnosis: Atrial fibrillation versus acute coronary syndrome    Conditions and Status:        Condition is unchanged.     MDM Data  External documents reviewed: Hospital charts  My EKG interpretation:  EKG completed 3/20/2023.  Rhythm: atrial fibrillation   Rate: tachycardic   Clinical impression: abnormal ECG    My plain film interpretation:  Chest x-ray completed 3/20/2023.  IMPRESSION:  Right basilar pleural effusion, right lower lobe  compressive atelectasis, cardiomegaly and mild central vascular  Congestion.   Tests considered but not ordered: None     Decision rules/scores evaluated (example [id number], Wells, etc):      Discussed with: Patient and agrees to current treatment plan     Treatment Plan  As above    Care Planning  Shared decision making: Patient updated on current status and  informed of proposed care plan; is in agreement with plan  Code status and discussions: Full code    Disposition  Social Determinants of Health that impact treatment or disposition: N/A  I expect the patient to be discharged to home to be determined      I have utilized all available immediate resources to obtain, update, or review the patient's current medications (including all prescriptions, over-the-counter products, herbals, cannabis/cannabidiol products, and vitamin/mineral/dietary (nutritional) supplements).     I confirmed that the patient's Advance Care Plan is present, code status is documented, or surrogate decision maker is listed in the patient's medical record.    Discharge Planning: I expect patient to be discharged to home once stabilized    Gurvinder David DO

## 2023-03-23 NOTE — TREATMENT PLAN
I was asked to evaluate this patient last night as he continues to fluctuate clinically.  I have arranged for the patient to have thoracentesis performed by interventional radiology today to better evaluate the moderate to large pleural effusion, which may have loculations and possible occult infection.    I have also ordered 5 mg vitamin K IV as the patient, understandably, has an elevated INR in the context of significant hepatic dysfunction.    Lasix was given last night under the recommendations of cardiology.  However, the patient's sodium has further trended down and I have therefore avoided any further diuretic use at this time.    Electronically signed by Víctor London MD, 03/23/23, 7:08 AM CDT.

## 2023-03-23 NOTE — CONSULTS
[facility]  [address] [place] [address]. [id number]  T - [phone]  F - [phone]     Consultation         PATIENT  DEMOGRAPHICS   PATIENT NAME: Pavan Mccauley                      PHYSICIAN: ARY Luna  : 1955  MRN: [id number]    Subjective   SUBJECTIVE   Referring Provider: Dr. David  Reason for Consultation: RUTH  History of present illness: This is a 67-year-old male with a past medical history significant for atrial fibrillation who presented to the hospital on the  with complaints of shortness of breath.  He had had progressive shortness of breath over the 3 weeks preceding admission.  He had associated lower extremity edema and weight gain.  On further questioning it seemed he had been on Xarelto and rate control medications for his atrial fibrillation but had stopped these due to significant cost.  He had been off his medications for around 6 months.  On evaluation here he was found to have atrial fibrillation with RVR.  He was also admitted for suspected acute on chronic CHF.  He has now developed acute kidney injury and nephrology is consulted to help manage this.    Past Medical History:   Diagnosis Date   • Bronchopneumonia    • Chest pain    • Corneal foreign body     both eyes   • Neck pain    • Perforation of left tympanic membrane     history of   • Prashant [place] spotted fever    • Wheezing      Past Surgical History:   Procedure Laterality Date   • EYE FOREIGN BODY REMOVAL  2013    REMOVE FOREIGN BODY CORNEA W/SLIT LAMP 42480 (1)     • INJECTION OF MEDICATION  2011    Kenalog (1)      • SPINE SURGERY  1991    Exploration of the previous C5-6 fusion with refusion, exploration of the C5 nerve root, left side   • TYMPANOPLASTY Left 1972    Perforation of the left tympanic membrane     History reviewed. No pertinent family history.  Social History     Tobacco Use   • Smoking status: Every Day     Types: Cigarettes   • Smokeless  "tobacco: Never   Vaping Use   • Vaping Use: Never used   Substance Use Topics   • Alcohol use: Yes     Comment: nominal   • Drug use: No     Allergies:  Patient has no known allergies.     REVIEW OF SYSTEMS    Review of Systems   All other systems reviewed and are negative.      Objective   OBJECTIVE   Vital Signs  Temp:  [97.5 °F (36.4 °C)-97.8 °F (36.6 °C)] 97.8 °F (36.6 °C)  Heart Rate:  [57-81] 64  Resp:  [18] 18  BP: ()/(48-87) 103/63    Flowsheet Rows    Flowsheet Row First Filed Value   Admission Height 170.2 cm (67\") Documented at 03/20/2023 1618   Admission Weight 105 kg (231 lb 14.8 oz) Documented at 03/20/2023 1648           I/O last 3 completed shifts:  In: 480 [P.O.:480]  Out: 1175 [Urine:1175]    PHYSICAL EXAM    Physical Exam  Constitutional:       Appearance: He is well-developed.   HENT:      Head: Normocephalic and atraumatic.   Eyes:      Conjunctiva/sclera: Conjunctivae normal.      Pupils: Pupils are equal, round, and reactive to light.   Cardiovascular:      Rate and Rhythm: Normal rate and regular rhythm.   Pulmonary:      Effort: Pulmonary effort is normal.      Breath sounds: Normal breath sounds.   Abdominal:      Palpations: Abdomen is soft.   Musculoskeletal:      Cervical back: Neck supple.      Right lower leg: No edema.      Left lower leg: No edema.   Skin:     General: Skin is warm and dry.   Neurological:      Mental Status: He is alert and oriented to person, place, and time.   Psychiatric:         Mood and Affect: Mood normal.         Behavior: Behavior normal.         RESULTS   Results Review:    Results from last 7 days   Lab Units 03/23/23  0414 03/22/23  1222 03/20/23  1642   SODIUM mmol/L 126* 126* 131*   POTASSIUM mmol/L 4.2 4.8 4.0   CHLORIDE mmol/L 92* 92* 95*   CO2 mmol/L 21.0* 21.0* 25.0   BUN mg/dL 27* 25* 17   CREATININE mg/dL 1.86* 1.74* 1.12   CALCIUM mg/dL 8.9 9.0 9.3   BILIRUBIN mg/dL 7.0* 7.0* 8.1*   ALK PHOS U/L 89 105 115   ALT (SGPT) U/L 61* 42* 38   AST " (SGOT) U/L 101* 65* 52*   GLUCOSE mg/dL 119* 115* 84       Estimated Creatinine Clearance: 44.1 mL/min (A) (by C-G formula based on SCr of 1.86 mg/dL (H)).    Results from last 7 days   Lab Units 03/20/23  1642   MAGNESIUM mg/dL 2.1             Results from last 7 days   Lab Units 03/23/23  0414 03/22/23  1009 03/20/23  1642   WBC 10*3/mm3 7.66 7.67 6.74   HEMOGLOBIN g/dL 16.6 18.2* 18.8*   PLATELETS 10*3/mm3 99* 93* 74*       Results from last 7 days   Lab Units 03/20/23  1642   INR  1.86*        MEDICATIONS    metoprolol tartrate, 25 mg, Oral, Q12H  phytonadione (VITAMIN K) IVPB, 5 mg, Intravenous, Once  rivaroxaban, 20 mg, Oral, Daily With Dinner  sodium chloride, 10 mL, Intravenous, Q12H      DOBUTamine, 5 mcg/kg/min, Last Rate: 5 mcg/kg/min (03/23/23 0947)  furosemide (LASIX) 100 mg in 100mL NS infusion, 10 mg/hr, Last Rate: 10 mg/hr (03/23/23 1034)  hold, 1 each      Medications Prior to Admission   Medication Sig Dispense Refill Last Dose   • dilTIAZem CD (CARDIZEM CD) 240 MG 24 hr capsule Take 1 capsule by mouth Daily. (Patient not taking: Reported on 3/20/2023) 90 capsule 3    • metoprolol succinate XL (TOPROL-XL) 100 MG 24 hr tablet Take 1 tablet by mouth Daily. 90 tablet 3    • rivaroxaban (XARELTO) 20 MG tablet Take 1 tablet by mouth once daily. (Patient not taking: Reported on 3/20/2023) 90 tablet 3      Assessment & Plan   ASSESSMENT / PLAN      Atrial fibrillation with RVR (HCC)    Atrial fibrillation (HCC)    1.  RUTH- baseline creatinine looks to be around 1.1, at baseline on arrival and now up to 1.86.  Etiology of RUTH could be secondary to diuretics versus poor perfusion with atrial fibrillation.  He has significant volume overload apparent on exam.    -Add Lasix drip for aggressive diuresis, continue dobutamine per cardiology    2.  Hyponatremia- likely secondary to hypervolemia, Lasix as above    3.  A-fib with RVR- cardiology managing    4.  Acute on chronic systolic heart failure- dobutamine  per cardiology    5.  Transaminitis    6.  Pleural effusion- thoracentesis per primary team        Thank you for the consult, we will continue to follow the patient.         I discussed the patients findings and my recommendations with patient and nursing staff      This document has been electronically signed by ARY Luna on March 23, 2023 12:47 CDT      For this patient encounter, I have reviewed the Nurse Practitioner's documentation, medical decision making, and treatment plan and personally spent time with the patient. Patient is seen on 3/23/23 in icu

## 2023-03-23 NOTE — PROGRESS NOTES
"  [facility] Progress Note   LOS: 3 days   Patient Care Team:  Dilcia Kapoor APRN as PCP - General (Family Medicine)    Chief Complaint   Patient presents with   • Shortness of Breath   • Jaundice   • Edema     feet         Subjective     Interval History:   Patient Denies: chest pain  Patient Complaints: shortness of air and edema  History taken from: patient chart RN    On dobutamine at 5 mcg/kg/min.  Lethargic.  Jaundice and has anasarca. Telemetry showing atrial flutter  With controlled ventricular rate     Objective     Vital Sign Min/Max for last 24 hours  Temp  Min: 97.5 °F (36.4 °C)  Max: 97.8 °F (36.6 °C)   BP  Min: 88/63  Max: 112/87   Pulse  Min: 57  Max: 81   Resp  Min: 18  Max: 18   SpO2  Min: 88 %  Max: 98 %   Flow (L/min)  Min: 3  Max: 5   No data recorded     Flowsheet Rows    Flowsheet Row First Filed Value   Admission Height 170.2 cm (67\") Documented at 03/20/2023 1618   Admission Weight 105 kg (231 lb 14.8 oz) Documented at 03/20/2023 1648            03/21/23  1017 03/21/23  1841 03/22/23  0423   Weight: 103 kg (227 lb 1.2 oz) 101 kg (221 lb 12.8 oz) 101 kg (222 lb)       Physical Exam:  Physical Exam  Vitals and nursing note reviewed.   Constitutional:       General: He is not in acute distress.     Appearance: He is ill-appearing. He is not diaphoretic.      Interventions: Nasal cannula in place.   HENT:      Head: Normocephalic and atraumatic.      Mouth/Throat:      Mouth: Mucous membranes are moist.      Pharynx: Oropharynx is clear. No oropharyngeal exudate.   Eyes:      General: Lids are normal.         Right eye: No discharge.         Left eye: No discharge.   Cardiovascular:      Rate and Rhythm: Normal rate. Rhythm irregular.      Heart sounds: Heart sounds are distant.   Pulmonary:      Breath sounds: Examination of the left-middle field reveals rales. Wheezing and rales present.   Abdominal:      General: There is distension.      Comments: anasarca   Musculoskeletal:    "      General: Swelling present.      Right lower le+ Edema present.      Left lower le+ Edema present.   Skin:     General: Skin is warm and dry.      Capillary Refill: Capillary refill takes less than 2 seconds.      Coloration: Skin is jaundiced.   Neurological:      Mental Status: He is easily aroused.          Results Reviewed by myself:     Results from last 7 days   Lab Units 23  0414 23  1222 23  1642   SODIUM mmol/L 126* 126* 131*   POTASSIUM mmol/L 4.2 4.8 4.0   CHLORIDE mmol/L 92* 92* 95*   CO2 mmol/L 21.0* 21.0* 25.0   BUN mg/dL 27* 25* 17   CREATININE mg/dL 1.86* 1.74* 1.12   CALCIUM mg/dL 8.9 9.0 9.3   BILIRUBIN mg/dL 7.0* 7.0* 8.1*   ALK PHOS U/L 89 105 115   ALT (SGPT) U/L 61* 42* 38   AST (SGOT) U/L 101* 65* 52*   GLUCOSE mg/dL 119* 115* 84       Estimated Creatinine Clearance: 44.1 mL/min (A) (by C-G formula based on SCr of 1.86 mg/dL (H)).    Results from last 7 days   Lab Units 23  1642   MAGNESIUM mg/dL 2.1             Results from last 7 days   Lab Units 23  0414 23  1009 23  1642   WBC 10*3/mm3 7.66 7.67 6.74   HEMOGLOBIN g/dL 16.6 18.2* 18.8*   PLATELETS 10*3/mm3 99* 93* 74*       Results from last 7 days   Lab Units 23  1642   INR  1.86*     Lab Results   Component Value Date    PROBNP 4,988.0 (H) 2023       I/O last 3 completed shifts:  In: 480 [P.O.:480]  Out: 1175 [Urine:1175]    Cardiographics:  ECG/EMG Results (last 24 hours)     Procedure Component Value Units Date/Time    SCANNED EKG [[id number]] Resulted: 23     Updated: 23 214    SCANNED - TELEMETRY   [[id number]] Resulted: 03/20/23     Updated: 23 1013          Results for orders placed during the hospital encounter of 23    Adult Transthoracic Echo Complete W/ Cont if Necessary Per Protocol    Interpretation Summary  •  Left ventricular ejection fraction appears to be 21 - 25%.  •  The left ventricular cavity is dilated.  •  Left ventricular wall  thickness is consistent with hypertrophy.  •  Left ventricular diastolic dysfunction is noted.  •  Moderately reduced right ventricular systolic function noted.  •  The right ventricular cavity is severely dilated.  •  Left atrial volume is severely increased.  •  The right atrial cavity is severely  dilated.  •  Estimated right ventricular systolic pressure from tricuspid regurgitation is mildly elevated (35-45 mmHg).      CT Abdomen Pelvis Without Contrast    Result Date: 3/20/2023  1.  Moderate to large right effusion which appears loculated. There is associated right lower lobe consolidation suggesting pneumonia. 2.  Cardiomegaly. 3.  Prominent main pulmonary artery suggesting pulmonary arterial hypertension. 4.  Small amount of ascites. There appears to be some edema and wall thickening associated with the third portion of the duodenum suggesting duodenitis. Electronically signed by:  Krzysztof Gomez MD  3/20/2023 9:36 PM CDT Workstation: [id number]    CT Chest Without Contrast Diagnostic    Result Date: 3/20/2023  1.  Moderate to large right effusion which appears loculated. There is associated right lower lobe consolidation suggesting pneumonia. 2.  Cardiomegaly. 3.  Prominent main pulmonary artery suggesting pulmonary arterial hypertension. 4.  Small amount of ascites. There appears to be some edema and wall thickening associated with the third portion of the duodenum suggesting duodenitis. Electronically signed by:  Krzysztof Gomez MD  3/20/2023 9:35 PM CDT Workstation: [id number]    US Abdomen Complete    Result Date: 3/22/2023  Findings, impression: Limited examination, portable bedside technique. Echogenic sludge within the gallbladder. Gallbladder  wall thickening. Normal liver. Common bile duct and pancreas are obscured by overlying bowel gas. Normal spleen. Normal right kidney 9.6 x 5.1 x 5.1 cm. Benign cyst left kidney. Left kidney is otherwise unremarkable 11.2 x 5.9 x 5.1 cm Normal aorta and inferior vena  cava. Small amount of free fluid, ascites throughout the abdomen. IMPRESSION: Ascites. Gallbladder wall thickening and echogenic sludge within the gallbladder. Electronically signed by:  Clem Balderrama MD  3/22/2023 3:07 PM CDT Workstation: [id number]    XR Chest 1 View    Result Date: 3/22/2023  1. Right pleural effusion and adjacent atelectasis with or without pneumonia, increased in the interval. 2. Cardiomegaly. Electronically signed by:  Issa Burch MD  3/22/2023 11:37 PM CDT Workstation: [id number]    XR Chest 1 View    Result Date: 3/20/2023  Right basilar pleural effusion, right lower lobe compressive atelectasis, cardiomegaly and mild central vascular congestion. Electronically signed by:  Hollis Sousa MD  3/20/2023 6:26 PM CDT Workstation: [id number]      Medication Review:     Current Facility-Administered Medications:   •  acetaminophen (TYLENOL) tablet 650 mg, 650 mg, Oral, Q4H PRN **OR** acetaminophen (TYLENOL) 160 MG/5ML solution 650 mg, 650 mg, Oral, Q4H PRN **OR** acetaminophen (TYLENOL) suppository 650 mg, 650 mg, Rectal, Q4H PRN, Magen Lagos MD  •  DOBUTamine (DOBUTREX) 1 mg/mL infusion, 5 mcg/kg/min, Intravenous, Continuous, Nannette Walters MD, Last Rate: 30.3 mL/hr at 03/23/23 0947, 5 mcg/kg/min at 03/23/23 0947  •  furosemide (LASIX) 100 mg in sodium chloride 0.9 % 100 mL infusion, 10 mg/hr, Intravenous, Continuous, Luis Alcantar MD, Last Rate: 10 mL/hr at 03/23/23 1034, 10 mg/hr at 03/23/23 1034  •  Hold medication, 1 each, Does not apply, Continuous PRN, Víctor London MD  •  metoprolol tartrate (LOPRESSOR) tablet 25 mg, 25 mg, Oral, Q12H, Nannette Walters MD, 25 mg at 03/23/23 0947  •  ondansetron (ZOFRAN) injection 4 mg, 4 mg, Intravenous, Q6H PRN, Magen Lagos MD, 4 mg at 03/22/23 0208  •  phytonadione (AQUA-MEPHYTON, VITAMIN K) 5 mg in sodium chloride 0.9 % 50 mL IVPB, 5 mg, Intravenous, Once, Víctor London MD, Held at 03/23/23  1035  •  rivaroxaban (XARELTO) tablet 20 mg, 20 mg, Oral, Daily With Dinner, Magen Lagos MD, 20 mg at 03/22/23 1830  •  sodium chloride 0.9 % flush 10 mL, 10 mL, Intravenous, Q12H, Magen Lagos MD, 10 mL at 03/22/23 2110  •  sodium chloride 0.9 % flush 10 mL, 10 mL, Intravenous, PRN, Magen Lagos MD  •  sodium chloride 0.9 % infusion 40 mL, 40 mL, Intravenous, PRN, Magen Lagos MD    Patient's recent labs and results as included in the subjective data were reviewed by me. Any pertinent outside data will be included.        Assessment & Plan       Atrial fibrillation with RVR (HCC)    Atrial fibrillation (HCC)    1.  Atrial fibrillation with RVR.  Has history of paroxysmal atrial fibrillation/SVT.  Has been noncompliant with medication for months.  Presented with worsening shortness of breath, weight gain, edema and chest pain.  EKG showed atrial fibrillation with RVR.  Found to be in heart failure with reduced LVEF.    Continue Xarelto 20 mg daily    2.  Acute systolic heart failure/LV dysfunction.  Echocardiogram this admission showing severely dilated LV cavity with LVEF at 23%.  Moderately reduced RV systolic function, RV severely dilated, right atrial cavity severely dilated, left atrial volume severely increased.    Is currently on dobutamine at 5 mcg/kg/min  Nephrology is on board and helping manage hypervolemia and hyponatremia.  Recommend Lasix drip.    Severe anasarca    NICM/ICM: EF:23 %. NYHA Class IV, Stage D. Patient is currently volume overloaded.  and in a well perfused physiologic state. Hemodynamics are unacceptable, needing inotropic support    Dobutamine 5 mcg/kg/min    BETA-BLOCKER: Held in the setting of hypotension and on inotropic support  ACE/ARB: Hypotensive, RUTH  ENTRESTO: Indicated, RUTH  DIURETIC: Lasix drip 10 mg an hour, nephrology on board  SGL2: RUTH  ALDOSTERONE ANTAGONIST: RUTH   IMDUR/HYDRALAZINE: N/A  DIGOXIN: N/A  Fluid restriction: 1100 ml  Sodium  restriction:2 grams    Cardiac Rehab: Indicated  ICD: Indicated   ADHF: This admission    3.  Elevated troponins.  High sensitivity troponin at 70, 65, 74, and 79.  Lactic acid 3.8 on arrival.  EKG showing atrial fibrillation with RVR at 149 bpm.    Most likely type II MI from RVR, metabolic acidosis and fluid overload.  However, he has not had an invasive cardiac work-up.  LV is  severely reduced.  Once fluid status improves, RUTH improves, can consider invasive work-up.  This time medical management.    PT 21.5, INR 1.86, platelets 99, Elevated LFT's    We will hold off Plavix or aspirin at this time.    4.  Urosepsis.  Primary team managing.    Plan for disposition: Continue current location.  We will continue to follow.  Condition guarded      This document has been electronically signed by ARY Renteria on March 23, 2023 10:13 CDT   Electronically signed by ARY Renteria, 03/23/23, 10:13 AM CDT.    I personally saw and examined Pavan Mccauley after the APRN.  I personally performed a history and physical examination of the patient.  I personally reviewed independent findings and plan of care.  I discussed management with the APRN.  I agree with the APRN's documentation.    67-year-old gentleman with history of known atrial fibrillation, SVT who has presented to the hospital decompensated heart failure and noted to be in A-fib with RVR.    Subjective: Intermittent hypotension overnight.  This morning he is drowsy but arousable.    Vitals:    03/23/23 0900 03/23/23 1000 03/23/23 1100 03/23/23 1200   BP: 106/73 112/87 104/71 103/63   BP Location: Left arm      Patient Position: Lying      Pulse: 68 81 72 64   Resp: 18      Temp: 97.8 °F (36.6 °C)      TempSrc: Axillary      SpO2: 96% 90% 98% 97%   Weight:       Height:         +3 pitting edema noted.  Labs reviewed.  Significant hyponatremia.  Creatinine up trended.      Assessment and plan    1.  Acute systolic heart failure  exacerbation:  Echocardiogram showed severely reduced LV systolic function.  Also noted to have diastolic dysfunction and RV failure.  Potential etiologies from A-fib with RVR.  Ischemic component cannot be excluded.  History of noncompliance with medications.  Is fluid overloaded and decompensated.  Has been started on dobutamine.  Aggressive diuresis.  Nephrology on board.  On Lasix drip.-Hyponatremia noted.    2.  A-fib with RVR:  Known history of paroxysmal A-fib.  [id number] score is elevated at 3.  On anticoagulation with Xarelto.  Heart rate is better controlled now.  Avoid Cardizem.    3.  Troponin elevation:  Potential demand ischemia in the setting of hypoperfusion and A-fib with RVR.  Medical management at this time.  Consider ischemic evaluations and optimized from heart failure and A-fib standpoint.    Thank you for the consult.  Dr. Power will resume care in the morning.          Electronically signed by Monica Lamb MD, 03/23/23, 2:14 PM CDT.

## 2023-03-24 LAB
ALBUMIN SERPL-MCNC: 2.7 G/DL (ref 3.5–5.2)
ALBUMIN/GLOB SERPL: 1.2 G/DL
ALP SERPL-CCNC: 94 U/L (ref 39–117)
ALT SERPL W P-5'-P-CCNC: 61 U/L (ref 1–41)
ANION GAP SERPL CALCULATED.3IONS-SCNC: 11 MMOL/L (ref 5–15)
AST SERPL-CCNC: 78 U/L (ref 1–40)
BASOPHILS # BLD AUTO: 0.01 10*3/MM3 (ref 0–0.2)
BASOPHILS NFR BLD AUTO: 0.1 % (ref 0–1.5)
BILIRUB SERPL-MCNC: 6.7 MG/DL (ref 0–1.2)
BUN SERPL-MCNC: 25 MG/DL (ref 8–23)
BUN/CREAT SERPL: 17.4 (ref 7–25)
CALCIUM SPEC-SCNC: 8.4 MG/DL (ref 8.6–10.5)
CHLORIDE SERPL-SCNC: 89 MMOL/L (ref 98–107)
CO2 SERPL-SCNC: 34 MMOL/L (ref 22–29)
CREAT SERPL-MCNC: 1.44 MG/DL (ref 0.76–1.27)
DEPRECATED RDW RBC AUTO: 54.4 FL (ref 37–54)
EGFRCR SERPLBLD CKD-EPI 2021: 53.9 ML/MIN/1.73
EOSINOPHIL # BLD AUTO: 0.05 10*3/MM3 (ref 0–0.4)
EOSINOPHIL NFR BLD AUTO: 0.7 % (ref 0.3–6.2)
ERYTHROCYTE [DISTWIDTH] IN BLOOD BY AUTOMATED COUNT: 19.7 % (ref 12.3–15.4)
GLOBULIN UR ELPH-MCNC: 2.2 GM/DL
GLUCOSE SERPL-MCNC: 87 MG/DL (ref 65–99)
HCT VFR BLD AUTO: 45.7 % (ref 37.5–51)
HGB BLD-MCNC: 16.6 G/DL (ref 13–17.7)
IMM GRANULOCYTES # BLD AUTO: 0.04 10*3/MM3 (ref 0–0.05)
IMM GRANULOCYTES NFR BLD AUTO: 0.6 % (ref 0–0.5)
LYMPHOCYTES # BLD AUTO: 1.05 10*3/MM3 (ref 0.7–3.1)
LYMPHOCYTES NFR BLD AUTO: 14.5 % (ref 19.6–45.3)
MAGNESIUM SERPL-MCNC: 1.7 MG/DL (ref 1.6–2.4)
MAGNESIUM SERPL-MCNC: 2.1 MG/DL (ref 1.6–2.4)
MCH RBC QN AUTO: 31 PG (ref 26.6–33)
MCHC RBC AUTO-ENTMCNC: 36.3 G/DL (ref 31.5–35.7)
MCV RBC AUTO: 85.4 FL (ref 79–97)
MONOCYTES # BLD AUTO: 1.18 10*3/MM3 (ref 0.1–0.9)
MONOCYTES NFR BLD AUTO: 16.3 % (ref 5–12)
NEUTROPHILS NFR BLD AUTO: 4.93 10*3/MM3 (ref 1.7–7)
NEUTROPHILS NFR BLD AUTO: 67.8 % (ref 42.7–76)
NRBC BLD AUTO-RTO: 0 /100 WBC (ref 0–0.2)
PLATELET # BLD AUTO: 95 10*3/MM3 (ref 140–450)
PMV BLD AUTO: 9.8 FL (ref 6–12)
POTASSIUM SERPL-SCNC: 2.4 MMOL/L (ref 3.5–5.2)
POTASSIUM SERPL-SCNC: 2.9 MMOL/L (ref 3.5–5.2)
PROT SERPL-MCNC: 4.9 G/DL (ref 6–8.5)
QT INTERVAL: 388 MS
QTC INTERVAL: 482 MS
RBC # BLD AUTO: 5.35 10*6/MM3 (ref 4.14–5.8)
SODIUM SERPL-SCNC: 134 MMOL/L (ref 136–145)
WBC NRBC COR # BLD: 7.26 10*3/MM3 (ref 3.4–10.8)

## 2023-03-24 PROCEDURE — 0 MAGNESIUM SULFATE 4 GM/100ML SOLUTION: Performed by: NURSE PRACTITIONER

## 2023-03-24 PROCEDURE — 93010 ELECTROCARDIOGRAM REPORT: CPT | Performed by: INTERNAL MEDICINE

## 2023-03-24 PROCEDURE — 93005 ELECTROCARDIOGRAM TRACING: CPT | Performed by: NURSE PRACTITIONER

## 2023-03-24 PROCEDURE — 84132 ASSAY OF SERUM POTASSIUM: CPT | Performed by: HOSPITALIST

## 2023-03-24 PROCEDURE — 85025 COMPLETE CBC W/AUTO DIFF WBC: CPT | Performed by: HOSPITALIST

## 2023-03-24 PROCEDURE — 83735 ASSAY OF MAGNESIUM: CPT | Performed by: HOSPITALIST

## 2023-03-24 PROCEDURE — 83735 ASSAY OF MAGNESIUM: CPT | Performed by: INTERNAL MEDICINE

## 2023-03-24 PROCEDURE — 25010000002 FUROSEMIDE PER 20 MG: Performed by: INTERNAL MEDICINE

## 2023-03-24 PROCEDURE — 80053 COMPREHEN METABOLIC PANEL: CPT | Performed by: HOSPITALIST

## 2023-03-24 PROCEDURE — 25010000002 DOBUTAMINE PER 250 MG: Performed by: INTERNAL MEDICINE

## 2023-03-24 PROCEDURE — 99232 SBSQ HOSP IP/OBS MODERATE 35: CPT | Performed by: INTERNAL MEDICINE

## 2023-03-24 PROCEDURE — 0 POTASSIUM CHLORIDE 10 MEQ/100ML SOLUTION

## 2023-03-24 RX ORDER — POTASSIUM CHLORIDE 750 MG/1
40 CAPSULE, EXTENDED RELEASE ORAL ONCE
Status: COMPLETED | OUTPATIENT
Start: 2023-03-24 | End: 2023-03-24

## 2023-03-24 RX ORDER — MAGNESIUM SULFATE HEPTAHYDRATE 40 MG/ML
4 INJECTION, SOLUTION INTRAVENOUS AS NEEDED
Status: DISCONTINUED | OUTPATIENT
Start: 2023-03-24 | End: 2023-04-04 | Stop reason: HOSPADM

## 2023-03-24 RX ORDER — POTASSIUM CHLORIDE 7.45 MG/ML
10 INJECTION INTRAVENOUS
Status: DISCONTINUED | OUTPATIENT
Start: 2023-03-24 | End: 2023-04-04 | Stop reason: HOSPADM

## 2023-03-24 RX ORDER — POTASSIUM CHLORIDE 1.5 G/1.77G
40 POWDER, FOR SOLUTION ORAL AS NEEDED
Status: DISCONTINUED | OUTPATIENT
Start: 2023-03-24 | End: 2023-04-04 | Stop reason: HOSPADM

## 2023-03-24 RX ORDER — MAGNESIUM SULFATE HEPTAHYDRATE 40 MG/ML
2 INJECTION, SOLUTION INTRAVENOUS AS NEEDED
Status: DISCONTINUED | OUTPATIENT
Start: 2023-03-24 | End: 2023-04-04 | Stop reason: HOSPADM

## 2023-03-24 RX ORDER — POTASSIUM CHLORIDE 750 MG/1
40 CAPSULE, EXTENDED RELEASE ORAL AS NEEDED
Status: DISCONTINUED | OUTPATIENT
Start: 2023-03-24 | End: 2023-04-04 | Stop reason: HOSPADM

## 2023-03-24 RX ADMIN — POTASSIUM CHLORIDE 40 MEQ: 10 CAPSULE, COATED, EXTENDED RELEASE ORAL at 10:55

## 2023-03-24 RX ADMIN — POTASSIUM CHLORIDE 10 MEQ: 7.46 INJECTION, SOLUTION INTRAVENOUS at 12:26

## 2023-03-24 RX ADMIN — FUROSEMIDE 10 MG/HR: 10 INJECTION, SOLUTION INTRAMUSCULAR; INTRAVENOUS at 10:04

## 2023-03-24 RX ADMIN — POTASSIUM CHLORIDE 10 MEQ: 7.46 INJECTION, SOLUTION INTRAVENOUS at 08:09

## 2023-03-24 RX ADMIN — POTASSIUM CHLORIDE 10 MEQ: 7.46 INJECTION, SOLUTION INTRAVENOUS at 11:18

## 2023-03-24 RX ADMIN — DOBUTAMINE HYDROCHLORIDE 5 MCG/KG/MIN: 100 INJECTION INTRAVENOUS at 23:10

## 2023-03-24 RX ADMIN — POTASSIUM CHLORIDE 10 MEQ: 7.46 INJECTION, SOLUTION INTRAVENOUS at 14:03

## 2023-03-24 RX ADMIN — RIVAROXABAN 20 MG: 10 TABLET, FILM COATED ORAL at 17:37

## 2023-03-24 RX ADMIN — POTASSIUM CHLORIDE 10 MEQ: 7.46 INJECTION, SOLUTION INTRAVENOUS at 10:01

## 2023-03-24 RX ADMIN — POTASSIUM CHLORIDE 10 MEQ: 7.46 INJECTION, SOLUTION INTRAVENOUS at 23:06

## 2023-03-24 RX ADMIN — POTASSIUM CHLORIDE 10 MEQ: 7.46 INJECTION, SOLUTION INTRAVENOUS at 06:57

## 2023-03-24 RX ADMIN — MAGNESIUM SULFATE 4 G: 4 INJECTION INTRAVENOUS at 15:11

## 2023-03-24 RX ADMIN — DOBUTAMINE HYDROCHLORIDE 5 MCG/KG/MIN: 100 INJECTION INTRAVENOUS at 12:26

## 2023-03-24 RX ADMIN — Medication 10 ML: at 21:07

## 2023-03-24 RX ADMIN — POTASSIUM CHLORIDE 10 MEQ: 7.46 INJECTION, SOLUTION INTRAVENOUS at 22:02

## 2023-03-24 NOTE — PROGRESS NOTES
"  [facility]  [address] [place] [address]. [address]  T - [phone]  F - [phone]     Progress Note          PATIENT  DEMOGRAPHICS   PATIENT NAME: Pavan Mccauley                      PHYSICIAN: Luis Alcantra MD  : 1955  MRN: [id number]   LOS: 4 days    Patient Care Team:  Dilcia Kapoor APRN as PCP - General (Family Medicine)  Subjective   SUBJECTIVE   Has marked urine output. Doing better         Objective   OBJECTIVE   Vital Signs  Temp:  [97.6 °F (36.4 °C)-98.7 °F (37.1 °C)] 97.9 °F (36.6 °C)  Heart Rate:  [] 88  Resp:  [16] 16  BP: ()/(57-82) 115/70    Flowsheet Rows    Flowsheet Row First Filed Value   Admission Height 170.2 cm (67\") Documented at 2023 1618   Admission Weight 105 kg (231 lb 14.8 oz) Documented at 2023 1648           I/O last 3 completed shifts:  In: 4172.7 [I.V.:672.7; Other:3500]  Out: 6200 [Urine:6200]    PHYSICAL EXAM    Physical Exam  Constitutional:       Appearance: He is well-developed.   HENT:      Head: Normocephalic.   Eyes:      Pupils: Pupils are equal, round, and reactive to light.   Cardiovascular:      Rate and Rhythm: Normal rate and regular rhythm.      Heart sounds: Normal heart sounds.   Pulmonary:      Effort: Pulmonary effort is normal.      Breath sounds: Normal breath sounds.   Abdominal:      General: Bowel sounds are normal.      Palpations: Abdomen is soft.   Musculoskeletal:         General: Swelling present.   Skin:     Coloration: Skin is not jaundiced.   Neurological:      General: No focal deficit present.      Mental Status: He is alert and oriented to person, place, and time.         RESULTS   Results Review:    Results from last 7 days   Lab Units 23  0545 23  0414 23  1222   SODIUM mmol/L 134* 126* 126*   POTASSIUM mmol/L 2.4* 4.2 4.8   CHLORIDE mmol/L 89* 92* 92*   CO2 mmol/L 34.0* 21.0* 21.0*   BUN mg/dL 25* 27* 25*   CREATININE mg/dL 1.44* 1.86* 1.74*   CALCIUM mg/dL 8.4* " 8.9 9.0   BILIRUBIN mg/dL 6.7* 7.0* 7.0*   ALK PHOS U/L 94 89 105   ALT (SGPT) U/L 61* 61* 42*   AST (SGOT) U/L 78* 101* 65*   GLUCOSE mg/dL 87 119* 115*       Estimated Creatinine Clearance: 56.9 mL/min (A) (by C-G formula based on SCr of 1.44 mg/dL (H)).    Results from last 7 days   Lab Units 03/24/23  0545 03/20/23  1642   MAGNESIUM mg/dL 1.7 2.1             Results from last 7 days   Lab Units 03/24/23  0545 03/23/23  0414 03/22/23  1009 03/20/23  1642   WBC 10*3/mm3 7.26 7.66 7.67 6.74   HEMOGLOBIN g/dL 16.6 16.6 18.2* 18.8*   PLATELETS 10*3/mm3 95* 99* 93* 74*       Results from last 7 days   Lab Units 03/20/23  1642   INR  1.86*         Imaging Results (Last 24 Hours)     Procedure Component Value Units Date/Time    US Guided Vascular Access [[id number]] Collected: 03/22/23 1330     Updated: 03/23/23 1550    Narrative:      PROCEDURE: Ultrasound Guidance Vascular Access      Ordering physician(s): NIDHI CARDONA    Clinical Indication: Vascular access      Findings:    The right basilic vein was sonographically evaluated and  determined to be patent. Concurrent realtime ultrasound was used  to visualize needle entry into the right basilic vein  for  midline catheter placement and 2 permanent images acquired for  permanent recording and reporting.         Impression:      Impression:   As above.      Electronically signed by:  Tony Giron MD  3/23/2023 3:48 PM CDT  Workstation: [id number]           MEDICATIONS    phytonadione (VITAMIN K) IVPB, 5 mg, Intravenous, Once  rivaroxaban, 20 mg, Oral, Daily With Dinner  sodium chloride, 10 mL, Intravenous, Q12H      DOBUTamine, 5 mcg/kg/min, Last Rate: 5 mcg/kg/min (03/23/23 1740)  furosemide (LASIX) 100 mg in 100mL NS infusion, 10 mg/hr, Last Rate: 10 mg/hr (03/24/23 1004)  hold, 1 each        Assessment & Plan   ASSESSMENT / PLAN      Atrial fibrillation with RVR (HCC)    Atrial fibrillation (HCC)       1.  RUTH- baseline creatinine looks to be around 1.1, at  baseline on arrival and peak up to 1.86.  Etiology of RUTH could be secondary to diuretics versus poor perfusion with atrial fibrillation.  He has significant volume overload apparent on exam.     -good diuretic response. bp remained stable. Keep Lasix drip for aggressive diuresis, continue dobutamine per cardiology. May add aldactone later if bp allows.      2.  Hyponatremia- likely secondary to hypervolemia, Lasix as above. Na better     3.  A-fib with RVR- cardiology managing     4.  Acute on chronic systolic heart failure- dobutamine per cardiology, lasix drip      5.  Transaminitis     6.  Pleural effusion- no plan for thoracentesis    7. Hypokalemia / hypomagnesemia - replace k 60meq IV and 40meq PO. Also getting iv mg                This document has been electronically signed by Luis Alcantar MD on March 24, 2023 10:29 CDT

## 2023-03-24 NOTE — PROGRESS NOTES
"  [facility] Progress Note   LOS: 4 days   Patient Care Team:  Dilcia Kapoor APRN as PCP - General (Family Medicine)    Chief Complaint   Patient presents with    Shortness of Breath    Jaundice    Edema     feet         Subjective     Interval History:   Patient Denies: chest pain  Patient Complaints: shortness of air and edema  History taken from: patient chart RN    On dobutamine at 5 mcg/kg/min.  More alert today  Anasarca improving. Telemetry showing atrial flutter with controlled ventricular rate     Objective     Vital Sign Min/Max for last 24 hours  Temp  Min: 97.6 °F (36.4 °C)  Max: 98.7 °F (37.1 °C)   BP  Min: 97/59  Max: 123/82   Pulse  Min: 64  Max: 123   Resp  Min: 16  Max: 16   SpO2  Min: 92 %  Max: 98 %   Flow (L/min)  Min: 4  Max: 5   No data recorded     Flowsheet Rows      Flowsheet Row First Filed Value   Admission Height 170.2 cm (67\") Documented at 03/20/2023 1618   Admission Weight 105 kg (231 lb 14.8 oz) Documented at 03/20/2023 1648              03/21/23  1017 03/21/23  1841 03/22/23  0423   Weight: 103 kg (227 lb 1.2 oz) 101 kg (221 lb 12.8 oz) 101 kg (222 lb)       Physical Exam:  Physical Exam  Vitals and nursing note reviewed.   Constitutional:       General: He is not in acute distress.     Appearance: He is ill-appearing. He is not diaphoretic.      Interventions: Nasal cannula in place.   HENT:      Head: Normocephalic and atraumatic.      Mouth/Throat:      Mouth: Mucous membranes are moist.      Pharynx: Oropharynx is clear. No oropharyngeal exudate.   Eyes:      General: Lids are normal.         Right eye: No discharge.         Left eye: No discharge.   Cardiovascular:      Rate and Rhythm: Normal rate. Rhythm irregular.      Heart sounds: Heart sounds are distant.   Pulmonary:      Breath sounds: Examination of the left-middle field reveals rales. Wheezing and rales present.   Abdominal:      General: There is distension.      Comments: anasarca   Musculoskeletal:    "      General: Swelling present.      Right lower le+ Edema present.      Left lower le+ Edema present.   Skin:     General: Skin is warm and dry.      Capillary Refill: Capillary refill takes less than 2 seconds.      Coloration: Skin is jaundiced.   Neurological:      Mental Status: He is easily aroused.          Results Reviewed by myself:     Results from last 7 days   Lab Units 23  0545 23  0414 23  1222   SODIUM mmol/L 134* 126* 126*   POTASSIUM mmol/L 2.4* 4.2 4.8   CHLORIDE mmol/L 89* 92* 92*   CO2 mmol/L 34.0* 21.0* 21.0*   BUN mg/dL 25* 27* 25*   CREATININE mg/dL 1.44* 1.86* 1.74*   CALCIUM mg/dL 8.4* 8.9 9.0   BILIRUBIN mg/dL 6.7* 7.0* 7.0*   ALK PHOS U/L 94 89 105   ALT (SGPT) U/L 61* 61* 42*   AST (SGOT) U/L 78* 101* 65*   GLUCOSE mg/dL 87 119* 115*       Estimated Creatinine Clearance: 56.9 mL/min (A) (by C-G formula based on SCr of 1.44 mg/dL (H)).    Results from last 7 days   Lab Units 23  0545 23  1642   MAGNESIUM mg/dL 1.7 2.1             Results from last 7 days   Lab Units 23  0545 23  0414 23  1009 23  1642   WBC 10*3/mm3 7.26 7.66 7.67 6.74   HEMOGLOBIN g/dL 16.6 16.6 18.2* 18.8*   PLATELETS 10*3/mm3 95* 99* 93* 74*       Results from last 7 days   Lab Units 23  1642   INR  1.86*     Lab Results   Component Value Date    PROBNP 4,988.0 (H) 2023       I/O last 3 completed shifts:  In: 4172.7 [I.V.:672.7; Other:3500]  Out: 6200 [Urine:6200]    Cardiographics:  ECG/EMG Results (last 24 hours)       Procedure Component Value Units Date/Time    SCANNED EKG [[id number]] Resulted: 23     Updated: 23 2146    SCANNED - TELEMETRY   [[id number]] Resulted: 23     Updated: 23 1013            Results for orders placed during the hospital encounter of 23    Adult Transthoracic Echo Complete W/ Cont if Necessary Per Protocol    Interpretation Summary    Left ventricular ejection fraction appears to be 21 -  25%.    The left ventricular cavity is dilated.    Left ventricular wall thickness is consistent with hypertrophy.    Left ventricular diastolic dysfunction is noted.    Moderately reduced right ventricular systolic function noted.    The right ventricular cavity is severely dilated.    Left atrial volume is severely increased.    The right atrial cavity is severely  dilated.    Estimated right ventricular systolic pressure from tricuspid regurgitation is mildly elevated (35-45 mmHg).      CT Abdomen Pelvis Without Contrast    Result Date: 3/20/2023  1.  Moderate to large right effusion which appears loculated. There is associated right lower lobe consolidation suggesting pneumonia. 2.  Cardiomegaly. 3.  Prominent main pulmonary artery suggesting pulmonary arterial hypertension. 4.  Small amount of ascites. There appears to be some edema and wall thickening associated with the third portion of the duodenum suggesting duodenitis. Electronically signed by:  Krzysztof Gomez MD  3/20/2023 9:36 PM CDT Workstation: [id number]    CT Chest Without Contrast Diagnostic    Result Date: 3/20/2023  1.  Moderate to large right effusion which appears loculated. There is associated right lower lobe consolidation suggesting pneumonia. 2.  Cardiomegaly. 3.  Prominent main pulmonary artery suggesting pulmonary arterial hypertension. 4.  Small amount of ascites. There appears to be some edema and wall thickening associated with the third portion of the duodenum suggesting duodenitis. Electronically signed by:  Krzysztof Gomez MD  3/20/2023 9:35 PM CDT Workstation: [id number]    US Abdomen Complete    Result Date: 3/22/2023  Findings, impression: Limited examination, portable bedside technique. Echogenic sludge within the gallbladder. Gallbladder  wall thickening. Normal liver. Common bile duct and pancreas are obscured by overlying bowel gas. Normal spleen. Normal right kidney 9.6 x 5.1 x 5.1 cm. Benign cyst left kidney. Left kidney is otherwise  unremarkable 11.2 x 5.9 x 5.1 cm Normal aorta and inferior vena cava. Small amount of free fluid, ascites throughout the abdomen. IMPRESSION: Ascites. Gallbladder wall thickening and echogenic sludge within the gallbladder. Electronically signed by:  Clem Balderrama MD  3/22/2023 3:07 PM CDT Workstation: [id number]    US Guided Vascular Access    Result Date: 3/23/2023  Impression: As above. Electronically signed by:  Tony Giron MD  3/23/2023 3:48 PM CDT Workstation: [id number]    XR Chest 1 View    Result Date: 3/22/2023  1. Right pleural effusion and adjacent atelectasis with or without pneumonia, increased in the interval. 2. Cardiomegaly. Electronically signed by:  Issa Burch MD  3/22/2023 11:37 PM CDT Workstation: [id number]    XR Chest 1 View    Result Date: 3/20/2023  Right basilar pleural effusion, right lower lobe compressive atelectasis, cardiomegaly and mild central vascular congestion. Electronically signed by:  Hollis Sousa MD  3/20/2023 6:26 PM CDT Workstation: [id number]      Medication Review:     Current Facility-Administered Medications:     acetaminophen (TYLENOL) tablet 650 mg, 650 mg, Oral, Q4H PRN **OR** [DISCONTINUED] acetaminophen (TYLENOL) 160 MG/5ML solution 650 mg, 650 mg, Oral, Q4H PRN **OR** acetaminophen (TYLENOL) suppository 650 mg, 650 mg, Rectal, Q4H PRN, Magen Lagos MD    DOBUTamine (DOBUTREX) 1 mg/mL infusion, 5 mcg/kg/min, Intravenous, Continuous, Nannette Walters MD, Last Rate: 30.3 mL/hr at 03/23/23 1740, 5 mcg/kg/min at 03/23/23 1740    furosemide (LASIX) 100 mg in sodium chloride 0.9 % 100 mL infusion, 10 mg/hr, Intravenous, Continuous, Luis Alcantar MD, Last Rate: 10 mL/hr at 03/24/23 1004, 10 mg/hr at 03/24/23 1004    Hold medication, 1 each, Does not apply, Continuous PRN, Víctor London MD    hydrocortisone-bacitracin-zinc oxide-nystatin (MAGIC BARRIER) ointment 1 application, 1 application, Topical, Q4H PRN, Gurvinder David, DO, 1  application at 03/23/23 1740    Magnesium Sulfate - Total Dose 10 grams - Magnesium 1 or Less, 2 g, Intravenous, PRN **OR** Magnesium Sulfate - Total Dose 6 grams - Magnesium 1.1 - 1.5, 2 g, Intravenous, PRN **OR** Magnesium Sulfate - Total Dose 4 grams - Magnesium 1.6 - 1.9, 4 g, Intravenous, PRN, Stephanie Traore, APRN    ondansetron (ZOFRAN) injection 4 mg, 4 mg, Intravenous, Q6H PRN, Magen Lagos MD, 4 mg at 03/22/23 0208    phytonadione (AQUA-MEPHYTON, VITAMIN K) 5 mg in sodium chloride 0.9 % 50 mL IVPB, 5 mg, Intravenous, Once, Víctor London MD, Held at 03/23/23 1035    potassium chloride (MICRO-K) CR capsule 40 mEq, 40 mEq, Oral, PRN **OR** potassium chloride (KLOR-CON) packet 40 mEq, 40 mEq, Oral, PRN **OR** potassium chloride 10 mEq in 100 mL IVPB, 10 mEq, Intravenous, Q1H PRN, Víctor London MD, Last Rate: 100 mL/hr at 03/24/23 1001, 10 mEq at 03/24/23 1001    rivaroxaban (XARELTO) tablet 20 mg, 20 mg, Oral, Daily With Dinner, Magen Lagos MD, 20 mg at 03/22/23 1830    sodium chloride 0.9 % flush 10 mL, 10 mL, Intravenous, Q12H, Magen Lagos MD, 10 mL at 03/23/23 2013    sodium chloride 0.9 % flush 10 mL, 10 mL, Intravenous, PRN, Magen Lagos MD    sodium chloride 0.9 % infusion 40 mL, 40 mL, Intravenous, PRN, Magen Lagos MD    Patient's recent labs and results as included in the subjective data were reviewed by me. Any pertinent outside data will be included.        Assessment & Plan       Atrial fibrillation with RVR (HCC)    Atrial fibrillation (HCC)    1.  Atrial fibrillation with RVR.  Has history of paroxysmal atrial fibrillation/SVT.  Has been noncompliant with medication for months.  Presented with worsening shortness of breath, weight gain, edema and chest pain.  EKG showed atrial fibrillation with RVR.  Found to be in heart failure with reduced LVEF.    Continue Xarelto 20 mg daily    2.  Acute systolic heart failure/LV dysfunction.   Echocardiogram this admission showing severely dilated LV cavity with LVEF at 23%.  Moderately reduced RV systolic function, RV severely dilated, right atrial cavity severely dilated, left atrial volume severely increased.    Is currently on dobutamine at 5 mcg/kg/min  Nephrology is on board and helping manage hypervolemia and hyponatremia.  Recommend Lasix drip.    Anasarca improving    NICM/ICM: EF:23 %. NYHA Class IV, Stage D. Patient is currently volume overloaded.  and in a well perfused physiologic state. Hemodynamics are unacceptable, needing inotropic support    Dobutamine 5 mcg/kg/min  K+ 2.4  Mag 1.7    Getting electrolyte replacements    BETA-BLOCKER: Held in the setting of hypotension and on inotropic support  ACE/ARB: Hypotensive, RUTH  ENTRESTO:  Indicated, RUTH  DIURETIC: Lasix drip 10 mg an hour, nephrology on board  SGL2: RUTH  ALDOSTERONE ANTAGONIST: RUTH   IMDUR/HYDRALAZINE: N/A  DIGOXIN: N/A  Fluid restriction: 1100 ml  Sodium restriction:2 grams    Cardiac Rehab:  Indicated  ICD:  Indicated    ADHF:  This admission    3.  Elevated troponins.  High sensitivity troponin at 70, 65, 74, and 79.  Lactic acid 3.8 on arrival.  EKG showing atrial fibrillation with RVR at 149 bpm.    Most likely type II MI from RVR, metabolic acidosis and fluid overload.  However, he has not had an invasive cardiac work-up.  LV is  severely reduced.  Once fluid status improves, RUTH improves, can consider invasive work-up.  This time medical management.     Discussed future plan of [facility] once his fluid status and other medical issues improved.    PT 21.5, INR 1.86, platelets 99, Elevated LFT's    We will hold off Plavix or aspirin at this time.    4.  Urosepsis.  Primary team managing.    Plan for disposition: Continue current location.  We will continue to follow.  Condition improved      This document has been electronically signed by ARY Renteria on March 24, 2023 10:13 CDT   Electronically signed by Stephanie BERRY  ARY Traore, 03/24/23, 10:13 AM CDT.    Patient examined and chart reviewed.  Agree with assessment and plan as outlined by ARY Goodson    Pavan Mccauley is a 67-year-old male who has history of paroxysmal atrial fibrillation/SVT in the past who has presented with progressive fatigue, dyspnea, weight gain, edema, chest pressure/palpitation and noted to have atrial fibrillation with rapid ventricular response and congestive heart failure consistent with HFrEF.  His symptoms have been going on for the past several months and poor compliance with medications noted.  He has longstanding history of tobacco and alcohol use, though he claims to have quit smoking about 5 months prior.     He has evidence of anasarca, and his blood tests do suggest elevated high-sensitivity troponins.  He has evidence of acute renal failure, hyponatremia, hypoalbuminemia, LFT abnormalities, polycythemia, thrombocytopenia.       Echocardiogram showed severe LV dysfunction with an ejection fraction of 20 to 25%.  There was left ventricular hypertrophy.  RV systolic function was moderately decreased.  There was severe RV dilatation.  Severe biatrial enlargement and RVSP was 35 to 45 mmHg.    Symptomatically, the patient is a little better with slight improvement in dyspnea and edema.  Urine output has been good with IV Lasix infusion, dobutamine.  He is being followed by nephrology closely.  Hypokalemia noted is being corrected.  EKG shows atrial flutter/fibrillation with controlled ventricular response.    Heart rate was 68 bpm, irregular blood pressure 120/75 mmHg. Chronically ill-appearing  There was no pallor.  Patient was icteric.  Exam of the heart showed varying intensity of the first heart sound with no S3 or S4.  Lung exam showed decreased breath sounds in both lung bases.  Few scattered rales in the bases.   Abdomen was distended with evidence of ascites.  2+ pitting edema both lower extremities.  No focal neurological  deficit.  He is drowsy but easily aroused.  Skin is jaundiced.  He has evidence of anasarca.    Labs reviewed.  Sodium 134, potassium 2.4, chloride 89, CO2 34.  Bilirubin 6.7.  ALT 61 and AST 78.  Hemoglobin 16.6 and platelet count 95.    Radiology not planning to do thoracentesis since they feel that the volume is not large enough to tap at this time.    Continue current management with IV dobutamine at 5 mcg/kg/min.  Continue Lasix drip 10 mg/h.  Electrolytes being corrected.  Anticoagulation with Xarelto may be continued.  Nephrology following closely.    Multiple etiologies for LV dysfunction/cardiomyopathy with congestive heart failure possible.  When clinically stable, ischemic work-up could be pursued.    Dr. Charles covering for me this weekend.    Electronically signed by Nannette Walters MD, 03/24/23, 3:53 PM CDT.

## 2023-03-24 NOTE — PROGRESS NOTES
[facility] Medicine Services  INPATIENT PROGRESS NOTE      Length of Stay: 4  Date of Admission: 3/20/2023  Primary Care Physician: Dilcia Kapoor APRN    Subjective   Chief Complaint:  Shortness of breath with heart palpitations  HPI:  67-year-old male comes to the ER stating he does not feel well.  He has not felt well for a while.  Patient states he has not taken any of his medicines for the last several months.  He reports having history of A-fib.  Patient is a poor historian, but states he is short of breath that is worse with exertion.     Patient seen and examined 3/24/2023.  Patient is currently lying in bed.  He states he is comfortable.  Reviewed medications and labs with the patient and treatment plan.  Patient is denying any new problems or complaints.  He actually does feel better.  Radiology is indicated that there is very little fluid to pull off of right pleural effusion and has recommended to hold for now and follow and proceed with procedure as needed.  The patient is not have any chest pain.  Continues to be very short of breath at rest and on exertion.  He has no fevers or chills.  Otherwise he is tolerating current treatment plan.     Review of Systems   Constitutional: Positive for fatigue. Negative for chills and fever.   HENT: Negative.    Eyes: Negative.    Respiratory: Positive for shortness of breath.    Cardiovascular: Positive for chest pain.   Gastrointestinal: Negative.    Endocrine: Negative.    Genitourinary: Negative.    Musculoskeletal: Negative.    Skin: Negative.    Neurological: Positive for weakness.   Hematological: Negative.    Psychiatric/Behavioral: Negative.         All pertinent negatives and positives are as above. All other systems have been reviewed and are negative unless otherwise stated.     Objective    As of today 03/24/23  Temp:  [97.6 °F (36.4 °C)-98 °F (36.7 °C)] 97.9 °F (36.6 °C)  Heart Rate:   [] 97  Resp:  [16] 16  BP: ()/(57-82) 127/63    Physical Exam  Vitals and nursing note reviewed.   HENT:      Head: Normocephalic and atraumatic.      Right Ear: External ear normal.      Left Ear: External ear normal.      Nose: Nose normal.      Mouth/Throat:      Mouth: Mucous membranes are dry.      Pharynx: Oropharynx is clear.   Eyes:      General: No scleral icterus.     Extraocular Movements: Extraocular movements intact.      Conjunctiva/sclera: Conjunctivae normal.      Pupils: Pupils are equal, round, and reactive to light.   Cardiovascular:      Rate and Rhythm: Bradycardia present. Rhythm irregular.      Pulses: Normal pulses.      Heart sounds: Normal heart sounds.   Pulmonary:      Effort: Pulmonary effort is normal.      Breath sounds: Normal breath sounds.      Comments: Decreased breath sounds at the bases otherwise clear  Abdominal:      General: Bowel sounds are normal. There is distension.      Palpations: Abdomen is soft.      Tenderness: There is no abdominal tenderness.      Comments: Soft nontender nondistended normal active bowel sounds   Musculoskeletal:         General: Normal range of motion.      Cervical back: Normal range of motion and neck supple.      Right lower leg: Edema present.      Left lower leg: Edema present.      Comments: Venous stasis changes bilateral lower extremities  Edema bilateral lower extremities improving   Skin:     General: Skin is warm and dry.      Capillary Refill: Capillary refill takes less than 2 seconds.      Coloration: Skin is not jaundiced.   Neurological:      General: No focal deficit present.      Mental Status: He is alert and oriented to person, place, and time.      Motor: Weakness present.   Psychiatric:         Mood and Affect: Mood normal.         Behavior: Behavior normal.           phytonadione (VITAMIN K) IVPB, 5 mg, Intravenous, Once  rivaroxaban, 20 mg, Oral, Daily With Dinner  sodium chloride, 10 mL, Intravenous, Q12H          Results Review:  I have reviewed the labs, radiology results, and diagnostic studies.    Laboratory Data:   Results from last 7 days   Lab Units 03/24/23  0545 03/23/23  0414 03/22/23  1222   SODIUM mmol/L 134* 126* 126*   POTASSIUM mmol/L 2.4* 4.2 4.8   CHLORIDE mmol/L 89* 92* 92*   CO2 mmol/L 34.0* 21.0* 21.0*   BUN mg/dL 25* 27* 25*   CREATININE mg/dL 1.44* 1.86* 1.74*   GLUCOSE mg/dL 87 119* 115*   CALCIUM mg/dL 8.4* 8.9 9.0   BILIRUBIN mg/dL 6.7* 7.0* 7.0*   ALK PHOS U/L 94 89 105   ALT (SGPT) U/L 61* 61* 42*   AST (SGOT) U/L 78* 101* 65*   ANION GAP mmol/L 11.0 13.0 13.0     Estimated Creatinine Clearance: 56.9 mL/min (A) (by C-G formula based on SCr of 1.44 mg/dL (H)).  Results from last 7 days   Lab Units 03/24/23  0545 03/20/23  1642   MAGNESIUM mg/dL 1.7 2.1         Results from last 7 days   Lab Units 03/24/23  0545 03/23/23  0414 03/22/23  1009 03/20/23  1642   WBC 10*3/mm3 7.26 7.66 7.67 6.74   HEMOGLOBIN g/dL 16.6 16.6 18.2* 18.8*   HEMATOCRIT % 45.7 43.8 50.0 51.2*   PLATELETS 10*3/mm3 95* 99* 93* 74*     Results from last 7 days   Lab Units 03/20/23  1642   INR  1.86*       Culture Data:   No results found for: BLOODCX  No results found for: URINECX  No results found for: RESPCX  No results found for: WOUNDCX  No results found for: STOOLCX  No components found for: BODYFLD    Radiology Data:   Imaging Results (Last 24 Hours)     Procedure Component Value Units Date/Time    US Guided Vascular Access [[id number]] Collected: 03/22/23 1330     Updated: 03/23/23 1550    Narrative:      PROCEDURE: Ultrasound Guidance Vascular Access      Ordering physician(s): NIDHI CARDONA    Clinical Indication: Vascular access      Findings:    The right basilic vein was sonographically evaluated and  determined to be patent. Concurrent realtime ultrasound was used  to visualize needle entry into the right basilic vein  for  midline catheter placement and 2 permanent images acquired for  permanent recording and  reporting.         Impression:      Impression:   As above.      Electronically signed by:  Tnoy Giron MD  3/23/2023 3:48 PM CDT  Workstation: [id number]          I have reviewed the patient's current medications.     Assessment/Plan     Principal Problem:    Atrial fibrillation with RVR (HCC)  Active Problems:    Atrial fibrillation (HCC)    Assessment and Plan    Type II MI if secondary to RVR and fluid overload  Dobutamine 5 mcg/kg/min  Fluid restriction: 1100 ml  Sodium restriction:2 grams  Continue Lasix drip.     Acute on chronic, suspected diastolic CHF  Will continue with lasix bid; monitor intake and output  Echocardiogram completed 3/21/2022.  Interpretation Summary       •  Left ventricular ejection fraction appears to be 21 - 25%.  •  The left ventricular cavity is dilated.  •  Left ventricular wall thickness is consistent with hypertrophy.  •  Left ventricular diastolic dysfunction is noted.  •  Moderately reduced right ventricular systolic function noted.  •  The right ventricular cavity is severely dilated.  •  Left atrial volume is severely increased.  •  The right atrial cavity is severely  dilated.  •  Estimated right ventricular systolic pressure from tricuspid regurgitation is mildly elevated (35-45 mmHg).     Atrial fibrillation with RVR  Cardiologist will be consulted as needed.  Continue dobutamine drip further recommendations including  Fluid restriction 1100 cc/day and sodium restriction 2 g daily.  Cardiac rehab.  Troponin elevated  Patient developed chest pain today troponin was ordered.  Troponins noted to be elevated.  70, 65, 74, and 79.  Left heart cath once fluid status has been stabilized.  Continue Xarelto 20 mg daily      Hypertension  Current blood pressure 127/63  O2 saturation is 94% 5 L    CODE STATUS full code  DVT prophylaxis Xarelto    Medical Decision Making  Number and Complexity of problems: 3 complex problems  Differential Diagnosis: Atrial fibrillation versus acute  coronary syndrome    Conditions and Status:        Condition is unchanged.     [facility] Data  External documents reviewed: Hospital charts  My EKG interpretation:  EKG completed 3/20/2023.  Rhythm: atrial fibrillation   Rate: tachycardic   Clinical impression: abnormal ECG    My plain film interpretation:  Chest x-ray completed 3/20/2023.  IMPRESSION:  Right basilar pleural effusion, right lower lobe  compressive atelectasis, cardiomegaly and mild central vascular  Congestion.   Tests considered but not ordered: None     Decision rules/scores evaluated (example [id number], [place], etc):      Discussed with: Patient and agrees to current treatment plan     Treatment Plan  As above    Care Planning  Shared decision making: Patient updated on current status and informed of proposed care plan; is in agreement with plan  Code status and discussions: Full code    Disposition  Social Determinants of Health that impact treatment or disposition: N/A  I expect the patient to be discharged to home to be determined      I have utilized all available immediate resources to obtain, update, or review the patient's current medications (including all prescriptions, over-the-counter products, herbals, cannabis/cannabidiol products, and vitamin/mineral/dietary (nutritional) supplements).     I confirmed that the patient's Advance Care Plan is present, code status is documented, or surrogate decision maker is listed in the patient's medical record.    Discharge Planning: I expect patient to be discharged to home once stabilized    Gurvinder David DO

## 2023-03-25 LAB
ALBUMIN SERPL-MCNC: 2.5 G/DL (ref 3.5–5.2)
ALBUMIN/GLOB SERPL: 1.1 G/DL
ALP SERPL-CCNC: 91 U/L (ref 39–117)
ALT SERPL W P-5'-P-CCNC: 50 U/L (ref 1–41)
ANION GAP SERPL CALCULATED.3IONS-SCNC: 8 MMOL/L (ref 5–15)
AST SERPL-CCNC: 56 U/L (ref 1–40)
BASOPHILS # BLD AUTO: 0.01 10*3/MM3 (ref 0–0.2)
BASOPHILS NFR BLD AUTO: 0.1 % (ref 0–1.5)
BILIRUB SERPL-MCNC: 7.3 MG/DL (ref 0–1.2)
BUN SERPL-MCNC: 17 MG/DL (ref 8–23)
BUN/CREAT SERPL: 17.3 (ref 7–25)
CALCIUM SPEC-SCNC: 8 MG/DL (ref 8.6–10.5)
CHLORIDE SERPL-SCNC: 85 MMOL/L (ref 98–107)
CO2 SERPL-SCNC: 41 MMOL/L (ref 22–29)
CREAT SERPL-MCNC: 0.98 MG/DL (ref 0.76–1.27)
DEPRECATED RDW RBC AUTO: 56.3 FL (ref 37–54)
EGFRCR SERPLBLD CKD-EPI 2021: 85.6 ML/MIN/1.73
EOSINOPHIL # BLD AUTO: 0.04 10*3/MM3 (ref 0–0.4)
EOSINOPHIL NFR BLD AUTO: 0.5 % (ref 0.3–6.2)
ERYTHROCYTE [DISTWIDTH] IN BLOOD BY AUTOMATED COUNT: 20.5 % (ref 12.3–15.4)
GLOBULIN UR ELPH-MCNC: 2.2 GM/DL
GLUCOSE SERPL-MCNC: 79 MG/DL (ref 65–99)
HCT VFR BLD AUTO: 44.6 % (ref 37.5–51)
HGB BLD-MCNC: 16.7 G/DL (ref 13–17.7)
IMM GRANULOCYTES # BLD AUTO: 0.04 10*3/MM3 (ref 0–0.05)
IMM GRANULOCYTES NFR BLD AUTO: 0.5 % (ref 0–0.5)
LYMPHOCYTES # BLD AUTO: 1.06 10*3/MM3 (ref 0.7–3.1)
LYMPHOCYTES NFR BLD AUTO: 14.5 % (ref 19.6–45.3)
MAGNESIUM SERPL-MCNC: 1.9 MG/DL (ref 1.6–2.4)
MCH RBC QN AUTO: 31.3 PG (ref 26.6–33)
MCHC RBC AUTO-ENTMCNC: 37.4 G/DL (ref 31.5–35.7)
MCV RBC AUTO: 83.7 FL (ref 79–97)
MONOCYTES # BLD AUTO: 1.1 10*3/MM3 (ref 0.1–0.9)
MONOCYTES NFR BLD AUTO: 15 % (ref 5–12)
NEUTROPHILS NFR BLD AUTO: 5.06 10*3/MM3 (ref 1.7–7)
NEUTROPHILS NFR BLD AUTO: 69.4 % (ref 42.7–76)
NRBC BLD AUTO-RTO: 0 /100 WBC (ref 0–0.2)
PLATELET # BLD AUTO: 101 10*3/MM3 (ref 140–450)
PMV BLD AUTO: 10.1 FL (ref 6–12)
POTASSIUM SERPL-SCNC: 2.8 MMOL/L (ref 3.5–5.2)
POTASSIUM SERPL-SCNC: 2.9 MMOL/L (ref 3.5–5.2)
POTASSIUM SERPL-SCNC: 3.3 MMOL/L (ref 3.5–5.2)
PROT SERPL-MCNC: 4.7 G/DL (ref 6–8.5)
QT INTERVAL: 386 MS
QTC INTERVAL: 490 MS
RBC # BLD AUTO: 5.33 10*6/MM3 (ref 4.14–5.8)
SODIUM SERPL-SCNC: 134 MMOL/L (ref 136–145)
WBC NRBC COR # BLD: 7.31 10*3/MM3 (ref 3.4–10.8)
WHOLE BLOOD HOLD SPECIMEN: NORMAL

## 2023-03-25 PROCEDURE — 83735 ASSAY OF MAGNESIUM: CPT | Performed by: INTERNAL MEDICINE

## 2023-03-25 PROCEDURE — 93010 ELECTROCARDIOGRAM REPORT: CPT | Performed by: INTERNAL MEDICINE

## 2023-03-25 PROCEDURE — 93005 ELECTROCARDIOGRAM TRACING: CPT | Performed by: NURSE PRACTITIONER

## 2023-03-25 PROCEDURE — 25010000002 FUROSEMIDE PER 20 MG: Performed by: INTERNAL MEDICINE

## 2023-03-25 PROCEDURE — 0 POTASSIUM CHLORIDE 10 MEQ/100ML SOLUTION

## 2023-03-25 PROCEDURE — 84132 ASSAY OF SERUM POTASSIUM: CPT | Performed by: INTERNAL MEDICINE

## 2023-03-25 PROCEDURE — 99232 SBSQ HOSP IP/OBS MODERATE 35: CPT | Performed by: INTERNAL MEDICINE

## 2023-03-25 PROCEDURE — 94799 UNLISTED PULMONARY SVC/PX: CPT

## 2023-03-25 PROCEDURE — 0 POTASSIUM CHLORIDE 10 MEQ/100ML SOLUTION: Performed by: INTERNAL MEDICINE

## 2023-03-25 PROCEDURE — 84132 ASSAY OF SERUM POTASSIUM: CPT | Performed by: NURSE PRACTITIONER

## 2023-03-25 PROCEDURE — 85025 COMPLETE CBC W/AUTO DIFF WBC: CPT | Performed by: HOSPITALIST

## 2023-03-25 PROCEDURE — 80053 COMPREHEN METABOLIC PANEL: CPT | Performed by: HOSPITALIST

## 2023-03-25 PROCEDURE — 94760 N-INVAS EAR/PLS OXIMETRY 1: CPT

## 2023-03-25 RX ORDER — POTASSIUM CHLORIDE 7.45 MG/ML
10 INJECTION INTRAVENOUS
Status: COMPLETED | OUTPATIENT
Start: 2023-03-25 | End: 2023-03-25

## 2023-03-25 RX ORDER — POTASSIUM CHLORIDE 7.45 MG/ML
10 INJECTION INTRAVENOUS
Status: DISCONTINUED | OUTPATIENT
Start: 2023-03-25 | End: 2023-03-25

## 2023-03-25 RX ORDER — FUROSEMIDE 10 MG/ML
40 INJECTION INTRAMUSCULAR; INTRAVENOUS 2 TIMES DAILY
Status: DISCONTINUED | OUTPATIENT
Start: 2023-03-25 | End: 2023-03-25

## 2023-03-25 RX ORDER — FUROSEMIDE 10 MG/ML
40 INJECTION INTRAMUSCULAR; INTRAVENOUS 3 TIMES DAILY
Status: DISCONTINUED | OUTPATIENT
Start: 2023-03-25 | End: 2023-03-25

## 2023-03-25 RX ORDER — SPIRONOLACTONE 25 MG/1
25 TABLET ORAL DAILY
Status: DISCONTINUED | OUTPATIENT
Start: 2023-03-25 | End: 2023-04-04 | Stop reason: HOSPADM

## 2023-03-25 RX ORDER — FUROSEMIDE 10 MG/ML
40 INJECTION INTRAMUSCULAR; INTRAVENOUS 2 TIMES DAILY
Status: DISCONTINUED | OUTPATIENT
Start: 2023-03-25 | End: 2023-03-27

## 2023-03-25 RX ADMIN — SPIRONOLACTONE 25 MG: 25 TABLET ORAL at 10:01

## 2023-03-25 RX ADMIN — Medication 10 ML: at 21:31

## 2023-03-25 RX ADMIN — POTASSIUM CHLORIDE 10 MEQ: 7.46 INJECTION, SOLUTION INTRAVENOUS at 11:58

## 2023-03-25 RX ADMIN — POTASSIUM CHLORIDE 10 MEQ: 7.46 INJECTION, SOLUTION INTRAVENOUS at 02:30

## 2023-03-25 RX ADMIN — POTASSIUM CHLORIDE 10 MEQ: 7.46 INJECTION, SOLUTION INTRAVENOUS at 13:48

## 2023-03-25 RX ADMIN — POTASSIUM CHLORIDE 10 MEQ: 7.46 INJECTION, SOLUTION INTRAVENOUS at 01:26

## 2023-03-25 RX ADMIN — POTASSIUM CHLORIDE 10 MEQ: 7.46 INJECTION, SOLUTION INTRAVENOUS at 13:01

## 2023-03-25 RX ADMIN — POTASSIUM CHLORIDE 10 MEQ: 7.46 INJECTION, SOLUTION INTRAVENOUS at 10:58

## 2023-03-25 RX ADMIN — FUROSEMIDE 40 MG: 10 INJECTION, SOLUTION INTRAMUSCULAR; INTRAVENOUS at 17:42

## 2023-03-25 RX ADMIN — POTASSIUM CHLORIDE 10 MEQ: 7.46 INJECTION, SOLUTION INTRAVENOUS at 10:01

## 2023-03-25 RX ADMIN — POTASSIUM CHLORIDE 10 MEQ: 7.46 INJECTION, SOLUTION INTRAVENOUS at 03:35

## 2023-03-25 RX ADMIN — Medication 10 ML: at 08:20

## 2023-03-25 RX ADMIN — POTASSIUM CHLORIDE 10 MEQ: 7.46 INJECTION, SOLUTION INTRAVENOUS at 14:36

## 2023-03-25 RX ADMIN — POTASSIUM CHLORIDE 40 MEQ: 750 CAPSULE, EXTENDED RELEASE ORAL at 08:20

## 2023-03-25 RX ADMIN — POTASSIUM CHLORIDE 10 MEQ: 7.46 INJECTION, SOLUTION INTRAVENOUS at 00:09

## 2023-03-25 NOTE — PROGRESS NOTES
[facility] Medicine Services  INPATIENT PROGRESS NOTE      Length of Stay: 5  Date of Admission: 3/20/2023  Primary Care Physician: Dilcia Kapoor APRN    Subjective   Chief Complaint:  Shortness of breath with heart palpitations  HPI:  67-year-old male comes to the ER stating he does not feel well.  He has not felt well for a while.  Patient states he has not taken any of his medicines for the last several months.  He reports having history of A-fib.  Patient is a poor historian, but states he is short of breath that is worse with exertion.     Daily assessment  Patient seen and examined 3/24/2023.  Patient is currently lying in bed.  He states he is comfortable.  Reviewed medications and labs with the patient and treatment plan.  Patient is denying any new problems or complaints.  He actually does feel better.  Radiology is indicated that there is very little fluid to pull off of right pleural effusion and has recommended to hold for now and follow and proceed with procedure as needed.  The patient is not have any chest pain.  Continues to be very short of breath at rest and on exertion.  He has no fevers or chills.  Otherwise he is tolerating current treatment plan.  3/25/2023  Patient admits to approximately 30 pound weight loss over past 2 days.  States breathing slightly better after Lasix diuresis.  Family including wife/daughter present during interview with Dr. Lopez this AM.     Review of Systems   Constitutional: Positive for fatigue. Negative for chills and fever.   HENT: Negative.    Eyes: Negative.    Respiratory: Positive for shortness of breath.    Cardiovascular: Positive for chest pain.   Gastrointestinal: Negative.    Endocrine: Negative.    Genitourinary: Negative.    Musculoskeletal: Negative.    Skin: Negative.    Neurological: Positive for weakness.   Hematological: Negative.    Psychiatric/Behavioral: Negative.         All pertinent  negatives and positives are as above. All other systems have been reviewed and are negative unless otherwise stated.     Objective    As of today 03/25/23  Temp:  [97 °F (36.1 °C)-98 °F (36.7 °C)] 97.8 °F (36.6 °C)  Heart Rate:  [] 113  Resp:  [16-20] 16  BP: ()/(53-83) 120/53    Physical Exam  Vitals and nursing note reviewed.   HENT:      Head: Normocephalic and atraumatic.      Right Ear: External ear normal.      Left Ear: External ear normal.      Nose: Nose normal.      Mouth/Throat:      Mouth: Mucous membranes are dry.      Pharynx: Oropharynx is clear.   Eyes:      General: No scleral icterus.     Extraocular Movements: Extraocular movements intact.      Conjunctiva/sclera: Conjunctivae normal.      Pupils: Pupils are equal, round, and reactive to light.   Cardiovascular:      Rate and Rhythm: Bradycardia present. Rhythm irregular.      Pulses: Normal pulses.      Heart sounds: Normal heart sounds.   Pulmonary:      Effort: Pulmonary effort is normal.      Breath sounds: Normal breath sounds.      Comments: Decreased breath sounds at the bases otherwise clear  Abdominal:      General: Bowel sounds are normal. There is distension.      Palpations: Abdomen is soft.      Tenderness: There is no abdominal tenderness.      Comments: Soft nontender nondistended normal active bowel sounds   Musculoskeletal:         General: Normal range of motion.      Cervical back: Normal range of motion and neck supple.      Right lower leg: Edema present.      Left lower leg: Edema present.      Comments: Venous stasis changes bilateral lower extremities  Edema bilateral lower extremities improving   Skin:     General: Skin is warm and dry.      Capillary Refill: Capillary refill takes less than 2 seconds.      Coloration: Skin is not jaundiced.   Neurological:      General: No focal deficit present.      Mental Status: He is alert and oriented to person, place, and time.      Motor: Weakness present.    Psychiatric:         Mood and Affect: Mood normal.         Behavior: Behavior normal.           Results Review:  I have reviewed the labs, radiology results, and diagnostic studies.    Laboratory Data:   Results from last 7 days   Lab Units 03/25/23  0822 03/25/23 0600 03/24/23 1938 03/24/23 0545 03/23/23  0414   SODIUM mmol/L  --  134*  --  134* 126*   POTASSIUM mmol/L 2.8* 2.9* 2.9* 2.4* 4.2   CHLORIDE mmol/L  --  85*  --  89* 92*   CO2 mmol/L  --  41.0*  --  34.0* 21.0*   BUN mg/dL  --  17  --  25* 27*   CREATININE mg/dL  --  0.98  --  1.44* 1.86*   GLUCOSE mg/dL  --  79  --  87 119*   CALCIUM mg/dL  --  8.0*  --  8.4* 8.9   BILIRUBIN mg/dL  --  7.3*  --  6.7* 7.0*   ALK PHOS U/L  --  91  --  94 89   ALT (SGPT) U/L  --  50*  --  61* 61*   AST (SGOT) U/L  --  56*  --  78* 101*   ANION GAP mmol/L  --  8.0  --  11.0 13.0     Estimated Creatinine Clearance: 79.9 mL/min (by C-G formula based on SCr of 0.98 mg/dL).  Results from last 7 days   Lab Units 03/25/23 0600 03/24/23 1938 03/24/23  0545   MAGNESIUM mg/dL 1.9 2.1 1.7         Results from last 7 days   Lab Units 03/25/23  0600 03/24/23  0545 03/23/23  0414 03/22/23  1009 03/20/23  1642   WBC 10*3/mm3 7.31 7.26 7.66 7.67 6.74   HEMOGLOBIN g/dL 16.7 16.6 16.6 18.2* 18.8*   HEMATOCRIT % 44.6 45.7 43.8 50.0 51.2*   PLATELETS 10*3/mm3 101* 95* 99* 93* 74*     Results from last 7 days   Lab Units 03/20/23  1642   INR  1.86*       Culture Data:   No results found for: BLOODCX  No results found for: URINECX  No results found for: RESPCX  No results found for: WOUNDCX  No results found for: STOOLCX  No components found for: BODYFLD    Radiology Data:   Imaging Results (Last 24 Hours)     ** No results found for the last 24 hours. **      Results for orders placed during the hospital encounter of 03/20/23    Adult Transthoracic Echo Complete W/ Cont if Necessary Per Protocol    Interpretation Summary  •  Left ventricular ejection fraction appears to be 21 - 25%.  •   The left ventricular cavity is dilated.  •  Left ventricular wall thickness is consistent with hypertrophy.  •  Left ventricular diastolic dysfunction is noted.  •  Moderately reduced right ventricular systolic function noted.  •  The right ventricular cavity is severely dilated.  •  Left atrial volume is severely increased.  •  The right atrial cavity is severely  dilated.  •  Estimated right ventricular systolic pressure from tricuspid regurgitation is mildly elevated (35-45 mmHg).      I have reviewed the patient's current medications.   Scheduled Meds:furosemide, 40 mg, Intravenous, BID  phytonadione (VITAMIN K) IVPB, 5 mg, Intravenous, Once  potassium chloride, 10 mEq, Intravenous, Q1H  rivaroxaban, 20 mg, Oral, Daily With Dinner  sodium chloride, 10 mL, Intravenous, Q12H  spironolactone, 25 mg, Oral, Daily      Continuous Infusions:DOBUTamine, 5 mcg/kg/min, Last Rate: 5 mcg/kg/min (03/24/23 2310)  hold, 1 each      PRN Meds:.•  acetaminophen **OR** [DISCONTINUED] acetaminophen **OR** acetaminophen  •  hold  •  hydrocortisone-bacitracin-zinc oxide-nystatin  •  magnesium sulfate **OR** magnesium sulfate **OR** magnesium sulfate  •  ondansetron  •  potassium chloride **OR** potassium chloride **OR** potassium chloride  •  sodium chloride  •  sodium chloride  Assessment/Plan     Principal Problem:    Atrial fibrillation with RVR (HCC)  Active Problems:    Atrial fibrillation (HCC)    Assessment and Plan    Type II MI secondary to RVR and fluid overload  -Appreciate cardiology assistance!  Continue current management.  Continue fluid restriction 1.1 L/day and salt restriction.  Continue IV Lasix.       Acute on chronic, systolic CHF  Will continue with lasix bid; monitor intake and output  - As above.  Improving on IV Lasix and dobutamine gtt.  Wean from dobutamine as tolerated.  Breathing steadily improving with diuresis.  Appreciate both cardiology and nephrology assistance with patient.      Atrial fibrillation  with RVR  Cardiologist will be consulted and appreciate their assistance. High [id number] score; currently tolerating rivaroxaban therapy.  Cardiac rehab.  Troponin elevated but being followed by cardiology  Left heart cath once fluid status has been stabilized.  Continue Xarelto 20 mg daily      Hypertension  - currently suffering from hypotension and on dobutamine gtt.  - O2 saturation is 94% 5 L    CODE STATUS full code  DVT prophylaxis Xarelto    Medical Decision Making  Number and Complexity of problems: 3 complex problems  Differential Diagnosis: Atrial fibrillation versus acute coronary syndrome    Conditions and Status:        Condition is unchanged.     [facility] Data  External documents reviewed: Hospital charts  My EKG interpretation:  EKG completed 3/20/2023.  Rhythm: atrial fibrillation   Rate: tachycardic   Clinical impression: abnormal ECG    My plain film interpretation:  Chest x-ray completed 3/20/2023.  IMPRESSION:  Right basilar pleural effusion, right lower lobe  compressive atelectasis, cardiomegaly and mild central vascular  Congestion.   Tests considered but not ordered: None     Decision rules/scores evaluated (example [id number], Wells, etc): [id number] high and on Rivaroxaban     Discussed with: Patient and agrees to current treatment plan     Treatment Plan  As above    Care Planning  Shared decision making: Patient updated on current status and informed of proposed care plan; is in agreement with plan  Code status and discussions: Full code    Disposition  Social Determinants of Health that impact treatment or disposition: N/A  I expect the patient to be discharged to home to be determined      I have utilized all available immediate resources to obtain, update, or review the patient's current medications (including all prescriptions, over-the-counter products, herbals, cannabis/cannabidiol products, and vitamin/mineral/dietary (nutritional) supplements).     I confirmed that the patient's  Advance Care Plan is present, code status is documented, or surrogate decision maker is listed in the patient's medical record.    Discharge Planning: I expect patient to be discharged to home once stabilized.  We will maintain current ICU status to patient weaned off dobutamine gtt.  Hopefully can transfer patient out of ICU in next 24 hours if weaning process successful.    37 minutes of critical care time spent on this patient by Dr. Lopez 3/25/23.    Cuco Lopez MD

## 2023-03-25 NOTE — PROGRESS NOTES
"  [facility]  [address] [place] [address]. [address]  T - [phone]  F - [phone]     Progress Note          PATIENT  DEMOGRAPHICS   PATIENT NAME: Pavan Mccauley                      PHYSICIAN: Luis Alcantar MD  : 1955  MRN: [id number]   LOS: 5 days    Patient Care Team:  Dilcia Kapoor APRN as PCP - General (Family Medicine)  Subjective   SUBJECTIVE   Has marked urine output almost 15L/24 hr. Doing better. Getting multiple k replacement. bp some better         Objective   OBJECTIVE   Vital Signs  Temp:  [97 °F (36.1 °C)-98 °F (36.7 °C)] 97.8 °F (36.6 °C)  Heart Rate:  [] 92  Resp:  [16-20] 16  BP: ()/(56-83) 119/78    Flowsheet Rows    Flowsheet Row First Filed Value   Admission Height 170.2 cm (67\") Documented at 2023 1618   Admission Weight 105 kg (231 lb 14.8 oz) Documented at 2023 1648           I/O last 3 completed shifts:  In: 2848.9 [P.O.:1280; I.V.:968.9; IV Piggyback:600]  Out: 13970 [Urine:43010]    PHYSICAL EXAM    Physical Exam  Constitutional:       Appearance: He is well-developed.   HENT:      Head: Normocephalic.   Eyes:      Pupils: Pupils are equal, round, and reactive to light.   Cardiovascular:      Rate and Rhythm: Normal rate and regular rhythm.      Heart sounds: Normal heart sounds.   Pulmonary:      Effort: Pulmonary effort is normal.      Breath sounds: Normal breath sounds.   Abdominal:      General: Bowel sounds are normal.      Palpations: Abdomen is soft.   Musculoskeletal:         General: Swelling present.   Skin:     Coloration: Skin is not jaundiced.   Neurological:      General: No focal deficit present.      Mental Status: He is alert and oriented to person, place, and time.         RESULTS   Results Review:    Results from last 7 days   Lab Units 23  0822 23  0600 23  1938 23  0545 23  0414   SODIUM mmol/L  --  134*  --  134* 126*   POTASSIUM mmol/L 2.8* 2.9* 2.9* 2.4* 4.2 "   CHLORIDE mmol/L  --  85*  --  89* 92*   CO2 mmol/L  --  41.0*  --  34.0* 21.0*   BUN mg/dL  --  17  --  25* 27*   CREATININE mg/dL  --  0.98  --  1.44* 1.86*   CALCIUM mg/dL  --  8.0*  --  8.4* 8.9   BILIRUBIN mg/dL  --  7.3*  --  6.7* 7.0*   ALK PHOS U/L  --  91  --  94 89   ALT (SGPT) U/L  --  50*  --  61* 61*   AST (SGOT) U/L  --  56*  --  78* 101*   GLUCOSE mg/dL  --  79  --  87 119*       Estimated Creatinine Clearance: 79.9 mL/min (by C-G formula based on SCr of 0.98 mg/dL).    Results from last 7 days   Lab Units 03/25/23  0600 03/24/23  1938 03/24/23  0545   MAGNESIUM mg/dL 1.9 2.1 1.7             Results from last 7 days   Lab Units 03/25/23  0600 03/24/23  0545 03/23/23  0414 03/22/23  1009 03/20/23  1642   WBC 10*3/mm3 7.31 7.26 7.66 7.67 6.74   HEMOGLOBIN g/dL 16.7 16.6 16.6 18.2* 18.8*   PLATELETS 10*3/mm3 101* 95* 99* 93* 74*       Results from last 7 days   Lab Units 03/20/23  1642   INR  1.86*         Imaging Results (Last 24 Hours)     ** No results found for the last 24 hours. **           MEDICATIONS    phytonadione (VITAMIN K) IVPB, 5 mg, Intravenous, Once  rivaroxaban, 20 mg, Oral, Daily With Dinner  sodium chloride, 10 mL, Intravenous, Q12H      DOBUTamine, 5 mcg/kg/min, Last Rate: 5 mcg/kg/min (03/24/23 2310)  furosemide (LASIX) 100 mg in 100mL NS infusion, 5 mg/hr, Last Rate: 5 mg/hr (03/24/23 1055)  hold, 1 each        Assessment & Plan   ASSESSMENT / PLAN      Atrial fibrillation with RVR (HCC)    Atrial fibrillation (HCC)       1.  RUTH- baseline creatinine looks to be around 1.1, at baseline and peak up to 1.86.  Etiology of RUTH could be secondary to diuretics versus poor perfusion with atrial fibrillation.  He has significant volume overload apparent on exam. Cr is now <1     -good diuretic response. bp remained stable. Keep Lasix drip for aggressive diuresis, continue dobutamine per cardiology. add aldactone because of marked k loss. Change lasix drip to intermittent iv lasix due to  elevated bicarb and alkalosis now. Suggest iv kcl to have chloride delivery.      2.  Hyponatremia- likely secondary to hypervolemia, Lasix as above. Na better     3.  A-fib with RVR- cardiology managing     4.  Acute on chronic systolic heart failure- dobutamine per cardiology, lasix drip      5.  Transaminitis     6.  Pleural effusion- no plan for thoracentesis    7. Hypokalemia / hypomagnesemia - replaced 40meq po and will give 60meq iv. Adding aldactone              This document has been electronically signed by Luis Alcantar MD on March 25, 2023 08:47 CDT

## 2023-03-25 NOTE — PLAN OF CARE
Problem: Dysrhythmia  Goal: Normalized Cardiac Rhythm  Outcome: Ongoing, Progressing     Problem: Asthma Comorbidity  Goal: Maintenance of Asthma Control  Outcome: Ongoing, Progressing     Problem: Behavioral Health Comorbidity  Goal: Maintenance of Behavioral Health Symptom Control  Outcome: Ongoing, Progressing     Problem: COPD (Chronic Obstructive Pulmonary Disease) Comorbidity  Goal: Maintenance of COPD Symptom Control  Outcome: Ongoing, Progressing     Problem: Diabetes Comorbidity  Goal: Blood Glucose Level Within Targeted Range  Outcome: Ongoing, Progressing     Problem: Heart Failure Comorbidity  Goal: Maintenance of Heart Failure Symptom Control  Outcome: Ongoing, Progressing     Problem: Hypertension Comorbidity  Goal: Blood Pressure in Desired Range  Outcome: Ongoing, Progressing     Problem: Obstructive Sleep Apnea Risk or Actual Comorbidity Management  Goal: Unobstructed Breathing During Sleep  Outcome: Ongoing, Progressing     Problem: Osteoarthritis Comorbidity  Goal: Maintenance of Osteoarthritis Symptom Control  Outcome: Ongoing, Progressing     Problem: Pain Chronic (Persistent) (Comorbidity Management)  Goal: Acceptable Pain Control and Functional Ability  Outcome: Ongoing, Progressing     Problem: Seizure Disorder Comorbidity  Goal: Maintenance of Seizure Control  Outcome: Ongoing, Progressing     Problem: Adult Inpatient Plan of Care  Goal: Plan of Care Review  Outcome: Ongoing, Progressing  Flowsheets (Taken 3/25/2023 1640)  Outcome Evaluation: pt remain in afib. lasix drip stopped this shift. pt remains on dobutamine drip. K+ replaced this shift.  Goal: Patient-Specific Goal (Individualized)  Outcome: Ongoing, Progressing  Goal: Absence of Hospital-Acquired Illness or Injury  Outcome: Ongoing, Progressing  Intervention: Identify and Manage Fall Risk  Recent Flowsheet Documentation  Taken 3/25/2023 1600 by Julia Curran RN  Safety Promotion/Fall Prevention: safety round/check  completed  Taken 3/25/2023 1200 by Julia Curran RN  Safety Promotion/Fall Prevention: safety round/check completed  Taken 3/25/2023 0800 by Julia Curran RN  Safety Promotion/Fall Prevention: safety round/check completed  Intervention: Prevent Skin Injury  Recent Flowsheet Documentation  Taken 3/25/2023 1600 by Julia Curran RN  Body Position: position changed independently  Taken 3/25/2023 1200 by Julia Curran RN  Body Position: position changed independently  Taken 3/25/2023 0800 by Julia Curran RN  Body Position: position changed independently  Goal: Optimal Comfort and Wellbeing  Outcome: Ongoing, Progressing  Goal: Readiness for Transition of Care  Outcome: Ongoing, Progressing     Problem: Skin Injury Risk Increased  Goal: Skin Health and Integrity  Outcome: Ongoing, Progressing     Problem: Fall Injury Risk  Goal: Absence of Fall and Fall-Related Injury  Outcome: Ongoing, Progressing  Intervention: Promote Injury-Free Environment  Recent Flowsheet Documentation  Taken 3/25/2023 1600 by Julia Curran RN  Safety Promotion/Fall Prevention: safety round/check completed  Taken 3/25/2023 1200 by Julia Curran RN  Safety Promotion/Fall Prevention: safety round/check completed  Taken 3/25/2023 0800 by Julia Curran RN  Safety Promotion/Fall Prevention: safety round/check completed   Goal Outcome Evaluation:              Outcome Evaluation: pt remain in afib. lasix drip stopped this shift. pt remains on dobutamine drip. K+ replaced this shift.

## 2023-03-25 NOTE — PLAN OF CARE
Goal Outcome Evaluation:  Plan of Care Reviewed With: patient        Progress: no change  Outcome Evaluation: Pt remains in AFib.  At 0032 pt had a 10 beat run of VTAC and at 0420 had a 5 beat run of VTAC.  Pt has frequent PVCs. BP WNL overnight.  Pt had 4+ liters UOP over night.  Pt remains on lasix and Dobutamine gtt. Pt denies CP.  Pts potassium was 2.9 at 2030.  Replaced K per protocol.  K redraw order placed for 0830 per protocol.  Will continue to monitor.

## 2023-03-25 NOTE — PROGRESS NOTES
"  [facility] Progress Note   LOS: 5 days   Patient Care Team:  Dilcia Kapoor APRN as PCP - General (Family Medicine)    Chief Complaint   Patient presents with   • Shortness of Breath   • Jaundice   • Edema     feet         Subjective     routine follow up without complaints report no CP ELENA SOB plap near syncope or syncope   He is not limited in his ADL and tolerating meds well no events overnight as discussed with RN  1 run of VT noted patient continues on dobutamine IV he is total of -8 L since arrival      Interval History:   Patient Denies: chest pain  Patient Complaints: shortness of air and edema  History taken from: patient chart RN    On dobutamine at 5 mcg/kg/min.  More alert today  Anasarca improving. Telemetry showing atrial flutter with controlled ventricular rate     Objective     Vital Sign Min/Max for last 24 hours  Temp  Min: 97 °F (36.1 °C)  Max: 98 °F (36.7 °C)   BP  Min: 95/67  Max: 127/63   Pulse  Min: 84  Max: 113   Resp  Min: 16  Max: 20   SpO2  Min: 91 %  Max: 95 %   Flow (L/min)  Min: 2  Max: 5   Weight  Min: 92.3 kg (203 lb 7.8 oz)  Max: 92.3 kg (203 lb 7.8 oz)     Flowsheet Rows      Flowsheet Row First Filed Value   Admission Height 170.2 cm (67\") Documented at 03/20/2023 1618   Admission Weight 105 kg (231 lb 14.8 oz) Documented at 03/20/2023 1648              03/21/23  1841 03/22/23  0423 03/25/23  0500   Weight: 101 kg (221 lb 12.8 oz) 101 kg (222 lb) 92.3 kg (203 lb 7.8 oz)       Physical Exam:  Physical Exam  Vitals and nursing note reviewed.   Constitutional:       General: He is not in acute distress.     Appearance: He is ill-appearing. He is not diaphoretic.      Interventions: Nasal cannula in place.   HENT:      Head: Normocephalic and atraumatic.      Mouth/Throat:      Mouth: Mucous membranes are moist.      Pharynx: Oropharynx is clear. No oropharyngeal exudate.   Eyes:      General: Lids are normal.         Right eye: No discharge.         Left eye: No " discharge.   Cardiovascular:      Rate and Rhythm: Normal rate. Rhythm irregular.      Heart sounds: Heart sounds are distant.   Pulmonary:      Breath sounds: Examination of the left-middle field reveals rales. Wheezing and rales present.   Abdominal:      General: There is distension.      Comments: anasarca   Musculoskeletal:         General: Swelling present.      Right lower le+ Edema present.      Left lower le+ Edema present.   Skin:     General: Skin is warm and dry.      Capillary Refill: Capillary refill takes less than 2 seconds.      Coloration: Skin is jaundiced.   Neurological:      Mental Status: He is easily aroused.      cardiac Positive systolic ejection murmur no gallops or rubs     Results Reviewed by myself:     Results from last 7 days   Lab Units 23  0623  0545 23  0414   SODIUM mmol/L 134*  --  134* 126*   POTASSIUM mmol/L 2.9* 2.9* 2.4* 4.2   CHLORIDE mmol/L 85*  --  89* 92*   CO2 mmol/L 41.0*  --  34.0* 21.0*   BUN mg/dL 17  --  25* 27*   CREATININE mg/dL 0.98  --  1.44* 1.86*   CALCIUM mg/dL 8.0*  --  8.4* 8.9   BILIRUBIN mg/dL 7.3*  --  6.7* 7.0*   ALK PHOS U/L 91  --  94 89   ALT (SGPT) U/L 50*  --  61* 61*   AST (SGOT) U/L 56*  --  78* 101*   GLUCOSE mg/dL 79  --  87 119*       Estimated Creatinine Clearance: 79.9 mL/min (by C-G formula based on SCr of 0.98 mg/dL).    Results from last 7 days   Lab Units 23  0623  0545   MAGNESIUM mg/dL 1.9 2.1 1.7             Results from last 7 days   Lab Units 23  0600 23  0545 23  0414 23  1009 23  1642   WBC 10*3/mm3 7.31 7.26 7.66 7.67 6.74   HEMOGLOBIN g/dL 16.7 16.6 16.6 18.2* 18.8*   PLATELETS 10*3/mm3 101* 95* 99* 93* 74*       Results from last 7 days   Lab Units 23  1642   INR  1.86*     Lab Results   Component Value Date    PROBNP 4,988.0 (H) 2023       I/O last 3 completed shifts:  In: 2848.9 [P.O.:1280; I.V.:968.9; IV  Piggyback:600]  Out: 85184 [Urine:32901]    Cardiographics:  ECG/EMG Results (last 24 hours)       Procedure Component Value Units Date/Time    SCANNED EKG [[id number]] Resulted: 03/20/23     Updated: 03/22/23 2146    SCANNED - TELEMETRY   [[id number]] Resulted: 03/20/23     Updated: 03/23/23 1013            Results for orders placed during the hospital encounter of 03/20/23    Adult Transthoracic Echo Complete W/ Cont if Necessary Per Protocol    Interpretation Summary  •  Left ventricular ejection fraction appears to be 21 - 25%.  •  The left ventricular cavity is dilated.  •  Left ventricular wall thickness is consistent with hypertrophy.  •  Left ventricular diastolic dysfunction is noted.  •  Moderately reduced right ventricular systolic function noted.  •  The right ventricular cavity is severely dilated.  •  Left atrial volume is severely increased.  •  The right atrial cavity is severely  dilated.  •  Estimated right ventricular systolic pressure from tricuspid regurgitation is mildly elevated (35-45 mmHg).      CT Abdomen Pelvis Without Contrast    Result Date: 3/20/2023  1.  Moderate to large right effusion which appears loculated. There is associated right lower lobe consolidation suggesting pneumonia. 2.  Cardiomegaly. 3.  Prominent main pulmonary artery suggesting pulmonary arterial hypertension. 4.  Small amount of ascites. There appears to be some edema and wall thickening associated with the third portion of the duodenum suggesting duodenitis. Electronically signed by:  Krzysztof Gomez MD  3/20/2023 9:36 PM CDT Workstation: [id number]    CT Chest Without Contrast Diagnostic    Result Date: 3/20/2023  1.  Moderate to large right effusion which appears loculated. There is associated right lower lobe consolidation suggesting pneumonia. 2.  Cardiomegaly. 3.  Prominent main pulmonary artery suggesting pulmonary arterial hypertension. 4.  Small amount of ascites. There appears to be some edema and wall thickening  associated with the third portion of the duodenum suggesting duodenitis. Electronically signed by:  Krzysztof Gomez MD  3/20/2023 9:35 PM CDT Workstation: [id number]    US Abdomen Complete    Result Date: 3/22/2023  Findings, impression: Limited examination, portable bedside technique. Echogenic sludge within the gallbladder. Gallbladder  wall thickening. Normal liver. Common bile duct and pancreas are obscured by overlying bowel gas. Normal spleen. Normal right kidney 9.6 x 5.1 x 5.1 cm. Benign cyst left kidney. Left kidney is otherwise unremarkable 11.2 x 5.9 x 5.1 cm Normal aorta and inferior vena cava. Small amount of free fluid, ascites throughout the abdomen. IMPRESSION: Ascites. Gallbladder wall thickening and echogenic sludge within the gallbladder. Electronically signed by:  Clem Balderrama MD  3/22/2023 3:07 PM CDT Workstation: [id number]    US Guided Vascular Access    Result Date: 3/23/2023  Impression: As above. Electronically signed by:  Tony Giron MD  3/23/2023 3:48 PM CDT Workstation: [id number]    XR Chest 1 View    Result Date: 3/22/2023  1. Right pleural effusion and adjacent atelectasis with or without pneumonia, increased in the interval. 2. Cardiomegaly. Electronically signed by:  Issa Burch MD  3/22/2023 11:37 PM CDT Workstation: [id number]    XR Chest 1 View    Result Date: 3/20/2023  Right basilar pleural effusion, right lower lobe compressive atelectasis, cardiomegaly and mild central vascular congestion. Electronically signed by:  Hollis Sousa MD  3/20/2023 6:26 PM CDT Workstation: [id number]      Medication Review:     Current Facility-Administered Medications:   •  acetaminophen (TYLENOL) tablet 650 mg, 650 mg, Oral, Q4H PRN **OR** [DISCONTINUED] acetaminophen (TYLENOL) 160 MG/5ML solution 650 mg, 650 mg, Oral, Q4H PRN **OR** acetaminophen (TYLENOL) suppository 650 mg, 650 mg, Rectal, Q4H PRN, Magen Lagos MD  •  DOBUTamine (DOBUTREX) 1 mg/mL infusion, 5 mcg/kg/min, Intravenous,  Continuous, Nannette Walters MD, Last Rate: 30.3 mL/hr at 03/24/23 2310, 5 mcg/kg/min at 03/24/23 2310  •  furosemide (LASIX) 100 mg in sodium chloride 0.9 % 100 mL infusion, 5 mg/hr, Intravenous, Continuous, Luis Alcantar MD, Last Rate: 5 mL/hr at 03/24/23 1055, 5 mg/hr at 03/24/23 1055  •  Hold medication, 1 each, Does not apply, Continuous PRN, Víctor London MD  •  hydrocortisone-bacitracin-zinc oxide-nystatin (MAGIC BARRIER) ointment 1 application, 1 application, Topical, Q4H PRN, Gurvinder David DO, 1 application at 03/23/23 1740  •  Magnesium Sulfate - Total Dose 10 grams - Magnesium 1 or Less, 2 g, Intravenous, PRN **OR** Magnesium Sulfate - Total Dose 6 grams - Magnesium 1.1 - 1.5, 2 g, Intravenous, PRN **OR** Magnesium Sulfate - Total Dose 4 grams - Magnesium 1.6 - 1.9, 4 g, Intravenous, PRN, Stephanie Traore, APRN, Last Rate: 25 mL/hr at 03/24/23 1511, 4 g at 03/24/23 1511  •  ondansetron (ZOFRAN) injection 4 mg, 4 mg, Intravenous, Q6H PRN, Magen Lagos MD, 4 mg at 03/22/23 0208  •  phytonadione (AQUA-MEPHYTON, VITAMIN K) 5 mg in sodium chloride 0.9 % 50 mL IVPB, 5 mg, Intravenous, Once, Víctor London MD, Held at 03/23/23 1035  •  potassium chloride (MICRO-K) CR capsule 40 mEq, 40 mEq, Oral, PRN, 40 mEq at 03/25/23 0820 **OR** potassium chloride (KLOR-CON) packet 40 mEq, 40 mEq, Oral, PRN **OR** potassium chloride 10 mEq in 100 mL IVPB, 10 mEq, Intravenous, Q1H PRN, Víctor London MD, Last Rate: 100 mL/hr at 03/25/23 0335, 10 mEq at 03/25/23 0335  •  rivaroxaban (XARELTO) tablet 20 mg, 20 mg, Oral, Daily With Dinner, Magen Lagos MD, 20 mg at 03/24/23 1737  •  sodium chloride 0.9 % flush 10 mL, 10 mL, Intravenous, Q12H, Magen Lagos MD, 10 mL at 03/25/23 0820  •  sodium chloride 0.9 % flush 10 mL, 10 mL, Intravenous, PRN, Magen Lagos MD  •  sodium chloride 0.9 % infusion 40 mL, 40 mL, Intravenous, PRN, Magen Lagos,  MD    Patient's recent labs and results as included in the subjective data were reviewed by me. Any pertinent outside data will be included.        Assessment & Plan       Atrial fibrillation with RVR (HCC)    Atrial fibrillation (HCC)    1.  Atrial fibrillation with RVR.  Has history of paroxysmal atrial fibrillation/SVT Hx of noncompliant with medication for months. improving   Rate is controlled currently on dobutamine we will continue to monitor closely and correct electrolytes keep K greater than 4 mag greater than 2  Continue Xarelto 20 mg daily    2.  Acute systolic heart failure/LV dysfunction.  Significant improvement noted in patient overall condition will remain on dobutamine likely switch off to 2.5 mics tomorrow   echocardiogram this admission showing severely dilated LV cavity with LVEF at 23%.  Moderately reduced RV systolic function, RV severely dilated, right atrial cavity severely dilated, left atrial volume severely increased.  Anasarca improving    3.  Nonsustained VT likely reaction to dobutamine as well as to his chronic congestive heart failure with acute on chronic exacerbation will monitor closely    NICM/ICM: EF:23 %. NYHA Class IV, Stage D. Patient is currently volume overloaded.  But resolving    BETA-BLOCKER: Held in the setting of hypotension and on inotropic support  ACE/ARB: Hypotensive, RUTH  ENTRESTO:  Indicated, RUTH  DIURETIC: Lasix drip 10 mg an hour, nephrology on board  SGL2: RUTH  ALDOSTERONE ANTAGONIST: RUTH   IMDUR/HYDRALAZINE: N/A  DIGOXIN: N/A  Fluid restriction: 1100 ml  Sodium restriction:2 grams    Cardiac Rehab:  Indicated  ICD:  Indicated    ADHF:  This admission    3.  Elevated troponins.  High sensitivity troponin at this point likely secondary to A-fib as well as congestive heart failure    Discussed future plan of [facility] once his fluid status and other medical issues improved.  Her primary cardiologist will await    Low platelets are somewhat of a concern  Elevated INR  is somewhat of a concern    This document has been electronically signed by [place] DO on March 25, 2023 10:13 CDT   Electronically signed by ARY Renteria, 03/24/23, 10:13 AM CDT.

## 2023-03-26 LAB
ALBUMIN SERPL-MCNC: 2.2 G/DL (ref 3.5–5.2)
ALBUMIN/GLOB SERPL: 1 G/DL
ALP SERPL-CCNC: 86 U/L (ref 39–117)
ALT SERPL W P-5'-P-CCNC: 39 U/L (ref 1–41)
ANION GAP SERPL CALCULATED.3IONS-SCNC: 6 MMOL/L (ref 5–15)
AST SERPL-CCNC: 39 U/L (ref 1–40)
BASOPHILS # BLD AUTO: 0 10*3/MM3 (ref 0–0.2)
BASOPHILS NFR BLD AUTO: 0 % (ref 0–1.5)
BILIRUB SERPL-MCNC: 6.8 MG/DL (ref 0–1.2)
BUN SERPL-MCNC: 16 MG/DL (ref 8–23)
BUN/CREAT SERPL: 18.6 (ref 7–25)
CALCIUM SPEC-SCNC: 7.9 MG/DL (ref 8.6–10.5)
CHLORIDE SERPL-SCNC: 85 MMOL/L (ref 98–107)
CO2 SERPL-SCNC: 39 MMOL/L (ref 22–29)
CREAT SERPL-MCNC: 0.86 MG/DL (ref 0.76–1.27)
DEPRECATED RDW RBC AUTO: 57 FL (ref 37–54)
EGFRCR SERPLBLD CKD-EPI 2021: 96.1 ML/MIN/1.73
EOSINOPHIL # BLD AUTO: 0.08 10*3/MM3 (ref 0–0.4)
EOSINOPHIL NFR BLD AUTO: 1.2 % (ref 0.3–6.2)
ERYTHROCYTE [DISTWIDTH] IN BLOOD BY AUTOMATED COUNT: 20.2 % (ref 12.3–15.4)
GLOBULIN UR ELPH-MCNC: 2.1 GM/DL
GLUCOSE SERPL-MCNC: 81 MG/DL (ref 65–99)
HCT VFR BLD AUTO: 44.3 % (ref 37.5–51)
HGB BLD-MCNC: 16.5 G/DL (ref 13–17.7)
IMM GRANULOCYTES # BLD AUTO: 0.05 10*3/MM3 (ref 0–0.05)
IMM GRANULOCYTES NFR BLD AUTO: 0.7 % (ref 0–0.5)
LYMPHOCYTES # BLD AUTO: 0.96 10*3/MM3 (ref 0.7–3.1)
LYMPHOCYTES NFR BLD AUTO: 14.2 % (ref 19.6–45.3)
MAGNESIUM SERPL-MCNC: 1.7 MG/DL (ref 1.6–2.4)
MCH RBC QN AUTO: 31.4 PG (ref 26.6–33)
MCHC RBC AUTO-ENTMCNC: 37.2 G/DL (ref 31.5–35.7)
MCV RBC AUTO: 84.2 FL (ref 79–97)
MONOCYTES # BLD AUTO: 1 10*3/MM3 (ref 0.1–0.9)
MONOCYTES NFR BLD AUTO: 14.8 % (ref 5–12)
NEUTROPHILS NFR BLD AUTO: 4.66 10*3/MM3 (ref 1.7–7)
NEUTROPHILS NFR BLD AUTO: 69.1 % (ref 42.7–76)
NRBC BLD AUTO-RTO: 0 /100 WBC (ref 0–0.2)
PLATELET # BLD AUTO: 93 10*3/MM3 (ref 140–450)
PMV BLD AUTO: 10.5 FL (ref 6–12)
POTASSIUM SERPL-SCNC: 3 MMOL/L (ref 3.5–5.2)
POTASSIUM SERPL-SCNC: 3.4 MMOL/L (ref 3.5–5.2)
PROT SERPL-MCNC: 4.3 G/DL (ref 6–8.5)
RBC # BLD AUTO: 5.26 10*6/MM3 (ref 4.14–5.8)
SODIUM SERPL-SCNC: 130 MMOL/L (ref 136–145)
WBC NRBC COR # BLD: 6.75 10*3/MM3 (ref 3.4–10.8)

## 2023-03-26 PROCEDURE — 94761 N-INVAS EAR/PLS OXIMETRY MLT: CPT

## 2023-03-26 PROCEDURE — 99232 SBSQ HOSP IP/OBS MODERATE 35: CPT | Performed by: INTERNAL MEDICINE

## 2023-03-26 PROCEDURE — 25010000002 AMIODARONE IN DEXTROSE 5% 360-4.14 MG/200ML-% SOLUTION: Performed by: INTERNAL MEDICINE

## 2023-03-26 PROCEDURE — 94799 UNLISTED PULMONARY SVC/PX: CPT

## 2023-03-26 PROCEDURE — 25010000002 FUROSEMIDE PER 20 MG: Performed by: INTERNAL MEDICINE

## 2023-03-26 PROCEDURE — 84132 ASSAY OF SERUM POTASSIUM: CPT | Performed by: INTERNAL MEDICINE

## 2023-03-26 PROCEDURE — 0 POTASSIUM CHLORIDE 10 MEQ/100ML SOLUTION: Performed by: INTERNAL MEDICINE

## 2023-03-26 PROCEDURE — 80053 COMPREHEN METABOLIC PANEL: CPT | Performed by: HOSPITALIST

## 2023-03-26 PROCEDURE — 93005 ELECTROCARDIOGRAM TRACING: CPT | Performed by: INTERNAL MEDICINE

## 2023-03-26 PROCEDURE — 25010000002 AMIODARONE IN DEXTROSE 5% 150-4.21 MG/100ML-% SOLUTION: Performed by: INTERNAL MEDICINE

## 2023-03-26 PROCEDURE — 0 MAGNESIUM SULFATE 4 GM/100ML SOLUTION: Performed by: NURSE PRACTITIONER

## 2023-03-26 PROCEDURE — 85025 COMPLETE CBC W/AUTO DIFF WBC: CPT | Performed by: HOSPITALIST

## 2023-03-26 PROCEDURE — 25010000002 DOBUTAMINE PER 250 MG: Performed by: INTERNAL MEDICINE

## 2023-03-26 PROCEDURE — 83735 ASSAY OF MAGNESIUM: CPT | Performed by: INTERNAL MEDICINE

## 2023-03-26 RX ORDER — SODIUM CHLORIDE 9 MG/ML
INJECTION, SOLUTION INTRAVENOUS
Status: COMPLETED
Start: 2023-03-26 | End: 2023-03-27

## 2023-03-26 RX ORDER — NOREPINEPHRINE BIT/0.9 % NACL 8 MG/250ML
.02-.3 INFUSION BOTTLE (ML) INTRAVENOUS
Status: DISCONTINUED | OUTPATIENT
Start: 2023-03-26 | End: 2023-03-28

## 2023-03-26 RX ORDER — SODIUM CHLORIDE 9 MG/ML
50 INJECTION, SOLUTION INTRAVENOUS CONTINUOUS
Status: DISCONTINUED | OUTPATIENT
Start: 2023-03-27 | End: 2023-03-27

## 2023-03-26 RX ORDER — POTASSIUM CHLORIDE 7.45 MG/ML
10 INJECTION INTRAVENOUS
Status: COMPLETED | OUTPATIENT
Start: 2023-03-26 | End: 2023-03-26

## 2023-03-26 RX ADMIN — POTASSIUM CHLORIDE 40 MEQ: 750 CAPSULE, EXTENDED RELEASE ORAL at 05:28

## 2023-03-26 RX ADMIN — POTASSIUM CHLORIDE 10 MEQ: 7.46 INJECTION, SOLUTION INTRAVENOUS at 14:45

## 2023-03-26 RX ADMIN — FUROSEMIDE 40 MG: 10 INJECTION, SOLUTION INTRAMUSCULAR; INTRAVENOUS at 20:35

## 2023-03-26 RX ADMIN — Medication 10 ML: at 08:17

## 2023-03-26 RX ADMIN — MAGNESIUM SULFATE 4 G: 4 INJECTION INTRAVENOUS at 11:50

## 2023-03-26 RX ADMIN — POTASSIUM CHLORIDE 10 MEQ: 7.46 INJECTION, SOLUTION INTRAVENOUS at 11:50

## 2023-03-26 RX ADMIN — POTASSIUM CHLORIDE 10 MEQ: 7.46 INJECTION, SOLUTION INTRAVENOUS at 12:59

## 2023-03-26 RX ADMIN — FUROSEMIDE 40 MG: 10 INJECTION, SOLUTION INTRAMUSCULAR; INTRAVENOUS at 08:16

## 2023-03-26 RX ADMIN — DOBUTAMINE HYDROCHLORIDE 5 MCG/KG/MIN: 100 INJECTION INTRAVENOUS at 04:02

## 2023-03-26 RX ADMIN — POTASSIUM CHLORIDE 10 MEQ: 7.46 INJECTION, SOLUTION INTRAVENOUS at 17:19

## 2023-03-26 RX ADMIN — AMIODARONE HYDROCHLORIDE 150 MG: 1.5 INJECTION, SOLUTION INTRAVENOUS at 19:43

## 2023-03-26 RX ADMIN — POTASSIUM CHLORIDE 10 MEQ: 7.46 INJECTION, SOLUTION INTRAVENOUS at 16:30

## 2023-03-26 RX ADMIN — SODIUM CHLORIDE 250 ML: 9 INJECTION, SOLUTION INTRAVENOUS at 09:26

## 2023-03-26 RX ADMIN — AMIODARONE HYDROCHLORIDE 1 MG/MIN: 1.8 INJECTION, SOLUTION INTRAVENOUS at 19:58

## 2023-03-26 RX ADMIN — Medication 10 ML: at 20:04

## 2023-03-26 RX ADMIN — SPIRONOLACTONE 25 MG: 25 TABLET ORAL at 08:16

## 2023-03-26 RX ADMIN — POTASSIUM CHLORIDE 10 MEQ: 7.46 INJECTION, SOLUTION INTRAVENOUS at 10:53

## 2023-03-26 RX ADMIN — POTASSIUM CHLORIDE 10 MEQ: 7.46 INJECTION, SOLUTION INTRAVENOUS at 15:30

## 2023-03-26 RX ADMIN — POTASSIUM CHLORIDE 10 MEQ: 7.46 INJECTION, SOLUTION INTRAVENOUS at 09:27

## 2023-03-26 NOTE — PLAN OF CARE
Goal Outcome Evaluation:  Plan of Care Reviewed With: patient        Progress: improving  Outcome Evaluation: Pt resting comfortably in bed overnight. Pt had bloody UOP up until 0000 at which time UOP returned to the previous leigh ann color.  Pt denies pain.  Pt remains in Afib.  BP WNL. Pt expresses the desire to be out of bed during dayshift.  Will continue to monitor.

## 2023-03-26 NOTE — PROGRESS NOTES
"  [facility] [place] [facility] Progress Note   LOS: 6 days   Patient Care Team:  Dilcia Kapoor APRN as PCP - General (Family Medicine)    Chief Complaint   Patient presents with   • Shortness of Breath   • Jaundice   • Edema     feet         Subjective    Discussed with RN no events overnight no new complaints greater than 10 L out in total 2500 cc yesterday patient is back to her baseline creatinine and tolerating medications well slight tachyarrhythmias noted    Interval History:   Patient Denies: chest pain  Patient Complaints: shortness of air and edema  History taken from: patient chart RN    On dobutamine at 5 mcg/kg/min.  More alert today  Anasarca improving. Telemetry showing atrial flutter with controlled ventricular rate     Objective     Vital Sign Min/Max for last 24 hours  Temp  Min: 97.7 °F (36.5 °C)  Max: 98.8 °F (37.1 °C)   BP  Min: 96/69  Max: 133/75   Pulse  Min: 93  Max: 125   Resp  Min: 20  Max: 20   SpO2  Min: 90 %  Max: 94 %   Flow (L/min)  Min: 4  Max: 5   Weight  Min: 92 kg (202 lb 13.2 oz)  Max: 92 kg (202 lb 13.2 oz)     Flowsheet Rows      Flowsheet Row First Filed Value   Admission Height 170.2 cm (67\") Documented at 03/20/2023 1618   Admission Weight 105 kg (231 lb 14.8 oz) Documented at 03/20/2023 1648              03/22/23  0423 03/25/23  0500 03/26/23  0500   Weight: 101 kg (222 lb) 92.3 kg (203 lb 7.8 oz) 92 kg (202 lb 13.2 oz)       Physical Exam:  Vitals have been noted  General patient is in no acute distress  Normocephalic atraumatic mucosas moist  Neck is grossly normal oropharynx grossly normal  Eyes lids are normal with no discharge extraocular muscles appear intact  Neck no deviation noted no swelling  Lungs clear to auscultation with no adventitious sounds no crackles wheeze or rails  Cardiovascular regular rate and rhythm no murmurs gallops or rubs  Abdomen is soft nontender nondistended with positive bowel sounds  Musculoskeletal is grossly normal  Skin is intact  Neurological " nonfocal cranial nerves II through XII grossly intact  Psych is appropriate       cardiac Positive systolic ejection murmur no gallops or rubs     Results Reviewed by myself:     Results from last 7 days   Lab Units 03/26/23  0428 03/25/23  1308 03/25/23  0822 03/25/23  0600 03/24/23 1938 03/24/23  0545   SODIUM mmol/L 130*  --   --  134*  --  134*   POTASSIUM mmol/L 3.0* 3.3* 2.8* 2.9*   < > 2.4*   CHLORIDE mmol/L 85*  --   --  85*  --  89*   CO2 mmol/L 39.0*  --   --  41.0*  --  34.0*   BUN mg/dL 16  --   --  17  --  25*   CREATININE mg/dL 0.86  --   --  0.98  --  1.44*   CALCIUM mg/dL 7.9*  --   --  8.0*  --  8.4*   BILIRUBIN mg/dL 6.8*  --   --  7.3*  --  6.7*   ALK PHOS U/L 86  --   --  91  --  94   ALT (SGPT) U/L 39  --   --  50*  --  61*   AST (SGOT) U/L 39  --   --  56*  --  78*   GLUCOSE mg/dL 81  --   --  79  --  87    < > = values in this interval not displayed.       Estimated Creatinine Clearance: 91 mL/min (by C-G formula based on SCr of 0.86 mg/dL).    Results from last 7 days   Lab Units 03/25/23  0600 03/24/23 1938 03/24/23  0545   MAGNESIUM mg/dL 1.9 2.1 1.7             Results from last 7 days   Lab Units 03/26/23  0428 03/25/23  0600 03/24/23  0545 03/23/23  0414 03/22/23  1009   WBC 10*3/mm3 6.75 7.31 7.26 7.66 7.67   HEMOGLOBIN g/dL 16.5 16.7 16.6 16.6 18.2*   PLATELETS 10*3/mm3 93* 101* 95* 99* 93*       Results from last 7 days   Lab Units 03/20/23  1642   INR  1.86*     Lab Results   Component Value Date    PROBNP 4,988.0 (H) 03/20/2023       I/O last 3 completed shifts:  In: 440 [P.O.:440]  Out: 7200 [Urine:7200]    Cardiographics:  ECG/EMG Results (last 24 hours)       Procedure Component Value Units Date/Time    SCANNED EKG [[id number]] Resulted: 03/20/23     Updated: 03/22/23 2146    SCANNED - TELEMETRY   [[id number]] Resulted: 03/20/23     Updated: 03/23/23 1013            Results for orders placed during the hospital encounter of 03/20/23    Adult Transthoracic Echo Complete W/ Cont if  Necessary Per Protocol    Interpretation Summary  •  Left ventricular ejection fraction appears to be 21 - 25%.  •  The left ventricular cavity is dilated.  •  Left ventricular wall thickness is consistent with hypertrophy.  •  Left ventricular diastolic dysfunction is noted.  •  Moderately reduced right ventricular systolic function noted.  •  The right ventricular cavity is severely dilated.  •  Left atrial volume is severely increased.  •  The right atrial cavity is severely  dilated.  •  Estimated right ventricular systolic pressure from tricuspid regurgitation is mildly elevated (35-45 mmHg).      CT Abdomen Pelvis Without Contrast    Result Date: 3/20/2023  1.  Moderate to large right effusion which appears loculated. There is associated right lower lobe consolidation suggesting pneumonia. 2.  Cardiomegaly. 3.  Prominent main pulmonary artery suggesting pulmonary arterial hypertension. 4.  Small amount of ascites. There appears to be some edema and wall thickening associated with the third portion of the duodenum suggesting duodenitis. Electronically signed by:  Krzysztof Gomez MD  3/20/2023 9:36 PM CDT Workstation: [id number]    CT Chest Without Contrast Diagnostic    Result Date: 3/20/2023  1.  Moderate to large right effusion which appears loculated. There is associated right lower lobe consolidation suggesting pneumonia. 2.  Cardiomegaly. 3.  Prominent main pulmonary artery suggesting pulmonary arterial hypertension. 4.  Small amount of ascites. There appears to be some edema and wall thickening associated with the third portion of the duodenum suggesting duodenitis. Electronically signed by:  Krzysztof Gomez MD  3/20/2023 9:35 PM CDT Workstation: [id number]    US Abdomen Complete    Result Date: 3/22/2023  Findings, impression: Limited examination, portable bedside technique. Echogenic sludge within the gallbladder. Gallbladder  wall thickening. Normal liver. Common bile duct and pancreas are obscured by overlying  bowel gas. Normal spleen. Normal right kidney 9.6 x 5.1 x 5.1 cm. Benign cyst left kidney. Left kidney is otherwise unremarkable 11.2 x 5.9 x 5.1 cm Normal aorta and inferior vena cava. Small amount of free fluid, ascites throughout the abdomen. IMPRESSION: Ascites. Gallbladder wall thickening and echogenic sludge within the gallbladder. Electronically signed by:  Clem Balderrama MD  3/22/2023 3:07 PM CDT Workstation: [id number]    US Guided Vascular Access    Result Date: 3/23/2023  Impression: As above. Electronically signed by:  Tony Giron MD  3/23/2023 3:48 PM CDT Workstation: [id number]    XR Chest 1 View    Result Date: 3/22/2023  1. Right pleural effusion and adjacent atelectasis with or without pneumonia, increased in the interval. 2. Cardiomegaly. Electronically signed by:  sIsa Burch MD  3/22/2023 11:37 PM CDT Workstation: [id number]    XR Chest 1 View    Result Date: 3/20/2023  Right basilar pleural effusion, right lower lobe compressive atelectasis, cardiomegaly and mild central vascular congestion. Electronically signed by:  Holils Sousa MD  3/20/2023 6:26 PM CDT Workstation: [id number]      Medication Review:     Current Facility-Administered Medications:   •  acetaminophen (TYLENOL) tablet 650 mg, 650 mg, Oral, Q4H PRN **OR** [DISCONTINUED] acetaminophen (TYLENOL) 160 MG/5ML solution 650 mg, 650 mg, Oral, Q4H PRN **OR** acetaminophen (TYLENOL) suppository 650 mg, 650 mg, Rectal, Q4H PRN, Magen Lagos MD  •  DOBUTamine (DOBUTREX) 1 mg/mL infusion, 5 mcg/kg/min, Intravenous, Continuous, Nannette Walters MD, Last Rate: 15.15 mL/hr at 03/26/23 0851, 2.5 mcg/kg/min at 03/26/23 0851  •  furosemide (LASIX) injection 40 mg, 40 mg, Intravenous, BID, Luis Alcantar MD, 40 mg at 03/26/23 0816  •  Hold medication, 1 each, Does not apply, Continuous PRN, Víctor London MD  •  hydrocortisone-bacitracin-zinc oxide-nystatin (MAGIC BARRIER) ointment 1 application, 1 application, Topical,  Q4H PRN, Gurvinder David DO, 1 application at 03/23/23 1740  •  Magnesium Sulfate - Total Dose 10 grams - Magnesium 1 or Less, 2 g, Intravenous, PRN **OR** Magnesium Sulfate - Total Dose 6 grams - Magnesium 1.1 - 1.5, 2 g, Intravenous, PRN **OR** Magnesium Sulfate - Total Dose 4 grams - Magnesium 1.6 - 1.9, 4 g, Intravenous, PRN, Stephanie Traore, APRN, Last Rate: 25 mL/hr at 03/24/23 1511, 4 g at 03/24/23 1511  •  ondansetron (ZOFRAN) injection 4 mg, 4 mg, Intravenous, Q6H PRN, Magen Lagos MD, 4 mg at 03/22/23 0208  •  phytonadione (AQUA-MEPHYTON, VITAMIN K) 5 mg in sodium chloride 0.9 % 50 mL IVPB, 5 mg, Intravenous, Once, Víctor London MD, Held at 03/23/23 1035  •  potassium chloride (MICRO-K) CR capsule 40 mEq, 40 mEq, Oral, PRN, 40 mEq at 03/26/23 0528 **OR** potassium chloride (KLOR-CON) packet 40 mEq, 40 mEq, Oral, PRN **OR** potassium chloride 10 mEq in 100 mL IVPB, 10 mEq, Intravenous, Q1H PRN, Víctor London MD, Last Rate: 100 mL/hr at 03/25/23 0335, 10 mEq at 03/25/23 0335  •  rivaroxaban (XARELTO) tablet 20 mg, 20 mg, Oral, Daily With Dinner, Magen Lagos MD, 20 mg at 03/24/23 1737  •  sodium chloride 0.9 % flush 10 mL, 10 mL, Intravenous, Q12H, Magen Lagos MD, 10 mL at 03/26/23 0817  •  sodium chloride 0.9 % flush 10 mL, 10 mL, Intravenous, PRN, Magen Lagos MD  •  sodium chloride 0.9 % infusion 40 mL, 40 mL, Intravenous, PRN, Magen Lagos MD  •  spironolactone (ALDACTONE) tablet 25 mg, 25 mg, Oral, Daily, Luis Alcantar MD, 25 mg at 03/26/23 0816    Patient's recent labs and results as included in the subjective data were reviewed by me. Any pertinent outside data will be included.        Assessment & Plan       Atrial fibrillation with RVR (HCC)    Atrial fibrillation (HCC)    1.  Atrial fibrillation with RVR.  We will begin by titrating down dobutamine as contributing  We will recheck magnesium today ongoing potassium correction  Hold  xarelto for possible left heart catheterization  Lovenox 1 mg/kg can be started tomorrow    2.  Acute systolic heart failure/LV dysfunction.  Significant improvement noted dobutamine likely switch off to 2.5 mics possible further having tomorrow    echocardiogram this admission showing severely dilated LV cavity with LVEF at 23%.  Moderately reduced RV systolic function, RV severely dilated, right atrial cavity severely dilated, left atrial volume severely increased.  Anasarca improving    3.  Nonsustained VT likely reaction to dobutamine weaning off as well as to his chronic congestive heart failure with acute on chronic exacerbation will monitor closely    NICM/ICM: EF:23 %. NYHA Class IV, Stage D. Patient is currently volume overloaded.  But resolving    BETA-BLOCKER: Held in the setting of hypotension and on inotropic support  ACE/ARB: Hypotensive, RUTH  ENTRESTO:  Indicated, RUTH  DIURETIC: Lasix drip 10 mg an hour, nephrology on board  SGL2: RUTH  ALDOSTERONE ANTAGONIST: RUTH   IMDUR/HYDRALAZINE: N/A  DIGOXIN: N/A  Fluid restriction: 1100 ml  Sodium restriction:2 grams    Cardiac Rehab:  Indicated  ICD:  Indicated    ADHF:  This admission    3.  Elevated troponins.  High sensitivity troponin at this point likely secondary to A-fib as well as congestive heart failure    Discussed future plan of [facility] he believes he is able to lay flat of discussed with nurse will optimize for procedure  Primary cardiologist to assume care in a.m.  Dr. Brothers  Discussed with renal in person okay to proceed with heart catheterization    Low platelets are somewhat of a concern  Elevated INR is somewhat of a concern    This document has been electronically signed by [place] DO on March 26, 2023 10:13 CDT   Electronically signed by ARY Renteria, 03/24/23, 10:13 AM CDT.

## 2023-03-26 NOTE — PROGRESS NOTES
[facility] Medicine Services  INPATIENT PROGRESS NOTE      Length of Stay: 6  Date of Admission: 3/20/2023  Primary Care Physician: Dilcia Kapoor APRN    Subjective   Chief Complaint:  Shortness of breath with heart palpitations  HPI:  67-year-old male comes to the ER stating he does not feel well.  He has not felt well for a while.  Patient states he has not taken any of his medicines for the last several months.  He reports having history of A-fib.  Patient is a poor historian, but states he is short of breath that is worse with exertion.     Daily assessment  Patient seen and examined 3/24/2023.  Patient is currently lying in bed.  He states he is comfortable.  Reviewed medications and labs with the patient and treatment plan.  Patient is denying any new problems or complaints.  He actually does feel better.  Radiology is indicated that there is very little fluid to pull off of right pleural effusion and has recommended to hold for now and follow and proceed with procedure as needed.  The patient is not have any chest pain.  Continues to be very short of breath at rest and on exertion.  He has no fevers or chills.  Otherwise he is tolerating current treatment plan.  3/25/2023  Patient admits to approximately 30 pound weight loss over past 2 days.  States breathing slightly better after Lasix diuresis.  Family including wife/daughter present during interview with Dr. Lopez this AM.  3/26/2023  Patient continues to improve on IV diuresis.  Patient's IV dobutamine weaned by 50% over past 24 hours.  Patient has cardiac catheterization planned for tomorrow.  States lower extremity swelling continues to decrease on diuretic therapy.     Review of Systems   Constitutional: Positive for fatigue. Negative for chills and fever.   HENT: Negative.    Eyes: Negative.    Respiratory: Positive for shortness of breath.    Cardiovascular: Positive for chest pain.    Gastrointestinal: Negative.    Endocrine: Negative.    Genitourinary: Negative.    Musculoskeletal: Negative.    Skin: Negative.    Neurological: Positive for weakness.   Hematological: Negative.    Psychiatric/Behavioral: Negative.         All pertinent negatives and positives are as above. All other systems have been reviewed and are negative unless otherwise stated.     Objective    As of today 03/26/23  Temp:  [97.7 °F (36.5 °C)-98.8 °F (37.1 °C)] 98.8 °F (37.1 °C)  Heart Rate:  [] 118  Resp:  [20] 20  BP: ()/(61-77) 111/77    Physical Exam  Vitals and nursing note reviewed.   HENT:      Head: Normocephalic and atraumatic.      Right Ear: External ear normal.      Left Ear: External ear normal.      Nose: Nose normal.      Mouth/Throat:      Mouth: Mucous membranes are dry.      Pharynx: Oropharynx is clear.   Eyes:      General: No scleral icterus.     Extraocular Movements: Extraocular movements intact.      Conjunctiva/sclera: Conjunctivae normal.      Pupils: Pupils are equal, round, and reactive to light.   Cardiovascular:      Rate and Rhythm: Bradycardia present. Rhythm irregular.      Pulses: Normal pulses.      Heart sounds: Normal heart sounds.   Pulmonary:      Effort: Pulmonary effort is normal.      Breath sounds: Normal breath sounds.      Comments: Decreased breath sounds at the bases otherwise clear  Abdominal:      General: Bowel sounds are normal. There is distension.      Palpations: Abdomen is soft.      Tenderness: There is no abdominal tenderness.      Comments: Soft nontender nondistended normal active bowel sounds   Musculoskeletal:         General: Normal range of motion.      Cervical back: Normal range of motion and neck supple.      Right lower leg: Edema present.      Left lower leg: Edema present.      Comments: Venous stasis changes bilateral lower extremities  Edema bilateral lower extremities improving   Skin:     General: Skin is warm and dry.      Capillary  Refill: Capillary refill takes less than 2 seconds.      Coloration: Skin is not jaundiced.   Neurological:      General: No focal deficit present.      Mental Status: He is alert and oriented to person, place, and time.      Motor: Weakness present.   Psychiatric:         Mood and Affect: Mood normal.         Behavior: Behavior normal.           Results Review:  I have reviewed the labs, radiology results, and diagnostic studies.    Laboratory Data:   Results from last 7 days   Lab Units 03/26/23  0428 03/25/23  1308 03/25/23  0822 03/25/23  0600 03/24/23  1938 03/24/23  0545   SODIUM mmol/L 130*  --   --  134*  --  134*   POTASSIUM mmol/L 3.0* 3.3* 2.8* 2.9*   < > 2.4*   CHLORIDE mmol/L 85*  --   --  85*  --  89*   CO2 mmol/L 39.0*  --   --  41.0*  --  34.0*   BUN mg/dL 16  --   --  17  --  25*   CREATININE mg/dL 0.86  --   --  0.98  --  1.44*   GLUCOSE mg/dL 81  --   --  79  --  87   CALCIUM mg/dL 7.9*  --   --  8.0*  --  8.4*   BILIRUBIN mg/dL 6.8*  --   --  7.3*  --  6.7*   ALK PHOS U/L 86  --   --  91  --  94   ALT (SGPT) U/L 39  --   --  50*  --  61*   AST (SGOT) U/L 39  --   --  56*  --  78*   ANION GAP mmol/L 6.0  --   --  8.0  --  11.0    < > = values in this interval not displayed.     Estimated Creatinine Clearance: 91 mL/min (by C-G formula based on SCr of 0.86 mg/dL).  Results from last 7 days   Lab Units 03/26/23  1032 03/25/23  0600 03/24/23  1938   MAGNESIUM mg/dL 1.7 1.9 2.1         Results from last 7 days   Lab Units 03/26/23  0428 03/25/23  0600 03/24/23  0545 03/23/23  0414 03/22/23  1009   WBC 10*3/mm3 6.75 7.31 7.26 7.66 7.67   HEMOGLOBIN g/dL 16.5 16.7 16.6 16.6 18.2*   HEMATOCRIT % 44.3 44.6 45.7 43.8 50.0   PLATELETS 10*3/mm3 93* 101* 95* 99* 93*     Results from last 7 days   Lab Units 03/20/23  1642   INR  1.86*       Culture Data:   No results found for: BLOODCX  No results found for: URINECX  No results found for: RESPCX  No results found for: WOUNDCX  No results found for: STOOLCX  No  components found for: BODYFLD    Radiology Data:   Imaging Results (Last 24 Hours)     ** No results found for the last 24 hours. **      Results for orders placed during the hospital encounter of 03/20/23    Adult Transthoracic Echo Complete W/ Cont if Necessary Per Protocol    Interpretation Summary  •  Left ventricular ejection fraction appears to be 21 - 25%.  •  The left ventricular cavity is dilated.  •  Left ventricular wall thickness is consistent with hypertrophy.  •  Left ventricular diastolic dysfunction is noted.  •  Moderately reduced right ventricular systolic function noted.  •  The right ventricular cavity is severely dilated.  •  Left atrial volume is severely increased.  •  The right atrial cavity is severely  dilated.  •  Estimated right ventricular systolic pressure from tricuspid regurgitation is mildly elevated (35-45 mmHg).      I have reviewed the patient's current medications.   Scheduled Meds:furosemide, 40 mg, Intravenous, BID  phytonadione (VITAMIN K) IVPB, 5 mg, Intravenous, Once  potassium chloride, 10 mEq, Intravenous, Q1H  sodium chloride, 10 mL, Intravenous, Q12H  spironolactone, 25 mg, Oral, Daily      Continuous Infusions:DOBUTamine, 5 mcg/kg/min, Last Rate: 2.5 mcg/kg/min (03/26/23 0851)  hold, 1 each  [START ON 3/27/2023] sodium chloride, 50 mL/hr      PRN Meds:.•  acetaminophen **OR** [DISCONTINUED] acetaminophen **OR** acetaminophen  •  hold  •  hydrocortisone-bacitracin-zinc oxide-nystatin  •  magnesium sulfate **OR** magnesium sulfate **OR** magnesium sulfate  •  ondansetron  •  potassium chloride **OR** potassium chloride **OR** potassium chloride  •  sodium chloride  •  sodium chloride  Assessment/Plan     Principal Problem:    Atrial fibrillation with RVR (HCC)  Active Problems:    Atrial fibrillation (HCC)    Assessment and Plan    Type II MI secondary to RVR and fluid overload  -Appreciate cardiology assistance!  Continue current management.  Continue fluid restriction  1.1 L/day and salt restriction.  Continue IV Lasix.  - 3/26 cardiac catheterization planned for tomorrow.  Patient will be n.p.o. after midnight.       Acute on chronic, systolic CHF  Will continue with lasix bid; monitor intake and output  - As above.  Improving on IV Lasix and dobutamine gtt.  Wean from dobutamine as tolerated.  Breathing steadily improving with diuresis.  Appreciate both cardiology and nephrology assistance with patient.      Atrial fibrillation with RVR  Cardiologist will be consulted and appreciate their assistance. High [id number] score; currently tolerating rivaroxaban therapy.  Cardiac rehab.  Troponin elevated but being followed by cardiology  Left heart cath once fluid status has been stabilized.  Continue Xarelto 20 mg daily      Hypertension  - currently suffering from hypotension and on dobutamine gtt.  - O2 saturation is 94% 5 L    CODE STATUS full code  DVT prophylaxis Xarelto    Medical Decision Making  Number and Complexity of problems: 3 complex problems  Differential Diagnosis: Atrial fibrillation versus acute coronary syndrome    Conditions and Status:        Condition is unchanged.     MDM Data  External documents reviewed: Hospital charts  My EKG interpretation:  EKG completed 3/20/2023.  Rhythm: atrial fibrillation   Rate: tachycardic   Clinical impression: abnormal ECG    My plain film interpretation:  Chest x-ray completed 3/20/2023.  IMPRESSION:  Right basilar pleural effusion, right lower lobe  compressive atelectasis, cardiomegaly and mild central vascular  Congestion.   Tests considered but not ordered: None     Decision rules/scores evaluated (example [id number], Wells, etc): [id number] high and on Rivaroxaban     Discussed with: Patient and agrees to current treatment plan     Treatment Plan  As above    Care Planning  Shared decision making: Patient updated on current status and informed of proposed care plan; is in agreement with plan  Code status and discussions:  Full code    Disposition  Social Determinants of Health that impact treatment or disposition: N/A  I expect the patient to be discharged to home to be determined      I have utilized all available immediate resources to obtain, update, or review the patient's current medications (including all prescriptions, over-the-counter products, herbals, cannabis/cannabidiol products, and vitamin/mineral/dietary (nutritional) supplements).     I confirmed that the patient's Advance Care Plan is present, code status is documented, or surrogate decision maker is listed in the patient's medical record.    Discharge Planning: I expect patient to be discharged to home once stabilized after cardiac catheterization procedure 3/27/2023.  We will maintain current ICU status to patient weaned off dobutamine gtt.  Hopefully can transfer patient out of ICU in next 24 hours if weaning process successful.    33 minutes of critical care time spent on this patient by Dr. Lopez 3/26/23.    Cuco Lopez MD       Addendum 3/26/2023 at 6:42 PM  Patient has suffered from A-fib with RVR issues intermittently since this AM.  Issues not resolved with discontinuation of dobutamine drip.  Patient's blood pressures low normal so extremely hesitant to use beta-blocker or calcium channel blocker for rate control.  We will therefore load patient with IV amiodarone.  Also recommend dobutamine vasopressor as first-line to maintain MAP above 65.  If dobutamine unsuccessful, would then utilize Levophed as second line to keep MAP greater than 65.  Patient scheduled for cardiac catheterization in a.m. for evaluation of acute on chronic heart failure with reduced ejection fraction.

## 2023-03-26 NOTE — PROGRESS NOTES
"  [facility]  [address] [place] [address]. [id number]  T - [phone]  F - [phone]     Progress Note          PATIENT  DEMOGRAPHICS   PATIENT NAME: Pavan Mccauley                      PHYSICIAN: Luis Alcantar MD  : 1955  MRN: [id number]   LOS: 6 days    Patient Care Team:  Dilcia Kapoor APRN as PCP - General (Family Medicine)  Subjective   SUBJECTIVE   Still has good urine output almost 7L/24 hr. Doing better. Getting multiple k replacement. bp some better. Edema has improved. Dobutamine is lowered         Objective   OBJECTIVE   Vital Signs  Temp:  [97.7 °F (36.5 °C)-98.8 °F (37.1 °C)] 98.8 °F (37.1 °C)  Heart Rate:  [] 136  Resp:  [20] 20  BP: ()/(53-75) 112/68    Flowsheet Rows    Flowsheet Row First Filed Value   Admission Height 170.2 cm (67\") Documented at 2023 1618   Admission Weight 105 kg (231 lb 14.8 oz) Documented at 2023 1648           I/O last 3 completed shifts:  In: 440 [P.O.:440]  Out: 7200 [Urine:7200]    PHYSICAL EXAM    Physical Exam  Constitutional:       Appearance: He is well-developed.   HENT:      Head: Normocephalic.   Eyes:      Pupils: Pupils are equal, round, and reactive to light.   Cardiovascular:      Rate and Rhythm: Normal rate and regular rhythm.      Heart sounds: Normal heart sounds.   Pulmonary:      Effort: Pulmonary effort is normal.      Breath sounds: Normal breath sounds.   Abdominal:      General: Bowel sounds are normal.      Palpations: Abdomen is soft.   Musculoskeletal:         General: Swelling present.   Skin:     Coloration: Skin is not jaundiced.   Neurological:      General: No focal deficit present.      Mental Status: He is alert and oriented to person, place, and time.         RESULTS   Results Review:    Results from last 7 days   Lab Units 23  0428 23  1308 23  0822 23  0600 23  1938 23  0545   SODIUM mmol/L 130*  --   --  134*  --  134*   POTASSIUM mmol/L " 3.0* 3.3* 2.8* 2.9*   < > 2.4*   CHLORIDE mmol/L 85*  --   --  85*  --  89*   CO2 mmol/L 39.0*  --   --  41.0*  --  34.0*   BUN mg/dL 16  --   --  17  --  25*   CREATININE mg/dL 0.86  --   --  0.98  --  1.44*   CALCIUM mg/dL 7.9*  --   --  8.0*  --  8.4*   BILIRUBIN mg/dL 6.8*  --   --  7.3*  --  6.7*   ALK PHOS U/L 86  --   --  91  --  94   ALT (SGPT) U/L 39  --   --  50*  --  61*   AST (SGOT) U/L 39  --   --  56*  --  78*   GLUCOSE mg/dL 81  --   --  79  --  87    < > = values in this interval not displayed.       Estimated Creatinine Clearance: 91 mL/min (by C-G formula based on SCr of 0.86 mg/dL).    Results from last 7 days   Lab Units 03/25/23  0600 03/24/23  1938 03/24/23  0545   MAGNESIUM mg/dL 1.9 2.1 1.7             Results from last 7 days   Lab Units 03/26/23  0428 03/25/23  0600 03/24/23  0545 03/23/23  0414 03/22/23  1009   WBC 10*3/mm3 6.75 7.31 7.26 7.66 7.67   HEMOGLOBIN g/dL 16.5 16.7 16.6 16.6 18.2*   PLATELETS 10*3/mm3 93* 101* 95* 99* 93*       Results from last 7 days   Lab Units 03/20/23  1642   INR  1.86*         Imaging Results (Last 24 Hours)     ** No results found for the last 24 hours. **           MEDICATIONS    furosemide, 40 mg, Intravenous, BID  phytonadione (VITAMIN K) IVPB, 5 mg, Intravenous, Once  sodium chloride, 10 mL, Intravenous, Q12H  spironolactone, 25 mg, Oral, Daily      DOBUTamine, 5 mcg/kg/min, Last Rate: 2.5 mcg/kg/min (03/26/23 0851)  hold, 1 each        Assessment & Plan   ASSESSMENT / PLAN      Atrial fibrillation with RVR (HCC)    Atrial fibrillation (HCC)       1.  RUTH- baseline creatinine looks to be around 1.1,  and peak up to 1.86.  Etiology of RUTH could be secondary to diuretics versus poor perfusion with atrial fibrillation.  He has significant volume overload apparent on exam. Cr is now <1     -good diuretic response. bp remained stable. continue dobutamine per cardiology. added aldactone because of marked k loss / low EF. Now on intermittent iv lasix due to  elevated bicarb and alkalosis. Will give iv kcl to have chloride delivery. Bicarb is better. Hold am of lasix when going for heart cath and ivf for 8 hrs     2.  Hyponatremia- likely secondary to hypervolemia, Lasix as above. Nastable     3.  A-fib with RVR- cardiology managing     4.  Acute on chronic systolic heart failure- dobutamine per cardiology, lasix drip      5.  Transaminitis / elevated bilirubin check for direct and indirect bilirubin     6.  Pleural effusion- no plan for thoracentesis    7. Hypokalemia / hypomagnesemia - replaced 40meq po and will give 80meq iv. Also  aldactone              This document has been electronically signed by Luis Alcantar MD on March 26, 2023 09:06 CDT

## 2023-03-26 NOTE — PLAN OF CARE
Goal Outcome Evaluation:  Plan of Care Reviewed With: patient        Progress: no change  Outcome Evaluation: Patient remains in afib. NPO at midnight with possibility of heartcath tomorrow. Per nephrology, hold morning lasix due to heart cath and flood orders placed to start in morning. Potassium and mag replaced this shift.

## 2023-03-27 PROBLEM — I21.4 NSTEMI, INITIAL EPISODE OF CARE: Status: ACTIVE | Noted: 2023-03-20

## 2023-03-27 PROBLEM — I50.82 BIVENTRICULAR CHF (CONGESTIVE HEART FAILURE): Status: ACTIVE | Noted: 2023-03-20

## 2023-03-27 LAB
ALBUMIN SERPL-MCNC: 2.1 G/DL (ref 3.5–5.2)
ALBUMIN/GLOB SERPL: 0.9 G/DL
ALP SERPL-CCNC: 87 U/L (ref 39–117)
ALT SERPL W P-5'-P-CCNC: 36 U/L (ref 1–41)
ANION GAP SERPL CALCULATED.3IONS-SCNC: 6 MMOL/L (ref 5–15)
AST SERPL-CCNC: 42 U/L (ref 1–40)
BASOPHILS # BLD AUTO: 0.02 10*3/MM3 (ref 0–0.2)
BASOPHILS NFR BLD AUTO: 0.2 % (ref 0–1.5)
BILIRUB SERPL-MCNC: 6.2 MG/DL (ref 0–1.2)
BUN SERPL-MCNC: 15 MG/DL (ref 8–23)
BUN/CREAT SERPL: 18.3 (ref 7–25)
CALCIUM SPEC-SCNC: 7.8 MG/DL (ref 8.6–10.5)
CHLORIDE SERPL-SCNC: 84 MMOL/L (ref 98–107)
CO2 SERPL-SCNC: 38 MMOL/L (ref 22–29)
CREAT SERPL-MCNC: 0.82 MG/DL (ref 0.76–1.27)
DEPRECATED RDW RBC AUTO: 57.6 FL (ref 37–54)
EGFRCR SERPLBLD CKD-EPI 2021: 97.5 ML/MIN/1.73
EOSINOPHIL # BLD AUTO: 0.16 10*3/MM3 (ref 0–0.4)
EOSINOPHIL NFR BLD AUTO: 1.9 % (ref 0.3–6.2)
ERYTHROCYTE [DISTWIDTH] IN BLOOD BY AUTOMATED COUNT: 19.8 % (ref 12.3–15.4)
GLOBULIN UR ELPH-MCNC: 2.3 GM/DL
GLUCOSE SERPL-MCNC: 84 MG/DL (ref 65–99)
HCT VFR BLD AUTO: 44.4 % (ref 37.5–51)
HGB BLD-MCNC: 16.3 G/DL (ref 13–17.7)
IMM GRANULOCYTES # BLD AUTO: 0.05 10*3/MM3 (ref 0–0.05)
IMM GRANULOCYTES NFR BLD AUTO: 0.6 % (ref 0–0.5)
LYMPHOCYTES # BLD AUTO: 1.13 10*3/MM3 (ref 0.7–3.1)
LYMPHOCYTES NFR BLD AUTO: 13.4 % (ref 19.6–45.3)
MAGNESIUM SERPL-MCNC: 2.1 MG/DL (ref 1.6–2.4)
MCH RBC QN AUTO: 31.3 PG (ref 26.6–33)
MCHC RBC AUTO-ENTMCNC: 36.7 G/DL (ref 31.5–35.7)
MCV RBC AUTO: 85.4 FL (ref 79–97)
MONOCYTES # BLD AUTO: 1.22 10*3/MM3 (ref 0.1–0.9)
MONOCYTES NFR BLD AUTO: 14.5 % (ref 5–12)
NEUTROPHILS NFR BLD AUTO: 5.85 10*3/MM3 (ref 1.7–7)
NEUTROPHILS NFR BLD AUTO: 69.4 % (ref 42.7–76)
NRBC BLD AUTO-RTO: 0 /100 WBC (ref 0–0.2)
PLATELET # BLD AUTO: 108 10*3/MM3 (ref 140–450)
PMV BLD AUTO: 9.7 FL (ref 6–12)
POTASSIUM SERPL-SCNC: 3.4 MMOL/L (ref 3.5–5.2)
POTASSIUM SERPL-SCNC: 3.5 MMOL/L (ref 3.5–5.2)
PROT SERPL-MCNC: 4.4 G/DL (ref 6–8.5)
QT INTERVAL: 378 MS
QT INTERVAL: 402 MS
QTC INTERVAL: 502 MS
QTC INTERVAL: 518 MS
RBC # BLD AUTO: 5.2 10*6/MM3 (ref 4.14–5.8)
SODIUM SERPL-SCNC: 128 MMOL/L (ref 136–145)
WBC NRBC COR # BLD: 8.43 10*3/MM3 (ref 3.4–10.8)

## 2023-03-27 PROCEDURE — 99232 SBSQ HOSP IP/OBS MODERATE 35: CPT | Performed by: NURSE PRACTITIONER

## 2023-03-27 PROCEDURE — 4A023N7 MEASUREMENT OF CARDIAC SAMPLING AND PRESSURE, LEFT HEART, PERCUTANEOUS APPROACH: ICD-10-PCS | Performed by: INTERNAL MEDICINE

## 2023-03-27 PROCEDURE — C1894 INTRO/SHEATH, NON-LASER: HCPCS | Performed by: INTERNAL MEDICINE

## 2023-03-27 PROCEDURE — 93010 ELECTROCARDIOGRAM REPORT: CPT | Performed by: INTERNAL MEDICINE

## 2023-03-27 PROCEDURE — 93458 L HRT ARTERY/VENTRICLE ANGIO: CPT | Performed by: INTERNAL MEDICINE

## 2023-03-27 PROCEDURE — 25010000002 MIDAZOLAM PER 1 MG: Performed by: INTERNAL MEDICINE

## 2023-03-27 PROCEDURE — 25010000002 HEPARIN (PORCINE) PER 1000 UNITS: Performed by: INTERNAL MEDICINE

## 2023-03-27 PROCEDURE — 25010000002 FENTANYL CITRATE (PF) 50 MCG/ML SOLUTION: Performed by: INTERNAL MEDICINE

## 2023-03-27 PROCEDURE — 80053 COMPREHEN METABOLIC PANEL: CPT | Performed by: HOSPITALIST

## 2023-03-27 PROCEDURE — 83735 ASSAY OF MAGNESIUM: CPT | Performed by: INTERNAL MEDICINE

## 2023-03-27 PROCEDURE — 25010000002 FUROSEMIDE PER 20 MG: Performed by: INTERNAL MEDICINE

## 2023-03-27 PROCEDURE — 25510000001 IOPAMIDOL PER 1 ML: Performed by: INTERNAL MEDICINE

## 2023-03-27 PROCEDURE — C1769 GUIDE WIRE: HCPCS | Performed by: INTERNAL MEDICINE

## 2023-03-27 PROCEDURE — 93005 ELECTROCARDIOGRAM TRACING: CPT | Performed by: INTERNAL MEDICINE

## 2023-03-27 PROCEDURE — 25010000002 AMIODARONE IN DEXTROSE 5% 360-4.14 MG/200ML-% SOLUTION: Performed by: INTERNAL MEDICINE

## 2023-03-27 PROCEDURE — B2111ZZ FLUOROSCOPY OF MULTIPLE CORONARY ARTERIES USING LOW OSMOLAR CONTRAST: ICD-10-PCS | Performed by: INTERNAL MEDICINE

## 2023-03-27 PROCEDURE — 84132 ASSAY OF SERUM POTASSIUM: CPT | Performed by: NURSE PRACTITIONER

## 2023-03-27 PROCEDURE — 85025 COMPLETE CBC W/AUTO DIFF WBC: CPT | Performed by: HOSPITALIST

## 2023-03-27 RX ORDER — FENTANYL CITRATE 50 UG/ML
INJECTION, SOLUTION INTRAMUSCULAR; INTRAVENOUS
Status: DISCONTINUED | OUTPATIENT
Start: 2023-03-27 | End: 2023-03-27 | Stop reason: HOSPADM

## 2023-03-27 RX ORDER — MIDAZOLAM HYDROCHLORIDE 1 MG/ML
INJECTION INTRAMUSCULAR; INTRAVENOUS
Status: DISCONTINUED | OUTPATIENT
Start: 2023-03-27 | End: 2023-03-27 | Stop reason: HOSPADM

## 2023-03-27 RX ORDER — ACETAMINOPHEN 325 MG/1
650 TABLET ORAL EVERY 4 HOURS PRN
Status: DISCONTINUED | OUTPATIENT
Start: 2023-03-27 | End: 2023-04-04 | Stop reason: HOSPADM

## 2023-03-27 RX ORDER — HEPARIN SODIUM 1000 [USP'U]/ML
INJECTION, SOLUTION INTRAVENOUS; SUBCUTANEOUS
Status: DISCONTINUED | OUTPATIENT
Start: 2023-03-27 | End: 2023-03-27 | Stop reason: HOSPADM

## 2023-03-27 RX ORDER — VERAPAMIL HYDROCHLORIDE 2.5 MG/ML
INJECTION, SOLUTION INTRAVENOUS
Status: DISCONTINUED | OUTPATIENT
Start: 2023-03-27 | End: 2023-03-27 | Stop reason: HOSPADM

## 2023-03-27 RX ORDER — LIDOCAINE HYDROCHLORIDE 20 MG/ML
INJECTION, SOLUTION INFILTRATION; PERINEURAL
Status: DISCONTINUED | OUTPATIENT
Start: 2023-03-27 | End: 2023-03-27 | Stop reason: HOSPADM

## 2023-03-27 RX ORDER — FUROSEMIDE 40 MG/1
40 TABLET ORAL
Status: DISCONTINUED | OUTPATIENT
Start: 2023-03-28 | End: 2023-03-28

## 2023-03-27 RX ORDER — PRASUGREL 10 MG/1
10 TABLET, FILM COATED ORAL DAILY
Status: DISCONTINUED | OUTPATIENT
Start: 2023-03-28 | End: 2023-03-28

## 2023-03-27 RX ORDER — NITROGLYCERIN 5 MG/ML
INJECTION, SOLUTION INTRAVENOUS
Status: DISCONTINUED | OUTPATIENT
Start: 2023-03-27 | End: 2023-03-27 | Stop reason: HOSPADM

## 2023-03-27 RX ADMIN — AMIODARONE HYDROCHLORIDE 0.5 MG/MIN: 1.8 INJECTION, SOLUTION INTRAVENOUS at 01:50

## 2023-03-27 RX ADMIN — POTASSIUM CHLORIDE 40 MEQ: 750 CAPSULE, EXTENDED RELEASE ORAL at 16:04

## 2023-03-27 RX ADMIN — SPIRONOLACTONE 25 MG: 25 TABLET ORAL at 08:59

## 2023-03-27 RX ADMIN — SODIUM CHLORIDE 50 ML/HR: 9 INJECTION, SOLUTION INTRAVENOUS at 11:27

## 2023-03-27 RX ADMIN — POTASSIUM CHLORIDE 40 MEQ: 750 CAPSULE, EXTENDED RELEASE ORAL at 12:11

## 2023-03-27 RX ADMIN — FUROSEMIDE 40 MG: 10 INJECTION, SOLUTION INTRAMUSCULAR; INTRAVENOUS at 08:59

## 2023-03-27 RX ADMIN — POTASSIUM CHLORIDE 40 MEQ: 750 CAPSULE, EXTENDED RELEASE ORAL at 20:44

## 2023-03-27 RX ADMIN — AMIODARONE HYDROCHLORIDE 0.5 MG/MIN: 1.8 INJECTION, SOLUTION INTRAVENOUS at 14:49

## 2023-03-27 RX ADMIN — Medication 10 ML: at 08:59

## 2023-03-27 RX ADMIN — Medication 10 ML: at 20:44

## 2023-03-27 NOTE — PLAN OF CARE
Goal Outcome Evaluation:  Plan of Care Reviewed With: caregiver, patient        Progress: no change  Outcome Evaluation: Nutrition F/U:  Pt seen for LOS. Currently being managed for cardiac issues.  He is going for a Heart Cath today.  Will monitor his po intake--limited intake but appears suboptimal.  Will monitor

## 2023-03-27 NOTE — PROGRESS NOTES
[facility] Medicine Services  INPATIENT PROGRESS NOTE      Length of Stay: 7  Date of Admission: 3/20/2023  Primary Care Physician: Dilcia Kapoor APRN    Subjective   Chief Complaint:  Shortness of breath with heart palpitations  HPI:  67-year-old male comes to the ER stating he does not feel well.  He has not felt well for a while.  Patient states he has not taken any of his medicines for the last several months.  He reports having history of A-fib.  Patient is a poor historian, but states he is short of breath that is worse with exertion.     Daily assessment  Patient seen and examined 3/24/2023.  Patient is currently lying in bed.  He states he is comfortable.  Reviewed medications and labs with the patient and treatment plan.  Patient is denying any new problems or complaints.  He actually does feel better.  Radiology is indicated that there is very little fluid to pull off of right pleural effusion and has recommended to hold for now and follow and proceed with procedure as needed.  The patient is not have any chest pain.  Continues to be very short of breath at rest and on exertion.  He has no fevers or chills.  Otherwise he is tolerating current treatment plan.  3/25/2023  Patient admits to approximately 30 pound weight loss over past 2 days.  States breathing slightly better after Lasix diuresis.  Family including wife/daughter present during interview with Dr. Lopez this AM.  3/26/2023  Patient continues to improve on IV diuresis.  Patient's IV dobutamine weaned by 50% over past 24 hours.  Patient has cardiac catheterization planned for tomorrow.  States lower extremity swelling continues to decrease on diuretic therapy.  3/27/2023:  Patient's RVR resolved with IV amnio initiation yesterday night.  Cardiology in room during evaluation with Dr. Lopez.  Patient still tentatively may receive cardiac catheterization procedure today.  No other new issues  overnight.     Review of Systems   Constitutional: Positive for fatigue. Negative for chills and fever.   HENT: Negative.    Eyes: Negative.    Respiratory: Positive for shortness of breath.    Cardiovascular: Positive for chest pain.   Gastrointestinal: Negative.    Endocrine: Negative.    Genitourinary: Negative.    Musculoskeletal: Negative.    Skin: Negative.    Neurological: Positive for weakness.   Hematological: Negative.    Psychiatric/Behavioral: Negative.         All pertinent negatives and positives are as above. All other systems have been reviewed and are negative unless otherwise stated.     Objective    As of today 03/27/23  Temp:  [97.4 °F (36.3 °C)] 97.4 °F (36.3 °C)  Heart Rate:  [] 101  Resp:  [16] 16  BP: ()/(55-98) 108/77     Physical Exam  Vitals and nursing note reviewed.   HENT:      Head: Normocephalic and atraumatic.      Right Ear: External ear normal.      Left Ear: External ear normal.      Nose: Nose normal.      Mouth/Throat:      Mouth: Mucous membranes are dry.      Pharynx: Oropharynx is clear.   Eyes:      General: No scleral icterus.     Extraocular Movements: Extraocular movements intact.      Conjunctiva/sclera: Conjunctivae normal.      Pupils: Pupils are equal, round, and reactive to light.   Cardiovascular:      Rate and Rhythm: Normal rate. Rhythm irregular.      Pulses: Normal pulses.      Heart sounds: Normal heart sounds.   Pulmonary:      Effort: Pulmonary effort is normal.      Breath sounds: Normal breath sounds.      Comments: Decreased breath sounds at the bases otherwise clear  Abdominal:      General: Bowel sounds are normal. There is distension.      Palpations: Abdomen is soft.      Tenderness: There is no abdominal tenderness.      Comments: Soft nontender nondistended normal active bowel sounds   Musculoskeletal:         General: Normal range of motion.      Cervical back: Normal range of motion and neck supple.      Right lower leg: Edema present.       Left lower leg: Edema present.      Comments: Venous stasis changes bilateral lower extremities  Edema bilateral lower extremities improving   Skin:     General: Skin is warm and dry.      Capillary Refill: Capillary refill takes less than 2 seconds.      Coloration: Skin is not jaundiced.   Neurological:      General: No focal deficit present.      Mental Status: He is alert and oriented to person, place, and time.      Motor: Weakness present.   Psychiatric:         Mood and Affect: Mood normal.         Behavior: Behavior normal.           Results Review:  I have reviewed the labs, radiology results, and diagnostic studies.    Laboratory Data:   Results from last 7 days   Lab Units 03/27/23 0316 03/26/23  2303 03/26/23  0428 03/25/23  0822 03/25/23  0600   SODIUM mmol/L 128*  --  130*  --  134*   POTASSIUM mmol/L 3.5 3.4* 3.0*   < > 2.9*   CHLORIDE mmol/L 84*  --  85*  --  85*   CO2 mmol/L 38.0*  --  39.0*  --  41.0*   BUN mg/dL 15  --  16  --  17   CREATININE mg/dL 0.82  --  0.86  --  0.98   GLUCOSE mg/dL 84  --  81  --  79   CALCIUM mg/dL 7.8*  --  7.9*  --  8.0*   BILIRUBIN mg/dL 6.2*  --  6.8*  --  7.3*   ALK PHOS U/L 87  --  86  --  91   ALT (SGPT) U/L 36  --  39  --  50*   AST (SGOT) U/L 42*  --  39  --  56*   ANION GAP mmol/L 6.0  --  6.0  --  8.0    < > = values in this interval not displayed.     Estimated Creatinine Clearance: 94.9 mL/min (by C-G formula based on SCr of 0.82 mg/dL).  Results from last 7 days   Lab Units 03/27/23 0316 03/26/23  1032 03/25/23  0600   MAGNESIUM mg/dL 2.1 1.7 1.9         Results from last 7 days   Lab Units 03/27/23 0316 03/26/23  0428 03/25/23  0600 03/24/23  0545 03/23/23  0414   WBC 10*3/mm3 8.43 6.75 7.31 7.26 7.66   HEMOGLOBIN g/dL 16.3 16.5 16.7 16.6 16.6   HEMATOCRIT % 44.4 44.3 44.6 45.7 43.8   PLATELETS 10*3/mm3 108* 93* 101* 95* 99*     Results from last 7 days   Lab Units 03/20/23  1642   INR  1.86*       Culture Data:   No results found for: BLOODCX  No  results found for: URINECX  No results found for: RESPCX  No results found for: WOUNDCX  No results found for: STOOLCX  No components found for: BODYFLD    Radiology Data:   Imaging Results (Last 24 Hours)     ** No results found for the last 24 hours. **      Results for orders placed during the hospital encounter of 03/20/23    Adult Transthoracic Echo Complete W/ Cont if Necessary Per Protocol    Interpretation Summary  •  Left ventricular ejection fraction appears to be 21 - 25%.  •  The left ventricular cavity is dilated.  •  Left ventricular wall thickness is consistent with hypertrophy.  •  Left ventricular diastolic dysfunction is noted.  •  Moderately reduced right ventricular systolic function noted.  •  The right ventricular cavity is severely dilated.  •  Left atrial volume is severely increased.  •  The right atrial cavity is severely  dilated.  •  Estimated right ventricular systolic pressure from tricuspid regurgitation is mildly elevated (35-45 mmHg).      I have reviewed the patient's current medications.   Scheduled Meds:furosemide, 40 mg, Intravenous, BID  phytonadione (VITAMIN K) IVPB, 5 mg, Intravenous, Once  sodium chloride, 10 mL, Intravenous, Q12H  spironolactone, 25 mg, Oral, Daily      Continuous Infusions:amiodarone, 0.5 mg/min, Last Rate: 0.5 mg/min (03/27/23 0150)  DOBUTamine, 5 mcg/kg/min, Last Rate: Stopped (03/26/23 1625)  hold, 1 each  norepinephrine, 0.02-0.3 mcg/kg/min  sodium chloride, 50 mL/hr      PRN Meds:.•  acetaminophen **OR** [DISCONTINUED] acetaminophen **OR** acetaminophen  •  hold  •  hydrocortisone-bacitracin-zinc oxide-nystatin  •  magnesium sulfate **OR** magnesium sulfate **OR** magnesium sulfate  •  ondansetron  •  potassium chloride **OR** potassium chloride **OR** potassium chloride  •  sodium chloride  •  sodium chloride  Assessment/Plan     Principal Problem:    Atrial fibrillation with RVR (HCC)  Active Problems:    Atrial fibrillation (HCC)    Assessment and  Plan    Type II MI secondary to RVR and fluid overload  -Appreciate cardiology assistance!  Continue current management.  Continue fluid restriction 1.1 L/day and salt restriction.  Continue IV Lasix.  - 3/26 cardiac catheterization planned for tomorrow.  Patient will be n.p.o. after midnight.       Acute on chronic, systolic CHF  Will continue with lasix bid; monitor intake and output  - As above.  Improving on IV Lasix and dobutamine gtt.  Wean from dobutamine as tolerated.  Breathing steadily improving with diuresis.  Appreciate both cardiology and nephrology assistance with patient.  -3/27 scheduled for heart catheterization procedure today.  Cardiology currently finalizing arrangements.Procedure may be canceled if patient unable to lay flat    Atrial fibrillation with RVR  Cardiologist will be consulted and appreciate their assistance. High [id number] score; currently tolerating rivaroxaban therapy.  Cardiac rehab.  Troponin elevated but being followed by cardiology  Left heart cath once fluid status has been stabilized.  Continue Xarelto 20 mg daily  -3/26/2023 at 6:42 PM Patient has suffered from A-fib with RVR issues intermittently since this AM.  Issues not resolved with discontinuation of dobutamine drip.  Patient's blood pressures low normal so extremely hesitant to use beta-blocker or calcium channel blocker for rate control.  We will therefore load patient with IV amiodarone.  Also recommend dobutamine vasopressor as first-line to maintain MAP above 65.  If dobutamine unsuccessful, would then utilize Levophed as second line to keep MAP greater than 65.  Patient scheduled for cardiac catheterization in a.m. for evaluation of acute on chronic heart failure with reduced ejection fraction.  - 3/27/2023 spoke with cardiology, cardiology currently adjusting patient's atrial fibrillation maintenance medications.  Truly appreciate cardiology's assistance!    Hypertension  - currently suffering from hypotension  and on dobutamine gtt.  - O2 saturation is 94% 5 L    CODE STATUS full code  DVT prophylaxis Xarelto    Medical Decision Making  Number and Complexity of problems: 3 complex problems  Differential Diagnosis: Atrial fibrillation versus acute coronary syndrome    Conditions and Status:        Condition is unchanged.     [facility] Data  External documents reviewed: Hospital charts  My EKG interpretation:  EKG completed 3/20/2023.  Rhythm: atrial fibrillation   Rate: tachycardic   Clinical impression: abnormal ECG    My plain film interpretation:  Chest x-ray completed 3/20/2023.  IMPRESSION:  Right basilar pleural effusion, right lower lobe  compressive atelectasis, cardiomegaly and mild central vascular  Congestion.   Tests considered but not ordered: None     Decision rules/scores evaluated (example [id number], Wells, etc): [id number] high and on Rivaroxaban     Discussed with: Patient and agrees to current treatment plan     Treatment Plan  As above    Care Planning  Shared decision making: Patient updated on current status and informed of proposed care plan; is in agreement with plan  Code status and discussions: Full code    Disposition  Social Determinants of Health that impact treatment or disposition: N/A  I expect the patient to be discharged to home to be determined      I have utilized all available immediate resources to obtain, update, or review the patient's current medications (including all prescriptions, over-the-counter products, herbals, cannabis/cannabidiol products, and vitamin/mineral/dietary (nutritional) supplements).     I confirmed that the patient's Advance Care Plan is present, code status is documented, or surrogate decision maker is listed in the patient's medical record.    Discharge Planning: I expect patient to be discharged to home once stabilized after cardiac catheterization procedure 3/27/2023.  We will maintain current ICU status to patient weaned off dobutamine/amiodarone gtt.   Hopefully can transfer patient out of ICU in next 96 hours if weaning process successful.    39 minutes of critical care time spent on this patient by Dr. Lopez 3/27/23.    Cuco Lopez MD

## 2023-03-27 NOTE — CONSULTS
"Adult Nutrition  Assessment/PES    Patient Name:  Pavan Mccauley  YOB: 1955  MRN: [id number]  Admit Date:  3/20/2023    Assessment Date:  3/27/2023    Comments:  Nutrition Assessment:    Pt seen for LOS.  He is currently being managed for AFib/RVR and fluid overload; MI due to RVR and fluid overload;  A/C CHF and hyponatremia.  He is going for a Heart Cathtoday.    Pt reports a good appetite but based on limited documentation and staff report--his intake is suboptimal.    He remains on Lasix and wt is down from admit wt of 231# to current body wt of 200#.  Hyponatremia with Na+ of 128--Nephrology following.  Rd will continue to monitor POC and po intake.      Diet educational materials provided on Low sodium eating along with contact number provided.      RD will continue to monitor his POC and po intake.         Reason for Assessment     Row Name 03/27/23 1414          Reason for Assessment    Reason For Assessment per organizational policy     Diagnosis cardiac disease     Identified At Risk by Screening Criteria other (see comments)  LOS                Nutrition/Diet History     Row Name 03/27/23 1414          Nutrition/Diet History    Typical Intake (Food/Fluid/EN/PN) Pt reports that he is eating well with a good appetite.  He was eating well pta.  Per staff he is eating ok but seems to be \"picky.\"  He is going for a heart CAth today                Labs/Tests/Procedures/Meds     Row Name 03/27/23 1423          Labs/Procedures/Meds    Lab Results Reviewed reviewed, pertinent     Lab Results Comments Na 128; Platelets 108        Diagnostic Tests/Procedures    Diagnostic Test/Procedure Reviewed reviewed, pertinent     Diagnostic Test/Procedures Comments Cardiology        Medications    Pertinent Medications Reviewed reviewed, pertinent     Pertinent Medications Comments Lasix; Aldactone; Dobutamine; Nacl 50cc/hr                  Estimated/Assessed Needs - Anthropometrics     Row Name 03/27/23 " "1426          Anthropometrics    Height for Calculation 1.676 m (5' 6\")     Weight for Calculation 64.4 kg (142 lb)        Estimated/Assessed Needs    Additional Documentation Additional Requirements (Group);Fluid Requirements (Group);Modified Chris State Equation (Group);Estimated Calorie Needs (Group);KCAL/KG (Group);[employer] Equation (Group);Protein Requirements (Group)        Estimated Calorie Needs    Estimated Calorie Need Method [employer]        [employer] Je[employer] Equation    RMR ([employer] Je[employer] Equation) 1371.606     [employer] Je[employer] Activity Factors 1.4 - 1.5     Activity Factors ([employer] Je[employer]) [phone] - [phone]        Protein Requirements    Weight Used For Protein Calculations 64.4 kg (142 lb)     Est Protein Requirement Amount (gms/kg) 1.2 gm protein     Estimated Protein Requirements (gms/day) 77.29        Fluid Requirements    Fluid Requirements (mL/day) 2000     RDA Method (mL) 2000                Nutrition Prescription Ordered     Row Name 03/27/23 1428          Nutrition Prescription PO    Current PO Diet Regular     Common Modifiers Cardiac;Consistent Carbohydrate                Evaluation of Received Nutrient/Fluid Intake     Row Name 03/27/23 1430          PO Evaluation    Number of Days PO Intake Evaluated Insufficient Data     Number of Meals 2     % PO Intake 50%                   Problem/Interventions:   Problem 1     Row Name 03/27/23 1432          Nutrition Diagnoses Problem 1    Problem 1 Knowledge Deficit     Etiology (related to) Medical Diagnosis     Cardiac CHF     Signs/Symptoms (evidenced by) Potential Information Deficit                Problem 2     Row Name 03/27/23 1432          Nutrition Diagnoses Problem 2    Problem 2 Altered Nutrition Related to Labs     Etiology (related to) Medical Diagnosis     Hematological Thrombocytopenia     Metabolic/ICU Hyponatremia     Signs/Symptoms (evidenced by) Biochemical     Labs Reviewed Done     Specific Labs Noted " Platelets;Na+                Problem 3     Row Name 03/27/23 1434          Nutrition Diagnoses Problem 3    Problem 3 Inadequate Intake/Infusion     Inadequate Intake Type Oral     Macronutrient Kcal;Protein     Micronutrient Vitamin;Mineral     Etiology (related to) Factors Affecting Nutrition     Food Habit/Preferences Limited Food Preferences     Signs/Symptoms (evidenced by) PO Intake     Percent (%) intake recorded 50 %     Over number of meals 2                  Intervention Goal     Row Name 03/27/23 1435          Intervention Goal    General Provide information regarding MNT for treatment/condition;Reduce/improve symptoms     PO Tolerate PO;Meet estimated needs;Increase intake     Weight Appropriate weight loss                Nutrition Intervention     Row Name 03/27/23 1437          Nutrition Intervention    RD/Tech Action Follow Tx progress;Care plan reviewd;Encourage intake;Advise alternate selection;Advise available snack;Interview for preference;Menu provided                  Education/Evaluation     Row Name 03/27/23 1437          Education    Education Education topics;Advised regarding habits/behavior     Education Topics Basic nutrition;Protein     Advised Regarding Habits/Behavior Food choices;Food prep;Seasoning food        Monitor/Evaluation    Monitor Per protocol;I&O;PO intake;Pertinent labs;Weight;Skin status;Symptoms     Education Follow-up Reinforce PRN                 Electronically signed by:  Amalia Costa RD  03/27/23 14:41 CDT

## 2023-03-27 NOTE — PLAN OF CARE
Goal Outcome Evaluation:  Plan of Care Reviewed With: patient        Progress: improving  Outcome Evaluation: Patient went to the  Cath Lab today R radial approach; clean cath no interventions done. Air out of the TR band with some bruising noted, no hematoma noted. VSS. UOP adequate

## 2023-03-27 NOTE — PROGRESS NOTES
"[facility] Cardiology Progress Note   LOS: 7 days   Patient Care Team:  Dilcia Kapoor APRN as PCP - General (Family Medicine)    Chief Complaint:    Chief Complaint   Patient presents with   • Shortness of Breath   • Jaundice   • Edema     feet        Subjective     Interval History:   Patient Denies: chest pain, palpitations and syncope  Patient Complaints: shortness of air and edema  History taken from: patient chart RN     No acute events overnight.  Borderline hypotension noted.  He remains in A-fib on telemetry.  Some nonsustained VT noted.  Continues to diurese.  Lab work reviewed, kidney function and hemoglobin stable    Objective     Vital Sign Min/Max for last 24 hours  Temp  Min: 97.4 °F (36.3 °C)  Max: 97.4 °F (36.3 °C)   BP  Min: 82/67  Max: 121/89   Pulse  Min: 85  Max: 137   Resp  Min: 16  Max: 16   SpO2  Min: 87 %  Max: 96 %   Flow (L/min)  Min: 3  Max: 4   Weight  Min: 91.1 kg (200 lb 13.4 oz)  Max: 91.1 kg (200 lb 13.4 oz)     Flowsheet Rows    Flowsheet Row First Filed Value   Admission Height 170.2 cm (67\") Documented at 03/20/2023 1618   Admission Weight 105 kg (231 lb 14.8 oz) Documented at 03/20/2023 1648            03/25/23  0500 03/26/23  0500 03/27/23  0500   Weight: 92.3 kg (203 lb 7.8 oz) 92 kg (202 lb 13.2 oz) 91.1 kg (200 lb 13.4 oz)       Physical Exam:  Physical Exam  Vitals and nursing note reviewed.   Constitutional:       General: He is not in acute distress.     Appearance: He is obese. He is ill-appearing. He is not diaphoretic.      Interventions: Nasal cannula in place.   HENT:      Head: Normocephalic.      Right Ear: External ear normal.      Left Ear: External ear normal.   Eyes:      General: Lids are normal.      Pupils: Pupils are equal, round, and reactive to light.   Neck:      Thyroid: No thyromegaly.      Vascular: No carotid bruit or JVD.      Trachea: No tracheal deviation.   Cardiovascular:      Rate and Rhythm: Tachycardia present. Rhythm irregularly irregular. "      Heart sounds: Normal heart sounds. No murmur heard.    No friction rub. No gallop.   Pulmonary:      Effort: Pulmonary effort is normal. No respiratory distress.      Breath sounds: No stridor. Decreased breath sounds and rales present. No wheezing.   Chest:      Chest wall: No tenderness.   Abdominal:      General: Bowel sounds are normal. There is no distension.      Palpations: Abdomen is soft.      Tenderness: There is no abdominal tenderness.   Musculoskeletal:      Right lower le+ Edema present.      Left lower leg: 3+ Edema present.   Skin:     General: Skin is warm and dry.      Findings: No erythema or rash.   Neurological:      Mental Status: He is alert and oriented to person, place, and time.      Cranial Nerves: No cranial nerve deficit.      Deep Tendon Reflexes: Reflexes are normal and symmetric. Reflexes normal.   Psychiatric:         Behavior: Behavior normal.         Thought Content: Thought content normal.         Judgment: Judgment normal.          Results Review:     Results from last 7 days   Lab Units 23  0316 23  2303 23  0428 23  0822 23  0600   SODIUM mmol/L 128*  --  130*  --  134*   POTASSIUM mmol/L 3.5 3.4* 3.0*   < > 2.9*   CHLORIDE mmol/L 84*  --  85*  --  85*   CO2 mmol/L 38.0*  --  39.0*  --  41.0*   BUN mg/dL 15  --  16  --  17   CREATININE mg/dL 0.82  --  0.86  --  0.98   CALCIUM mg/dL 7.8*  --  7.9*  --  8.0*   BILIRUBIN mg/dL 6.2*  --  6.8*  --  7.3*   ALK PHOS U/L 87  --  86  --  91   ALT (SGPT) U/L 36  --  39  --  50*   AST (SGOT) U/L 42*  --  39  --  56*   GLUCOSE mg/dL 84  --  81  --  79    < > = values in this interval not displayed.       Estimated Creatinine Clearance: 94.9 mL/min (by C-G formula based on SCr of 0.82 mg/dL).    Results from last 7 days   Lab Units 23  0316 23  1032 23  0600   MAGNESIUM mg/dL 2.1 1.7 1.9             Results from last 7 days   Lab Units 23  0316 23  0428 23  0600  03/24/23  0545 03/23/23  0414   WBC 10*3/mm3 8.43 6.75 7.31 7.26 7.66   HEMOGLOBIN g/dL 16.3 16.5 16.7 16.6 16.6   PLATELETS 10*3/mm3 108* 93* 101* 95* 99*       Results from last 7 days   Lab Units 03/20/23  1642   INR  1.86*     Lab Results   Component Value Date    PROBNP 4,988.0 (H) 03/20/2023       I/O last 3 completed shifts:  In: 200 [P.O.:200]  Out: 4250 [Urine:4250]    Cardiographics:  ECG/EMG Results (last 24 hours)     Procedure Component Value Units Date/Time    ECG 12 Lead Drug Monitoring; Amiodarone [[id number]] Collected: 03/27/23 0003     Updated: 03/27/23 0750     QT Interval 378 ms      QTC Interval 502 ms     Narrative:      Test Reason : MEDICATION START  Blood Pressure :   */*   mmHG  Vent. Rate : 106 BPM     Atrial Rate :  94 BPM     P-R Int :   * ms          QRS Dur : 116 ms      QT Int : 378 ms       P-R-T Axes :   * 125 230 degrees     QTc Int : 502 ms    Atrial fibrillation with rapid ventricular response with premature ventricular or aberrantly conducted complexes  Right axis deviation  Incomplete right bundle branch block  Possible Right ventricular hypertrophy  ST & T wave abnormality, consider lateral ischemia  Abnormal ECG  When compared with ECG of 25-MAR-2023 01:02,  No significant change was found    Referred By: MD           Confirmed By: HERNESTO WALTON    ECG 12 Lead Drug Monitoring; Amiodarone [[id number]] Collected: 03/27/23 0549     Updated: 03/27/23 0750     QT Interval 402 ms      QTC Interval 518 ms     Narrative:      Test Reason : Drug Monitoring  Blood Pressure :   */*   mmHG  Vent. Rate : 100 BPM     Atrial Rate :  98 BPM     P-R Int :   * ms          QRS Dur : 114 ms      QT Int : 402 ms       P-R-T Axes :   * 131 233 degrees     QTc Int : 518 ms    Atrial fibrillation with premature ventricular or aberrantly conducted complexes  Right axis deviation  Incomplete right bundle branch block  Possible Right ventricular hypertrophy  Septal infarct , age undetermined  ST & T wave  abnormality, consider lateral ischemia  Prolonged QT  Abnormal ECG  When compared with ECG of 27-MAR-2023 00:03,  No significant change was found    Referred By:            Confirmed By: HERNESTO WALTON        Results for orders placed during the hospital encounter of 03/20/23    Adult Transthoracic Echo Complete W/ Cont if Necessary Per Protocol    Interpretation Summary  •  Left ventricular ejection fraction appears to be 21 - 25%.  •  The left ventricular cavity is dilated.  •  Left ventricular wall thickness is consistent with hypertrophy.  •  Left ventricular diastolic dysfunction is noted.  •  Moderately reduced right ventricular systolic function noted.  •  The right ventricular cavity is severely dilated.  •  Left atrial volume is severely increased.  •  The right atrial cavity is severely  dilated.  •  Estimated right ventricular systolic pressure from tricuspid regurgitation is mildly elevated (35-45 mmHg).      Imaging Results (Most Recent)     Procedure Component Value Units Date/Time    US Guided Vascular Access [[id number]] Collected: 03/22/23 1330     Updated: 03/23/23 1550    Narrative:      PROCEDURE: Ultrasound Guidance Vascular Access      Ordering physician(s): NIDHI CARDONA    Clinical Indication: Vascular access      Findings:    The right basilic vein was sonographically evaluated and  determined to be patent. Concurrent realtime ultrasound was used  to visualize needle entry into the right basilic vein  for  midline catheter placement and 2 permanent images acquired for  permanent recording and reporting.         Impression:      Impression:   As above.      Electronically signed by:  Tony Giron MD  3/23/2023 3:48 PM CDT  Workstation: [id number]    XR Chest 1 View [[id number]] Collected: 03/22/23 1327     Updated: 03/22/23 3530    Narrative:      EXAM:  XR CHEST 1 VIEW    TECHNIQUE:   Single frontal radiograph of the chest.    VIEWS:  1 view    CLINICAL HISTORY:   67 years  Male  SOB, I48.91  Unspecified atrial fibrillation N39.0  Urinary tract infection, site not specified R09.89 Other  specified symptoms and signs involving the circulatory and  respiratory systems    COMPARISON:   March 20, 2022 chest x-ray    FINDINGS:   Support lines and tubes: The right PICC tip overlies the right  axillary soft tissues.     Lungs:  Right lung base airspace opacities. The left lung is  clear.  Pleura: No pleural effusion. No pneumothorax.  Heart/mediastinum: The cardiac silhouette is enlarged.  Bones: No acute osseous findings.  Soft tissues: Unremarkable.      Impression:      1. Right pleural effusion and adjacent atelectasis with or  without pneumonia, increased in the interval.  2. Cardiomegaly.    Electronically signed by:  Issa Burch MD  3/22/2023 11:37  PM CDT Workstation: [id number]    US Abdomen Complete [[id number]] Collected: 03/22/23 1330     Updated: 03/22/23 1509    Narrative:      Ultrasound abdomen complete.    HISTORY: Abdominal distention.    Prior exam: CT chest March 20, 2023.      Impression:      Findings, impression: Limited examination, portable bedside  technique.    Echogenic sludge within the gallbladder.    Gallbladder  wall thickening. Normal liver.    Common bile duct and pancreas are obscured by overlying bowel  gas.    Normal spleen. Normal right kidney 9.6 x 5.1 x 5.1 cm.    Benign cyst left kidney. Left kidney is otherwise unremarkable  11.2 x 5.9 x 5.1 cm    Normal aorta and inferior vena cava.    Small amount of free fluid, ascites throughout the abdomen.    IMPRESSION: Ascites. Gallbladder wall thickening and echogenic  sludge within the gallbladder.    Electronically signed by:  Clem Balderrama MD  3/22/2023 3:07 PM CDT  Workstation: [id number]    IR Insert Midline Without Port Pump 5 Plus [[id number]] Resulted: 03/22/23 1338     Updated: 03/22/23 1338    Narrative:      This procedure was auto-finalized with no dictation required.    CT Abdomen Pelvis Without Contrast  [[id number]] Collected: 03/20/23 2011     Updated: 03/20/23 2138    Narrative:        lINDICATION: sob, pulm edema, I48.91 Unspecified atrial  fibrillation N39.0 Urinary tract infection, site not specified  R09.89 Other specified symptoms and signs involving the  circulatory and respiratory systems    EXAMINATION: CT CHEST, ABDOMEN AND PELVIS     TECHNIQUE:  Helically acquired images were obtained of the chest,  abdomen and pelvis. A radiation dose optimization technique was  used for this scan.    IV Contrast dosage and agent: None    Oral contrast: None.    COMPARISON: None.    FINDINGS:    ----Chest:   LUNGS, PLEURA: There is a right lower lobe consolidation  suggesting atelectasis or pneumonia. This is associated with a  large pleural effusion. This appears slightly loculated. Left  lung is clear. There is no left-sided effusion. No pneumothorax     SOFT TISSUES: There is diffuse subcutaneous edema within the  lower chest wall and upper abdominal wall suggesting anasarca.  There is no evidence of adenopathy or soft tissue gas.     HEART AND PERICARDIUM: Heart is prominent. There is no  pericardial effusion.     VESSELS: Main pulmonary artery is prominent measuring 4.2 cm  suggesting pulmonary arterial hypertension. The aorta is normal  in course and caliber on this noncontrast study.     MEDIASTINUM AND MICHAEL: No mediastinal or hilar adenopathy.  Esophagus is unremarkable. No hiatal hernia.     BONES: No lytic or blastic abnormality.  No fractures are  identified.     ----Abdomen/Pelvis:    LIVER: Homogeneous.  No focal lesion is identified on this  noncontrast study.    GALLBLADDER AND BILIARY TREE: There appears to be dependent  sludge. There are no calcified stones. The gallbladder is not  dilated.    No intra- or extrahepatic biliary ductal dilation.    PANCREAS: There is no evidence of mass or inflammatory process.    SPLEEN: Spleen is normal in size with no focal lesion.    ADRENAL GLANDS: Normal in  appearance.      KIDNEYS AND URETERS: Normal renal size and position.  There is no  evidence of renal mass or calcification. No hydronephrosis.    URINARY BLADDER: Unremarkable.     PERITONEUM: There is a small amount of ascites.     BOWEL: The appendix is normal. There is no bowel distention or  evidence of obstruction. There is questionable edema in the  region of the third portion of the duodenum. Possibility of mild  duodenitis  is not excluded.    VASCULAR STRUCTURES AND RETROPERITONEUM: Aorta and IVC are normal  in caliber.  There is no evidence of aneurysm. There is no  evidence of retroperitoneal lymphadenopathy, mass, or fluid  collection    REPRODUCTIVE ORGANS: There is no evidence of pelvic mass.     ABDOMINAL WALL: There is diffuse anasarca. There is no hernia.     BONES: There is no acute fracture or focal bone lesion.       Impression:      1.  Moderate to large right effusion which appears loculated.  There is associated right lower lobe consolidation suggesting  pneumonia.  2.  Cardiomegaly.  3.  Prominent main pulmonary artery suggesting pulmonary arterial  hypertension.  4.  Small amount of ascites. There appears to be some edema and  wall thickening associated with the third portion of the duodenum  suggesting duodenitis.     Electronically signed by:  Krzysztof Gomez MD  3/20/2023 9:36 PM CDT  Workstation: [id number]    CT Chest Without Contrast Diagnostic [[id number]] Collected: 03/20/23 2011     Updated: 03/20/23 2137    Narrative:        lINDICATION: sob, pulm edema, I48.91 Unspecified atrial  fibrillation N39.0 Urinary tract infection, site not specified  R09.89 Other specified symptoms and signs involving the  circulatory and respiratory systems    EXAMINATION: CT CHEST, ABDOMEN AND PELVIS     TECHNIQUE:  Helically acquired images were obtained of the chest,  abdomen and pelvis. A radiation dose optimization technique was  used for this scan.    IV Contrast dosage and agent: None    Oral contrast:  None.    COMPARISON: None.    FINDINGS:    ----Chest:   LUNGS, PLEURA: There is a right lower lobe consolidation  suggesting atelectasis or pneumonia. This is associated with a  large pleural effusion. This appears slightly loculated. Left  lung is clear. There is no left-sided effusion. No pneumothorax     SOFT TISSUES: There is diffuse subcutaneous edema within the  lower chest wall and upper abdominal wall suggesting anasarca.  There is no evidence of adenopathy or soft tissue gas.     HEART AND PERICARDIUM: Heart is prominent. There is no  pericardial effusion.     VESSELS: Main pulmonary artery is prominent measuring 4.2 cm  suggesting pulmonary arterial hypertension. The aorta is normal  in course and caliber on this noncontrast study.     MEDIASTINUM AND MICHAEL: No mediastinal or hilar adenopathy.  Esophagus is unremarkable. No hiatal hernia.     BONES: No lytic or blastic abnormality.  No fractures are  identified.     ----Abdomen/Pelvis:    LIVER: Homogeneous.  No focal lesion is identified on this  noncontrast study.    GALLBLADDER AND BILIARY TREE: There appears to be dependent  sludge. There are no calcified stones. The gallbladder is not  dilated.    No intra- or extrahepatic biliary ductal dilation.    PANCREAS: There is no evidence of mass or inflammatory process.    SPLEEN: Spleen is normal in size with no focal lesion.    ADRENAL GLANDS: Normal in appearance.      KIDNEYS AND URETERS: Normal renal size and position.  There is no  evidence of renal mass or calcification. No hydronephrosis.    URINARY BLADDER: Unremarkable.     PERITONEUM: There is a small amount of ascites.     BOWEL: The appendix is normal. There is no bowel distention or  evidence of obstruction. There is questionable edema in the  region of the third portion of the duodenum. Possibility of mild  duodenitis  is not excluded.    VASCULAR STRUCTURES AND RETROPERITONEUM: Aorta and IVC are normal  in caliber.  There is no evidence of  aneurysm. There is no  evidence of retroperitoneal lymphadenopathy, mass, or fluid  collection    REPRODUCTIVE ORGANS: There is no evidence of pelvic mass.     ABDOMINAL WALL: There is diffuse anasarca. There is no hernia.     BONES: There is no acute fracture or focal bone lesion.       Impression:      1.  Moderate to large right effusion which appears loculated.  There is associated right lower lobe consolidation suggesting  pneumonia.  2.  Cardiomegaly.  3.  Prominent main pulmonary artery suggesting pulmonary arterial  hypertension.  4.  Small amount of ascites. There appears to be some edema and  wall thickening associated with the third portion of the duodenum  suggesting duodenitis.     Electronically signed by:  Krzysztof Gomez MD  3/20/2023 9:35 PM CDT  Workstation: [id number]    XR Chest 1 View [[id number]] Collected: 03/20/23 1651     Updated: 03/20/23 1828    Narrative:      EXAMINATION: XR CHEST 1 VIEW    COMPARISON: Chest 2 views November 5, 2020    INDICATION: tachycardia    FINDINGS: Portable AP upright imaging of the chest is submitted -  1 view. A moderate size basilar right pleural effusion is new.  Some degree of right basilar compressive atelectasis is  suspected. Heart size is difficult to assess but appears at least  mildly enlarged. Mild central vascular prominence is noted but no  subpleural interstitial edema is evident. No pneumothorax.       Impression:      Right basilar pleural effusion, right lower lobe  compressive atelectasis, cardiomegaly and mild central vascular  congestion.    Electronically signed by:  Hollis Sousa MD  3/20/2023 6:26 PM CDT  Workstation: [id number]          CT Abdomen Pelvis Without Contrast    Result Date: 3/20/2023  1.  Moderate to large right effusion which appears loculated. There is associated right lower lobe consolidation suggesting pneumonia. 2.  Cardiomegaly. 3.  Prominent main pulmonary artery suggesting pulmonary arterial hypertension. 4.  Small amount of  ascites. There appears to be some edema and wall thickening associated with the third portion of the duodenum suggesting duodenitis. Electronically signed by:  Krzysztof Gomez MD  3/20/2023 9:36 PM CDT Workstation: [id number]    CT Chest Without Contrast Diagnostic    Result Date: 3/20/2023  1.  Moderate to large right effusion which appears loculated. There is associated right lower lobe consolidation suggesting pneumonia. 2.  Cardiomegaly. 3.  Prominent main pulmonary artery suggesting pulmonary arterial hypertension. 4.  Small amount of ascites. There appears to be some edema and wall thickening associated with the third portion of the duodenum suggesting duodenitis. Electronically signed by:  Krzysztof Gomez MD  3/20/2023 9:35 PM CDT Workstation: [id number]    US Abdomen Complete    Result Date: 3/22/2023  Findings, impression: Limited examination, portable bedside technique. Echogenic sludge within the gallbladder. Gallbladder  wall thickening. Normal liver. Common bile duct and pancreas are obscured by overlying bowel gas. Normal spleen. Normal right kidney 9.6 x 5.1 x 5.1 cm. Benign cyst left kidney. Left kidney is otherwise unremarkable 11.2 x 5.9 x 5.1 cm Normal aorta and inferior vena cava. Small amount of free fluid, ascites throughout the abdomen. IMPRESSION: Ascites. Gallbladder wall thickening and echogenic sludge within the gallbladder. Electronically signed by:  Clem Balderrama MD  3/22/2023 3:07 PM CDT Workstation: [id number]    US Guided Vascular Access    Result Date: 3/23/2023  Impression: As above. Electronically signed by:  Tony Giron MD  3/23/2023 3:48 PM CDT Workstation: [id number]    XR Chest 1 View    Result Date: 3/22/2023  1. Right pleural effusion and adjacent atelectasis with or without pneumonia, increased in the interval. 2. Cardiomegaly. Electronically signed by:  Issa Burch MD  3/22/2023 11:37 PM CDT Workstation: [id number]    XR Chest 1 View    Result Date: 3/20/2023  Right basilar pleural  effusion, right lower lobe compressive atelectasis, cardiomegaly and mild central vascular congestion. Electronically signed by:  Hollis Sousa MD  3/20/2023 6:26 PM CDT Workstation: [id number]      Medication Review:     Current Facility-Administered Medications:   •  acetaminophen (TYLENOL) tablet 650 mg, 650 mg, Oral, Q4H PRN **OR** [DISCONTINUED] acetaminophen (TYLENOL) 160 MG/5ML solution 650 mg, 650 mg, Oral, Q4H PRN **OR** acetaminophen (TYLENOL) suppository 650 mg, 650 mg, Rectal, Q4H PRN, Magen Lagos MD  •  [COMPLETED] amiodarone in dextrose 5% (NEXTERONE) loading dose 150mg/100mL, 150 mg, Intravenous, Once, 150 mg at 23 1943 **FOLLOWED BY** [] amiodarone 360 mg in 200 mL D5W infusion, 1 mg/min, Intravenous, Continuous, Stopped at 23 0147 **FOLLOWED BY** amiodarone 360 mg in 200 mL D5W infusion, 0.5 mg/min, Intravenous, Continuous, Cuco Lopez MD, Last Rate: 16.67 mL/hr at 23 0150, 0.5 mg/min at 23 0150  •  DOBUTamine (DOBUTREX) 1 mg/mL infusion, 5 mcg/kg/min, Intravenous, Continuous, Nannette Walters MD, Held at 23 1625  •  furosemide (LASIX) injection 40 mg, 40 mg, Intravenous, BID, Luis Alcantar MD, 40 mg at 23 0859  •  Hold medication, 1 each, Does not apply, Continuous PRN, Víctor London MD  •  hydrocortisone-bacitracin-zinc oxide-nystatin (MAGIC BARRIER) ointment 1 application, 1 application, Topical, Q4H PRN, Gurvinder David DO, 1 application at 23 1740  •  Magnesium Sulfate - Total Dose 10 grams - Magnesium 1 or Less, 2 g, Intravenous, PRN **OR** Magnesium Sulfate - Total Dose 6 grams - Magnesium 1.1 - 1.5, 2 g, Intravenous, PRN **OR** Magnesium Sulfate - Total Dose 4 grams - Magnesium 1.6 - 1.9, 4 g, Intravenous, PRN, Stephanie Traore, APRN, Last Rate: 25 mL/hr at 23 1150, 4 g at 23 1150  •  norepinephrine (LEVOPHED) 8 mg in 250 mL NS infusion (premix), 0.02-0.3 mcg/kg/min, Intravenous, Titrated,  Cuco Lopez MD  •  ondansetron (ZOFRAN) injection 4 mg, 4 mg, Intravenous, Q6H PRN, Magen Lagos MD, 4 mg at 03/22/23 0208  •  phytonadione (AQUA-MEPHYTON, VITAMIN K) 5 mg in sodium chloride 0.9 % 50 mL IVPB, 5 mg, Intravenous, Once, Víctor London MD, Held at 03/23/23 1035  •  potassium chloride (MICRO-K) CR capsule 40 mEq, 40 mEq, Oral, PRN, 40 mEq at 03/26/23 0528 **OR** potassium chloride (KLOR-CON) packet 40 mEq, 40 mEq, Oral, PRN **OR** potassium chloride 10 mEq in 100 mL IVPB, 10 mEq, Intravenous, Q1H PRN, Víctor London MD, Last Rate: 100 mL/hr at 03/25/23 0335, 10 mEq at 03/25/23 0335  •  sodium chloride 0.9 % flush 10 mL, 10 mL, Intravenous, Q12H, Magen Lagos MD, 10 mL at 03/27/23 0859  •  sodium chloride 0.9 % flush 10 mL, 10 mL, Intravenous, PRN, Magen Lagos MD  •  sodium chloride 0.9 % infusion 40 mL, 40 mL, Intravenous, PRN, Magen Lagos MD  •  sodium chloride 0.9 % infusion, 50 mL/hr, Intravenous, Continuous, Luis Alcantar MD  •  spironolactone (ALDACTONE) tablet 25 mg, 25 mg, Oral, Daily, Luis Alcantar MD, 25 mg at 03/27/23 0859    Assessment & Plan       Atrial fibrillation with RVR (HCC)    Atrial fibrillation (HCC)    #1.  Atrial fibrillation with RVR.  Has history of paroxysmal atrial fibrillation/SVT.  Has been noncompliant with medication for months.  Presented with worsening shortness of breath, weight gain, edema and chest pain.  EKG showed atrial fibrillation with RVR.  Found to be in heart failure with reduced LVEF.   -Amiodarone 0.5 mg a minute  -We will plan to add beta-blocker as condition allows, can consider digoxin  -Keep mag and potassium normal, has been replaced.  Replacement protocols in place  -Xarelto on hold for cardiac catheterization    #2.  Biventricular heart failure, LVEF reduced at 21 to 25%, moderately reduced RVEF.   -Dobutamine is off  -Nephrology is on board and helping manage hypervolemia and hyponatremia.     NYHA Class IV, Stage D. Patient is currently nearing euvolemia, with borderline perfusion and hemodynamics    BETA-BLOCKER: Consider adding as condition allows  ACE/ARB: Indicated  ENTRESTO:  Indicated  DIURETIC: Lasix 40 mg IV twice daily  SGLt2: Indicated  ALDOSTERONE ANTAGONIST: Indicated  IMDUR/HYDRALAZINE: N/A  DIGOXIN: N/A  Fluid restriction: 1100 ml  Sodium restriction:2 grams     Cardiac Rehab:  Indicated  ICD:  Indicated    ADHF:  This admission     #3.  NSTEMI:  High sensitivity troponin at 70, 65, 74, and 79.  Lactic acid 3.8 on arrival.  EKG showing atrial fibrillation with RVR at 149 bpm.    Patient presented with A-fib RVR, metabolic acidosis CHF.  Patient previously has not had an invasive cardiac work-up, noted to have nuclear stress test in 2020, low risk.  Patient be recommended for definitive evaluation of troponin elevation and reduced EF.  Respiratory/fluid status has improved, able to lay flat today.  Extensive discussion with patient today regarding medication compliance.  He previously had stopped taking all of his medications for 6 months due to medication affordability.  We will plan to consult case management and place patient on generic medications to assist with drug affordability.   -LHC around 1300 today with Dr. Charles. NPO after breakfast.  -Xarelto has been held    [facility] Pre-Op:     Invasive coronary angiography was recommended to the patient. The patient denies  bleeding issues. The patient denies use of antiplatelet agents.  The patient reports  CKD. The patient denies contrast allergy. The patient denies use of diabetes medications.        The indications, risks/benefits and alternatives of diagnostic left heart cardiac catheterization, angiography, conscious sedation, and possible blood transfusion were discussed in detail with the patient. The potential complications of 1/2000 chance of death, 1/1000 chance of heart attack or stroke, 1/500 chance of bleeding or clotting of the  femoral artery, and 1/500 chance of allergic reaction to contrast were discussed. We also reviewed possible complications of infection and kidney dysfunction. If PCI were performed and intra-coronary stents indicated, we discussed the details about ROB. This included a review of the risks of the infrequent, but relatively higher incidence of late thrombosis with ROB. The importance of maintaining a consistent daily regimen of aspirin and an additional anti-platelet agent for as long as directed after implantation was emphasized. No contraindications were found. The patient  appeared to understand and agreed to the above.  -Left heart catheterization, Coronary angiography, Graft angiography, In-situ LIMA angiography, Left ventriculography, Intravascular ultrasound, Optical coherence tomography, Flow wire, Balloon Angioplasty, Coronary stent, Graft stent, Aortography, Iliofemoral angiography, Device closure, femoral artery, Intra-aortic balloon pump, Impella LV assist device implantation, Transvenous pacemaker, Pericardiocentesis and Subclavian angiography     ASA Class: III  Mallampati Score: II        Contraindications to DAPT: none (will need to monitor thrombocytopenia)       Plan for disposition: Continue CCU.  We will continue to follow            This document has been electronically signed by ARY Garcia on March 27, 2023 10:04 CDT     Electronically signed by ARY Garcia, 03/27/23, 10:04 AM CDT.

## 2023-03-27 NOTE — PROGRESS NOTES
"  [facility]  [address] [place] [address]. [id number]  T - [phone]  F - [phone]     Progress Note          PATIENT  DEMOGRAPHICS   PATIENT NAME: Pavan Mccauley                      PHYSICIAN: Luis Alcantar MD  : [id number]  MRN: [id number]   LOS: 7 days    Patient Care Team:  Dilcia Kapoor APRN as PCP - General (Family Medicine)  Subjective   SUBJECTIVE   Doing better. Edema has improved. Dobutamine is now off. bp is stable         Objective   OBJECTIVE   Vital Signs  Temp:  [97.4 °F (36.3 °C)] 97.4 °F (36.3 °C)  Heart Rate:  [] 103  Resp:  [16] 16  BP: ()/(55-98) 114/77    Flowsheet Rows    Flowsheet Row First Filed Value   Admission Height 170.2 cm (67\") Documented at 2023 1618   Admission Weight 105 kg (231 lb 14.8 oz) Documented at 2023 1648           I/O last 3 completed shifts:  In: 200 [P.O.:200]  Out: 4250 [Urine:4250]    PHYSICAL EXAM    Physical Exam  Constitutional:       Appearance: He is well-developed.   HENT:      Head: Normocephalic.   Eyes:      Pupils: Pupils are equal, round, and reactive to light.   Cardiovascular:      Rate and Rhythm: Normal rate and regular rhythm.      Heart sounds: Normal heart sounds.   Pulmonary:      Effort: Pulmonary effort is normal.      Breath sounds: Normal breath sounds.   Abdominal:      General: Bowel sounds are normal.      Palpations: Abdomen is soft.   Musculoskeletal:         General: Swelling present.   Skin:     Coloration: Skin is not jaundiced.   Neurological:      General: No focal deficit present.      Mental Status: He is alert and oriented to person, place, and time.         RESULTS   Results Review:    Results from last 7 days   Lab Units 23  0316 23  2303 23  0428 23  0822 23  0600   SODIUM mmol/L 128*  --  130*  --  134*   POTASSIUM mmol/L 3.5 3.4* 3.0*   < > 2.9*   CHLORIDE mmol/L 84*  --  85*  --  85*   CO2 mmol/L 38.0*  --  39.0*  --  41.0*   BUN mg/dL " 15  --  16  --  17   CREATININE mg/dL 0.82  --  0.86  --  0.98   CALCIUM mg/dL 7.8*  --  7.9*  --  8.0*   BILIRUBIN mg/dL 6.2*  --  6.8*  --  7.3*   ALK PHOS U/L 87  --  86  --  91   ALT (SGPT) U/L 36  --  39  --  50*   AST (SGOT) U/L 42*  --  39  --  56*   GLUCOSE mg/dL 84  --  81  --  79    < > = values in this interval not displayed.       Estimated Creatinine Clearance: 94.9 mL/min (by C-G formula based on SCr of 0.82 mg/dL).    Results from last 7 days   Lab Units 03/27/23  0316 03/26/23  1032 03/25/23  0600   MAGNESIUM mg/dL 2.1 1.7 1.9             Results from last 7 days   Lab Units 03/27/23  0316 03/26/23  0428 03/25/23  0600 03/24/23  0545 03/23/23  0414   WBC 10*3/mm3 8.43 6.75 7.31 7.26 7.66   HEMOGLOBIN g/dL 16.3 16.5 16.7 16.6 16.6   PLATELETS 10*3/mm3 108* 93* 101* 95* 99*       Results from last 7 days   Lab Units 03/20/23  1642   INR  1.86*         Imaging Results (Last 24 Hours)     ** No results found for the last 24 hours. **           MEDICATIONS    furosemide, 40 mg, Intravenous, BID  phytonadione (VITAMIN K) IVPB, 5 mg, Intravenous, Once  sodium chloride, 10 mL, Intravenous, Q12H  spironolactone, 25 mg, Oral, Daily      amiodarone, 0.5 mg/min, Last Rate: 0.5 mg/min (03/27/23 0150)  DOBUTamine, 5 mcg/kg/min, Last Rate: Stopped (03/26/23 1625)  hold, 1 each  norepinephrine, 0.02-0.3 mcg/kg/min  sodium chloride, 50 mL/hr, Last Rate: 50 mL/hr (03/27/23 1127)        Assessment & Plan   ASSESSMENT / PLAN      Atrial fibrillation with RVR (HCC)    Atrial fibrillation (HCC)    NSTEMI, initial episode of care ([facility])    Biventricular CHF (congestive heart failure) ([facility])       1.  RUTH- baseline creatinine looks to be around 1.1,  and peak up to 1.86.  Etiology of RUTH could be secondary to cardio renal syndrome versus poor perfusion with atrial fibrillation.  He has significant volume overload apparent on exam. Cr is now <1     -good diuretic response. bp remained stable on aldactone.  added aldactone  because of marked k loss / low EF. Wt is down to 200 from 232 lbs. Had am lasix and will avoid this afternoon and start po lasix from tomorrow am.      2.  Hyponatremia- likely secondary to hypervolemia, Lasix as above. Na stable     3.  A-fib with RVR- cardiology managing     4.  Acute on chronic systolic heart failure- clinically better     5.  Transaminitis / elevated bilirubin check for direct and indirect bilirubin     6.  Pleural effusion- no plan for thoracentesis    7. Hypokalemia / hypomagnesemia - replaced 40meq po x 2               This document has been electronically signed by Luis Alcantar MD on March 27, 2023 11:46 CDT

## 2023-03-27 NOTE — PLAN OF CARE
Goal Outcome Evaluation:  Plan of Care Reviewed With: patient        Progress: improving  Outcome Evaluation: Pt was in AFib RVR at start of night shift, HR up to 140s.  Pt still in AFib but HR now 80s-100s on Amio gtt..  Pt has been NPO since 0000.  Pts UOP has been adequate, still dark leigh ann but no obvious blood in his urine.  Pts BP has maintained WNL without need for pressors.  Pts AM labs show K 3.5, , and Ca 7.8. Pt remained free of any SVT runs.  Pt anxious about possible AM procedure but wants to hurry up and get things over so he can get up and get better.  Will contiue to monitor.

## 2023-03-28 LAB
ALBUMIN SERPL-MCNC: 2.2 G/DL (ref 3.5–5.2)
ALBUMIN/GLOB SERPL: 0.9 G/DL
ALP SERPL-CCNC: 92 U/L (ref 39–117)
ALT SERPL W P-5'-P-CCNC: 32 U/L (ref 1–41)
ANION GAP SERPL CALCULATED.3IONS-SCNC: 5 MMOL/L (ref 5–15)
AST SERPL-CCNC: 38 U/L (ref 1–40)
BASOPHILS # BLD AUTO: 0.02 10*3/MM3 (ref 0–0.2)
BASOPHILS NFR BLD AUTO: 0.2 % (ref 0–1.5)
BILIRUB SERPL-MCNC: 6.6 MG/DL (ref 0–1.2)
BUN SERPL-MCNC: 16 MG/DL (ref 8–23)
BUN/CREAT SERPL: 19.3 (ref 7–25)
CALCIUM SPEC-SCNC: 7.8 MG/DL (ref 8.6–10.5)
CHLORIDE SERPL-SCNC: 87 MMOL/L (ref 98–107)
CO2 SERPL-SCNC: 35 MMOL/L (ref 22–29)
CREAT SERPL-MCNC: 0.83 MG/DL (ref 0.76–1.27)
DEPRECATED RDW RBC AUTO: 59.1 FL (ref 37–54)
EGFRCR SERPLBLD CKD-EPI 2021: 97.1 ML/MIN/1.73
EOSINOPHIL # BLD AUTO: 0.14 10*3/MM3 (ref 0–0.4)
EOSINOPHIL NFR BLD AUTO: 1.7 % (ref 0.3–6.2)
ERYTHROCYTE [DISTWIDTH] IN BLOOD BY AUTOMATED COUNT: 20.3 % (ref 12.3–15.4)
GLOBULIN UR ELPH-MCNC: 2.4 GM/DL
GLUCOSE SERPL-MCNC: 84 MG/DL (ref 65–99)
HCT VFR BLD AUTO: 44.5 % (ref 37.5–51)
HGB BLD-MCNC: 16.1 G/DL (ref 13–17.7)
IMM GRANULOCYTES # BLD AUTO: 0.04 10*3/MM3 (ref 0–0.05)
IMM GRANULOCYTES NFR BLD AUTO: 0.5 % (ref 0–0.5)
LYMPHOCYTES # BLD AUTO: 0.93 10*3/MM3 (ref 0.7–3.1)
LYMPHOCYTES NFR BLD AUTO: 11.3 % (ref 19.6–45.3)
MAGNESIUM SERPL-MCNC: 1.9 MG/DL (ref 1.6–2.4)
MCH RBC QN AUTO: 31 PG (ref 26.6–33)
MCHC RBC AUTO-ENTMCNC: 36.2 G/DL (ref 31.5–35.7)
MCV RBC AUTO: 85.7 FL (ref 79–97)
MONOCYTES # BLD AUTO: 1.41 10*3/MM3 (ref 0.1–0.9)
MONOCYTES NFR BLD AUTO: 17.1 % (ref 5–12)
NEUTROPHILS NFR BLD AUTO: 5.7 10*3/MM3 (ref 1.7–7)
NEUTROPHILS NFR BLD AUTO: 69.2 % (ref 42.7–76)
NRBC BLD AUTO-RTO: 0 /100 WBC (ref 0–0.2)
PLATELET # BLD AUTO: 113 10*3/MM3 (ref 140–450)
PMV BLD AUTO: 9 FL (ref 6–12)
POTASSIUM SERPL-SCNC: 3.9 MMOL/L (ref 3.5–5.2)
PROT SERPL-MCNC: 4.6 G/DL (ref 6–8.5)
QT INTERVAL: 394 MS
QTC INTERVAL: 525 MS
RBC # BLD AUTO: 5.19 10*6/MM3 (ref 4.14–5.8)
SODIUM SERPL-SCNC: 127 MMOL/L (ref 136–145)
WBC NRBC COR # BLD: 8.24 10*3/MM3 (ref 3.4–10.8)

## 2023-03-28 PROCEDURE — 97162 PT EVAL MOD COMPLEX 30 MIN: CPT

## 2023-03-28 PROCEDURE — 85025 COMPLETE CBC W/AUTO DIFF WBC: CPT | Performed by: INTERNAL MEDICINE

## 2023-03-28 PROCEDURE — 93010 ELECTROCARDIOGRAM REPORT: CPT | Performed by: INTERNAL MEDICINE

## 2023-03-28 PROCEDURE — 25010000002 AMIODARONE IN DEXTROSE 5% 360-4.14 MG/200ML-% SOLUTION

## 2023-03-28 PROCEDURE — 99232 SBSQ HOSP IP/OBS MODERATE 35: CPT | Performed by: INTERNAL MEDICINE

## 2023-03-28 PROCEDURE — 93005 ELECTROCARDIOGRAM TRACING: CPT | Performed by: INTERNAL MEDICINE

## 2023-03-28 PROCEDURE — 25010000002 DIGOXIN PER 500 MCG

## 2023-03-28 PROCEDURE — 97166 OT EVAL MOD COMPLEX 45 MIN: CPT

## 2023-03-28 PROCEDURE — 80053 COMPREHEN METABOLIC PANEL: CPT | Performed by: INTERNAL MEDICINE

## 2023-03-28 PROCEDURE — 83735 ASSAY OF MAGNESIUM: CPT | Performed by: INTERNAL MEDICINE

## 2023-03-28 PROCEDURE — 0 MAGNESIUM SULFATE 4 GM/100ML SOLUTION: Performed by: INTERNAL MEDICINE

## 2023-03-28 RX ORDER — AMIODARONE HYDROCHLORIDE 200 MG/1
200 TABLET ORAL 2 TIMES DAILY WITH MEALS
Status: DISCONTINUED | OUTPATIENT
Start: 2023-03-28 | End: 2023-04-04 | Stop reason: HOSPADM

## 2023-03-28 RX ORDER — FUROSEMIDE 20 MG/1
20 TABLET ORAL
Status: DISCONTINUED | OUTPATIENT
Start: 2023-03-29 | End: 2023-04-04 | Stop reason: HOSPADM

## 2023-03-28 RX ORDER — DIGOXIN 0.25 MG/ML
125 INJECTION INTRAMUSCULAR; INTRAVENOUS ONCE
Status: COMPLETED | OUTPATIENT
Start: 2023-03-28 | End: 2023-03-28

## 2023-03-28 RX ORDER — DIGOXIN 125 MCG
125 TABLET ORAL
Status: DISCONTINUED | OUTPATIENT
Start: 2023-03-28 | End: 2023-04-04 | Stop reason: HOSPADM

## 2023-03-28 RX ORDER — METOPROLOL SUCCINATE 25 MG
12.5 TABLET, EXTENDED RELEASE 24 HR ORAL
Status: DISCONTINUED | OUTPATIENT
Start: 2023-03-28 | End: 2023-04-04 | Stop reason: HOSPADM

## 2023-03-28 RX ADMIN — Medication 12.5 MG: at 08:17

## 2023-03-28 RX ADMIN — AMIODARONE HYDROCHLORIDE 200 MG: 200 TABLET ORAL at 17:06

## 2023-03-28 RX ADMIN — DIGOXIN 125 MCG: 0.25 INJECTION INTRAMUSCULAR; INTRAVENOUS at 03:38

## 2023-03-28 RX ADMIN — POTASSIUM CHLORIDE 40 MEQ: 750 CAPSULE, EXTENDED RELEASE ORAL at 01:07

## 2023-03-28 RX ADMIN — RIVAROXABAN 20 MG: 10 TABLET, FILM COATED ORAL at 17:06

## 2023-03-28 RX ADMIN — TOLVAPTAN 15 MG: 30 TABLET ORAL at 11:33

## 2023-03-28 RX ADMIN — Medication 10 ML: at 22:18

## 2023-03-28 RX ADMIN — SPIRONOLACTONE 25 MG: 25 TABLET ORAL at 08:00

## 2023-03-28 RX ADMIN — DIGOXIN 125 MCG: 125 TABLET ORAL at 11:33

## 2023-03-28 RX ADMIN — FUROSEMIDE 40 MG: 40 TABLET ORAL at 08:00

## 2023-03-28 RX ADMIN — MAGNESIUM SULFATE 4 G: 4 INJECTION INTRAVENOUS at 08:00

## 2023-03-28 RX ADMIN — AMIODARONE HYDROCHLORIDE 200 MG: 200 TABLET ORAL at 09:21

## 2023-03-28 RX ADMIN — AMIODARONE HYDROCHLORIDE 0.5 MG/MIN: 1.8 INJECTION, SOLUTION INTRAVENOUS at 03:47

## 2023-03-28 RX ADMIN — Medication 10 ML: at 08:01

## 2023-03-28 NOTE — PLAN OF CARE
Goal Outcome Evaluation:   Pt has remained in A-fib RVR overnight. HR has been between 100-120. Amio drip still infusing. UOP adequate. Remains on 3L. Pt is hoping to get out of bed today. VSS stable. Will continue to monitor.

## 2023-03-28 NOTE — PROGRESS NOTES
"[facility] [facility] Progress Note   LOS: 8 days   Patient Care Team:  Dilcia Kapoor APRN as PCP - General (Family Medicine)    Chief Complaint:    Chief Complaint   Patient presents with    Shortness of Breath    Jaundice    Edema     feet        Subjective     Interval History:   Patient Denies: chest pain, palpitations and syncope  Patient Complaints: shortness of air and edema  History taken from: patient chart RN     No acute events overnight. He remains in A-fib on telemetry with improved rate today. LHC yesterday negative for obstructive CAD. Radial cath site wnl. H/h and kidney function stable.  Transition to oral amiodarone today and add toprol xl and digoxin. PT/OT ordered.     Objective     Vital Sign Min/Max for last 24 hours  Temp  Min: 97.8 °F (36.6 °C)  Max: 98.1 °F (36.7 °C)   BP  Min: 88/71  Max: 120/83   Pulse  Min: 97  Max: 127   Resp  Min: 18  Max: 20   SpO2  Min: 89 %  Max: 96 %   Flow (L/min)  Min: 3  Max: 3   Weight  Min: 91.7 kg (202 lb 2.6 oz)  Max: 91.7 kg (202 lb 2.6 oz)     Flowsheet Rows      Flowsheet Row First Filed Value   Admission Height 170.2 cm (67\") Documented at 03/20/2023 1618   Admission Weight 105 kg (231 lb 14.8 oz) Documented at 03/20/2023 1648              03/26/23  0500 03/27/23  0500 03/28/23  0544   Weight: 92 kg (202 lb 13.2 oz) 91.1 kg (200 lb 13.4 oz) 91.7 kg (202 lb 2.6 oz)       Physical Exam:  Physical Exam  Vitals and nursing note reviewed.   Constitutional:       General: He is not in acute distress.     Appearance: He is obese. He is ill-appearing. He is not diaphoretic.      Interventions: Nasal cannula in place.   HENT:      Head: Normocephalic.      Right Ear: External ear normal.      Left Ear: External ear normal.   Eyes:      General: Lids are normal.      Pupils: Pupils are equal, round, and reactive to light.   Neck:      Thyroid: No thyromegaly.      Vascular: No carotid bruit or JVD.      Trachea: No tracheal deviation.   Cardiovascular:      Rate " and Rhythm: Normal rate. Rhythm irregularly irregular.      Heart sounds: Normal heart sounds. No murmur heard.    No friction rub. No gallop.   Pulmonary:      Effort: Pulmonary effort is normal. No respiratory distress.      Breath sounds: No stridor. Decreased breath sounds and rales present. No wheezing.   Chest:      Chest wall: No tenderness.   Abdominal:      General: Bowel sounds are normal. There is no distension.      Palpations: Abdomen is soft.      Tenderness: There is no abdominal tenderness.   Musculoskeletal:      Right lower le+ Edema present.      Left lower leg: 3+ Edema present.   Skin:     General: Skin is warm and dry.      Findings: No erythema or rash.   Neurological:      Mental Status: He is alert and oriented to person, place, and time.      Cranial Nerves: No cranial nerve deficit.      Deep Tendon Reflexes: Reflexes are normal and symmetric. Reflexes normal.   Psychiatric:         Behavior: Behavior normal.         Thought Content: Thought content normal.         Judgment: Judgment normal.          Results Review:     Results from last 7 days   Lab Units 23  0557 23  1938 23  2303 23  0428   SODIUM mmol/L 127*  --  128*  --  130*   POTASSIUM mmol/L 3.9 3.4* 3.5   < > 3.0*   CHLORIDE mmol/L 87*  --  84*  --  85*   CO2 mmol/L 35.0*  --  38.0*  --  39.0*   BUN mg/dL 16  --  15  --  16   CREATININE mg/dL 0.83  --  0.82  --  0.86   CALCIUM mg/dL 7.8*  --  7.8*  --  7.9*   BILIRUBIN mg/dL 6.6*  --  6.2*  --  6.8*   ALK PHOS U/L 92  --  87  --  86   ALT (SGPT) U/L 32  --  36  --  39   AST (SGOT) U/L 38  --  42*  --  39   GLUCOSE mg/dL 84  --  84  --  81    < > = values in this interval not displayed.       Estimated Creatinine Clearance: 94.1 mL/min (by C-G formula based on SCr of 0.83 mg/dL).    Results from last 7 days   Lab Units 23  0557 23  1032   MAGNESIUM mg/dL 1.9 2.1 1.7             Results from last 7 days   Lab  Units 03/28/23  0557 03/27/23  0316 03/26/23  0428 03/25/23  0600 03/24/23  0545   WBC 10*3/mm3 8.24 8.43 6.75 7.31 7.26   HEMOGLOBIN g/dL 16.1 16.3 16.5 16.7 16.6   PLATELETS 10*3/mm3 113* 108* 93* 101* 95*           Lab Results   Component Value Date    PROBNP 4,988.0 (H) 03/20/2023       I/O last 3 completed shifts:  In: -   Out: 3985 [Urine:3985]    Cardiographics:  ECG/EMG Results (last 24 hours)       Procedure Component Value Units Date/Time    ECG 12 Lead Drug Monitoring; Amiodarone [[id number]] Collected: 03/27/23 0003     Updated: 03/27/23 0750     QT Interval 378 ms      QTC Interval 502 ms     Narrative:      Test Reason : MEDICATION START  Blood Pressure :   */*   mmHG  Vent. Rate : 106 BPM     Atrial Rate :  94 BPM     P-R Int :   * ms          QRS Dur : 116 ms      QT Int : 378 ms       P-R-T Axes :   * 125 230 degrees     QTc Int : 502 ms    Atrial fibrillation with rapid ventricular response with premature ventricular or aberrantly conducted complexes  Right axis deviation  Incomplete right bundle branch block  Possible Right ventricular hypertrophy  ST & T wave abnormality, consider lateral ischemia  Abnormal ECG  When compared with ECG of 25-MAR-2023 01:02,  No significant change was found    Referred By: MD           Confirmed By: HERNESTO WALTON    ECG 12 Lead Drug Monitoring; Amiodarone [[id number]] Collected: 03/27/23 0549     Updated: 03/27/23 0750     QT Interval 402 ms      QTC Interval 518 ms     Narrative:      Test Reason : Drug Monitoring  Blood Pressure :   */*   mmHG  Vent. Rate : 100 BPM     Atrial Rate :  98 BPM     P-R Int :   * ms          QRS Dur : 114 ms      QT Int : 402 ms       P-R-T Axes :   * 131 233 degrees     QTc Int : 518 ms    Atrial fibrillation with premature ventricular or aberrantly conducted complexes  Right axis deviation  Incomplete right bundle branch block  Possible Right ventricular hypertrophy  Septal infarct , age undetermined  ST & T wave abnormality, consider  lateral ischemia  Prolonged QT  Abnormal ECG  When compared with ECG of 27-MAR-2023 00:03,  No significant change was found    Referred By:            Confirmed By: HERNESTO WALTON          Results for orders placed during the hospital encounter of 03/20/23    Adult Transthoracic Echo Complete W/ Cont if Necessary Per Protocol    Interpretation Summary    Left ventricular ejection fraction appears to be 21 - 25%.    The left ventricular cavity is dilated.    Left ventricular wall thickness is consistent with hypertrophy.    Left ventricular diastolic dysfunction is noted.    Moderately reduced right ventricular systolic function noted.    The right ventricular cavity is severely dilated.    Left atrial volume is severely increased.    The right atrial cavity is severely  dilated.    Estimated right ventricular systolic pressure from tricuspid regurgitation is mildly elevated (35-45 mmHg).      Imaging Results (Most Recent)       Procedure Component Value Units Date/Time    US Guided Vascular Access [[id number]] Collected: 03/22/23 1330     Updated: 03/23/23 1550    Narrative:      PROCEDURE: Ultrasound Guidance Vascular Access      Ordering physician(s): NIDHI CARDONA    Clinical Indication: Vascular access      Findings:    The right basilic vein was sonographically evaluated and  determined to be patent. Concurrent realtime ultrasound was used  to visualize needle entry into the right basilic vein  for  midline catheter placement and 2 permanent images acquired for  permanent recording and reporting.         Impression:      Impression:   As above.      Electronically signed by:  Tony Giron MD  3/23/2023 3:48 PM CDT  Workstation: [id number]    XR Chest 1 View [[id number]] Collected: 03/22/23 1327     Updated: 03/22/23 5325    Narrative:      EXAM:  XR CHEST 1 VIEW    TECHNIQUE:   Single frontal radiograph of the chest.    VIEWS:  1 view    CLINICAL HISTORY:   67 years  Male  SOB, I48.91 Unspecified atrial  fibrillation N39.0  Urinary tract infection, site not specified R09.89 Other  specified symptoms and signs involving the circulatory and  respiratory systems    COMPARISON:   March 20, 2022 chest x-ray    FINDINGS:   Support lines and tubes: The right PICC tip overlies the right  axillary soft tissues.     Lungs:  Right lung base airspace opacities. The left lung is  clear.  Pleura: No pleural effusion. No pneumothorax.  Heart/mediastinum: The cardiac silhouette is enlarged.  Bones: No acute osseous findings.  Soft tissues: Unremarkable.      Impression:      1. Right pleural effusion and adjacent atelectasis with or  without pneumonia, increased in the interval.  2. Cardiomegaly.    Electronically signed by:  Issa Burch MD  3/22/2023 11:37  PM CDT Workstation: [id number]    US Abdomen Complete [[id number]] Collected: 03/22/23 1330     Updated: 03/22/23 1509    Narrative:      Ultrasound abdomen complete.    HISTORY: Abdominal distention.    Prior exam: CT chest March 20, 2023.      Impression:      Findings, impression: Limited examination, portable bedside  technique.    Echogenic sludge within the gallbladder.    Gallbladder  wall thickening. Normal liver.    Common bile duct and pancreas are obscured by overlying bowel  gas.    Normal spleen. Normal right kidney 9.6 x 5.1 x 5.1 cm.    Benign cyst left kidney. Left kidney is otherwise unremarkable  11.2 x 5.9 x 5.1 cm    Normal aorta and inferior vena cava.    Small amount of free fluid, ascites throughout the abdomen.    IMPRESSION: Ascites. Gallbladder wall thickening and echogenic  sludge within the gallbladder.    Electronically signed by:  Clem Balderrama MD  3/22/2023 3:07 PM CDT  Workstation: [id number]    IR Insert Midline Without Port Pump 5 Plus [[id number]] Resulted: 03/22/23 1338     Updated: 03/22/23 1338    Narrative:      This procedure was auto-finalized with no dictation required.    CT Abdomen Pelvis Without Contrast [[id number]] Collected:  03/20/23 2011     Updated: 03/20/23 2138    Narrative:        lINDICATION: sob, pulm edema, I48.91 Unspecified atrial  fibrillation N39.0 Urinary tract infection, site not specified  R09.89 Other specified symptoms and signs involving the  circulatory and respiratory systems    EXAMINATION: CT CHEST, ABDOMEN AND PELVIS     TECHNIQUE:  Helically acquired images were obtained of the chest,  abdomen and pelvis. A radiation dose optimization technique was  used for this scan.    IV Contrast dosage and agent: None    Oral contrast: None.    COMPARISON: None.    FINDINGS:    ----Chest:   LUNGS, PLEURA: There is a right lower lobe consolidation  suggesting atelectasis or pneumonia. This is associated with a  large pleural effusion. This appears slightly loculated. Left  lung is clear. There is no left-sided effusion. No pneumothorax     SOFT TISSUES: There is diffuse subcutaneous edema within the  lower chest wall and upper abdominal wall suggesting anasarca.  There is no evidence of adenopathy or soft tissue gas.     HEART AND PERICARDIUM: Heart is prominent. There is no  pericardial effusion.     VESSELS: Main pulmonary artery is prominent measuring 4.2 cm  suggesting pulmonary arterial hypertension. The aorta is normal  in course and caliber on this noncontrast study.     MEDIASTINUM AND MICHAEL: No mediastinal or hilar adenopathy.  Esophagus is unremarkable. No hiatal hernia.     BONES: No lytic or blastic abnormality.  No fractures are  identified.     ----Abdomen/Pelvis:    LIVER: Homogeneous.  No focal lesion is identified on this  noncontrast study.    GALLBLADDER AND BILIARY TREE: There appears to be dependent  sludge. There are no calcified stones. The gallbladder is not  dilated.    No intra- or extrahepatic biliary ductal dilation.    PANCREAS: There is no evidence of mass or inflammatory process.    SPLEEN: Spleen is normal in size with no focal lesion.    ADRENAL GLANDS: Normal in appearance.      KIDNEYS AND  URETERS: Normal renal size and position.  There is no  evidence of renal mass or calcification. No hydronephrosis.    URINARY BLADDER: Unremarkable.     PERITONEUM: There is a small amount of ascites.     BOWEL: The appendix is normal. There is no bowel distention or  evidence of obstruction. There is questionable edema in the  region of the third portion of the duodenum. Possibility of mild  duodenitis  is not excluded.    VASCULAR STRUCTURES AND RETROPERITONEUM: Aorta and IVC are normal  in caliber.  There is no evidence of aneurysm. There is no  evidence of retroperitoneal lymphadenopathy, mass, or fluid  collection    REPRODUCTIVE ORGANS: There is no evidence of pelvic mass.     ABDOMINAL WALL: There is diffuse anasarca. There is no hernia.     BONES: There is no acute fracture or focal bone lesion.       Impression:      1.  Moderate to large right effusion which appears loculated.  There is associated right lower lobe consolidation suggesting  pneumonia.  2.  Cardiomegaly.  3.  Prominent main pulmonary artery suggesting pulmonary arterial  hypertension.  4.  Small amount of ascites. There appears to be some edema and  wall thickening associated with the third portion of the duodenum  suggesting duodenitis.     Electronically signed by:  Krzysztof Gomez MD  3/20/2023 9:36 PM CDT  Workstation: [id number]    CT Chest Without Contrast Diagnostic [[id number]] Collected: 03/20/23 2011     Updated: 03/20/23 2137    Narrative:        lINDICATION: sob, pulm edema, I48.91 Unspecified atrial  fibrillation N39.0 Urinary tract infection, site not specified  R09.89 Other specified symptoms and signs involving the  circulatory and respiratory systems    EXAMINATION: CT CHEST, ABDOMEN AND PELVIS     TECHNIQUE:  Helically acquired images were obtained of the chest,  abdomen and pelvis. A radiation dose optimization technique was  used for this scan.    IV Contrast dosage and agent: None    Oral contrast: None.    COMPARISON:  None.    FINDINGS:    ----Chest:   LUNGS, PLEURA: There is a right lower lobe consolidation  suggesting atelectasis or pneumonia. This is associated with a  large pleural effusion. This appears slightly loculated. Left  lung is clear. There is no left-sided effusion. No pneumothorax     SOFT TISSUES: There is diffuse subcutaneous edema within the  lower chest wall and upper abdominal wall suggesting anasarca.  There is no evidence of adenopathy or soft tissue gas.     HEART AND PERICARDIUM: Heart is prominent. There is no  pericardial effusion.     VESSELS: Main pulmonary artery is prominent measuring 4.2 cm  suggesting pulmonary arterial hypertension. The aorta is normal  in course and caliber on this noncontrast study.     MEDIASTINUM AND MICHAEL: No mediastinal or hilar adenopathy.  Esophagus is unremarkable. No hiatal hernia.     BONES: No lytic or blastic abnormality.  No fractures are  identified.     ----Abdomen/Pelvis:    LIVER: Homogeneous.  No focal lesion is identified on this  noncontrast study.    GALLBLADDER AND BILIARY TREE: There appears to be dependent  sludge. There are no calcified stones. The gallbladder is not  dilated.    No intra- or extrahepatic biliary ductal dilation.    PANCREAS: There is no evidence of mass or inflammatory process.    SPLEEN: Spleen is normal in size with no focal lesion.    ADRENAL GLANDS: Normal in appearance.      KIDNEYS AND URETERS: Normal renal size and position.  There is no  evidence of renal mass or calcification. No hydronephrosis.    URINARY BLADDER: Unremarkable.     PERITONEUM: There is a small amount of ascites.     BOWEL: The appendix is normal. There is no bowel distention or  evidence of obstruction. There is questionable edema in the  region of the third portion of the duodenum. Possibility of mild  duodenitis  is not excluded.    VASCULAR STRUCTURES AND RETROPERITONEUM: Aorta and IVC are normal  in caliber.  There is no evidence of aneurysm. There is  no  evidence of retroperitoneal lymphadenopathy, mass, or fluid  collection    REPRODUCTIVE ORGANS: There is no evidence of pelvic mass.     ABDOMINAL WALL: There is diffuse anasarca. There is no hernia.     BONES: There is no acute fracture or focal bone lesion.       Impression:      1.  Moderate to large right effusion which appears loculated.  There is associated right lower lobe consolidation suggesting  pneumonia.  2.  Cardiomegaly.  3.  Prominent main pulmonary artery suggesting pulmonary arterial  hypertension.  4.  Small amount of ascites. There appears to be some edema and  wall thickening associated with the third portion of the duodenum  suggesting duodenitis.     Electronically signed by:  Krzysztof Gomez MD  3/20/2023 9:35 PM CDT  Workstation: [id number]    XR Chest 1 View [[id number]] Collected: 03/20/23 1651     Updated: 03/20/23 1828    Narrative:      EXAMINATION: XR CHEST 1 VIEW    COMPARISON: Chest 2 views November 5, 2020    INDICATION: tachycardia    FINDINGS: Portable AP upright imaging of the chest is submitted -  1 view. A moderate size basilar right pleural effusion is new.  Some degree of right basilar compressive atelectasis is  suspected. Heart size is difficult to assess but appears at least  mildly enlarged. Mild central vascular prominence is noted but no  subpleural interstitial edema is evident. No pneumothorax.       Impression:      Right basilar pleural effusion, right lower lobe  compressive atelectasis, cardiomegaly and mild central vascular  congestion.    Electronically signed by:  Hollis Sousa MD  3/20/2023 6:26 PM CDT  Workstation: [id number]            CT Abdomen Pelvis Without Contrast    Result Date: 3/20/2023  1.  Moderate to large right effusion which appears loculated. There is associated right lower lobe consolidation suggesting pneumonia. 2.  Cardiomegaly. 3.  Prominent main pulmonary artery suggesting pulmonary arterial hypertension. 4.  Small amount of ascites. There  appears to be some edema and wall thickening associated with the third portion of the duodenum suggesting duodenitis. Electronically signed by:  Krzysztof Gomez MD  3/20/2023 9:36 PM CDT Workstation: [id number]    CT Chest Without Contrast Diagnostic    Result Date: 3/20/2023  1.  Moderate to large right effusion which appears loculated. There is associated right lower lobe consolidation suggesting pneumonia. 2.  Cardiomegaly. 3.  Prominent main pulmonary artery suggesting pulmonary arterial hypertension. 4.  Small amount of ascites. There appears to be some edema and wall thickening associated with the third portion of the duodenum suggesting duodenitis. Electronically signed by:  Krzysztof Gomez MD  3/20/2023 9:35 PM CDT Workstation: [id number]    US Abdomen Complete    Result Date: 3/22/2023  Findings, impression: Limited examination, portable bedside technique. Echogenic sludge within the gallbladder. Gallbladder  wall thickening. Normal liver. Common bile duct and pancreas are obscured by overlying bowel gas. Normal spleen. Normal right kidney 9.6 x 5.1 x 5.1 cm. Benign cyst left kidney. Left kidney is otherwise unremarkable 11.2 x 5.9 x 5.1 cm Normal aorta and inferior vena cava. Small amount of free fluid, ascites throughout the abdomen. IMPRESSION: Ascites. Gallbladder wall thickening and echogenic sludge within the gallbladder. Electronically signed by:  Clem Balderrama MD  3/22/2023 3:07 PM CDT Workstation: [id number]    US Guided Vascular Access    Result Date: 3/23/2023  Impression: As above. Electronically signed by:  Tony Giron MD  3/23/2023 3:48 PM CDT Workstation: [id number]    XR Chest 1 View    Result Date: 3/22/2023  1. Right pleural effusion and adjacent atelectasis with or without pneumonia, increased in the interval. 2. Cardiomegaly. Electronically signed by:  Issa Burch MD  3/22/2023 11:37 PM CDT Workstation: [id number]    XR Chest 1 View    Result Date: 3/20/2023  Right basilar pleural effusion, right  lower lobe compressive atelectasis, cardiomegaly and mild central vascular congestion. Electronically signed by:  Hollis Sousa MD  3/20/2023 6:26 PM CDT Workstation: [id number]      Medication Review:     Current Facility-Administered Medications:     [DISCONTINUED] acetaminophen (TYLENOL) tablet 650 mg, 650 mg, Oral, Q4H PRN **OR** [DISCONTINUED] acetaminophen (TYLENOL) 160 MG/5ML solution 650 mg, 650 mg, Oral, Q4H PRN **OR** acetaminophen (TYLENOL) suppository 650 mg, 650 mg, Rectal, Q4H PRN, Shamai, Prince, DO    acetaminophen (TYLENOL) tablet 650 mg, 650 mg, Oral, Q4H PRN, Shamai, Prince, DO    amiodarone (PACERONE) tablet 200 mg, 200 mg, Oral, BID With Meals, Kalpana Abreu APRN, 200 mg at 03/28/23 0921    amiodarone 360 mg in 200 mL D5W infusion, 0.5 mg/min, Intravenous, Continuous, Kalpana Abreu, APRN    digoxin (LANOXIN) tablet 125 mcg, 125 mcg, Oral, Daily, Kalpana Abreu, APRN    furosemide (LASIX) tablet 40 mg, 40 mg, Oral, BID, Shamai, Prince, DO, 40 mg at 03/28/23 0800    Hold medication, 1 each, Does not apply, Continuous PRN, Shamai, Prince, DO    hydrocortisone-bacitracin-zinc oxide-nystatin (MAGIC BARRIER) ointment 1 application, 1 application, Topical, Q4H PRN, Shamai, Prince, DO, 1 application at 03/23/23 1740    Magnesium Sulfate - Total Dose 10 grams - Magnesium 1 or Less, 2 g, Intravenous, PRN **OR** Magnesium Sulfate - Total Dose 6 grams - Magnesium 1.1 - 1.5, 2 g, Intravenous, PRN **OR** Magnesium Sulfate - Total Dose 4 grams - Magnesium 1.6 - 1.9, 4 g, Intravenous, PRN, Shamai, Prince, DO, Last Rate: 25 mL/hr at 03/28/23 0800, 4 g at 03/28/23 0800    metoprolol succinate XL (TOPROL-XL) 24 hr half tablet 12.5 mg, 12.5 mg, Oral, Q24H, Nannette Walters MD, 12.5 mg at 03/28/23 0817    norepinephrine (LEVOPHED) 8 mg in 250 mL NS infusion (premix), 0.02-0.3 mcg/kg/min, Intravenous, Titrated, Prince Charles DO    ondansetron (ZOFRAN) injection 4 mg, 4 mg, Intravenous, Q6H PRN, Araceli,  Prince, DO, 4 mg at 03/22/23 0208    phytonadione (AQUA-MEPHYTON, VITAMIN K) 5 mg in sodium chloride 0.9 % 50 mL IVPB, 5 mg, Intravenous, Once, Shamai, Prince, DO, Held at 03/23/23 1035    potassium chloride (MICRO-K) CR capsule 40 mEq, 40 mEq, Oral, PRN, 40 mEq at 03/28/23 0107 **OR** potassium chloride (KLOR-CON) packet 40 mEq, 40 mEq, Oral, PRN **OR** potassium chloride 10 mEq in 100 mL IVPB, 10 mEq, Intravenous, Q1H PRN, Shamai, Prince, DO, Last Rate: 100 mL/hr at 03/25/23 0335, 10 mEq at 03/25/23 0335    rivaroxaban (XARELTO) tablet 20 mg, 20 mg, Oral, Daily With Dinner, Nannette Walters MD    sodium chloride 0.9 % flush 10 mL, 10 mL, Intravenous, Q12H, Shamai, Prince, DO, 10 mL at 03/28/23 0801    sodium chloride 0.9 % flush 10 mL, 10 mL, Intravenous, PRN, Shamai, Prince, DO    sodium chloride 0.9 % infusion 40 mL, 40 mL, Intravenous, PRN, Shamai, Prince, DO    spironolactone (ALDACTONE) tablet 25 mg, 25 mg, Oral, Daily, Shamai, Prince, DO, 25 mg at 03/28/23 0800    Assessment & Plan       Atrial fibrillation with RVR ([facility])    NSTEMI, initial episode of care ([facility])    Biventricular CHF (congestive heart failure) ([facility])    Atrial fibrillation ([facility])    #1.  Atrial fibrillation with RVR.  Improving  Has history of paroxysmal atrial fibrillation/SVT.  Has been noncompliant with medication for months.  Presented with worsening shortness of breath, weight gain, edema and chest pain.  EKG showed atrial fibrillation with RVR.  Found to be in heart failure with reduced LVEF.   -Stop IV amiodarone and transition to oral 200mg BID  -Start Toprol XL 12.5mg and digoxin.   -Resume Xarelto. (will need to sample)  -Keep K 4 or greater, Keep mag 2 or greater. Replacement protocols in place.     #2.  Biventricular heart failure, LVEF reduced at 21 to 25%, moderately reduced RVEF.  Etiology NICM: LHC negative for Obstructive CAD  NYHA Class III, Stage C. Patient appears euvolemic today with third spacing with borderline perfusion and  hemodynamics    BETA-BLOCKER: Toprol XL 12.5mg  ACE/ARB/ENTRESTO: Will plan to add prior to discharge if hemodynamics allows  DIURETIC: Lasix 40 mg po BID  SGLt2: Indicated  ALDOSTERONE ANTAGONIST: Spironolactone 25mg  IMDUR/HYDRALAZINE: N/A  DIGOXIN: N/A  Fluid restriction: 1500 ml  Sodium restriction:2 grams     Cardiac Rehab:  Indicated  ICD:  Indicated    ADHF:  This admission  Daily weight  Strict intake/ output  Continuous cardiac monitoring     #3.  NSTEMI:  High sensitivity troponin at 70, 65, 74, and 79.  Lactic acid 3.8 on arrival.  EKG showing atrial fibrillation with RVR at 149 bpm.    Patient presented with A-fib RVR, metabolic acidosis CHF.  Patient previously has not had an invasive cardiac work-up, noted to have nuclear stress test in 2020, low risk.  Patient be recommended for definitive evaluation of troponin elevation and reduced EF once kidney status and respiratory status improved. He is s/p LHC on 3/27 which showed mild irregularity and negative for obstructive CAD. Normal LVEDP. Consistent with NICM. Radial cath site wnl. H/h and kidney function stable. Denies CP.      Plan for disposition: Likely can transfer to [facility] this afternoon or in AM.             This document has been electronically signed by ARY Garcia on March 28, 2023 09:53 CDT     Electronically signed by ARY Garcia, 03/27/23, 10:04 AM CDT.    Patient examined and chart reviewed.  Agree with assessment and plan as outlined by ARY Walton     Pavan Mccauley is a 67-year-old male who has history of paroxysmal atrial fibrillation/SVT in the past who has presented with progressive fatigue, dyspnea, weight gain, edema, chest pressure/palpitation and noted to have atrial fibrillation with rapid ventricular response and congestive heart failure consistent with HFrEF.  His symptoms have been going on for the past several months and poor compliance with medications noted.  He has longstanding history of  tobacco and alcohol use, though he claims to have quit smoking about 5 months prior.  He had evidence of anasarca, and his blood tests do suggest elevated high-sensitivity troponins.  He has evidence of acute renal failure, hyponatremia, hypoalbuminemia, LFT abnormalities, polycythemia, thrombocytopenia.       Echocardiogram showed severe LV dysfunction with an ejection fraction of 20 to 25%.  There was left ventricular hypertrophy.  RV systolic function was moderately decreased.  There was severe RV dilatation.  Severe biatrial enlargement and RVSP was 35 to 45 mmHg.     Cardiac catheterization performed yesterday showed no structural coronary artery disease.  With IV diuretics, dobutamine infusion, anasarca has resolved and he is symptomatically much better.  He was started on IV amiodarone for optimization of heart rate with underlying atrial fibrillation and was resumed on anticoagulation with Xarelto following cardiac catheterization.  Symptomatically, he feels better and his edema has decreased considerably.  He still has some degree of shortness of breath on ambulation.  No chest pain reported.  He has been followed by nephrology closely.  His renal function is optimized.     Heart rate was 72 bpm, irregular, Blood pressure 110/75 mmHg. Chronically ill-appearing  There was no pallor.  Patient was icteric.  Exam of the heart showed varying intensity of the first heart sound with no S3 or S4.  No heart murmurs  Lung exam showed decreased breath sounds in both lung bases.  Few scattered rales in the bases.   Abdomen was mildly distended with significant resolution of ascites.  2+ pitting edema both lower extremities.  No focal neurological deficit.  He is drowsy but easily aroused.  Skin is jaundiced.    Labs reviewed.  Sodium was 127, potassium 3.9, chloride 87, CO2 35.  Liver function tests were within normal limits.  BUN 16 and creatinine 0.83.  Globin 16.1 and platelet count 113.    Agree with current  management for atrial fibrillation with transitioning IV amiodarone to p.o. amiodarone.  Metoprolol succinate 12.5 mg daily and low-dose digoxin 125 mcg daily has been initiated for optimization of heart rate.  Anticoagulation with Xarelto restarted.  Dose of Lasix decreased to 20 mg twice a day and tolvaptan initiated by nephrology for optimization of sodium.  Continue Aldactone 25 mg daily.  I did have a long discussion with the patient about management of atrial fibrillation and KRYSTYNA/cardioversion was discussed.  The plan would be to observe the progress with the patient with ambulation and assessing symptoms.  If he has significant dyspnea and if there is no improvement in hemodynamics, this will be considered.   The patient is aware of the fact that converting to sinus rhythm at this time poses significant risk of recurrence versus performing the procedure 3 to 4 weeks from now after he is loaded with amiodarone.    Electronically signed by Nannette Walters MD, 03/28/23, 4:11 PM CDT.

## 2023-03-28 NOTE — THERAPY EVALUATION
Patient Name: Pavan Mccauley  : 1955    MRN: [id number]                              Today's Date: 3/28/2023       Admit Date: 3/20/2023    Visit Dx:     ICD-10-CM ICD-9-CM   1. Atrial fibrillation with RVR ([facility])  I48.91 427.31   2. Urinary tract infection without hematuria, site unspecified  N39.0 599.0   3. Pulmonary vascular congestion  R09.89 514   4. NSTEMI, initial episode of care ([facility])  I21.4 410.71   5. Biventricular CHF (congestive heart failure) ([facility])  I50.82 428.0   6. Impaired mobility and ADLs  Z74.09 V49.89    Z78.9      Patient Active Problem List   Diagnosis   • General medical exam   • Malaise   • Hyperglycemia   • Cough   • Tick bite   • Screening for colon cancer   • Pneumonia due to organism   • Carotid artery aneurysm ([facility])   • Dizziness   • SVT (supraventricular tachycardia) ([facility])   • Atrial fibrillation ([facility])   • Dyspnea on exertion   • Atrial fibrillation with RVR ([facility])   • NSTEMI, initial episode of care ([facility])   • Biventricular CHF (congestive heart failure) ([facility])     Past Medical History:   Diagnosis Date   • Bronchopneumonia    • Chest pain    • Corneal foreign body     both eyes   • Neck pain    • Perforation of left tympanic membrane     history of   • Prashant [place] spotted fever    • Wheezing      Past Surgical History:   Procedure Laterality Date   • CARDIAC CATHETERIZATION N/A 3/27/2023    Procedure: Left Heart Cath;  Surgeon: [place] DO;  Location: [facility] INVASIVE LOCATION;  Service: Cardiology;  Laterality: N/A;   • EYE FOREIGN BODY REMOVAL  2013    REMOVE FOREIGN BODY CORNEA W/SLIT LAMP 86534 (1)     • INJECTION OF MEDICATION  2011    Kenalog (1)      • SPINE SURGERY  1991    Exploration of the previous C5-6 fusion with refusion, exploration of the C5 nerve root, left side   • TYMPANOPLASTY Left 1972    Perforation of the left tympanic membrane      General Information     Row Name 23 1043          OT Time and Intention     Document Type evaluation  -     Mode of Treatment co-treatment;physical therapy;occupational therapy  -     Row Name 03/28/23 1043          General Information    Patient Profile Reviewed yes  -     Prior Level of Function independent:;dressing;bathing;ADL's;driving;cooking;cleaning  -     Existing Precautions/Restrictions fall  -     Row Name 03/28/23 1043          Living Environment    People in Home alone  -     Row Name 03/28/23 1043          Home Main Entrance    Number of Stairs, Main Entrance none  -     Stair Railings, Main Entrance none  -[facility] Name 03/28/23 1043          Stairs Within Home, Primary    Stairs, Within Home, Primary tub bath  -     Number of Stairs, Within Home, Primary none  -     Stair Railings, Within Home, Primary none  -     Row Name 03/28/23 1043          Cognition    Orientation Status (Cognition) oriented x 4;time;verbal cues/prompts needed for orientation  -     Row Name 03/28/23 1043          Safety Issues, Functional Mobility    Impairments Affecting Function (Mobility) endurance/activity tolerance  -           User Key  (r) = Recorded By, (t) = Taken By, (c) = Cosigned By    Initials Name Provider Type     Hallie Caraballo OT Occupational Therapist                 Mobility/ADL's     Row Name 03/28/23 1043          Bed Mobility    Comment, (Bed Mobility) pt up in chair upon entering  -     Row Name 03/28/23 1043          Transfers    Transfers sit-stand transfer;stand-sit transfer  -[facility] Name 03/28/23 1043          Sit-Stand Transfer    Sit-Stand [place] (Transfers) contact guard  [facility]     Assistive Device (Sit-Stand Transfers) walker, front-wheeled  -     Row Name 03/28/23 1043          Stand-Sit Transfer    Stand-Sit [place] (Transfers) contact guard  [facility]     Assistive Device (Stand-Sit Transfers) walker, front-wheeled  -           User Key  (r) = Recorded By, (t) = Taken By, (c) = Cosigned By    Initials Name Provider Type     Hallie Dickson, OT Occupational Therapist               Obj/Interventions     [facility] Name 03/28/23 1043          Sensory Assessment (Somatosensory)    Sensory Assessment (Somatosensory) UE sensation intact  -[facility] 03/28/23 1043          Range of Motion Comprehensive    General Range of Motion bilateral upper extremity ROM WFL  -[facility] 03/28/23 1043          Strength Comprehensive (MMT)    Comment, General Manual Muscle Testing (MMT) Assessment BUE MMT 4-/5 grossly  -           User Key  (r) = Recorded By, (t) = Taken By, (c) = Cosigned By    Initials Name Provider Type    Hallie Dickson, DENNIS Occupational Therapist               Goals/Plan     [facility] Name 03/28/23 1043          Transfer Goal 1 (OT)    Activity/Assistive Device (Transfer Goal 1, OT) transfers, all  -     [place] Level/Cues Needed (Transfer Goal 1, OT) independent  -     Time Frame (Transfer Goal 1, OT) long term goal (LTG);by discharge  -     Progress/Outcome (Transfer Goal 1, OT) goal not met  -[facility] 03/28/23 1043          Bathing Goal 1 (OT)    Activity/Device (Bathing Goal 1, OT) bathing skills, all  -     [place] Level/Cues Needed (Bathing Goal 1, OT) independent  -     Time Frame (Bathing Goal 1, OT) long term goal (LTG);by discharge  -     Progress/Outcomes (Bathing Goal 1, OT) goal not met  -[facility] 03/28/23 1043          Dressing Goal 1 (OT)    Activity/Device (Dressing Goal 1, OT) dressing skills, all  -     [place]/Cues Needed (Dressing Goal 1, OT) independent  -     Time Frame (Dressing Goal 1, OT) long term goal (LTG);by discharge  -     Progress/Outcome (Dressing Goal 1, OT) goal not met  -[facility] 03/28/23 1043          Therapy Assessment/Plan (OT)    Planned Therapy Interventions (OT) activity tolerance training;manual therapy/joint mobilization;patient/caregiver education/training;adaptive equipment training;BADL retraining;cognitive/visual perception  retraining;edema control/reduction;functional balance retraining;IADL retraining;passive ROM/stretching;occupation/activity based interventions;neuromuscular control/coordination retraining;ROM/therapeutic exercise;transfer/mobility retraining;strengthening exercise  -           User Key  (r) = Recorded By, (t) = Taken By, (c) = Cosigned By    Initials Name Provider Type     Hallie Caraballo, DENNIS Occupational Therapist               Clinical Impression     Row Name 03/28/23 1043          Pain Assessment    Pretreatment Pain Rating 0/10 - no pain  -     Posttreatment Pain Rating 0/10 - no pain  -     Row Name 03/28/23 1043          Plan of Care Review    Plan of Care Reviewed With patient  -     Progress no change  -     Outcome Evaluation OT eval completed, co-eval with PT. Nsg and APRN approved eval. Pt sitting up in chair upon entering and agreeable to the session. Pt BUE MMT 4-/5 grossly. Pt CGA for sit to stand t/f with RW. Pt CGA for taking steps in front of chair with RW. Pt demonstrates decreased fxnl mobility, strength, and independence in ADLs. Pt would benefit from skilled OT services. Recommend d/c home with assist, HHOT.  -     Row Name 03/28/23 1043          Therapy Assessment/Plan (OT)    Patient/Family Therapy Goal Statement (OT) Return home  -     Rehab Potential (OT) good, to achieve stated therapy goals  -     Criteria for Skilled Therapeutic Interventions Met (OT) yes;meets criteria;skilled treatment is necessary  -     Therapy Frequency (OT) other (see comments)  5-7 d/wk  -     Predicted Duration of Therapy Intervention (OT) until all goals met or d/c  -     Row Name 03/28/23 1043          Therapy Plan Review/Discharge Plan (OT)    Anticipated Discharge Disposition (OT) home with assist;home with home health  -     Row Name 03/28/23 1043          Vital Signs    Pre Systolic BP Rehab 92  -MC     Pre Treatment Diastolic BP 69  -MC     Post Systolic BP Rehab 126  -MC      Post Treatment Diastolic BP 92  -     Pretreatment Heart Rate (beats/min) 99  -MC     Posttreatment Heart Rate (beats/min) 100  -     Pre SpO2 (%) 95  -     O2 Delivery Pre Treatment nasal cannula  3L  -     Post SpO2 (%) 95  -     O2 Delivery Post Treatment nasal cannula  -     Pre Patient Position Supine  -     Row Name 03/28/23 1043          Positioning and Restraints    Pre-Treatment Position sitting in chair/recliner  -     Post Treatment Position chair  -     In Chair reclined;exit alarm on  -           User Key  (r) = Recorded By, (t) = Taken By, (c) = Cosigned By    Initials Name Provider Type    Hallie Dickson OT Occupational Therapist               Outcome Measures     Row Name 03/28/23 1043          How much help from another is currently needed...    Putting on and taking off regular lower body clothing? 3  -MC     Bathing (including washing, rinsing, and drying) 3  -MC     Toileting (which includes using toilet bed pan or urinal) 4  -MC     Putting on and taking off regular upper body clothing 4  -MC     Taking care of personal grooming (such as brushing teeth) 4  -MC     Eating meals 4  -     AM-PAC 6 Clicks Score (OT) 22  -     Row Name 03/28/23 1043          Functional Assessment    Outcome Measure Options AM-PAC 6 Clicks Daily Activity (OT)  -           User Key  (r) = Recorded By, (t) = Taken By, (c) = Cosigned By    Initials Name Provider Type    Hallie Dickson OT Occupational Therapist                Occupational Therapy Education     Title: PT OT SLP Therapies (In Progress)     Topic: Occupational Therapy (In Progress)     Point: ADL training (Done)     Description:   Instruct learner(s) on proper safety adaptation and remediation techniques during self care or transfers.   Instruct in proper use of assistive devices.              Learning Progress Summary           Patient Acceptance, E,TB, VU by  at 3/28/2023 1123    Comment: OT role, POC, t/f training                    Point: Home exercise program (Not Started)     Description:   Instruct learner(s) on appropriate technique for monitoring, assisting and/or progressing therapeutic exercises/activities.              Learner Progress:  Not documented in this visit.          Point: Precautions (Done)     Description:   Instruct learner(s) on prescribed precautions during self-care and functional transfers.              Learning Progress Summary           Patient Acceptance, E,TB, VU by  at 3/28/2023 1123    Comment: OT role, POC, t/f training                   Point: Body mechanics (Done)     Description:   Instruct learner(s) on proper positioning and spine alignment during self-care, functional mobility activities and/or exercises.              Learning Progress Summary           Patient Acceptance, E,TB, VU by  at 3/28/2023 1123    Comment: OT role, POC, t/f training                               User Key     Initials Effective Dates Name Provider Type Discipline     10/19/22 -  Hallie Caraballo OT Occupational Therapist OT              OT Recommendation and Plan  Planned Therapy Interventions (OT): activity tolerance training, manual therapy/joint mobilization, patient/caregiver education/training, adaptive equipment training, BADL retraining, cognitive/visual perception retraining, edema control/reduction, functional balance retraining, IADL retraining, passive ROM/stretching, occupation/activity based interventions, neuromuscular control/coordination retraining, ROM/therapeutic exercise, transfer/mobility retraining, strengthening exercise  Therapy Frequency (OT): other (see comments) (5-7 d/wk)  Plan of Care Review  Plan of Care Reviewed With: patient  Progress: no change  Outcome Evaluation: OT eval completed, co-eval with PT. Nsg and APRN approved eval. Pt sitting up in chair upon entering and agreeable to the session. Pt BUE MMT 4-/5 grossly. Pt CGA for sit to stand t/f with RW. Pt CGA for taking  steps in front of chair with RW. Pt demonstrates decreased fxnl mobility, strength, and independence in ADLs. Pt would benefit from skilled OT services. Recommend d/c home with assist, HHOT.     Time Calculation:    Time Calculation- OT     Row Name 03/28/23 1043             Time Calculation- OT    OT Start Time 1043  -      OT Stop Time 1124  -      OT Time Calculation (min) 41 min  -MC      OT Received On 03/28/23  -      OT Goal Re-Cert Due Date 04/10/23  -         Untimed Charges    OT Eval/Re-eval Minutes 41  -MC         Total Minutes    Untimed Charges Total Minutes 41  -MC       Total Minutes 41  -MC            User Key  (r) = Recorded By, (t) = Taken By, (c) = Cosigned By    Initials Name Provider Type    Hallie Dickson OT Occupational Therapist              Therapy Charges for Today     Code Description Service Date Service Provider Modifiers Qty    [id number] HC OT EVAL MOD COMPLEXITY 3 3/28/2023 Hallie Caraballo OT GO 1               Hallie Caraballo OT  3/28/2023

## 2023-03-28 NOTE — THERAPY EVALUATION
Patient Name: Pavan Mccauley  : 1955    MRN: [id number]                              Today's Date: 3/28/2023       Admit Date: 3/20/2023    Visit Dx:     ICD-10-CM ICD-9-CM   1. Atrial fibrillation with RVR ([facility])  I48.91 427.31   2. Urinary tract infection without hematuria, site unspecified  N39.0 599.0   3. Pulmonary vascular congestion  R09.89 514   4. NSTEMI, initial episode of care ([facility])  I21.4 410.71   5. Biventricular CHF (congestive heart failure) ([facility])  I50.82 428.0   6. Impaired mobility and ADLs  Z74.09 V49.89    Z78.9    7. Impaired functional mobility, balance, gait, and endurance  Z74.09 V49.89     Patient Active Problem List   Diagnosis   • General medical exam   • Malaise   • Hyperglycemia   • Cough   • Tick bite   • Screening for colon cancer   • Pneumonia due to organism   • Carotid artery aneurysm ([facility])   • Dizziness   • SVT (supraventricular tachycardia) ([facility])   • Atrial fibrillation ([facility])   • Dyspnea on exertion   • Atrial fibrillation with RVR ([facility])   • NSTEMI, initial episode of care ([facility])   • Biventricular CHF (congestive heart failure) ([facility])     Past Medical History:   Diagnosis Date   • Bronchopneumonia    • Chest pain    • Corneal foreign body     both eyes   • Neck pain    • Perforation of left tympanic membrane     history of   • Prashant [place] spotted fever    • Wheezing      Past Surgical History:   Procedure Laterality Date   • CARDIAC CATHETERIZATION N/A 3/27/2023    Procedure: Left Heart Cath;  Surgeon: [place] DO;  Location: [facility] INVASIVE LOCATION;  Service: Cardiology;  Laterality: N/A;   • EYE FOREIGN BODY REMOVAL  2013    REMOVE FOREIGN BODY CORNEA W/SLIT LAMP 33899 (1)     • INJECTION OF MEDICATION  2011    Kenalog (1)      • SPINE SURGERY  1991    Exploration of the previous C5-6 fusion with refusion, exploration of the C5 nerve root, left side   • TYMPANOPLASTY Left 1972    Perforation of the left tympanic membrane      General  Information     Row Name 03/28/23 1037          Physical Therapy Time and Intention    Document Type evaluation  -     Mode of Treatment co-treatment;physical therapy;occupational therapy  -     Row Name 03/28/23 1037          General Information    Patient Profile Reviewed yes  -ORAL     Prior Level of Function independent:;dressing;bathing;all household mobility;community mobility;gait;transfer;ADL's;driving  -     Existing Precautions/Restrictions fall  -     Row Name 03/28/23 1037          Living Environment    People in Home alone  -     Row Name 03/28/23 1037          Home Main Entrance    Number of Stairs, Main Entrance none  -ORAL     Stair Railings, Main Entrance none  -ORAL     Row Name 03/28/23 1037          Stairs Within Home, Primary    Number of Stairs, Within Home, Primary none  -ORAL     Stair Railings, Within Home, Primary none  -ORAL     Row Name 03/28/23 1037          Cognition    Orientation Status (Cognition) oriented x 4;time;verbal cues/prompts needed for orientation  could not think of 2023  -     Row Name 03/28/23 1037          Safety Issues, Functional Mobility    Safety Issues Affecting Function (Mobility) insight into deficits/self-awareness  -     Impairments Affecting Function (Mobility) balance;endurance/activity tolerance;strength  -           User Key  (r) = Recorded By, (t) = Taken By, (c) = Cosigned By    Initials Name Provider Type    ORAL Blanca Marie PT Physical Therapist               Mobility     Row Name 03/28/23 1037          Bed Mobility    Comment, (Bed Mobility) Pt in chair before and after session  -     Row Name 03/28/23 1037          Sit-Stand Transfer    Sit-Stand [place] (Transfers) contact guard  -     Assistive Device (Sit-Stand Transfers) walker, front-wheeled  -     Row Name 03/28/23 1037          Gait/Stairs (Locomotion)    [place] Level (Gait) contact guard  -     Assistive Device (Gait) walker, front-wheeled  -           User Key   (r) = Recorded By, (t) = Taken By, (c) = Cosigned By    Initials Name Provider Type    Blanca Calle PT Physical Therapist               Obj/Interventions     Row Name 03/28/23 1037          Range of Motion Comprehensive    Comment, General Range of Motion BLE:  AROM WFL  -[facility] Name 03/28/23 1037          Strength Comprehensive (MMT)    Comment, General Manual Muscle Testing (MMT) Assessment BLE: 4/5 grossly  -[facility] Name 03/28/23 1037          Balance    Balance Assessment sitting static balance;sitting dynamic balance;sit to stand dynamic balance;standing static balance;standing dynamic balance  -     Static Sitting Balance independent  -     Dynamic Sitting Balance standby assist  -     Position, Sitting Balance sitting in chair  -     Sit to Stand Dynamic Balance contact guard;verbal cues;non-verbal cues (demo/gesture)  -     Static Standing Balance standby assist  -     Dynamic Standing Balance contact guard  -     Position/Device Used, Standing Balance walker, front-wheeled  -     Row Name 03/28/23 1037          Sensory Assessment (Somatosensory)    Sensory Assessment (Somatosensory) LE sensation intact  -           User Key  (r) = Recorded By, (t) = Taken By, (c) = Cosigned By    Initials Name Provider Type    Blanca Calle, PT Physical Therapist               Goals/Plan     [facility] Name 03/28/23 1037          Bed Mobility Goal 1 (PT)    Activity/Assistive Device (Bed Mobility Goal 1, PT) bed mobility activities, all  -     [place] Level/Cues Needed (Bed Mobility Goal 1, PT) independent  -     Time Frame (Bed Mobility Goal 1, PT) by discharge  -     Progress/Outcomes (Bed Mobility Goal 1, PT) goal not met  -[facility] Name 03/28/23 1037          Transfer Goal 1 (PT)    Activity/Assistive Device (Transfer Goal 1, PT) sit-to-stand/stand-to-sit;bed-to-chair/chair-to-bed  -     [place] Level/Cues Needed (Transfer Goal 1, PT) modified independence;independent  -ORAL      Time Frame (Transfer Goal 1, PT) by discharge  -ORAL     Progress/Outcome (Transfer Goal 1, PT) goal not met  -ORAL     Row Name 03/28/23 1037          Gait Training Goal 1 (PT)    Activity/Assistive Device (Gait Training Goal 1, PT) gait (walking locomotion);decrease fall risk;increase endurance/gait distance  -ORAL     [place] Level (Gait Training Goal 1, PT) modified independence;independent  -ORAL     Distance (Gait Training Goal 1, PT) 150ft each trip  -ORAL     Time Frame (Gait Training Goal 1, PT) 5 days  -ORAL     Progress/Outcome (Gait Training Goal 1, PT) goal not met  -ORAL     Row Name 03/28/23 1037          ROM Goal 1 (PT)    ROM Goal 1 (PT) Pt will tolerate LE exercises OOB in chair with VSS  -ORAL     Time Frame (ROM Goal 1, PT) by discharge  -ORAL     Progress/Outcome (ROM Goal 1, PT) goal not met  -     Row Name 03/28/23 1037          Stairs Goal 1 (PT)    Activity/Assistive Device (Stairs Goal 1, PT) ascending stairs;descending stairs;using handrail, left;using handrail, right  -ORAL     [place] Level/Cues Needed (Stairs Goal 1, PT) modified independence  -ORAL     Number of Stairs (Stairs Goal 1, PT) 3  -ORAL     Time Frame (Stairs Goal 1, PT) by discharge  -ORAL     Progress/Outcome (Stairs Goal 1, PT) goal not met  -     Row Name 03/28/23 1037          Therapy Assessment/Plan (PT)    Planned Therapy Interventions (PT) balance training;bed mobility training;gait training;home exercise program;patient/family education;stair training;strengthening;transfer training  -           User Key  (r) = Recorded By, (t) = Taken By, (c) = Cosigned By    Initials Name Provider Type    Blanca Calle, PT Physical Therapist               Clinical Impression     Row Name 03/28/23 1037          Pain    Pretreatment Pain Rating 0/10 - no pain  -ORAL     Posttreatment Pain Rating 0/10 - no pain  -ORAL     Additional Documentation Pain Scale: Numbers Pre/Post-Treatment (Group)  -     Row Name 03/28/23 1037          Plan  of Care Review    Plan of Care Reviewed With patient  -ORAL     Outcome Evaluation PT evaluation completed as co-eval with OT. Pt awake, alert, in recliner, and agreeable to therapy. Patient transferred sit to stand to sit with CGA and ambulated 8ft with FWW and CGA. Patient tolerated standing ~ 2 minutes.m Patient reported lightheadedness at times that improved with time.HR initially 104bpm and fluctuated from 99 to 117 bpm during session. Ending  bpm with patient reclined in sitting. O2 sats remained in 90's on 3lpm. function limited by decreased strength, balance, and tolerance for functional mobility and activities. Patient will benefit from PT to improve strength, balance, and tolerance to improve transfer and gait ability and decrease fall risk as patient improves medically. Anticipate home with assist at discharge with HHPT.  -     Row Name 03/28/23 [facility]          Therapy Assessment/Plan (PT)    Patient/Family Therapy Goals Statement (PT) return to [facility]  -     Rehab Potential (PT) good, to achieve stated therapy goals  -     Criteria for Skilled Interventions Met (PT) yes;meets criteria;skilled treatment is necessary  -     Therapy Frequency (PT) other (see comments)  5-7 days/wk  -     Predicted Duration of Therapy Intervention (PT) until discharge or goals met  -     Row Name 03/28/23 1037          Vital Signs    Pre Systolic BP Rehab 92  -ORAL     Pre Treatment Diastolic BP 69  -ORAL     Post Systolic BP Rehab 126  -ORAL     Post Treatment Diastolic BP 92  -ORAL     Pretreatment Heart Rate (beats/min) 99  -ORAL     Posttreatment Heart Rate (beats/min) 100  -ORAL     Pre SpO2 (%) 95  -ORAL     O2 Delivery Pre Treatment nasal cannula  3lpm  -ORAL     Post SpO2 (%) 95  -ORAL     O2 Delivery Post Treatment nasal cannula  -ORAL     Pre Patient Position Sitting  -ORAL     Post Patient Position Sitting  -     Row Name 03/28/23 1037          Positioning and Restraints    Pre-Treatment Position sitting in chair/recliner   -ORAL     Post Treatment Position chair  -ORAL     In Chair reclined;encouraged to call for assist;patient within staff view  -           User Key  (r) = Recorded By, (t) = Taken By, (c) = Cosigned By    Initials Name Provider Type    Blanca Calle, PT Physical Therapist               Outcome Measures     Row Name 03/28/23 1037          How much help from another person do you currently need...    Turning from your back to your side while in flat bed without using bedrails? 4  -ORAL     Moving from lying on back to sitting on the side of a flat bed without bedrails? 3  -ORAL     Moving to and from a bed to a chair (including a wheelchair)? 3  -ORAL     Standing up from a chair using your arms (e.g., wheelchair, bedside chair)? 3  -ORAL     Climbing 3-5 steps with a railing? 3  -ORAL     To walk in hospital room? 3  -ORAL     AM-PAC 6 Clicks Score (PT) 19  -ORAL     Highest level of mobility 6 --> Walked 10 steps or more  -     Row Name 03/28/23 1043 03/28/23 [facility]       Functional Assessment    Outcome Measure Options AM-PAC 6 Clicks Daily Activity (OT)  - AM-PAC 6 Clicks Basic Mobility (PT)  -          User Key  (r) = Recorded By, (t) = Taken By, (c) = Cosigned By    Initials Name Provider Type    Blanca Calle, PT Physical Therapist    Hallie Dickson, OT Occupational Therapist                             Physical Therapy Education     Title: PT OT SLP Therapies (In Progress)     Topic: Physical Therapy (In Progress)     Point: Mobility training (In Progress)     Learning Progress Summary           Patient Acceptance, E, NR by ORAL at 3/28/2023 1235                   Point: Home exercise program (Not Started)     Learner Progress:  Not documented in this visit.          Point: Body mechanics (In Progress)     Learning Progress Summary           Patient Acceptance, E, NR by ORAL at 3/28/2023 1235                   Point: Precautions (In Progress)     Learning Progress Summary           Patient Acceptance, E, NR by  ORAL at 3/28/2023 1235                               User Key     Initials Effective Dates Name Provider Type Discipline     06/16/21 -  Blanca Marie PT Physical Therapist PT              PT Recommendation and Plan  Planned Therapy Interventions (PT): balance training, bed mobility training, gait training, home exercise program, patient/family education, stair training, strengthening, transfer training  Plan of Care Reviewed With: patient  Outcome Evaluation: PT evaluation completed as co-eval with OT. Pt awake, alert, in recliner, and agreeable to therapy. Patient transferred sit to stand to sit with CGA and ambulated 8ft with FWW and CGA. Patient tolerated standing ~ 2 minutes.m Patient reported lightheadedness at times that improved with time.HR initially 104bpm and fluctuated from 99 to 117 bpm during session. Ending  bpm with patient reclined in sitting. O2 sats remained in 90's on 3lpm. function limited by decreased strength, balance, and tolerance for functional mobility and activities. Patient will benefit from PT to improve strength, balance, and tolerance to improve transfer and gait ability and decrease fall risk as patient improves medically. Anticipate home with assist at discharge with HHPT.     Time Calculation:    PT Charges     Row Name 03/28/23 1236             Time Calculation    Start Time 1035  -      Stop Time 1113  -      Time Calculation (min) 38 min  -      PT Received On 03/28/23  -      PT Goal Re-Cert Due Date 04/10/23  -         Time Calculation- PT    Total Timed Code Minutes- PT 0 minute(s)  -ORAL         Untimed Charges    PT Eval/Re-eval Minutes 38  -ORAL         Total Minutes    Untimed Charges Total Minutes 38  -ORAL       Total Minutes 38  -ORAL            User Key  (r) = Recorded By, (t) = Taken By, (c) = Cosigned By    Initials Name Provider Type    ORAL Blanca Marie, PT Physical Therapist              Therapy Charges for Today     Code Description Service Date  Service Provider Modifiers Qty    [id number] HC PT EVAL MOD COMPLEXITY 3 3/28/2023 Blanca Marie, PT GP 1          PT G-Codes  Outcome Measure Options: AM-PAC 6 Clicks Daily Activity (OT)  AM-PAC 6 Clicks Score (PT): 19  AM-PAC 6 Clicks Score (OT): 22  PT Discharge Summary  Anticipated Discharge Disposition (PT): home with assist, home with home health    Blanca Marie, PT  3/28/2023

## 2023-03-28 NOTE — PROGRESS NOTES
[facility] Medicine Services  INPATIENT PROGRESS NOTE      Length of Stay: 8  Date of Admission: 3/20/2023  Primary Care Physician: Dilcia Kapoor APRN    Subjective   Chief Complaint:  Shortness of breath with heart palpitations  HPI:  67-year-old male comes to the ER stating he does not feel well.  He has not felt well for a while.  Patient states he has not taken any of his medicines for the last several months.  He reports having history of A-fib.  Patient is a poor historian, but states he is short of breath that is worse with exertion.     Daily assessment  Patient seen and examined 3/24/2023.  Patient is currently lying in bed.  He states he is comfortable.  Reviewed medications and labs with the patient and treatment plan.  Patient is denying any new problems or complaints.  He actually does feel better.  Radiology is indicated that there is very little fluid to pull off of right pleural effusion and has recommended to hold for now and follow and proceed with procedure as needed.  The patient is not have any chest pain.  Continues to be very short of breath at rest and on exertion.  He has no fevers or chills.  Otherwise he is tolerating current treatment plan.  3/25/2023  Patient admits to approximately 30 pound weight loss over past 2 days.  States breathing slightly better after Lasix diuresis.  Family including wife/daughter present during interview with Dr. Lopez this AM.  3/26/2023  Patient continues to improve on IV diuresis.  Patient's IV dobutamine weaned by 50% over past 24 hours.  Patient has cardiac catheterization planned for tomorrow.  States lower extremity swelling continues to decrease on diuretic therapy.  3/27/2023:  Patient's RVR resolved with IV amnio initiation yesterday night.  Cardiology in room during evaluation with Dr. Lopez.  Patient still tentatively may receive cardiac catheterization procedure today.  No other new issues  overnight.  3/28/2023:  Patient being transitioned from IV amnio to p.o. amnio by cardiology.  Truly appreciate cardiology's assistance with this case estimation point.  Status post cardiac catheterization yesterday with nonobstructive CAD noted.  Patient in no acute distress.  States breathing has improved with Lasix diuresis during hospitalization.  Still having bouts of RVR.     Review of Systems   Constitutional: Positive for fatigue. Negative for chills and fever.   HENT: Negative.    Eyes: Negative.    Respiratory: Positive for shortness of breath.    Cardiovascular: Positive for chest pain.   Gastrointestinal: Negative.    Endocrine: Negative.    Genitourinary: Negative.    Musculoskeletal: Negative.    Skin: Negative.    Neurological: Positive for weakness.   Hematological: Negative.    Psychiatric/Behavioral: Negative.         All pertinent negatives and positives are as above. All other systems have been reviewed and are negative unless otherwise stated.     Objective    As of today 03/28/23  Temp:  [97.8 °F (36.6 °C)-98.1 °F (36.7 °C)] 98.1 °F (36.7 °C)  Heart Rate:  [] 86  Resp:  [18-20] 20  BP: ()/(55-83) 105/75     Physical Exam  Vitals and nursing note reviewed.   HENT:      Head: Normocephalic and atraumatic.      Right Ear: External ear normal.      Left Ear: External ear normal.      Nose: Nose normal.      Mouth/Throat:      Mouth: Mucous membranes are dry.      Pharynx: Oropharynx is clear.   Eyes:      General: No scleral icterus.     Extraocular Movements: Extraocular movements intact.      Conjunctiva/sclera: Conjunctivae normal.      Pupils: Pupils are equal, round, and reactive to light.   Cardiovascular:      Rate and Rhythm: Normal rate. Rhythm irregular.      Pulses: Normal pulses.      Heart sounds: Normal heart sounds.   Pulmonary:      Effort: Pulmonary effort is normal.      Breath sounds: Normal breath sounds.      Comments: Decreased breath sounds at the bases otherwise  clear  Abdominal:      General: Bowel sounds are normal. There is distension.      Palpations: Abdomen is soft.      Tenderness: There is no abdominal tenderness.      Comments: Soft nontender nondistended normal active bowel sounds   Musculoskeletal:         General: Normal range of motion.      Cervical back: Normal range of motion and neck supple.      Right lower leg: Edema present.      Left lower leg: Edema present.      Comments: Venous stasis changes bilateral lower extremities  Edema bilateral lower extremities improving   Skin:     General: Skin is warm and dry.      Capillary Refill: Capillary refill takes less than 2 seconds.      Coloration: Skin is not jaundiced.   Neurological:      General: No focal deficit present.      Mental Status: He is alert and oriented to person, place, and time.      Motor: Weakness present.   Psychiatric:         Mood and Affect: Mood normal.         Behavior: Behavior normal.           Results Review:  I have reviewed the labs, radiology results, and diagnostic studies.    Laboratory Data:   Results from last 7 days   Lab Units 03/28/23  0557 03/27/23  1938 03/27/23  0316 03/26/23  2303 03/26/23  0428   SODIUM mmol/L 127*  --  128*  --  130*   POTASSIUM mmol/L 3.9 3.4* 3.5   < > 3.0*   CHLORIDE mmol/L 87*  --  84*  --  85*   CO2 mmol/L 35.0*  --  38.0*  --  39.0*   BUN mg/dL 16  --  15  --  16   CREATININE mg/dL 0.83  --  0.82  --  0.86   GLUCOSE mg/dL 84  --  84  --  81   CALCIUM mg/dL 7.8*  --  7.8*  --  7.9*   BILIRUBIN mg/dL 6.6*  --  6.2*  --  6.8*   ALK PHOS U/L 92  --  87  --  86   ALT (SGPT) U/L 32  --  36  --  39   AST (SGOT) U/L 38  --  42*  --  39   ANION GAP mmol/L 5.0  --  6.0  --  6.0    < > = values in this interval not displayed.     Estimated Creatinine Clearance: 94.1 mL/min (by C-G formula based on SCr of 0.83 mg/dL).  Results from last 7 days   Lab Units 03/28/23  0557 03/27/23  0316 03/26/23  1032   MAGNESIUM mg/dL 1.9 2.1 1.7         Results from last  7 days   Lab Units 03/28/23  0557 03/27/23  0316 03/26/23  0428 03/25/23  0600 03/24/23  0545   WBC 10*3/mm3 8.24 8.43 6.75 7.31 7.26   HEMOGLOBIN g/dL 16.1 16.3 16.5 16.7 16.6   HEMATOCRIT % 44.5 44.4 44.3 44.6 45.7   PLATELETS 10*3/mm3 113* 108* 93* 101* 95*           Culture Data:   No results found for: BLOODCX  No results found for: URINECX  No results found for: RESPCX  No results found for: WOUNDCX  No results found for: STOOLCX  No components found for: BODYFLD    Radiology Data:   Imaging Results (Last 24 Hours)     ** No results found for the last 24 hours. **      Results for orders placed during the hospital encounter of 03/20/23    Adult Transthoracic Echo Complete W/ Cont if Necessary Per Protocol    Interpretation Summary  •  Left ventricular ejection fraction appears to be 21 - 25%.  •  The left ventricular cavity is dilated.  •  Left ventricular wall thickness is consistent with hypertrophy.  •  Left ventricular diastolic dysfunction is noted.  •  Moderately reduced right ventricular systolic function noted.  •  The right ventricular cavity is severely dilated.  •  Left atrial volume is severely increased.  •  The right atrial cavity is severely  dilated.  •  Estimated right ventricular systolic pressure from tricuspid regurgitation is mildly elevated (35-45 mmHg).      I have reviewed the patient's current medications.   Scheduled Meds:amiodarone, 200 mg, Oral, BID With Meals  digoxin, 125 mcg, Oral, Daily  [START ON 3/29/2023] furosemide, 20 mg, Oral, BID  metoprolol succinate XL, 12.5 mg, Oral, Q24H  phytonadione (VITAMIN K) IVPB, 5 mg, Intravenous, Once  rivaroxaban, 20 mg, Oral, Daily With Dinner  sodium chloride, 10 mL, Intravenous, Q12H  spironolactone, 25 mg, Oral, Daily      Continuous Infusions:amiodarone, 0.5 mg/min, Last Rate: 0.5 mg/min (03/28/23 0930)  hold, 1 each      PRN Meds:.•  [DISCONTINUED] acetaminophen **OR** [DISCONTINUED] acetaminophen **OR** acetaminophen  •   acetaminophen  •  hold  •  hydrocortisone-bacitracin-zinc oxide-nystatin  •  magnesium sulfate **OR** magnesium sulfate **OR** magnesium sulfate  •  ondansetron  •  potassium chloride **OR** potassium chloride **OR** potassium chloride  •  sodium chloride  •  sodium chloride  Assessment/Plan     Principal Problem:    Atrial fibrillation with RVR ([facility])  Active Problems:    Atrial fibrillation ([facility])    NSTEMI, initial episode of care ([facility])    Biventricular CHF (congestive heart failure) ([facility])    Assessment and Plan    Type II MI secondary to RVR and fluid overload  -Appreciate cardiology assistance!  Continue current management.  Continue fluid restriction 1.1 L/day and salt restriction.  Continue IV Lasix.  - 3/28 status post PCI 3/27 with nonobstructive CAD noted.       Acute on chronic, systolic CHF  Will continue with lasix bid; monitor intake and output  - As above.  Improving on IV Lasix and dobutamine gtt.  Wean from dobutamine as tolerated.  Breathing steadily improving with diuresis.  Appreciate both cardiology and nephrology assistance with patient.  -3/27 scheduled for heart catheterization procedure today.  Cardiology currently finalizing arrangements.Procedure may be canceled if patient unable to lay flat  -Weaning patient's oxygen to room air as tolerated.    Atrial fibrillation with RVR  Cardiologist will be consulted and appreciate their assistance. High [id number] score; currently tolerating rivaroxaban therapy.  Cardiac rehab.  Troponin elevated but being followed by cardiology  Left heart cath once fluid status has been stabilized.  Continue Xarelto 20 mg daily  -3/26/2023 at 6:42 PM Patient has suffered from A-fib with RVR issues intermittently since this AM.  Issues not resolved with discontinuation of dobutamine drip.  Patient's blood pressures low normal so extremely hesitant to use beta-blocker or calcium channel blocker for rate control.  We will therefore load patient with IV amiodarone.  Also  recommend dobutamine vasopressor as first-line to maintain MAP above 65.  If dobutamine unsuccessful, would then utilize Levophed as second line to keep MAP greater than 65.  Patient scheduled for cardiac catheterization in a.m. for evaluation of acute on chronic heart failure with reduced ejection fraction.  - 3/27/2023 spoke with cardiology, cardiology currently adjusting patient's atrial fibrillation maintenance medications.  Truly appreciate cardiology's assistance!    RUTH:  - Appreciate nephrology assistance.  Etiology possibly A-fib with RVR related versus cardiorenal syndrome.  Continue Lasix.    Hypertension  - currently suffering from hypotension and on dobutamine gtt.      CODE STATUS full code  DVT prophylaxis Xarelto    Medical Decision Making  Number and Complexity of problems: 3 complex problems  Differential Diagnosis: Atrial fibrillation versus acute coronary syndrome    Conditions and Status:        Condition is unchanged.     [facility] Data  External documents reviewed: Hospital charts  My EKG interpretation:  EKG completed 3/20/2023.  Rhythm: atrial fibrillation   Rate: tachycardic   Clinical impression: abnormal ECG    My plain film interpretation:  Chest x-ray completed 3/20/2023.  IMPRESSION:  Right basilar pleural effusion, right lower lobe  compressive atelectasis, cardiomegaly and mild central vascular  Congestion.   Tests considered but not ordered: None     Decision rules/scores evaluated (example [id number], Wells, etc): [id number] high and on Rivaroxaban     Discussed with: Patient and agrees to current treatment plan     Treatment Plan  As above    Care Planning  Shared decision making: Patient updated on current status and informed of proposed care plan; is in agreement with plan  Code status and discussions: Full code    Disposition  Social Determinants of Health that impact treatment or disposition: N/A        I have utilized all available immediate resources to obtain, update, or  review the patient's current medications (including all prescriptions, over-the-counter products, herbals, cannabis/cannabidiol products, and vitamin/mineral/dietary (nutritional) supplements).     I confirmed that the patient's Advance Care Plan is present, code status is documented, or surrogate decision maker is listed in the patient's medical record.    Discharge Planning:   - 3/28 transferred out of ICU to telemetry floor.  - I expect patient to be discharged home hospital within the next 72 hours once amiodarone loading completed, if RVR resolved, and cardiac/nephrology consults agrees with hospital disposition.      Cuco Lopez MD

## 2023-03-28 NOTE — PROGRESS NOTES
"  [facility]  [address] [place] [address]. [address]  T - [phone]  F - [phone]     Progress Note          PATIENT  DEMOGRAPHICS   PATIENT NAME: Pavan Mccauley                      PHYSICIAN: Luis Alcantar MD  : 1955  MRN: [id number]   LOS: 8 days    Patient Care Team:  Dilcia Kapoor APRN as PCP - General (Family Medicine)  Subjective   SUBJECTIVE   Doing better. Edema has improved. bp Is on low side. Out of bed to chair today          Objective   OBJECTIVE   Vital Signs  Temp:  [97.8 °F (36.6 °C)-98.1 °F (36.7 °C)] 98.1 °F (36.7 °C)  Heart Rate:  [] 88  Resp:  [18-20] 20  BP: ()/(55-83) 92/69    Flowsheet Rows    Flowsheet Row First Filed Value   Admission Height 170.2 cm (67\") Documented at 2023 1618   Admission Weight 105 kg (231 lb 14.8 oz) Documented at 2023 1648           I/O last 3 completed shifts:  In: -   Out: 3985 [Urine:3985]    PHYSICAL EXAM    Physical Exam  Constitutional:       Appearance: He is well-developed.   HENT:      Head: Normocephalic.   Eyes:      Pupils: Pupils are equal, round, and reactive to light.   Cardiovascular:      Rate and Rhythm: Normal rate and regular rhythm.      Heart sounds: Normal heart sounds.   Pulmonary:      Effort: Pulmonary effort is normal.      Breath sounds: Normal breath sounds.   Abdominal:      General: Bowel sounds are normal.      Palpations: Abdomen is soft.   Musculoskeletal:         General: Swelling present.   Skin:     Coloration: Skin is not jaundiced.   Neurological:      General: No focal deficit present.      Mental Status: He is alert and oriented to person, place, and time.         RESULTS   Results Review:    Results from last 7 days   Lab Units 23  0557 23  1938 23  0316 23  2303 23  0428   SODIUM mmol/L 127*  --  128*  --  130*   POTASSIUM mmol/L 3.9 3.4* 3.5   < > 3.0*   CHLORIDE mmol/L 87*  --  84*  --  85*   CO2 mmol/L 35.0*  --  38.0*  --  " 39.0*   BUN mg/dL 16  --  15  --  16   CREATININE mg/dL 0.83  --  0.82  --  0.86   CALCIUM mg/dL 7.8*  --  7.8*  --  7.9*   BILIRUBIN mg/dL 6.6*  --  6.2*  --  6.8*   ALK PHOS U/L 92  --  87  --  86   ALT (SGPT) U/L 32  --  36  --  39   AST (SGOT) U/L 38  --  42*  --  39   GLUCOSE mg/dL 84  --  84  --  81    < > = values in this interval not displayed.       Estimated Creatinine Clearance: 94.1 mL/min (by C-G formula based on SCr of 0.83 mg/dL).    Results from last 7 days   Lab Units 03/28/23  0557 03/27/23  0316 03/26/23  1032   MAGNESIUM mg/dL 1.9 2.1 1.7             Results from last 7 days   Lab Units 03/28/23  0557 03/27/23  0316 03/26/23  0428 03/25/23  0600 03/24/23  0545   WBC 10*3/mm3 8.24 8.43 6.75 7.31 7.26   HEMOGLOBIN g/dL 16.1 16.3 16.5 16.7 16.6   PLATELETS 10*3/mm3 113* 108* 93* 101* 95*               Imaging Results (Last 24 Hours)     ** No results found for the last 24 hours. **           MEDICATIONS    amiodarone, 200 mg, Oral, BID With Meals  digoxin, 125 mcg, Oral, Daily  furosemide, 40 mg, Oral, BID  metoprolol succinate XL, 12.5 mg, Oral, Q24H  phytonadione (VITAMIN K) IVPB, 5 mg, Intravenous, Once  rivaroxaban, 20 mg, Oral, Daily With Dinner  sodium chloride, 10 mL, Intravenous, Q12H  spironolactone, 25 mg, Oral, Daily      amiodarone, 0.5 mg/min, Last Rate: 0.5 mg/min (03/28/23 0930)  hold, 1 each  norepinephrine, 0.02-0.3 mcg/kg/min        Assessment & Plan   ASSESSMENT / PLAN      Atrial fibrillation with RVR ([facility])    Atrial fibrillation ([facility])    NSTEMI, initial episode of care ([facility])    Biventricular CHF (congestive heart failure) ([facility])       1.  RUTH- baseline creatinine looks to be around 1.1,  and peak up to 1.86.  Etiology of RUTH could be secondary to cardio renal syndrome versus poor perfusion with atrial fibrillation.  He has significant volume overload apparent on exam. Cr is now <1     -good diuretic response. bp remained stable on aldactone.  added aldactone because of marked k  loss / low EF. Wt is down to 202 from 232 lbs.     Did well post contrast. Keep lasix and lower it to 20mg bid. Keep aldactone.      2.  Hyponatremia- likely secondary to hypervolemia, Lasix as above. Na dropped today add tolvaptan x 1      3.  A-fib with RVR- cardiology managing     4.  Acute on chronic systolic heart failure- clinically better     5.  Transaminitis / elevated bilirubin check for direct and indirect bilirubin     6.  Pleural effusion- no plan for thoracentesis    7. Hypokalemia / hypomagnesemia - now better              This document has been electronically signed by Luis Alcantar MD on March 28, 2023 11:01 CDT

## 2023-03-28 NOTE — PLAN OF CARE
Goal Outcome Evaluation:  Plan of Care Reviewed With: patient        Progress: no change  Outcome Evaluation: OT eval completed, co-eval with PT. Nsg and APRN approved eval. Pt sitting up in chair upon entering and agreeable to the session. Pt BUE MMT 4-/5 grossly. Pt CGA for sit to stand t/f with RW. Pt CGA for taking steps in front of chair with RW. Pt demonstrates decreased fxnl mobility, strength, and independence in ADLs. Pt would benefit from skilled OT services. Recommend d/c home with assist, HHOT.

## 2023-03-29 LAB
ALBUMIN SERPL-MCNC: 2.3 G/DL (ref 3.5–5.2)
ALBUMIN/GLOB SERPL: 0.9 G/DL
ALP SERPL-CCNC: 96 U/L (ref 39–117)
ALT SERPL W P-5'-P-CCNC: 35 U/L (ref 1–41)
ANION GAP SERPL CALCULATED.3IONS-SCNC: 8 MMOL/L (ref 5–15)
AST SERPL-CCNC: 43 U/L (ref 1–40)
BASOPHILS # BLD AUTO: 0.03 10*3/MM3 (ref 0–0.2)
BASOPHILS NFR BLD AUTO: 0.3 % (ref 0–1.5)
BILIRUB SERPL-MCNC: 6.7 MG/DL (ref 0–1.2)
BUN SERPL-MCNC: 16 MG/DL (ref 8–23)
BUN/CREAT SERPL: 17.8 (ref 7–25)
CALCIUM SPEC-SCNC: 8.4 MG/DL (ref 8.6–10.5)
CHLORIDE SERPL-SCNC: 95 MMOL/L (ref 98–107)
CO2 SERPL-SCNC: 33 MMOL/L (ref 22–29)
CREAT SERPL-MCNC: 0.9 MG/DL (ref 0.76–1.27)
DEPRECATED RDW RBC AUTO: 62 FL (ref 37–54)
EGFRCR SERPLBLD CKD-EPI 2021: 94.8 ML/MIN/1.73
EOSINOPHIL # BLD AUTO: 0.15 10*3/MM3 (ref 0–0.4)
EOSINOPHIL NFR BLD AUTO: 1.5 % (ref 0.3–6.2)
ERYTHROCYTE [DISTWIDTH] IN BLOOD BY AUTOMATED COUNT: 21.1 % (ref 12.3–15.4)
GLOBULIN UR ELPH-MCNC: 2.6 GM/DL
GLUCOSE SERPL-MCNC: 82 MG/DL (ref 65–99)
HCT VFR BLD AUTO: 47.6 % (ref 37.5–51)
HGB BLD-MCNC: 17.1 G/DL (ref 13–17.7)
IMM GRANULOCYTES # BLD AUTO: 0.06 10*3/MM3 (ref 0–0.05)
IMM GRANULOCYTES NFR BLD AUTO: 0.6 % (ref 0–0.5)
LYMPHOCYTES # BLD AUTO: 0.78 10*3/MM3 (ref 0.7–3.1)
LYMPHOCYTES NFR BLD AUTO: 7.8 % (ref 19.6–45.3)
MAGNESIUM SERPL-MCNC: 2.3 MG/DL (ref 1.6–2.4)
MCH RBC QN AUTO: 31.1 PG (ref 26.6–33)
MCHC RBC AUTO-ENTMCNC: 35.9 G/DL (ref 31.5–35.7)
MCV RBC AUTO: 86.7 FL (ref 79–97)
MONOCYTES # BLD AUTO: 1.53 10*3/MM3 (ref 0.1–0.9)
MONOCYTES NFR BLD AUTO: 15.2 % (ref 5–12)
NEUTROPHILS NFR BLD AUTO: 7.5 10*3/MM3 (ref 1.7–7)
NEUTROPHILS NFR BLD AUTO: 74.6 % (ref 42.7–76)
NRBC BLD AUTO-RTO: 0 /100 WBC (ref 0–0.2)
PLATELET # BLD AUTO: 127 10*3/MM3 (ref 140–450)
PMV BLD AUTO: 9.8 FL (ref 6–12)
POTASSIUM SERPL-SCNC: 3.7 MMOL/L (ref 3.5–5.2)
PROT SERPL-MCNC: 4.9 G/DL (ref 6–8.5)
QT INTERVAL: 400 MS
QT INTERVAL: 400 MS
QTC INTERVAL: 531 MS
QTC INTERVAL: 531 MS
RBC # BLD AUTO: 5.49 10*6/MM3 (ref 4.14–5.8)
SODIUM SERPL-SCNC: 136 MMOL/L (ref 136–145)
WBC NRBC COR # BLD: 10.05 10*3/MM3 (ref 3.4–10.8)

## 2023-03-29 PROCEDURE — 80053 COMPREHEN METABOLIC PANEL: CPT | Performed by: INTERNAL MEDICINE

## 2023-03-29 PROCEDURE — 99232 SBSQ HOSP IP/OBS MODERATE 35: CPT | Performed by: NURSE PRACTITIONER

## 2023-03-29 PROCEDURE — 97535 SELF CARE MNGMENT TRAINING: CPT

## 2023-03-29 PROCEDURE — 83735 ASSAY OF MAGNESIUM: CPT | Performed by: NURSE PRACTITIONER

## 2023-03-29 PROCEDURE — 93005 ELECTROCARDIOGRAM TRACING: CPT

## 2023-03-29 PROCEDURE — 85025 COMPLETE CBC W/AUTO DIFF WBC: CPT | Performed by: INTERNAL MEDICINE

## 2023-03-29 PROCEDURE — 97110 THERAPEUTIC EXERCISES: CPT

## 2023-03-29 PROCEDURE — 93010 ELECTROCARDIOGRAM REPORT: CPT | Performed by: INTERNAL MEDICINE

## 2023-03-29 PROCEDURE — 97530 THERAPEUTIC ACTIVITIES: CPT

## 2023-03-29 PROCEDURE — 93005 ELECTROCARDIOGRAM TRACING: CPT | Performed by: INTERNAL MEDICINE

## 2023-03-29 RX ORDER — ATORVASTATIN CALCIUM 40 MG/1
40 TABLET, FILM COATED ORAL NIGHTLY
Status: DISCONTINUED | OUTPATIENT
Start: 2023-03-29 | End: 2023-04-02

## 2023-03-29 RX ADMIN — ATORVASTATIN CALCIUM 40 MG: 40 TABLET, FILM COATED ORAL at 20:38

## 2023-03-29 RX ADMIN — AMIODARONE HYDROCHLORIDE 200 MG: 200 TABLET ORAL at 08:24

## 2023-03-29 RX ADMIN — SPIRONOLACTONE 25 MG: 25 TABLET ORAL at 08:24

## 2023-03-29 RX ADMIN — AMIODARONE HYDROCHLORIDE 200 MG: 200 TABLET ORAL at 17:53

## 2023-03-29 RX ADMIN — Medication 10 ML: at 20:38

## 2023-03-29 RX ADMIN — FUROSEMIDE 20 MG: 20 TABLET ORAL at 08:24

## 2023-03-29 RX ADMIN — RIVAROXABAN 20 MG: 10 TABLET, FILM COATED ORAL at 17:53

## 2023-03-29 RX ADMIN — Medication 10 ML: at 08:25

## 2023-03-29 RX ADMIN — Medication 12.5 MG: at 08:23

## 2023-03-29 RX ADMIN — DIGOXIN 125 MCG: 125 TABLET ORAL at 11:45

## 2023-03-29 RX ADMIN — FUROSEMIDE 20 MG: 20 TABLET ORAL at 17:53

## 2023-03-29 NOTE — PROGRESS NOTES
"Adult Nutrition  Assessment/PES    Patient Name:  Pavan Mccauley  YOB: 1955  MRN: [id number]  Admit Date:  3/20/2023    Assessment Date:  3/29/2023    Comments:  Nutrition F/U:  Pt is post op PCI with nonobstructive CAD noted.  He continues to be managed for AFib; NSTEMI; and Biventricular CHF.  He remains on Lasix and Aldactone with a significant wt loss since admit.  Admit wt 231# and #.    Pt reports a \"fair\" appetite at this time.  Limited po documentation--75% this am.    RD will add milk with meals and a fruit place at breakfast per his request.    Low Sodium diet education discussed.  He does not use salt at home.  Diet copies and contact number will be provided at discharge.  RD will continue to monitor POC and po intake.    Reminded him of snacks that are available in the pantry prn.         Reason for Assessment     Row Name 03/29/23 1217          Reason for Assessment    Reason For Assessment follow-up protocol                Nutrition/Diet History     Row Name 03/29/23 1217          Nutrition/Diet History    Typical Intake (Food/Fluid/EN/PN) Pt reports today that his appetite is \"so-so\"  He requested fruit at lunch.  We discussed food preferences.  He also reports that he follows a Low Sodium diet at home.  We discussed other ways that he can reduce sodium in the diet.                Labs/Tests/Procedures/Meds     Row Name 03/29/23 1221          Labs/Procedures/Meds    Lab Results Reviewed reviewed, pertinent     Lab Results Comments Platelets 127; Alb 2.3        Diagnostic Tests/Procedures    Diagnostic Test/Procedure Reviewed reviewed, pertinent     Diagnostic Test/Procedures Comments S/P PCI        Medications    Pertinent Medications Reviewed reviewed, pertinent     Pertinent Medications Comments Dig; Xarelto; lasix; Aldactone                  Estimated/Assessed Needs - Anthropometrics     Row Name 03/29/23 0500          Anthropometrics    Weight 89.8 kg (198 lb)           "      Nutrition Prescription Ordered     Row Name 03/29/23 1222          Nutrition Prescription PO    Current PO Diet Regular     Common Modifiers Cardiac;Consistent Carbohydrate                Evaluation of Received Nutrient/Fluid Intake     Row Name 03/29/23 1223          PO Evaluation    Number of Days PO Intake Evaluated Insufficient Data     Number of Meals 1     % PO Intake 75%                   Problem/Interventions:   Problem 1     Row Name 03/29/23 1223          Nutrition Diagnoses Problem 1    Problem 1 Knowledge Deficit     Etiology (related to) Medical Diagnosis     Cardiac CHF     Signs/Symptoms (evidenced by) Potential Information Deficit                Problem 2     Row Name 03/29/23 1224          Nutrition Diagnoses Problem 2    Problem 2 Altered Nutrition Related to Labs     Etiology (related to) Medical Diagnosis     Hematological Thrombocytopenia     Signs/Symptoms (evidenced by) Biochemical     Labs Reviewed Done     Specific Labs Noted Platelets                Problem 3     Row Name 03/29/23 1224          Nutrition Diagnoses Problem 3    Problem 3 Inadequate Intake/Infusion     Inadequate Intake Type Oral     Macronutrient Kcal;Protein     Micronutrient Vitamin;Mineral     Etiology (related to) Factors Affecting Nutrition     Appetite Fair     Food Habit/Preferences Limited Food Preferences     Signs/Symptoms (evidenced by) PO Intake     Percent (%) intake recorded 75 %     Over number of meals 1                  Intervention Goal     Row Name 03/29/23 1225          Intervention Goal    General Provide information regarding MNT for treatment/condition;Reduce/improve symptoms;Meet nutritional needs for age/condition     PO Tolerate PO;Increase intake;Meet estimated needs     Weight Maintain weight  Fluid loss is appropriate                Nutrition Intervention     Row Name 03/29/23 1226          Nutrition Intervention    RD/Tech Action Follow Tx progress;Care plan reviewd;Encourage  intake;Recommend/ordered;Advise alternate selection;Advise available snack;Interview for preference;Menu provided     Recommended/Ordered Supplement                Nutrition Prescription     Row Name 03/29/23 1226          Nutrition Prescription PO    PO Prescription Begin/change diet;Begin/change supplement     Begin/Change Diet to Regular     Fluid Consistency Thin     Supplement Milk;Other (comment)  fruit plate     Supplement Frequency 3 times a day;Daily     Common Modifiers Cardiac     New PO Prescription Ordered? Yes                Education/Evaluation     Row Name 03/29/23 1227          Education    Education Education topics;Advised regarding habits/behavior     Education Topics Basic nutrition;Protein;Na+     Na+ (mg/day) 1500 mg/day     Advised Regarding Habits/Behavior Food choices;Food prep;Increased nutrient density;Use supplement        Monitor/Evaluation    Monitor Per protocol;I&O;PO intake;Supplement intake;Pertinent labs;Weight;Skin status;Symptoms     Education Follow-up Reinforce PRN                 Electronically signed by:  Amalia Costa RD  03/29/23 12:30 CDT

## 2023-03-29 NOTE — PLAN OF CARE
Goal Outcome Evaluation:  Plan of Care Reviewed With: patient        Progress: improving  Outcome Evaluation: Sup-Sit-Min A. Pt sat EOB and completed UB bathing/dressing-SBA. LB bathing (pericare) SBA. Feet-dep. LBD-dep. Sit-stand-CGA. Fxl mobility ~10' bed>recliner-CGA of 1 w/ RW. Pt tolerated UE ther ex in all planes w/ 3lb HW and good tolerance. No goals met this date. Cont OT POC.

## 2023-03-29 NOTE — PROGRESS NOTES
"  [facility]  [address] [place] [address]. [address]  T - [phone]  F - [phone]     Progress Note          PATIENT  DEMOGRAPHICS   PATIENT NAME: Pavan Mccauley                      PHYSICIAN: Luis Alcantar MD  : 1955  MRN: [id number]   LOS: 9 days    Patient Care Team:  Dilcia Kapoor APRN as PCP - General (Family Medicine)  Subjective   SUBJECTIVE   Doing better. Edema has improved.          Objective   OBJECTIVE   Vital Signs  Temp:  [97 °F (36.1 °C)-98.4 °F (36.9 °C)] 97.5 °F (36.4 °C)  Heart Rate:  [] 94  Resp:  [18] 18  BP: ()/(60-88) 113/78    Flowsheet Rows    Flowsheet Row First Filed Value   Admission Height 170.2 cm (67\") Documented at 2023 1618   Admission Weight 105 kg (231 lb 14.8 oz) Documented at 2023 1648           I/O last 3 completed shifts:  In: -   Out: 6550 [Urine:6550]    PHYSICAL EXAM    Physical Exam  Constitutional:       Appearance: He is well-developed.   HENT:      Head: Normocephalic.   Eyes:      Pupils: Pupils are equal, round, and reactive to light.   Cardiovascular:      Rate and Rhythm: Normal rate and regular rhythm.      Heart sounds: Normal heart sounds.   Pulmonary:      Effort: Pulmonary effort is normal.      Breath sounds: Normal breath sounds.   Abdominal:      General: Bowel sounds are normal.      Palpations: Abdomen is soft.   Musculoskeletal:         General: Swelling present.   Skin:     Coloration: Skin is not jaundiced.   Neurological:      General: No focal deficit present.      Mental Status: He is alert and oriented to person, place, and time.         RESULTS   Results Review:    Results from last 7 days   Lab Units 23  0517 23  0557 23  1938 23  0316   SODIUM mmol/L 136 127*  --  128*   POTASSIUM mmol/L 3.7 3.9 3.4* 3.5   CHLORIDE mmol/L 95* 87*  --  84*   CO2 mmol/L 33.0* 35.0*  --  38.0*   BUN mg/dL 16 16  --  15   CREATININE mg/dL 0.90 0.83  --  0.82   CALCIUM mg/dL " 8.4* 7.8*  --  7.8*   BILIRUBIN mg/dL 6.7* 6.6*  --  6.2*   ALK PHOS U/L 96 92  --  87   ALT (SGPT) U/L 35 32  --  36   AST (SGOT) U/L 43* 38  --  42*   GLUCOSE mg/dL 82 84  --  84       Estimated Creatinine Clearance: 87.5 mL/min (by C-G formula based on SCr of 0.9 mg/dL).    Results from last 7 days   Lab Units 03/29/23 0517 03/28/23 0557 03/27/23 0316   MAGNESIUM mg/dL 2.3 1.9 2.1             Results from last 7 days   Lab Units 03/29/23 0517 03/28/23 0557 03/27/23 0316 03/26/23  0428 03/25/23  0600   WBC 10*3/mm3 10.05 8.24 8.43 6.75 7.31   HEMOGLOBIN g/dL 17.1 16.1 16.3 16.5 16.7   PLATELETS 10*3/mm3 127* 113* 108* 93* 101*               Imaging Results (Last 24 Hours)     ** No results found for the last 24 hours. **           MEDICATIONS    amiodarone, 200 mg, Oral, BID With Meals  digoxin, 125 mcg, Oral, Daily  furosemide, 20 mg, Oral, BID  metoprolol succinate XL, 12.5 mg, Oral, Q24H  phytonadione (VITAMIN K) IVPB, 5 mg, Intravenous, Once  rivaroxaban, 20 mg, Oral, Daily With Dinner  sodium chloride, 10 mL, Intravenous, Q12H  spironolactone, 25 mg, Oral, Daily      hold, 1 each        Assessment & Plan   ASSESSMENT / PLAN      Atrial fibrillation with RVR ([facility])    Atrial fibrillation ([facility])    NSTEMI, initial episode of care ([facility])    Biventricular CHF (congestive heart failure) ([facility])       1.  RUTH- baseline creatinine looks to be around 1.1,  and peak up to 1.86.  Etiology of RUTH could be secondary to cardio renal syndrome versus poor perfusion with atrial fibrillation.  He has significant volume overload apparent on exam. Cr is now <1     -good diuretic response. bp remained stable on aldactone.  added aldactone because of marked k loss / low EF. Wt is down to 198 from 232 lbs.     Did well post contrast. Keep lasix 20mg bid. Keep aldactone.     Will review lab in am and if all stable follow up in office in 2 weeks post discharge      2.  Hyponatremia- likely secondary to hypervolemia, Lasix as above.  Na better after tolvaptan x 1      3.  A-fib with RVR- cardiology managing     4.  Acute on chronic systolic heart failure- clinically better     5.  Transaminitis / elevated bilirubin check for direct and indirect bilirubin     6.  Pleural effusion- no plan for thoracentesis    7. Hypokalemia / hypomagnesemia - now better              This document has been electronically signed by Luis Alcantar MD on March 29, 2023 12:37 CDT

## 2023-03-29 NOTE — PLAN OF CARE
Problem: Dysrhythmia  Goal: Normalized Cardiac Rhythm  Outcome: Ongoing, Progressing     Problem: Asthma Comorbidity  Goal: Maintenance of Asthma Control  Outcome: Ongoing, Progressing     Problem: Behavioral Health Comorbidity  Goal: Maintenance of Behavioral Health Symptom Control  Outcome: Ongoing, Progressing     Problem: COPD (Chronic Obstructive Pulmonary Disease) Comorbidity  Goal: Maintenance of COPD Symptom Control  Outcome: Ongoing, Progressing     Problem: Diabetes Comorbidity  Goal: Blood Glucose Level Within Targeted Range  Outcome: Ongoing, Progressing     Problem: Heart Failure Comorbidity  Goal: Maintenance of Heart Failure Symptom Control  Outcome: Ongoing, Progressing     Problem: Hypertension Comorbidity  Goal: Blood Pressure in Desired Range  Outcome: Ongoing, Progressing     Problem: Obstructive Sleep Apnea Risk or Actual Comorbidity Management  Goal: Unobstructed Breathing During Sleep  Outcome: Ongoing, Progressing     Problem: Osteoarthritis Comorbidity  Goal: Maintenance of Osteoarthritis Symptom Control  Outcome: Ongoing, Progressing     Problem: Pain Chronic (Persistent) (Comorbidity Management)  Goal: Acceptable Pain Control and Functional Ability  Outcome: Ongoing, Progressing     Problem: Seizure Disorder Comorbidity  Goal: Maintenance of Seizure Control  Outcome: Ongoing, Progressing     Problem: Adult Inpatient Plan of Care  Goal: Plan of Care Review  Outcome: Ongoing, Progressing  Goal: Patient-Specific Goal (Individualized)  Outcome: Ongoing, Progressing  Goal: Absence of Hospital-Acquired Illness or Injury  Outcome: Ongoing, Progressing  Intervention: Identify and Manage Fall Risk  Recent Flowsheet Documentation  Taken 3/28/2023 1920 by Karma Hamilton, RN  Safety Promotion/Fall Prevention: safety round/check completed  Goal: Optimal Comfort and Wellbeing  Outcome: Ongoing, Progressing  Goal: Readiness for Transition of Care  Outcome: Ongoing, Progressing     Problem: Skin  Injury Risk Increased  Goal: Skin Health and Integrity  Outcome: Ongoing, Progressing     Problem: Fall Injury Risk  Goal: Absence of Fall and Fall-Related Injury  Outcome: Ongoing, Progressing  Intervention: Promote Injury-Free Environment  Recent Flowsheet Documentation  Taken 3/28/2023 1920 by Karma Hamilton RN  Safety Promotion/Fall Prevention: safety round/check completed   Goal Outcome Evaluation:

## 2023-03-29 NOTE — PLAN OF CARE
Goal Outcome Evaluation:  Plan of Care Reviewed With: caregiver, patient        Progress: improving  Outcome Evaluation: Nutrition F/U:  Pt reports a fair appeite. only one meal documented--75%.  S/p PCI--no findings.  RD will add milk with meals.  Diet information on Low sodium provided. Will monitor

## 2023-03-29 NOTE — THERAPY TREATMENT NOTE
Acute Care - Physical Therapy Treatment Note  [facility]     Patient Name: Pavan Mccauley  : 1955  MRN: [id number]  Today's Date: 3/29/2023      Visit Dx:     ICD-10-CM ICD-9-CM   1. Atrial fibrillation with RVR ([facility])  I48.91 427.31   2. Urinary tract infection without hematuria, site unspecified  N39.0 599.0   3. Pulmonary vascular congestion  R09.89 514   4. NSTEMI, initial episode of care ([facility])  I21.4 410.71   5. Biventricular CHF (congestive heart failure) ([facility])  I50.82 428.0   6. Impaired mobility and ADLs  Z74.09 V49.89    Z78.9    7. Impaired functional mobility, balance, gait, and endurance  Z74.09 V49.89     Patient Active Problem List   Diagnosis   • General medical exam   • Malaise   • Hyperglycemia   • Cough   • Tick bite   • Screening for colon cancer   • Pneumonia due to organism   • Carotid artery aneurysm ([facility])   • Dizziness   • SVT (supraventricular tachycardia) ([facility])   • Atrial fibrillation ([facility])   • Dyspnea on exertion   • Atrial fibrillation with RVR ([facility])   • NSTEMI, initial episode of care ([facility])   • Biventricular CHF (congestive heart failure) ([facility])     Past Medical History:   Diagnosis Date   • Bronchopneumonia    • Chest pain    • Corneal foreign body     both eyes   • Neck pain    • Perforation of left tympanic membrane     history of   • Prashant [place] spotted fever    • Wheezing      Past Surgical History:   Procedure Laterality Date   • CARDIAC CATHETERIZATION N/A 3/27/2023    Procedure: Left Heart Cath;  Surgeon: [place] DO;  Location: [facility] INVASIVE LOCATION;  Service: Cardiology;  Laterality: N/A;   • EYE FOREIGN BODY REMOVAL  2013    REMOVE FOREIGN BODY CORNEA W/SLIT LAMP 00730 (1)     • INJECTION OF MEDICATION  2011    Kenalog (1)      • SPINE SURGERY  1991    Exploration of the previous C5-6 fusion with refusion, exploration of the C5 nerve root, left side   • TYMPANOPLASTY Left 1972    Perforation of the left tympanic membrane      PT Assessment (last 12 hours)     PT Evaluation and Treatment     Row Name 03/29/23 1325          Physical Therapy Time and Intention    Document Type therapy note (daily note)  -RW     Mode of Treatment physical therapy  -RW     Patient Effort adequate  -RW     Row Name 03/29/23 1325          General Information    Patient Profile Reviewed yes  -RW     Existing Precautions/Restrictions fall  -RW     Row Name 03/29/23 1325          Pain    Pretreatment Pain Rating 0/10 - no pain  -RW     Posttreatment Pain Rating 0/10 - no pain  -RW     Row Name 03/29/23 1325          Cognition    Affect/Mental Status (Cognition) flat/blunted affect  -RW     Orientation Status (Cognition) --  could not think of 2023  -RW     Follows Commands (Cognition) delayed response/completion;increased processing time needed  -RW     Row Name 03/29/23 1325          Bed Mobility    Sit-Supine [place] (Bed Mobility) standby assist;contact guard  -RW     Row Name 03/29/23 1325          Sit-Stand Transfer    Sit-Stand [place] (Transfers) contact guard  -RW     Assistive Device (Sit-Stand Transfers) walker, front-wheeled  -RW     Row Name 03/29/23 1325          Stand-Sit Transfer    Stand-Sit [place] (Transfers) contact guard  -RW     Row Name 03/29/23 1325          Gait/Stairs (Locomotion)    [place] Level (Gait) contact guard  -RW     Assistive Device (Gait) walker, front-wheeled  -RW     Distance in Feet (Gait) 14  -RW     Pattern (Gait) step-through  -RW     Deviations/Abnormal Patterns (Gait) base of support, wide;misael decreased;gait speed decreased  -RW     Row Name 03/29/23 1325          Safety Issues, Functional Mobility    Impairments Affecting Function (Mobility) balance;endurance/activity tolerance;strength  -RW     Row Name 03/29/23 1325          Vital Signs    Pre Systolic BP Rehab 117  -RW     Pre Treatment Diastolic BP 68  -RW     Pretreatment Heart Rate (beats/min) 91  -RW     Pre SpO2 (%) 94  -RW     O2  Delivery Pre Treatment nasal cannula  -RW     Row Name 03/29/23 1325          Positioning and Restraints    Pre-Treatment Position sitting in chair/recliner  -RW     Post Treatment Position bed  -RW     In Bed exit alarm on;encouraged to call for assist;call light within reach  -RW     Row Name 03/29/23 1325          Therapy Assessment/Plan (PT)    Rehab Potential (PT) good, to achieve stated therapy goals  -RW     Criteria for Skilled Interventions Met (PT) yes;meets criteria;skilled treatment is necessary  -RW     Therapy Frequency (PT) other (see comments)  5-7 days/wk  -RW     Row Name 03/29/23 1325          Bed Mobility Goal 1 (PT)    Activity/Assistive Device (Bed Mobility Goal 1, PT) bed mobility activities, all  -RW     [place] Level/Cues Needed (Bed Mobility Goal 1, PT) independent  -RW     Time Frame (Bed Mobility Goal 1, PT) by discharge  -RW     Progress/Outcomes (Bed Mobility Goal 1, PT) goal not met  -RW     Row Name 03/29/23 1325          Transfer Goal 1 (PT)    Activity/Assistive Device (Transfer Goal 1, PT) sit-to-stand/stand-to-sit;bed-to-chair/chair-to-bed  -RW     [place] Level/Cues Needed (Transfer Goal 1, PT) modified independence;independent  -RW     Time Frame (Transfer Goal 1, PT) by discharge  -RW     Progress/Outcome (Transfer Goal 1, PT) goal not met  -RW     Row Name 03/29/23 1325          Gait Training Goal 1 (PT)    Activity/Assistive Device (Gait Training Goal 1, PT) gait (walking locomotion);decrease fall risk;increase endurance/gait distance  -RW     [place] Level (Gait Training Goal 1, PT) modified independence;independent  -RW     Distance (Gait Training Goal 1, PT) 150ft each trip  -RW     Time Frame (Gait Training Goal 1, PT) 5 days  -RW     Progress/Outcome (Gait Training Goal 1, PT) goal not met  -RW     Row Name 03/29/23 1325          ROM Goal 1 (PT)    ROM Goal 1 (PT) Pt will tolerate LE exercises OOB in chair with VSS  -RW     Time Frame (ROM Goal 1, PT) by  discharge  -RW     Progress/Outcome (ROM Goal 1, PT) goal not met  -RW     Row Name 03/29/23 1325          Stairs Goal 1 (PT)    Activity/Assistive Device (Stairs Goal 1, PT) ascending stairs;descending stairs;using handrail, left;using handrail, right  -RW     [place] Level/Cues Needed (Stairs Goal 1, PT) modified independence  -RW     Number of Stairs (Stairs Goal 1, PT) 3  -RW     Time Frame (Stairs Goal 1, PT) by discharge  -RW     Progress/Outcome (Stairs Goal 1, PT) goal not met  -RW           User Key  (r) = Recorded By, (t) = Taken By, (c) = Cosigned By    Initials Name Provider Type    RW Narciso Raymond, PTA Physical Therapist Assistant                Physical Therapy Education     Title: PT OT SLP Therapies (In Progress)     Topic: Physical Therapy (In Progress)     Point: Mobility training (In Progress)     Learning Progress Summary           Patient Acceptance, E, NR by  at 3/28/2023 1235                   Point: Home exercise program (Not Started)     Learner Progress:  Not documented in this visit.          Point: Body mechanics (In Progress)     Learning Progress Summary           Patient Acceptance, E, NR by  at 3/28/2023 1235                   Point: Precautions (In Progress)     Learning Progress Summary           Patient Acceptance, E, NR by  at 3/28/2023 1235                               User Key     Initials Effective Dates Name Provider Type Discipline    ORAL 06/16/21 -  Blanca Marie, PT Physical Therapist PT              PT Recommendation and Plan  Anticipated Discharge Disposition (PT): home with assist, home with home health  Therapy Frequency (PT): other (see comments) (5-7 days/wk)  Plan of Care Reviewed With: patient  Progress: no change  Outcome Evaluation: pt in chair with c/o hurting . poorly positioned. pt reports needing urinal and tries for a bit to use it sitting down.cant. stands with min assist and using fww ,pt is able to void. gown is wet , linens in chair wet,  and socks. these are all changed. pt stands and amb around bed with fww cga/min to side of bed and eventually lies down. pt is slow to process info and has several needs that needed to be met. will need assist at [place]. unsure of plan.       Time Calculation:    PT Charges     Row Name 03/29/23 1519             Time Calculation    Start Time 1325  -RW      Stop Time 1403  -RW      Time Calculation (min) 38 min  -RW         Time Calculation- PT    Total Timed Code Minutes- PT 38 minute(s)  -RW         Timed Charges    70546 - PT Therapeutic Activity Minutes 38  -RW         Total Minutes    Timed Charges Total Minutes 38  -RW       Total Minutes 38  -RW            User Key  (r) = Recorded By, (t) = Taken By, (c) = Cosigned By    Initials Name Provider Type    Narciso Ward PTA Physical Therapist Assistant              Therapy Charges for Today     Code Description Service Date Service Provider Modifiers Qty    [id number]  PT THERAPEUTIC ACT EA 15 MIN 3/29/2023 Narciso Raymond PTA GP 3          PT G-Codes  Outcome Measure Options: AM-PAC 6 Clicks Daily Activity (OT)  AM-PAC 6 Clicks Score (PT): 19  AM-PAC 6 Clicks Score (OT): 22    Narciso Raymond PTA  3/29/2023

## 2023-03-29 NOTE — PROGRESS NOTES
[facility] Medicine Services  INPATIENT PROGRESS NOTE      Length of Stay: 9  Date of Admission: 3/20/2023  Primary Care Physician: Dilcia Kapoor APRN    Subjective   Chief Complaint:  Shortness of breath with heart palpitations  HPI:  67-year-old male comes to the ER stating he does not feel well.  He has not felt well for a while.  Patient states he has not taken any of his medicines for the last several months.  He reports having history of A-fib.  Patient is a poor historian, but states he is short of breath that is worse with exertion.     Daily assessment  Patient seen and examined 3/24/2023.  Patient is currently lying in bed.  He states he is comfortable.  Reviewed medications and labs with the patient and treatment plan.  Patient is denying any new problems or complaints.  He actually does feel better.  Radiology is indicated that there is very little fluid to pull off of right pleural effusion and has recommended to hold for now and follow and proceed with procedure as needed.  The patient is not have any chest pain.  Continues to be very short of breath at rest and on exertion.  He has no fevers or chills.  Otherwise he is tolerating current treatment plan.  3/25/2023  Patient admits to approximately 30 pound weight loss over past 2 days.  States breathing slightly better after Lasix diuresis.  Family including wife/daughter present during interview with Dr. Lopez this AM.  3/26/2023  Patient continues to improve on IV diuresis.  Patient's IV dobutamine weaned by 50% over past 24 hours.  Patient has cardiac catheterization planned for tomorrow.  States lower extremity swelling continues to decrease on diuretic therapy.  3/27/2023:  Patient's RVR resolved with IV amnio initiation yesterday night.  Cardiology in room during evaluation with Dr. Lopez.  Patient still tentatively may receive cardiac catheterization procedure today.  No other new issues  overnight.  3/28/2023:  Patient being transitioned from IV amnio to p.o. amiodarone by cardiology.  Truly appreciate cardiology's assistance with this case estimation point.  Status post cardiac catheterization yesterday with nonobstructive CAD noted.  Patient in no acute distress.  States breathing has improved with Lasix diuresis during hospitalization.  Still having bouts of RVR.  3/29/2023:  No new complaints.  Patient states she is tolerating amiodarone without complications.  Denies fevers, chills overnight.  Resting comfortably during exam with Dr. Lopez.  Slightly yellowish-orange, but denies itching.     Review of Systems   Constitutional: Positive for fatigue. Negative for chills and fever.   HENT: Negative.    Eyes: Negative.    Respiratory: Positive for shortness of breath.    Cardiovascular: Positive for chest pain.   Gastrointestinal: Negative.    Endocrine: Negative.    Genitourinary: Negative.    Musculoskeletal: Negative.    Skin: Negative.    Neurological: Positive for weakness.   Hematological: Negative.    Psychiatric/Behavioral: Negative.    Skin slightly jaundiced in coloration      All pertinent negatives and positives are as above. All other systems have been reviewed and are negative unless otherwise stated.     Objective    As of today 03/29/23  Temp:  [97.3 °F (36.3 °C)-98.4 °F (36.9 °C)] 97.3 °F (36.3 °C)  Heart Rate:  [] 102  Resp:  [18] 18  BP: ()/(62-88) 103/68     Physical Exam  Vitals and nursing note reviewed.   HENT:      Head: Normocephalic and atraumatic.      Right Ear: External ear normal.      Left Ear: External ear normal.      Nose: Nose normal.      Mouth/Throat:      Mouth: Mucous membranes are dry.      Pharynx: Oropharynx is clear.   Eyes:      General: No scleral icterus.     Extraocular Movements: Extraocular movements intact.      Conjunctiva/sclera: Conjunctivae normal.      Pupils: Pupils are equal, round, and reactive to light.   Cardiovascular:       Rate and Rhythm: Normal rate. Rhythm irregular.      Pulses: Normal pulses.      Heart sounds: Normal heart sounds.   Pulmonary:      Effort: Pulmonary effort is normal.      Breath sounds: Normal breath sounds.      Comments: Decreased breath sounds at the bases otherwise clear  Abdominal:      General: Bowel sounds are normal. There is distension.      Palpations: Abdomen is soft.      Tenderness: There is no abdominal tenderness.      Comments: Soft nontender nondistended normal active bowel sounds   Musculoskeletal:         General: Normal range of motion.      Cervical back: Normal range of motion and neck supple.      Right lower leg: Edema present.      Left lower leg: Edema present.      Comments: Venous stasis changes bilateral lower extremities  Edema bilateral lower extremities improving   Skin:     General: Skin is warm and dry.      Capillary Refill: Capillary refill takes less than 2 seconds.      Coloration: Skin is not jaundiced.   Neurological:      General: No focal deficit present.      Mental Status: He is alert and oriented to person, place, and time.      Motor: Weakness present.   Psychiatric:         Mood and Affect: Mood normal.         Behavior: Behavior normal.           Results Review:  I have reviewed the labs, radiology results, and diagnostic studies.    Laboratory Data:   Results from last 7 days   Lab Units 03/29/23  0517 03/28/23  0557 03/27/23  1938 03/27/23  0316   SODIUM mmol/L 136 127*  --  128*   POTASSIUM mmol/L 3.7 3.9 3.4* 3.5   CHLORIDE mmol/L 95* 87*  --  84*   CO2 mmol/L 33.0* 35.0*  --  38.0*   BUN mg/dL 16 16  --  15   CREATININE mg/dL 0.90 0.83  --  0.82   GLUCOSE mg/dL 82 84  --  84   CALCIUM mg/dL 8.4* 7.8*  --  7.8*   BILIRUBIN mg/dL 6.7* 6.6*  --  6.2*   ALK PHOS U/L 96 92  --  87   ALT (SGPT) U/L 35 32  --  36   AST (SGOT) U/L 43* 38  --  42*   ANION GAP mmol/L 8.0 5.0  --  6.0     Estimated Creatinine Clearance: 87.5 mL/min (by C-G formula based on SCr of 0.9  mg/dL).  Results from last 7 days   Lab Units 03/29/23  0517 03/28/23  0557 03/27/23  0316   MAGNESIUM mg/dL 2.3 1.9 2.1         Results from last 7 days   Lab Units 03/29/23  0517 03/28/23  0557 03/27/23  0316 03/26/23  0428 03/25/23  0600   WBC 10*3/mm3 10.05 8.24 8.43 6.75 7.31   HEMOGLOBIN g/dL 17.1 16.1 16.3 16.5 16.7   HEMATOCRIT % 47.6 44.5 44.4 44.3 44.6   PLATELETS 10*3/mm3 127* 113* 108* 93* 101*           Culture Data:   No results found for: BLOODCX  No results found for: URINECX  No results found for: RESPCX  No results found for: WOUNDCX  No results found for: STOOLCX  No components found for: BODYFLD    Radiology Data:   Imaging Results (Last 24 Hours)     ** No results found for the last 24 hours. **      Results for orders placed during the hospital encounter of 03/20/23    Adult Transthoracic Echo Complete W/ Cont if Necessary Per Protocol    Interpretation Summary  •  Left ventricular ejection fraction appears to be 21 - 25%.  •  The left ventricular cavity is dilated.  •  Left ventricular wall thickness is consistent with hypertrophy.  •  Left ventricular diastolic dysfunction is noted.  •  Moderately reduced right ventricular systolic function noted.  •  The right ventricular cavity is severely dilated.  •  Left atrial volume is severely increased.  •  The right atrial cavity is severely  dilated.  •  Estimated right ventricular systolic pressure from tricuspid regurgitation is mildly elevated (35-45 mmHg).      I have reviewed the patient's current medications.   Scheduled Meds:amiodarone, 200 mg, Oral, BID With Meals  digoxin, 125 mcg, Oral, Daily  furosemide, 20 mg, Oral, BID  metoprolol succinate XL, 12.5 mg, Oral, Q24H  phytonadione (VITAMIN K) IVPB, 5 mg, Intravenous, Once  rivaroxaban, 20 mg, Oral, Daily With Dinner  sodium chloride, 10 mL, Intravenous, Q12H  spironolactone, 25 mg, Oral, Daily      Continuous Infusions:hold, 1 each      PRN Meds:.•  [DISCONTINUED] acetaminophen **OR**  [DISCONTINUED] acetaminophen **OR** acetaminophen  •  acetaminophen  •  hold  •  hydrocortisone-bacitracin-zinc oxide-nystatin  •  magnesium sulfate **OR** magnesium sulfate **OR** magnesium sulfate  •  ondansetron  •  potassium chloride **OR** potassium chloride **OR** potassium chloride  •  sodium chloride  •  sodium chloride  Assessment/Plan     Principal Problem:    Atrial fibrillation with RVR ([facility])  Active Problems:    Atrial fibrillation ([facility])    NSTEMI, initial episode of care ([facility])    Biventricular CHF (congestive heart failure) ([facility])    Assessment and Plan    Type II MI secondary to RVR and fluid overload  -Appreciate cardiology assistance!  Continue current management.  Continue fluid restriction 1.1 L/day and salt restriction.  Continue IV Lasix.  - 3/28 status post PCI 3/27 with nonobstructive CAD noted.       Acute on chronic, systolic CHF  Will continue with lasix bid; monitor intake and output  - As above.  Improving on IV Lasix and dobutamine gtt.  Wean from dobutamine as tolerated.  Breathing steadily improving with diuresis.  Appreciate both cardiology and nephrology assistance with patient.  -3/27 scheduled for heart catheterization procedure today.  Cardiology currently finalizing arrangements.Procedure may be canceled if patient unable to lay flat  -3/29 Weaning patient's oxygen to room air as tolerated.  Continue metoprolol succinate, digoxin.  Start atorvastatin 3/29.    Atrial fibrillation with RVR  Cardiologist will be consulted and appreciate their assistance. High [id number] score; currently tolerating rivaroxaban therapy.  Cardiac rehab.  Troponin elevated but being followed by cardiology  Left heart cath once fluid status has been stabilized.  Continue Xarelto 20 mg daily  -3/26/2023 at 6:42 PM Patient has suffered from A-fib with RVR issues intermittently since this AM.  Issues not resolved with discontinuation of dobutamine drip.  Patient's blood pressures low normal so extremely  hesitant to use beta-blocker or calcium channel blocker for rate control.  We will therefore load patient with IV amiodarone.  Also recommend dobutamine vasopressor as first-line to maintain MAP above 65.  If dobutamine unsuccessful, would then utilize Levophed as second line to keep MAP greater than 65.  Patient scheduled for cardiac catheterization in a.m. for evaluation of acute on chronic heart failure with reduced ejection fraction.  - 3/27/2023 spoke with cardiology, cardiology currently adjusting patient's atrial fibrillation maintenance medications.  Truly appreciate cardiology's assistance!  -3/29/2023 weaned off IV amiodarone drip and transition to by mouth amiodarone.  Patient also on digoxin.  Appreciate cardiology's assistance!    Hyperbilirubinemia:  - 3/29/2023 chronic issue.  Will follow bilirubin levels.  Continue supportive care for now.  Contemplating whether patient may suffer from undiagnosed Gilbert's syndrome.    RUTH:  - Appreciate nephrology assistance.  Etiology possibly A-fib with RVR related versus cardiorenal syndrome.  Continue Lasix.  -3/29 kidney function now appears at baseline.  Continue oral Lasix.    Hypertension  - currently suffering from hypotension and on dobutamine gtt.  - 3/29 patient weaned from dobutamine 3/27 overnight then transferred out of ICU 3/28.  Patient now successfully restarted on Lasix, metoprolol succinate medications which are well-tolerated.      CODE STATUS full code  DVT prophylaxis Xarelto    Medical Decision Making  Number and Complexity of problems: 3 complex problems  Differential Diagnosis: Atrial fibrillation versus acute coronary syndrome    Conditions and Status:        Condition is unchanged.     [facility] Data  External documents reviewed: Hospital charts  My EKG interpretation:  EKG completed 3/20/2023.  Rhythm: atrial fibrillation   Rate: tachycardic   Clinical impression: abnormal ECG    My plain film interpretation:  Chest x-ray completed  3/20/2023.  IMPRESSION:  Right basilar pleural effusion, right lower lobe  compressive atelectasis, cardiomegaly and mild central vascular  Congestion.   Tests considered but not ordered: None     Decision rules/scores evaluated (example [id number], [place], etc): [id number] high and on Rivaroxaban     Discussed with: Patient and agrees to current treatment plan     Treatment Plan  As above    Care Planning  Shared decision making: Patient updated on current status and informed of proposed care plan; is in agreement with plan  Code status and discussions: Full code    Disposition  Social Determinants of Health that impact treatment or disposition: N/A        I have utilized all available immediate resources to obtain, update, or review the patient's current medications (including all prescriptions, over-the-counter products, herbals, cannabis/cannabidiol products, and vitamin/mineral/dietary (nutritional) supplements).     I confirmed that the patient's Advance Care Plan is present, code status is documented, or surrogate decision maker is listed in the patient's medical record.    Discharge Planning:   - 3/28 transferred out of ICU to telemetry floor.  - I expect patient to be discharged home hospital within the next 72 hours once amiodarone loading completed, if RVR resolved, and cardiac/nephrology consults agrees with hospital disposition.      Cuco Lopez MD

## 2023-03-29 NOTE — THERAPY TREATMENT NOTE
Patient Name: Pavan Mccauley  : 1955    MRN: [id number]                              Today's Date: 3/29/2023       Admit Date: 3/20/2023    Visit Dx:     ICD-10-CM ICD-9-CM   1. Atrial fibrillation with RVR ([facility]ly [facility])  I48.91 427.31   2. Urinary tract infection without hematuria, site unspecified  N39.0 599.0   3. Pulmonary vascular congestion  R09.89 514   4. NSTEMI, initial episode of care ([facility]ly [facility])  I21.4 410.71   5. Biventricular CHF (congestive heart failure) (Formerly [facility])  I50.82 428.0   6. Impaired mobility and ADLs  Z74.09 V49.89    Z78.9    7. Impaired functional mobility, balance, gait, and endurance  Z74.09 V49.89     Patient Active Problem List   Diagnosis   • General medical exam   • Malaise   • Hyperglycemia   • Cough   • Tick bite   • Screening for colon cancer   • Pneumonia due to organism   • Carotid artery aneurysm ([facility])   • Dizziness   • SVT (supraventricular tachycardia) ([facility])   • Atrial fibrillation ([facility])   • Dyspnea on exertion   • Atrial fibrillation with RVR ([facility])   • NSTEMI, initial episode of care ([facility])   • Biventricular CHF (congestive heart failure) ([facility])     Past Medical History:   Diagnosis Date   • Bronchopneumonia    • Chest pain    • Corneal foreign body     both eyes   • Neck pain    • Perforation of left tympanic membrane     history of   • Prashant [place] spotted fever    • Wheezing      Past Surgical History:   Procedure Laterality Date   • CARDIAC CATHETERIZATION N/A 3/27/2023    Procedure: Left Heart Cath;  Surgeon: [place] DO;  Location: [facility] INVASIVE LOCATION;  Service: Cardiology;  Laterality: N/A;   • EYE FOREIGN BODY REMOVAL  2013    REMOVE FOREIGN BODY CORNEA W/SLIT LAMP 65103 (1)     • INJECTION OF MEDICATION  2011    Kenalog (1)      • SPINE SURGERY  1991    Exploration of the previous C5-6 fusion with refusion, exploration of the C5 nerve root, left side   • TYMPANOPLASTY Left 1972    Perforation of the left tympanic membrane      General  Information     Row Name 03/29/23 0959          OT Time and Intention    Document Type therapy note (daily note)  -KD     Mode of Treatment individual therapy;occupational therapy  -KD     Row Name 03/29/23 0959          General Information    Patient Profile Reviewed yes  -KD     Existing Precautions/Restrictions fall  -KD     Row Name 03/29/23 0959          Cognition    Orientation Status (Cognition) oriented x 4;time;verbal cues/prompts needed for orientation  -KD     Row Name 03/29/23 0959          Safety Issues, Functional Mobility    Impairments Affecting Function (Mobility) balance;endurance/activity tolerance;strength  -KD           User Key  (r) = Recorded By, (t) = Taken By, (c) = Cosigned By    Initials Name Provider Type     Shelby Carrillo COTA Occupational Therapist Assistant                 Mobility/ADL's     Row Name 03/29/23 0959          Sit-Stand Transfer    Sit-Stand [place] (Transfers) contact guard  -KD     Assistive Device (Sit-Stand Transfers) walker, front-wheeled  -KD     Row Name 03/29/23 0959          Stand-Sit Transfer    Stand-Sit [place] (Transfers) contact guard  -KD     Assistive Device (Stand-Sit Transfers) walker, front-wheeled  -KD     Row Name 03/29/23 0959          Functional Mobility    Functional Mobility- Ind. Level contact guard assist  -KD     Functional Mobility- Device walker, front-wheeled  -KD     Functional Mobility-Distance (Feet) 10  -KD     Row Name 03/29/23 0959          Activities of Daily Living    BADL Assessment/Intervention bathing;upper body dressing;lower body dressing;grooming  -KD     Row Name 03/29/23 0959          Bathing Assessment/Intervention    [place] Level (Bathing) bathing skills;lower body;upper body;standby assist;maximum assist (25% patient effort)  -KD     Assistive Devices (Bathing) bath mitt  -KD     Position (Bathing) edge of bed sitting  -KD     Row Name 03/29/23 0959          Upper Body Dressing Assessment/Training     [place] Level (Upper Body Dressing) upper body dressing skills;doff;don;standby assist  -     Position (Upper Body Dressing) edge of bed sitting  -     Row Name 03/29/23 0959          Lower Body Dressing Assessment/Training    [place] Level (Lower Body Dressing) lower body dressing skills;doff;don;pants/bottoms;socks;maximum assist (25% patient effort)  -     Position (Lower Body Dressing) edge of bed sitting  -     Row Name 03/29/23 0959          Grooming Assessment/Training    [place] Level (Grooming) grooming skills;hair care, combing/brushing;wash face, hands;standby assist  -     Position (Grooming) edge of bed sitting  -           User Key  (r) = Recorded By, (t) = Taken By, (c) = Cosigned By    Initials Name Provider Type     Shelby Carrillo COTA Occupational Therapist Assistant               Obj/Interventions     Row Name 03/29/23 0959          Shoulder (Therapeutic Exercise)    Shoulder (Therapeutic Exercise) AROM (active range of motion)  -     Shoulder AROM (Therapeutic Exercise) bilateral;flexion;extension;aBduction;aDduction;external rotation;internal rotation;horizontal aBduction/aDduction  -     Row Name 03/29/23 0959          Elbow/Forearm (Therapeutic Exercise)    Elbow/Forearm (Therapeutic Exercise) AROM (active range of motion)  -     Elbow/Forearm AROM (Therapeutic Exercise) bilateral;flexion;supination;pronation;extension  -     Row Name 03/29/23 0959          Wrist (Therapeutic Exercise)    Wrist (Therapeutic Exercise) AROM (active range of motion)  -     Wrist AROM (Therapeutic Exercise) bilateral;flexion;extension  -     Row Name 03/29/23 0959          Hand (Therapeutic Exercise)    Hand (Therapeutic Exercise) AROM (active range of motion)  -     Hand AROM/AAROM (Therapeutic Exercise) bilateral;finger flexion;finger extension  -     Row Name 03/29/23 0959          Motor Skills    Therapeutic Exercise shoulder;elbow/forearm;wrist;hand  -            User Key  (r) = Recorded By, (t) = Taken By, (c) = Cosigned By    Initials Name Provider Type    Shelby Ann COTA Occupational Therapist Assistant               Goals/Plan     Row Name 03/29/23 0959          Transfer Goal 1 (OT)    Activity/Assistive Device (Transfer Goal 1, OT) transfers, all  -KD     [place] Level/Cues Needed (Transfer Goal 1, OT) independent  -KD     Time Frame (Transfer Goal 1, OT) long term goal (LTG);by discharge  -KD     Progress/Outcome (Transfer Goal 1, OT) goal not met  -KD     Row Name 03/29/23 0959          Bathing Goal 1 (OT)    Activity/Device (Bathing Goal 1, OT) bathing skills, all  -KD     [place] Level/Cues Needed (Bathing Goal 1, OT) independent  -KD     Time Frame (Bathing Goal 1, OT) long term goal (LTG);by discharge  -KD     Progress/Outcomes (Bathing Goal 1, OT) goal not met  -KD     Row Name 03/29/23 0959          Dressing Goal 1 (OT)    Activity/Device (Dressing Goal 1, OT) dressing skills, all  -KD     [place]/Cues Needed (Dressing Goal 1, OT) independent  -KD     Time Frame (Dressing Goal 1, OT) long term goal (LTG);by discharge  -KD     Progress/Outcome (Dressing Goal 1, OT) goal not met  -KD           User Key  (r) = Recorded By, (t) = Taken By, (c) = Cosigned By    Initials Name Provider Type    Shelby Ann COTA Occupational Therapist Assistant               Clinical Impression     Row Name 03/29/23 0959          Pain Assessment    Pretreatment Pain Rating 0/10 - no pain  -KD     Posttreatment Pain Rating 0/10 - no pain  -KD     Row Name 03/29/23 0959          Plan of Care Review    Plan of Care Reviewed With patient  -KD     Progress improving  -KD     Outcome Evaluation Sup-Sit-Min A. Pt sat EOB and completed UB bathing/dressing-SBA. LB bathing (pericare) SBA. Feet-dep. LBD-dep. Sit-stand-CGA. Fxl mobility ~10' bed>recliner-CGA of 1 w/ RW. Pt tolerated UE ther ex in all planes w/ 3lb HW and good tolerance. No goals met this date. Cont  OT POC.  -KD     Row Name 03/29/23 0959          Therapy Assessment/Plan (OT)    Therapy Frequency (OT) other (see comments)  5-7 d/wk  -KD     Row Name 03/29/23 0959          Therapy Plan Review/Discharge Plan (OT)    Anticipated Discharge Disposition (OT) home with assist;home with home health  -KD     Row Name 03/29/23 0959          Vital Signs    Pre Systolic BP Rehab 92  -KD     Pre Treatment Diastolic BP 62  -KD     Pretreatment Heart Rate (beats/min) 96  -KD     Pre SpO2 (%) 91  -KD     O2 Delivery Pre Treatment nasal cannula  -KD     Pre Patient Position Supine  -KD     Intra Patient Position Standing  -KD     Post Patient Position Sitting  -KD     Row Name 03/29/23 0959          Positioning and Restraints    Pre-Treatment Position in bed  -KD     Post Treatment Position chair  -KD     In Chair reclined;call light within reach;encouraged to call for assist;exit alarm on  -KD           User Key  (r) = Recorded By, (t) = Taken By, (c) = Cosigned By    Initials Name Provider Type    KD Shelby Carrillo COTA Occupational Therapist Assistant               Outcome Measures     Row Name 03/29/23 0959          How much help from another is currently needed...    Putting on and taking off regular lower body clothing? 3  -KD     Bathing (including washing, rinsing, and drying) 3  -KD     Toileting (which includes using toilet bed pan or urinal) 4  -KD     Putting on and taking off regular upper body clothing 4  -KD     Taking care of personal grooming (such as brushing teeth) 4  -KD     Eating meals 4  -KD     AM-PAC 6 Clicks Score (OT) 22  -KD     Row Name 03/29/23 0824          How much help from another person do you currently need...    Turning from your back to your side while in flat bed without using bedrails? 4  -LB     Moving from lying on back to sitting on the side of a flat bed without bedrails? 3  -LB     Moving to and from a bed to a chair (including a wheelchair)? 3  -LB     Standing up from a chair  using your arms (e.g., wheelchair, bedside chair)? 3  -LB     Climbing 3-5 steps with a railing? 3  -LB     To walk in hospital room? 3  -LB     AM-PAC 6 Clicks Score (PT) 19  -LB     Highest level of mobility 6 --> Walked 10 steps or more  -LB           User Key  (r) = Recorded By, (t) = Taken By, (c) = Cosigned By    Initials Name Provider Type    Shelby Ann COTA Occupational Therapist Assistant    Celi Amezcua, RN Registered Nurse                Occupational Therapy Education     Title: PT OT SLP Therapies (In Progress)     Topic: Occupational Therapy (In Progress)     Point: ADL training (Done)     Description:   Instruct learner(s) on proper safety adaptation and remediation techniques during self care or transfers.   Instruct in proper use of assistive devices.              Learning Progress Summary           Patient Acceptance, E,TB, VU by  at 3/28/2023 1123    Comment: OT role, POC, t/f training                   Point: Home exercise program (Not Started)     Description:   Instruct learner(s) on appropriate technique for monitoring, assisting and/or progressing therapeutic exercises/activities.              Learner Progress:  Not documented in this visit.          Point: Precautions (Done)     Description:   Instruct learner(s) on prescribed precautions during self-care and functional transfers.              Learning Progress Summary           Patient Acceptance, E,TB, VU by  at 3/28/2023 1123    Comment: OT role, POC, t/f training                   Point: Body mechanics (Done)     Description:   Instruct learner(s) on proper positioning and spine alignment during self-care, functional mobility activities and/or exercises.              Learning Progress Summary           Patient Acceptance, E,TB, VU by  at 3/28/2023 1123    Comment: OT role, POC, t/f training                               User Key     Initials Effective Dates Name Provider Type Discipline     10/19/22 -  Ilya  DENNIS Sterling Occupational Therapist OT              OT Recommendation and Plan  Therapy Frequency (OT): other (see comments) (5-7 d/wk)  Plan of Care Review  Plan of Care Reviewed With: patient  Progress: improving  Outcome Evaluation: Sup-Sit-Min A. Pt sat EOB and completed UB bathing/dressing-SBA. LB bathing (pericare) SBA. Feet-dep. LBD-dep. Sit-stand-CGA. Fxl mobility ~10' bed>recliner-CGA of 1 w/ RW. Pt tolerated UE ther ex in all planes w/ 3lb HW and good tolerance. No goals met this date. Cont OT POC.     Time Calculation:    Time Calculation- OT     Row Name 03/29/23 0959             Time Calculation- OT    OT Start Time 0959  -KD      OT Stop Time 1052  -KD      OT Time Calculation (min) 53 min  -KD      Total Timed Code Minutes- OT 53 minute(s)  -KD      OT Received On 03/29/23  -KD         Timed Charges    98450 - OT Therapeutic Exercise Minutes 15  -KD      60485 - OT Self Care/Mgmt Minutes 38  -KD         Total Minutes    Timed Charges Total Minutes 53  -KD       Total Minutes 53  -KD            User Key  (r) = Recorded By, (t) = Taken By, (c) = Cosigned By    Initials Name Provider Type     Shelby Carrillo COTA Occupational Therapist Assistant              Therapy Charges for Today     Code Description Service Date Service Provider Modifiers Qty    20294220379 HC OT SELF CARE/MGMT/TRAIN EA 15 MIN 3/29/2023 Shelby Carrillo COTA GO 2    06846116854 HC OT THER PROC EA 15 MIN 3/29/2023 Shelby Carrillo COTA GO 2               EMILY Corbett  3/29/2023

## 2023-03-29 NOTE — PLAN OF CARE
Goal Outcome Evaluation:  Plan of Care Reviewed With: patient        Progress: no change  Outcome Evaluation: pt in chair with c/o hurting . poorly positioned. pt reports needing urinal and tries for a bit to use it sitting down.cant. stands with min assist and using fww ,pt is able to void. gown is wet , linens in chair wet, and socks. these are all changed. pt stands and amb around bed with fww cga/min to side of bed and eventually lies down. pt is slow to process info and has several needs that needed to be met. will need assist at dc. unsure of plan.

## 2023-03-29 NOTE — PROGRESS NOTES
"[facility] Progress Note   LOS: 9 days   Patient Care Team:  Dilcia Kapoor APRN as PCP - General (Family Medicine)    Chief Complaint:    Chief Complaint   Patient presents with   • Shortness of Breath   • Jaundice   • Edema     feet        Subjective     Interval History:   Patient Denies: chest pain, palpitations and syncope  Patient Complaints: shortness of air and edema  History taken from: patient chart RN     No acute events overnight. He remains in A-fib rate controlled.  Still having shortness of breath and leg swelling.  Vital signs stable.  Working with PT      Objective     Vital Sign Min/Max for last 24 hours  Temp  Min: 97 °F (36.1 °C)  Max: 98.4 °F (36.9 °C)   BP  Min: 92/62  Max: 113/88   Pulse  Min: 57  Max: 114   Resp  Min: 18  Max: 18   SpO2  Min: 91 %  Max: 94 %   Flow (L/min)  Min: 2  Max: 2   Weight  Min: 87.5 kg (193 lb)  Max: 89.8 kg (198 lb)     Flowsheet Rows      Flowsheet Row First Filed Value   Admission Height 170.2 cm (67\") Documented at 03/20/2023 1618   Admission Weight 105 kg (231 lb 14.8 oz) Documented at 03/20/2023 1648              03/28/23  0544 03/28/23  1314 03/29/23  0500   Weight: 91.7 kg (202 lb 2.6 oz) 87.5 kg (193 lb) 89.8 kg (198 lb)       Physical Exam:  Physical Exam  Vitals and nursing note reviewed.   Constitutional:       General: He is not in acute distress.     Appearance: He is obese. He is ill-appearing. He is not diaphoretic.      Interventions: Nasal cannula in place.   HENT:      Head: Normocephalic.      Right Ear: External ear normal.      Left Ear: External ear normal.   Eyes:      General: Lids are normal.      Pupils: Pupils are equal, round, and reactive to light.   Neck:      Thyroid: No thyromegaly.      Vascular: No carotid bruit or JVD.      Trachea: No tracheal deviation.   Cardiovascular:      Rate and Rhythm: Normal rate. Rhythm irregularly irregular.      Heart sounds: Normal heart sounds. No murmur heard.    No friction rub. No " gallop.   Pulmonary:      Effort: Pulmonary effort is normal. No respiratory distress.      Breath sounds: No stridor. Decreased breath sounds and rales present. No wheezing.   Chest:      Chest wall: No tenderness.   Abdominal:      General: Bowel sounds are normal. There is no distension.      Palpations: Abdomen is soft.      Tenderness: There is no abdominal tenderness.   Musculoskeletal:      Right lower le+ Edema present.      Left lower le+ Edema present.   Skin:     General: Skin is warm and dry.      Coloration: Skin is jaundiced.      Findings: No erythema or rash.   Neurological:      Mental Status: He is alert and oriented to person, place, and time.      Cranial Nerves: No cranial nerve deficit.      Deep Tendon Reflexes: Reflexes are normal and symmetric. Reflexes normal.   Psychiatric:         Behavior: Behavior normal.         Thought Content: Thought content normal.         Judgment: Judgment normal.          Results Review:     Results from last 7 days   Lab Units 236   SODIUM mmol/L 136 127*  --  128*   POTASSIUM mmol/L 3.7 3.9 3.4* 3.5   CHLORIDE mmol/L 95* 87*  --  84*   CO2 mmol/L 33.0* 35.0*  --  38.0*   BUN mg/dL 16 16  --  15   CREATININE mg/dL 0.90 0.83  --  0.82   CALCIUM mg/dL 8.4* 7.8*  --  7.8*   BILIRUBIN mg/dL 6.7* 6.6*  --  6.2*   ALK PHOS U/L 96 92  --  87   ALT (SGPT) U/L 35 32  --  36   AST (SGOT) U/L 43* 38  --  42*   GLUCOSE mg/dL 82 84  --  84       Estimated Creatinine Clearance: 87.5 mL/min (by C-G formula based on SCr of 0.9 mg/dL).    Results from last 7 days   Lab Units 2357 23  1032   MAGNESIUM mg/dL 1.9 2.1 1.7             Results from last 7 days   Lab Units 23  0557 236 23  0428 23  0600   WBC 10*3/mm3 10.05 8.24 8.43 6.75 7.31   HEMOGLOBIN g/dL 17.1 16.1 16.3 16.5 16.7   PLATELETS 10*3/mm3 127* 113* 108* 93* 101*           Lab  Results   Component Value Date    PROBNP 4,988.0 (H) 03/20/2023       I/O last 3 completed shifts:  In: -   Out: 6550 [Urine:6550]    Cardiographics:  ECG/EMG Results (last 24 hours)       Procedure Component Value Units Date/Time    ECG 12 Lead Drug Monitoring; Amiodarone [[id number]] Collected: 03/27/23 0003     Updated: 03/27/23 0750     QT Interval 378 ms      QTC Interval 502 ms     Narrative:      Test Reason : MEDICATION START  Blood Pressure :   */*   mmHG  Vent. Rate : 106 BPM     Atrial Rate :  94 BPM     P-R Int :   * ms          QRS Dur : 116 ms      QT Int : 378 ms       P-R-T Axes :   * 125 230 degrees     QTc Int : 502 ms    Atrial fibrillation with rapid ventricular response with premature ventricular or aberrantly conducted complexes  Right axis deviation  Incomplete right bundle branch block  Possible Right ventricular hypertrophy  ST & T wave abnormality, consider lateral ischemia  Abnormal ECG  When compared with ECG of 25-MAR-2023 01:02,  No significant change was found    Referred By: MD           Confirmed By: HERNESTO WALTON    ECG 12 Lead Drug Monitoring; Amiodarone [[id number]] Collected: 03/27/23 0549     Updated: 03/27/23 0750     QT Interval 402 ms      QTC Interval 518 ms     Narrative:      Test Reason : Drug Monitoring  Blood Pressure :   */*   mmHG  Vent. Rate : 100 BPM     Atrial Rate :  98 BPM     P-R Int :   * ms          QRS Dur : 114 ms      QT Int : 402 ms       P-R-T Axes :   * 131 233 degrees     QTc Int : 518 ms    Atrial fibrillation with premature ventricular or aberrantly conducted complexes  Right axis deviation  Incomplete right bundle branch block  Possible Right ventricular hypertrophy  Septal infarct , age undetermined  ST & T wave abnormality, consider lateral ischemia  Prolonged QT  Abnormal ECG  When compared with ECG of 27-MAR-2023 00:03,  No significant change was found    Referred By:            Confirmed By: HERNESTO WALTON          Results for orders placed during the  hospital encounter of 03/20/23    Adult Transthoracic Echo Complete W/ Cont if Necessary Per Protocol    Interpretation Summary  •  Left ventricular ejection fraction appears to be 21 - 25%.  •  The left ventricular cavity is dilated.  •  Left ventricular wall thickness is consistent with hypertrophy.  •  Left ventricular diastolic dysfunction is noted.  •  Moderately reduced right ventricular systolic function noted.  •  The right ventricular cavity is severely dilated.  •  Left atrial volume is severely increased.  •  The right atrial cavity is severely  dilated.  •  Estimated right ventricular systolic pressure from tricuspid regurgitation is mildly elevated (35-45 mmHg).      Imaging Results (Most Recent)     Procedure Component Value Units Date/Time    US Guided Vascular Access [[id number]] Collected: 03/22/23 1330     Updated: 03/23/23 1550    Narrative:      PROCEDURE: Ultrasound Guidance Vascular Access      Ordering physician(s): NIDHI CARDONA    Clinical Indication: Vascular access      Findings:    The right basilic vein was sonographically evaluated and  determined to be patent. Concurrent realtime ultrasound was used  to visualize needle entry into the right basilic vein  for  midline catheter placement and 2 permanent images acquired for  permanent recording and reporting.         Impression:      Impression:   As above.      Electronically signed by:  Tony Giron MD  3/23/2023 3:48 PM CDT  Workstation: [id number]    XR Chest 1 View [[id number]] Collected: 03/22/23 1327     Updated: 03/22/23 9193    Narrative:      EXAM:  XR CHEST 1 VIEW    TECHNIQUE:   Single frontal radiograph of the chest.    VIEWS:  1 view    CLINICAL HISTORY:   67 years  Male  SOB, I48.91 Unspecified atrial fibrillation N39.0  Urinary tract infection, site not specified R09.89 Other  specified symptoms and signs involving the circulatory and  respiratory systems    COMPARISON:   March 20, 2022 chest x-ray    FINDINGS:   Support  lines and tubes: The right PICC tip overlies the right  axillary soft tissues.     Lungs:  Right lung base airspace opacities. The left lung is  clear.  Pleura: No pleural effusion. No pneumothorax.  Heart/mediastinum: The cardiac silhouette is enlarged.  Bones: No acute osseous findings.  Soft tissues: Unremarkable.      Impression:      1. Right pleural effusion and adjacent atelectasis with or  without pneumonia, increased in the interval.  2. Cardiomegaly.    Electronically signed by:  Issa Burch MD  3/22/2023 11:37  PM CDT Workstation: [id number]    US Abdomen Complete [[id number]] Collected: 03/22/23 1330     Updated: 03/22/23 1509    Narrative:      Ultrasound abdomen complete.    HISTORY: Abdominal distention.    Prior exam: CT chest March 20, 2023.      Impression:      Findings, impression: Limited examination, portable bedside  technique.    Echogenic sludge within the gallbladder.    Gallbladder  wall thickening. Normal liver.    Common bile duct and pancreas are obscured by overlying bowel  gas.    Normal spleen. Normal right kidney 9.6 x 5.1 x 5.1 cm.    Benign cyst left kidney. Left kidney is otherwise unremarkable  11.2 x 5.9 x 5.1 cm    Normal aorta and inferior vena cava.    Small amount of free fluid, ascites throughout the abdomen.    IMPRESSION: Ascites. Gallbladder wall thickening and echogenic  sludge within the gallbladder.    Electronically signed by:  Clem Balderrama MD  3/22/2023 3:07 PM CDT  Workstation: [id number]    IR Insert Midline Without Port Pump 5 Plus [[id number]] Resulted: 03/22/23 1338     Updated: 03/22/23 1338    Narrative:      This procedure was auto-finalized with no dictation required.    CT Abdomen Pelvis Without Contrast [[id number]] Collected: 03/20/23 2011     Updated: 03/20/23 2138    Narrative:        lINDICATION: sob, pulm edema, I48.91 Unspecified atrial  fibrillation N39.0 Urinary tract infection, site not specified  R09.89 Other specified symptoms and signs  involving the  circulatory and respiratory systems    EXAMINATION: CT CHEST, ABDOMEN AND PELVIS     TECHNIQUE:  Helically acquired images were obtained of the chest,  abdomen and pelvis. A radiation dose optimization technique was  used for this scan.    IV Contrast dosage and agent: None    Oral contrast: None.    COMPARISON: None.    FINDINGS:    ----Chest:   LUNGS, PLEURA: There is a right lower lobe consolidation  suggesting atelectasis or pneumonia. This is associated with a  large pleural effusion. This appears slightly loculated. Left  lung is clear. There is no left-sided effusion. No pneumothorax     SOFT TISSUES: There is diffuse subcutaneous edema within the  lower chest wall and upper abdominal wall suggesting anasarca.  There is no evidence of adenopathy or soft tissue gas.     HEART AND PERICARDIUM: Heart is prominent. There is no  pericardial effusion.     VESSELS: Main pulmonary artery is prominent measuring 4.2 cm  suggesting pulmonary arterial hypertension. The aorta is normal  in course and caliber on this noncontrast study.     MEDIASTINUM AND MICHAEL: No mediastinal or hilar adenopathy.  Esophagus is unremarkable. No hiatal hernia.     BONES: No lytic or blastic abnormality.  No fractures are  identified.     ----Abdomen/Pelvis:    LIVER: Homogeneous.  No focal lesion is identified on this  noncontrast study.    GALLBLADDER AND BILIARY TREE: There appears to be dependent  sludge. There are no calcified stones. The gallbladder is not  dilated.    No intra- or extrahepatic biliary ductal dilation.    PANCREAS: There is no evidence of mass or inflammatory process.    SPLEEN: Spleen is normal in size with no focal lesion.    ADRENAL GLANDS: Normal in appearance.      KIDNEYS AND URETERS: Normal renal size and position.  There is no  evidence of renal mass or calcification. No hydronephrosis.    URINARY BLADDER: Unremarkable.     PERITONEUM: There is a small amount of ascites.     BOWEL: The appendix is  normal. There is no bowel distention or  evidence of obstruction. There is questionable edema in the  region of the third portion of the duodenum. Possibility of mild  duodenitis  is not excluded.    VASCULAR STRUCTURES AND RETROPERITONEUM: Aorta and IVC are normal  in caliber.  There is no evidence of aneurysm. There is no  evidence of retroperitoneal lymphadenopathy, mass, or fluid  collection    REPRODUCTIVE ORGANS: There is no evidence of pelvic mass.     ABDOMINAL WALL: There is diffuse anasarca. There is no hernia.     BONES: There is no acute fracture or focal bone lesion.       Impression:      1.  Moderate to large right effusion which appears loculated.  There is associated right lower lobe consolidation suggesting  pneumonia.  2.  Cardiomegaly.  3.  Prominent main pulmonary artery suggesting pulmonary arterial  hypertension.  4.  Small amount of ascites. There appears to be some edema and  wall thickening associated with the third portion of the duodenum  suggesting duodenitis.     Electronically signed by:  Krzysztof Gomez MD  3/20/2023 9:36 PM CDT  Workstation: [id number]    CT Chest Without Contrast Diagnostic [[id number]] Collected: 03/20/23 2011     Updated: 03/20/23 2137    Narrative:        lINDICATION: sob, pulm edema, I48.91 Unspecified atrial  fibrillation N39.0 Urinary tract infection, site not specified  R09.89 Other specified symptoms and signs involving the  circulatory and respiratory systems    EXAMINATION: CT CHEST, ABDOMEN AND PELVIS     TECHNIQUE:  Helically acquired images were obtained of the chest,  abdomen and pelvis. A radiation dose optimization technique was  used for this scan.    IV Contrast dosage and agent: None    Oral contrast: None.    COMPARISON: None.    FINDINGS:    ----Chest:   LUNGS, PLEURA: There is a right lower lobe consolidation  suggesting atelectasis or pneumonia. This is associated with a  large pleural effusion. This appears slightly loculated. Left  lung is clear.  There is no left-sided effusion. No pneumothorax     SOFT TISSUES: There is diffuse subcutaneous edema within the  lower chest wall and upper abdominal wall suggesting anasarca.  There is no evidence of adenopathy or soft tissue gas.     HEART AND PERICARDIUM: Heart is prominent. There is no  pericardial effusion.     VESSELS: Main pulmonary artery is prominent measuring 4.2 cm  suggesting pulmonary arterial hypertension. The aorta is normal  in course and caliber on this noncontrast study.     MEDIASTINUM AND MICHAEL: No mediastinal or hilar adenopathy.  Esophagus is unremarkable. No hiatal hernia.     BONES: No lytic or blastic abnormality.  No fractures are  identified.     ----Abdomen/Pelvis:    LIVER: Homogeneous.  No focal lesion is identified on this  noncontrast study.    GALLBLADDER AND BILIARY TREE: There appears to be dependent  sludge. There are no calcified stones. The gallbladder is not  dilated.    No intra- or extrahepatic biliary ductal dilation.    PANCREAS: There is no evidence of mass or inflammatory process.    SPLEEN: Spleen is normal in size with no focal lesion.    ADRENAL GLANDS: Normal in appearance.      KIDNEYS AND URETERS: Normal renal size and position.  There is no  evidence of renal mass or calcification. No hydronephrosis.    URINARY BLADDER: Unremarkable.     PERITONEUM: There is a small amount of ascites.     BOWEL: The appendix is normal. There is no bowel distention or  evidence of obstruction. There is questionable edema in the  region of the third portion of the duodenum. Possibility of mild  duodenitis  is not excluded.    VASCULAR STRUCTURES AND RETROPERITONEUM: Aorta and IVC are normal  in caliber.  There is no evidence of aneurysm. There is no  evidence of retroperitoneal lymphadenopathy, mass, or fluid  collection    REPRODUCTIVE ORGANS: There is no evidence of pelvic mass.     ABDOMINAL WALL: There is diffuse anasarca. There is no hernia.     BONES: There is no acute  fracture or focal bone lesion.       Impression:      1.  Moderate to large right effusion which appears loculated.  There is associated right lower lobe consolidation suggesting  pneumonia.  2.  Cardiomegaly.  3.  Prominent main pulmonary artery suggesting pulmonary arterial  hypertension.  4.  Small amount of ascites. There appears to be some edema and  wall thickening associated with the third portion of the duodenum  suggesting duodenitis.     Electronically signed by:  Krzysztof Gomez MD  3/20/2023 9:35 PM CDT  Workstation: [id number]    XR Chest 1 View [[id number]] Collected: 03/20/23 1651     Updated: 03/20/23 1828    Narrative:      EXAMINATION: XR CHEST 1 VIEW    COMPARISON: Chest 2 views November 5, 2020    INDICATION: tachycardia    FINDINGS: Portable AP upright imaging of the chest is submitted -  1 view. A moderate size basilar right pleural effusion is new.  Some degree of right basilar compressive atelectasis is  suspected. Heart size is difficult to assess but appears at least  mildly enlarged. Mild central vascular prominence is noted but no  subpleural interstitial edema is evident. No pneumothorax.       Impression:      Right basilar pleural effusion, right lower lobe  compressive atelectasis, cardiomegaly and mild central vascular  congestion.    Electronically signed by:  Hollis Sousa MD  3/20/2023 6:26 PM CDT  Workstation: [id number]          CT Abdomen Pelvis Without Contrast    Result Date: 3/20/2023  1.  Moderate to large right effusion which appears loculated. There is associated right lower lobe consolidation suggesting pneumonia. 2.  Cardiomegaly. 3.  Prominent main pulmonary artery suggesting pulmonary arterial hypertension. 4.  Small amount of ascites. There appears to be some edema and wall thickening associated with the third portion of the duodenum suggesting duodenitis. Electronically signed by:  Krzysztof Gomez MD  3/20/2023 9:36 PM CDT Workstation: [id number]    CT Chest Without Contrast  Diagnostic    Result Date: 3/20/2023  1.  Moderate to large right effusion which appears loculated. There is associated right lower lobe consolidation suggesting pneumonia. 2.  Cardiomegaly. 3.  Prominent main pulmonary artery suggesting pulmonary arterial hypertension. 4.  Small amount of ascites. There appears to be some edema and wall thickening associated with the third portion of the duodenum suggesting duodenitis. Electronically signed by:  Krzysztof Gomez MD  3/20/2023 9:35 PM CDT Workstation: [id number]    US Abdomen Complete    Result Date: 3/22/2023  Findings, impression: Limited examination, portable bedside technique. Echogenic sludge within the gallbladder. Gallbladder  wall thickening. Normal liver. Common bile duct and pancreas are obscured by overlying bowel gas. Normal spleen. Normal right kidney 9.6 x 5.1 x 5.1 cm. Benign cyst left kidney. Left kidney is otherwise unremarkable 11.2 x 5.9 x 5.1 cm Normal aorta and inferior vena cava. Small amount of free fluid, ascites throughout the abdomen. IMPRESSION: Ascites. Gallbladder wall thickening and echogenic sludge within the gallbladder. Electronically signed by:  Clem Balderrama MD  3/22/2023 3:07 PM CDT Workstation: [id number]    US Guided Vascular Access    Result Date: 3/23/2023  Impression: As above. Electronically signed by:  Tony Giron MD  3/23/2023 3:48 PM CDT Workstation: [id number]    XR Chest 1 View    Result Date: 3/22/2023  1. Right pleural effusion and adjacent atelectasis with or without pneumonia, increased in the interval. 2. Cardiomegaly. Electronically signed by:  Issa Burch MD  3/22/2023 11:37 PM CDT Workstation: [id number]    XR Chest 1 View    Result Date: 3/20/2023  Right basilar pleural effusion, right lower lobe compressive atelectasis, cardiomegaly and mild central vascular congestion. Electronically signed by:  Hollis Sousa MD  3/20/2023 6:26 PM CDT Workstation: [id number]      Medication Review:     Current  Facility-Administered Medications:   •  [DISCONTINUED] acetaminophen (TYLENOL) tablet 650 mg, 650 mg, Oral, Q4H PRN **OR** [DISCONTINUED] acetaminophen (TYLENOL) 160 MG/5ML solution 650 mg, 650 mg, Oral, Q4H PRN **OR** acetaminophen (TYLENOL) suppository 650 mg, 650 mg, Rectal, Q4H PRN, Shamai, Prince, DO  •  acetaminophen (TYLENOL) tablet 650 mg, 650 mg, Oral, Q4H PRN, Shamai, Prince, DO  •  amiodarone (PACERONE) tablet 200 mg, 200 mg, Oral, BID With Meals, Kalpana Abreu, APRN, 200 mg at 03/29/23 0824  •  digoxin (LANOXIN) tablet 125 mcg, 125 mcg, Oral, Daily, Kalpana Abreu, APRN, 125 mcg at 03/28/23 1133  •  furosemide (LASIX) tablet 20 mg, 20 mg, Oral, BID, Luis Alcantar MD, 20 mg at 03/29/23 0824  •  Hold medication, 1 each, Does not apply, Continuous PRN, Shamai, Prince, DO  •  hydrocortisone-bacitracin-zinc oxide-nystatin (MAGIC BARRIER) ointment 1 application, 1 application, Topical, Q4H PRN, Araceli, Prince, DO, 1 application at 03/23/23 1740  •  Magnesium Sulfate - Total Dose 10 grams - Magnesium 1 or Less, 2 g, Intravenous, PRN **OR** Magnesium Sulfate - Total Dose 6 grams - Magnesium 1.1 - 1.5, 2 g, Intravenous, PRN **OR** Magnesium Sulfate - Total Dose 4 grams - Magnesium 1.6 - 1.9, 4 g, Intravenous, PRN, Shamai, Prince, DO, Last Rate: 25 mL/hr at 03/28/23 0800, 4 g at 03/28/23 0800  •  metoprolol succinate XL (TOPROL-XL) 24 hr half tablet 12.5 mg, 12.5 mg, Oral, Q24H, Nannette Walters MD, 12.5 mg at 03/29/23 0823  •  ondansetron (ZOFRAN) injection 4 mg, 4 mg, Intravenous, Q6H PRN, Shamdel, Prince, DO, 4 mg at 03/22/23 0208  •  phytonadione (AQUA-MEPHYTON, VITAMIN K) 5 mg in sodium chloride 0.9 % 50 mL IVPB, 5 mg, Intravenous, Once, Prince Charles DO, Held at 03/23/23 1035  •  potassium chloride (MICRO-K) CR capsule 40 mEq, 40 mEq, Oral, PRN, 40 mEq at 03/28/23 0107 **OR** potassium chloride (KLOR-CON) packet 40 mEq, 40 mEq, Oral, PRN **OR** potassium chloride 10 mEq in 100 mL IVPB, 10 mEq,  Intravenous, Q1H PRN, Shamai, Prince, DO, Last Rate: 100 mL/hr at 03/25/23 0335, 10 mEq at 03/25/23 0335  •  rivaroxaban (XARELTO) tablet 20 mg, 20 mg, Oral, Daily With Dinner, Nannette Walters MD, 20 mg at 03/28/23 1706  •  sodium chloride 0.9 % flush 10 mL, 10 mL, Intravenous, Q12H, Shamai, Prince, DO, 10 mL at 03/29/23 0825  •  sodium chloride 0.9 % flush 10 mL, 10 mL, Intravenous, PRN, Shamai, Prince, DO  •  sodium chloride 0.9 % infusion 40 mL, 40 mL, Intravenous, PRN, Shamai, Prince, DO  •  spironolactone (ALDACTONE) tablet 25 mg, 25 mg, Oral, Daily, Shamai, Prince, DO, 25 mg at 03/29/23 0824    Assessment & Plan       Atrial fibrillation with RVR ([facility])    NSTEMI, initial episode of care ([facility])    Biventricular CHF (congestive heart failure) ([facility])    Atrial fibrillation ([facility])    #1.  Atrial fibrillation with RVR.  Improving  Has history of paroxysmal atrial fibrillation/SVT.  Has been noncompliant with medication for months.  Presented with worsening shortness of breath, weight gain, edema and chest pain.  EKG showed atrial fibrillation with RVR.  Found to be in heart failure with reduced LVEF.  -Continue amiodarone 200 mg twice daily, Toprol-XL 12.5 mg, digoxin 125mcg  -Continue Xarelto 20 mg, we will need to sample him.  Discussed with patient previously regarding that if he continues to be in A-fib we will need to consider cardioversion as an outpatient in 4 weeks.   -Keep K 4 or greater, Keep mag 2 or greater. Replacement protocols in place.     #2.  Biventricular heart failure, LVEF reduced at 21 to 25%, moderately reduced RVEF.  Etiology NICM: [facility] negative for Obstructive CAD  NYHA Class III, Stage C. Patient appears euvolemic today with third spacing with borderline perfusion and hemodynamics    BETA-BLOCKER: Toprol XL 12.5mg  ACE/ARB/ENTRESTO: Will plan to add prior to discharge if hemodynamics allows  DIURETIC: Lasix 20 mg po BID  SGLt2: Indicated  ALDOSTERONE ANTAGONIST: Spironolactone  25mg  IMDUR/HYDRALAZINE: N/A  DIGOXIN: N/A  Fluid restriction: 1500 ml  Sodium restriction:2 grams     Cardiac Rehab:  Indicated  ICD:  Indicated    ADHF:  This admission  Daily weight  Strict intake/ output  Continuous cardiac monitoring     #3.  NSTEMI:  High sensitivity troponin at 70, 65, 74, and 79.  Lactic acid 3.8 on arrival.  EKG showing atrial fibrillation with RVR at 149 bpm.    Patient presented with A-fib RVR, metabolic acidosis CHF.  Patient previously has not had an invasive cardiac work-up, noted to have nuclear stress test in 2020, low risk.  Patient be recommended for definitive evaluation of troponin elevation and reduced EF once kidney status and respiratory status improved. He is s/p LHC on 3/27 which showed mild irregularity and negative for obstructive CAD. Normal LVEDP. Consistent with NICM. Radial cath site wnl. H/h and kidney function stable. Denies CP.     #4.  Transaminitis/elevated bilirubin: ALT 35, AST 43, total bilirubin 6.7.  Stable and actually appears to be improving.  He does appear somewhat jaundiced upon exam.  We will need to closely monitor liver function with amiodarone.     Plan for disposition: Continue 3 west.  We will continue to follow            This document has been electronically signed by ARY Garcia on March 29, 2023 10:31 CDT   Electronically signed by ARY Garcia, 03/29/23, 10:31 AM CDT.

## 2023-03-30 ENCOUNTER — APPOINTMENT (OUTPATIENT)
Dept: ULTRASOUND IMAGING | Facility: HOSPITAL | Age: 68
DRG: 280 | End: 2023-03-30
Payer: MEDICARE

## 2023-03-30 LAB
ALBUMIN SERPL-MCNC: 2.2 G/DL (ref 3.5–5.2)
ALBUMIN/GLOB SERPL: 0.8 G/DL
ALP SERPL-CCNC: 91 U/L (ref 39–117)
ALT SERPL W P-5'-P-CCNC: 34 U/L (ref 1–41)
AMMONIA BLD-SCNC: 60 UMOL/L (ref 16–60)
ANION GAP SERPL CALCULATED.3IONS-SCNC: 9 MMOL/L (ref 5–15)
AST SERPL-CCNC: 47 U/L (ref 1–40)
BASOPHILS # BLD AUTO: 0.06 10*3/MM3 (ref 0–0.2)
BASOPHILS NFR BLD AUTO: 0.7 % (ref 0–1.5)
BILIRUB SERPL-MCNC: 6.8 MG/DL (ref 0–1.2)
BUN SERPL-MCNC: 16 MG/DL (ref 8–23)
BUN/CREAT SERPL: 17.2 (ref 7–25)
CALCIUM SPEC-SCNC: 8.2 MG/DL (ref 8.6–10.5)
CHLORIDE SERPL-SCNC: 96 MMOL/L (ref 98–107)
CO2 SERPL-SCNC: 28 MMOL/L (ref 22–29)
CREAT SERPL-MCNC: 0.93 MG/DL (ref 0.76–1.27)
DEPRECATED RDW RBC AUTO: 62 FL (ref 37–54)
EGFRCR SERPLBLD CKD-EPI 2021: 91.1 ML/MIN/1.73
EOSINOPHIL # BLD AUTO: 0.15 10*3/MM3 (ref 0–0.4)
EOSINOPHIL NFR BLD AUTO: 1.7 % (ref 0.3–6.2)
ERYTHROCYTE [DISTWIDTH] IN BLOOD BY AUTOMATED COUNT: 21.1 % (ref 12.3–15.4)
GLOBULIN UR ELPH-MCNC: 2.7 GM/DL
GLUCOSE SERPL-MCNC: 87 MG/DL (ref 65–99)
HCT VFR BLD AUTO: 48.2 % (ref 37.5–51)
HGB BLD-MCNC: 17.4 G/DL (ref 13–17.7)
IMM GRANULOCYTES # BLD AUTO: 0.09 10*3/MM3 (ref 0–0.05)
IMM GRANULOCYTES NFR BLD AUTO: 1 % (ref 0–0.5)
LYMPHOCYTES # BLD AUTO: 0.93 10*3/MM3 (ref 0.7–3.1)
LYMPHOCYTES NFR BLD AUTO: 10.2 % (ref 19.6–45.3)
MCH RBC QN AUTO: 31.1 PG (ref 26.6–33)
MCHC RBC AUTO-ENTMCNC: 36.1 G/DL (ref 31.5–35.7)
MCV RBC AUTO: 86.1 FL (ref 79–97)
MONOCYTES # BLD AUTO: 1.49 10*3/MM3 (ref 0.1–0.9)
MONOCYTES NFR BLD AUTO: 16.4 % (ref 5–12)
NEUTROPHILS NFR BLD AUTO: 6.37 10*3/MM3 (ref 1.7–7)
NEUTROPHILS NFR BLD AUTO: 70 % (ref 42.7–76)
NRBC BLD AUTO-RTO: 0 /100 WBC (ref 0–0.2)
PLATELET # BLD AUTO: 138 10*3/MM3 (ref 140–450)
PMV BLD AUTO: 10.5 FL (ref 6–12)
POTASSIUM SERPL-SCNC: 4.2 MMOL/L (ref 3.5–5.2)
PROT SERPL-MCNC: 4.9 G/DL (ref 6–8.5)
QT INTERVAL: 352 MS
QTC INTERVAL: 469 MS
RBC # BLD AUTO: 5.6 10*6/MM3 (ref 4.14–5.8)
SODIUM SERPL-SCNC: 133 MMOL/L (ref 136–145)
WBC NRBC COR # BLD: 9.09 10*3/MM3 (ref 3.4–10.8)

## 2023-03-30 PROCEDURE — 93005 ELECTROCARDIOGRAM TRACING: CPT

## 2023-03-30 PROCEDURE — 85025 COMPLETE CBC W/AUTO DIFF WBC: CPT | Performed by: INTERNAL MEDICINE

## 2023-03-30 PROCEDURE — 93970 EXTREMITY STUDY: CPT

## 2023-03-30 PROCEDURE — 82140 ASSAY OF AMMONIA: CPT | Performed by: NURSE PRACTITIONER

## 2023-03-30 PROCEDURE — 93010 ELECTROCARDIOGRAM REPORT: CPT | Performed by: INTERNAL MEDICINE

## 2023-03-30 PROCEDURE — 25010000002 ALBUMIN HUMAN 25% PER 50 ML: Performed by: NURSE PRACTITIONER

## 2023-03-30 PROCEDURE — P9047 ALBUMIN (HUMAN), 25%, 50ML: HCPCS | Performed by: NURSE PRACTITIONER

## 2023-03-30 PROCEDURE — 80053 COMPREHEN METABOLIC PANEL: CPT | Performed by: INTERNAL MEDICINE

## 2023-03-30 PROCEDURE — 99232 SBSQ HOSP IP/OBS MODERATE 35: CPT | Performed by: INTERNAL MEDICINE

## 2023-03-30 RX ORDER — LACTULOSE 10 G/15ML
10 SOLUTION ORAL 2 TIMES DAILY
Status: COMPLETED | OUTPATIENT
Start: 2023-03-30 | End: 2023-03-31

## 2023-03-30 RX ORDER — ALBUMIN (HUMAN) 12.5 G/50ML
25 SOLUTION INTRAVENOUS ONCE
Status: COMPLETED | OUTPATIENT
Start: 2023-03-30 | End: 2023-03-30

## 2023-03-30 RX ORDER — DOCUSATE SODIUM 100 MG/1
100 CAPSULE, LIQUID FILLED ORAL 2 TIMES DAILY
Status: DISCONTINUED | OUTPATIENT
Start: 2023-03-30 | End: 2023-03-30

## 2023-03-30 RX ORDER — DOCUSATE SODIUM 100 MG/1
100 CAPSULE, LIQUID FILLED ORAL DAILY PRN
Status: DISCONTINUED | OUTPATIENT
Start: 2023-03-30 | End: 2023-04-04 | Stop reason: HOSPADM

## 2023-03-30 RX ADMIN — LACTULOSE 10 G: 10 SOLUTION ORAL at 12:21

## 2023-03-30 RX ADMIN — DIGOXIN 125 MCG: 125 TABLET ORAL at 12:21

## 2023-03-30 RX ADMIN — LACTULOSE 10 G: 10 SOLUTION ORAL at 20:42

## 2023-03-30 RX ADMIN — DOCUSATE SODIUM 100 MG: 100 CAPSULE, LIQUID FILLED ORAL at 12:21

## 2023-03-30 RX ADMIN — Medication 10 ML: at 20:42

## 2023-03-30 RX ADMIN — SPIRONOLACTONE 25 MG: 25 TABLET ORAL at 08:33

## 2023-03-30 RX ADMIN — Medication 10 ML: at 08:34

## 2023-03-30 RX ADMIN — RIVAROXABAN 15 MG: 15 TABLET, FILM COATED ORAL at 18:18

## 2023-03-30 RX ADMIN — AMIODARONE HYDROCHLORIDE 200 MG: 200 TABLET ORAL at 18:18

## 2023-03-30 RX ADMIN — ALBUMIN (HUMAN) 25 G: 0.25 INJECTION, SOLUTION INTRAVENOUS at 12:21

## 2023-03-30 RX ADMIN — FUROSEMIDE 20 MG: 20 TABLET ORAL at 08:34

## 2023-03-30 RX ADMIN — FUROSEMIDE 20 MG: 20 TABLET ORAL at 18:19

## 2023-03-30 RX ADMIN — Medication 12.5 MG: at 08:33

## 2023-03-30 RX ADMIN — AMIODARONE HYDROCHLORIDE 200 MG: 200 TABLET ORAL at 08:34

## 2023-03-30 RX ADMIN — ATORVASTATIN CALCIUM 40 MG: 40 TABLET, FILM COATED ORAL at 20:42

## 2023-03-30 NOTE — PLAN OF CARE
Goal Outcome Evaluation:           Progress: improving  Outcome Evaluation: Pt vitals stable.  Pt had venous doppler ultrasound this shift and unable to get unna boots because it was found that pt has DVT's; pt Xarelto dosing adjusted per MD.  No complaints at this time.  Will continue with care plan.

## 2023-03-30 NOTE — SIGNIFICANT NOTE
03/30/23 1242   OTHER   Discipline physical therapy assistant   Rehab Time/Intention   Session Not Performed patient unavailable for treatment     Gone for testing

## 2023-03-30 NOTE — PROGRESS NOTES
"[facility] Progress Note   LOS: 10 days   Patient Care Team:  Dilcia Kapoor APRN as PCP - General (Family Medicine)    Chief Complaint:    Chief Complaint   Patient presents with    Shortness of Breath    Jaundice    Edema     feet        Subjective     Interval History:   Patient Denies: chest pain, palpitations and syncope  Patient Complaints: shortness of air and edema  History taken from: patient chart RN     No acute events overnight. He remains in A-fib rate controlled.  Still having shortness of breath and leg swelling.  Vital signs stable. Albumin ordered for today. Unna boots ordered for compression to lower ext.       Objective     Vital Sign Min/Max for last 24 hours  Temp  Min: 97 °F (36.1 °C)  Max: 98.8 °F (37.1 °C)   BP  Min: 91/67  Max: 113/75   Pulse  Min: 89  Max: 107   Resp  Min: 18  Max: 18   SpO2  Min: 90 %  Max: 94 %   Flow (L/min)  Min: 2  Max: 2   No data recorded     Flowsheet Rows      Flowsheet Row First Filed Value   Admission Height 170.2 cm (67\") Documented at 03/20/2023 1618   Admission Weight 105 kg (231 lb 14.8 oz) Documented at 03/20/2023 1648              03/28/23  0544 03/28/23  1314 03/29/23  0500   Weight: 91.7 kg (202 lb 2.6 oz) 87.5 kg (193 lb) 89.8 kg (198 lb)       Physical Exam:  Physical Exam  Vitals and nursing note reviewed.   Constitutional:       General: He is not in acute distress.     Appearance: He is obese. He is ill-appearing. He is not diaphoretic.      Interventions: Nasal cannula in place.   HENT:      Head: Normocephalic.      Right Ear: External ear normal.      Left Ear: External ear normal.   Eyes:      General: Lids are normal.      Pupils: Pupils are equal, round, and reactive to light.   Neck:      Thyroid: No thyromegaly.      Vascular: No carotid bruit or JVD.      Trachea: No tracheal deviation.   Cardiovascular:      Rate and Rhythm: Normal rate. Rhythm irregularly irregular.      Heart sounds: Normal heart sounds. No murmur heard.    " No friction rub. No gallop.   Pulmonary:      Effort: Pulmonary effort is normal. No respiratory distress.      Breath sounds: No stridor. Decreased breath sounds and rales present. No wheezing.   Chest:      Chest wall: No tenderness.   Abdominal:      General: Bowel sounds are normal. There is no distension.      Palpations: Abdomen is soft.      Tenderness: There is no abdominal tenderness.   Musculoskeletal:      Right lower le+ Edema present.      Left lower le+ Edema present.   Skin:     General: Skin is warm and dry.      Coloration: Skin is jaundiced.      Findings: No erythema or rash.   Neurological:      Mental Status: He is alert and oriented to person, place, and time.      Cranial Nerves: No cranial nerve deficit.      Deep Tendon Reflexes: Reflexes are normal and symmetric. Reflexes normal.   Psychiatric:         Behavior: Behavior normal.         Thought Content: Thought content normal.         Judgment: Judgment normal.          Results Review:     Results from last 7 days   Lab Units 2346 23  0557   SODIUM mmol/L 133* 136 127*   POTASSIUM mmol/L 4.2 3.7 3.9   CHLORIDE mmol/L 96* 95* 87*   CO2 mmol/L 28.0 33.0* 35.0*   BUN mg/dL 16 16 16   CREATININE mg/dL 0.93 0.90 0.83   CALCIUM mg/dL 8.2* 8.4* 7.8*   BILIRUBIN mg/dL 6.8* 6.7* 6.6*   ALK PHOS U/L 91 96 92   ALT (SGPT) U/L 34 35 32   AST (SGOT) U/L 47* 43* 38   GLUCOSE mg/dL 87 82 84       Estimated Creatinine Clearance: 84.7 mL/min (by C-G formula based on SCr of 0.93 mg/dL).    Results from last 7 days   Lab Units 2317 23  0557 23  0316   MAGNESIUM mg/dL 2.3 1.9 2.1             Results from last 7 days   Lab Units 23  0546 23  0517 23  0557 23  0316 23  0428   WBC 10*3/mm3 9.09 10.05 8.24 8.43 6.75   HEMOGLOBIN g/dL 17.4 17.1 16.1 16.3 16.5   PLATELETS 10*3/mm3 138* 127* 113* 108* 93*           Lab Results   Component Value Date    PROBNP 4,988.0 (H)  03/20/2023       I/O last 3 completed shifts:  In: 1080 [P.O.:1080]  Out: 7575 [Urine:7575]    Cardiographics:  ECG/EMG Results (last 24 hours)       Procedure Component Value Units Date/Time    ECG 12 Lead Drug Monitoring; Amiodarone [[id number]] Collected: 03/27/23 0003     Updated: 03/27/23 0750     QT Interval 378 ms      QTC Interval 502 ms     Narrative:      Test Reason : MEDICATION START  Blood Pressure :   */*   mmHG  Vent. Rate : 106 BPM     Atrial Rate :  94 BPM     P-R Int :   * ms          QRS Dur : 116 ms      QT Int : 378 ms       P-R-T Axes :   * 125 230 degrees     QTc Int : 502 ms    Atrial fibrillation with rapid ventricular response with premature ventricular or aberrantly conducted complexes  Right axis deviation  Incomplete right bundle branch block  Possible Right ventricular hypertrophy  ST & T wave abnormality, consider lateral ischemia  Abnormal ECG  When compared with ECG of 25-MAR-2023 01:02,  No significant change was found    Referred By: MD           Confirmed By: HERNESTO WALTON    ECG 12 Lead Drug Monitoring; Amiodarone [[id number]] Collected: 03/27/23 0549     Updated: 03/27/23 0750     QT Interval 402 ms      QTC Interval 518 ms     Narrative:      Test Reason : Drug Monitoring  Blood Pressure :   */*   mmHG  Vent. Rate : 100 BPM     Atrial Rate :  98 BPM     P-R Int :   * ms          QRS Dur : 114 ms      QT Int : 402 ms       P-R-T Axes :   * 131 233 degrees     QTc Int : 518 ms    Atrial fibrillation with premature ventricular or aberrantly conducted complexes  Right axis deviation  Incomplete right bundle branch block  Possible Right ventricular hypertrophy  Septal infarct , age undetermined  ST & T wave abnormality, consider lateral ischemia  Prolonged QT  Abnormal ECG  When compared with ECG of 27-MAR-2023 00:03,  No significant change was found    Referred By:            Confirmed By: HERNESTO WALTON          Results for orders placed during the hospital encounter of 03/20/23    Adult  Transthoracic Echo Complete W/ Cont if Necessary Per Protocol    Interpretation Summary    Left ventricular ejection fraction appears to be 21 - 25%.    The left ventricular cavity is dilated.    Left ventricular wall thickness is consistent with hypertrophy.    Left ventricular diastolic dysfunction is noted.    Moderately reduced right ventricular systolic function noted.    The right ventricular cavity is severely dilated.    Left atrial volume is severely increased.    The right atrial cavity is severely  dilated.    Estimated right ventricular systolic pressure from tricuspid regurgitation is mildly elevated (35-45 mmHg).      Imaging Results (Most Recent)       Procedure Component Value Units Date/Time    US Guided Vascular Access [[id number]] Collected: 03/22/23 1330     Updated: 03/23/23 1550    Narrative:      PROCEDURE: Ultrasound Guidance Vascular Access      Ordering physician(s): NIDHI CARDONA    Clinical Indication: Vascular access      Findings:    The right basilic vein was sonographically evaluated and  determined to be patent. Concurrent realtime ultrasound was used  to visualize needle entry into the right basilic vein  for  midline catheter placement and 2 permanent images acquired for  permanent recording and reporting.         Impression:      Impression:   As above.      Electronically signed by:  Tony Giron MD  3/23/2023 3:48 PM CDT  Workstation: [id number]    XR Chest 1 View [[id number]] Collected: 03/22/23 1327     Updated: 03/22/23 5669    Narrative:      EXAM:  XR CHEST 1 VIEW    TECHNIQUE:   Single frontal radiograph of the chest.    VIEWS:  1 view    CLINICAL HISTORY:   67 years  Male  SOB, I48.91 Unspecified atrial fibrillation N39.0  Urinary tract infection, site not specified R09.89 Other  specified symptoms and signs involving the circulatory and  respiratory systems    COMPARISON:   March 20, 2022 chest x-ray    FINDINGS:   Support lines and tubes: The right PICC tip overlies the  right  axillary soft tissues.     Lungs:  Right lung base airspace opacities. The left lung is  clear.  Pleura: No pleural effusion. No pneumothorax.  Heart/mediastinum: The cardiac silhouette is enlarged.  Bones: No acute osseous findings.  Soft tissues: Unremarkable.      Impression:      1. Right pleural effusion and adjacent atelectasis with or  without pneumonia, increased in the interval.  2. Cardiomegaly.    Electronically signed by:  Issa Burch MD  3/22/2023 11:37  PM CDT Workstation: [id number]    US Abdomen Complete [[id number]] Collected: 03/22/23 1330     Updated: 03/22/23 1509    Narrative:      Ultrasound abdomen complete.    HISTORY: Abdominal distention.    Prior exam: CT chest March 20, 2023.      Impression:      Findings, impression: Limited examination, portable bedside  technique.    Echogenic sludge within the gallbladder.    Gallbladder  wall thickening. Normal liver.    Common bile duct and pancreas are obscured by overlying bowel  gas.    Normal spleen. Normal right kidney 9.6 x 5.1 x 5.1 cm.    Benign cyst left kidney. Left kidney is otherwise unremarkable  11.2 x 5.9 x 5.1 cm    Normal aorta and inferior vena cava.    Small amount of free fluid, ascites throughout the abdomen.    IMPRESSION: Ascites. Gallbladder wall thickening and echogenic  sludge within the gallbladder.    Electronically signed by:  Clem Balderrama MD  3/22/2023 3:07 PM CDT  Workstation: [id number]    IR Insert Midline Without Port Pump 5 Plus [[id number]] Resulted: 03/22/23 1338     Updated: 03/22/23 1338    Narrative:      This procedure was auto-finalized with no dictation required.    CT Abdomen Pelvis Without Contrast [[id number]] Collected: 03/20/23 2011     Updated: 03/20/23 2138    Narrative:        lINDICATION: sob, pulm edema, I48.91 Unspecified atrial  fibrillation N39.0 Urinary tract infection, site not specified  R09.89 Other specified symptoms and signs involving the  circulatory and respiratory  systems    EXAMINATION: CT CHEST, ABDOMEN AND PELVIS     TECHNIQUE:  Helically acquired images were obtained of the chest,  abdomen and pelvis. A radiation dose optimization technique was  used for this scan.    IV Contrast dosage and agent: None    Oral contrast: None.    COMPARISON: None.    FINDINGS:    ----Chest:   LUNGS, PLEURA: There is a right lower lobe consolidation  suggesting atelectasis or pneumonia. This is associated with a  large pleural effusion. This appears slightly loculated. Left  lung is clear. There is no left-sided effusion. No pneumothorax     SOFT TISSUES: There is diffuse subcutaneous edema within the  lower chest wall and upper abdominal wall suggesting anasarca.  There is no evidence of adenopathy or soft tissue gas.     HEART AND PERICARDIUM: Heart is prominent. There is no  pericardial effusion.     VESSELS: Main pulmonary artery is prominent measuring 4.2 cm  suggesting pulmonary arterial hypertension. The aorta is normal  in course and caliber on this noncontrast study.     MEDIASTINUM AND MICHAEL: No mediastinal or hilar adenopathy.  Esophagus is unremarkable. No hiatal hernia.     BONES: No lytic or blastic abnormality.  No fractures are  identified.     ----Abdomen/Pelvis:    LIVER: Homogeneous.  No focal lesion is identified on this  noncontrast study.    GALLBLADDER AND BILIARY TREE: There appears to be dependent  sludge. There are no calcified stones. The gallbladder is not  dilated.    No intra- or extrahepatic biliary ductal dilation.    PANCREAS: There is no evidence of mass or inflammatory process.    SPLEEN: Spleen is normal in size with no focal lesion.    ADRENAL GLANDS: Normal in appearance.      KIDNEYS AND URETERS: Normal renal size and position.  There is no  evidence of renal mass or calcification. No hydronephrosis.    URINARY BLADDER: Unremarkable.     PERITONEUM: There is a small amount of ascites.     BOWEL: The appendix is normal. There is no bowel distention  or  evidence of obstruction. There is questionable edema in the  region of the third portion of the duodenum. Possibility of mild  duodenitis  is not excluded.    VASCULAR STRUCTURES AND RETROPERITONEUM: Aorta and IVC are normal  in caliber.  There is no evidence of aneurysm. There is no  evidence of retroperitoneal lymphadenopathy, mass, or fluid  collection    REPRODUCTIVE ORGANS: There is no evidence of pelvic mass.     ABDOMINAL WALL: There is diffuse anasarca. There is no hernia.     BONES: There is no acute fracture or focal bone lesion.       Impression:      1.  Moderate to large right effusion which appears loculated.  There is associated right lower lobe consolidation suggesting  pneumonia.  2.  Cardiomegaly.  3.  Prominent main pulmonary artery suggesting pulmonary arterial  hypertension.  4.  Small amount of ascites. There appears to be some edema and  wall thickening associated with the third portion of the duodenum  suggesting duodenitis.     Electronically signed by:  Krzysztof Gomez MD  3/20/2023 9:36 PM CDT  Workstation: [id number]    CT Chest Without Contrast Diagnostic [[id number]] Collected: 03/20/23 2011     Updated: 03/20/23 2137    Narrative:        lINDICATION: sob, pulm edema, I48.91 Unspecified atrial  fibrillation N39.0 Urinary tract infection, site not specified  R09.89 Other specified symptoms and signs involving the  circulatory and respiratory systems    EXAMINATION: CT CHEST, ABDOMEN AND PELVIS     TECHNIQUE:  Helically acquired images were obtained of the chest,  abdomen and pelvis. A radiation dose optimization technique was  used for this scan.    IV Contrast dosage and agent: None    Oral contrast: None.    COMPARISON: None.    FINDINGS:    ----Chest:   LUNGS, PLEURA: There is a right lower lobe consolidation  suggesting atelectasis or pneumonia. This is associated with a  large pleural effusion. This appears slightly loculated. Left  lung is clear. There is no left-sided effusion. No  pneumothorax     SOFT TISSUES: There is diffuse subcutaneous edema within the  lower chest wall and upper abdominal wall suggesting anasarca.  There is no evidence of adenopathy or soft tissue gas.     HEART AND PERICARDIUM: Heart is prominent. There is no  pericardial effusion.     VESSELS: Main pulmonary artery is prominent measuring 4.2 cm  suggesting pulmonary arterial hypertension. The aorta is normal  in course and caliber on this noncontrast study.     MEDIASTINUM AND MICHAEL: No mediastinal or hilar adenopathy.  Esophagus is unremarkable. No hiatal hernia.     BONES: No lytic or blastic abnormality.  No fractures are  identified.     ----Abdomen/Pelvis:    LIVER: Homogeneous.  No focal lesion is identified on this  noncontrast study.    GALLBLADDER AND BILIARY TREE: There appears to be dependent  sludge. There are no calcified stones. The gallbladder is not  dilated.    No intra- or extrahepatic biliary ductal dilation.    PANCREAS: There is no evidence of mass or inflammatory process.    SPLEEN: Spleen is normal in size with no focal lesion.    ADRENAL GLANDS: Normal in appearance.      KIDNEYS AND URETERS: Normal renal size and position.  There is no  evidence of renal mass or calcification. No hydronephrosis.    URINARY BLADDER: Unremarkable.     PERITONEUM: There is a small amount of ascites.     BOWEL: The appendix is normal. There is no bowel distention or  evidence of obstruction. There is questionable edema in the  region of the third portion of the duodenum. Possibility of mild  duodenitis  is not excluded.    VASCULAR STRUCTURES AND RETROPERITONEUM: Aorta and IVC are normal  in caliber.  There is no evidence of aneurysm. There is no  evidence of retroperitoneal lymphadenopathy, mass, or fluid  collection    REPRODUCTIVE ORGANS: There is no evidence of pelvic mass.     ABDOMINAL WALL: There is diffuse anasarca. There is no hernia.     BONES: There is no acute fracture or focal bone lesion.        Impression:      1.  Moderate to large right effusion which appears loculated.  There is associated right lower lobe consolidation suggesting  pneumonia.  2.  Cardiomegaly.  3.  Prominent main pulmonary artery suggesting pulmonary arterial  hypertension.  4.  Small amount of ascites. There appears to be some edema and  wall thickening associated with the third portion of the duodenum  suggesting duodenitis.     Electronically signed by:  Krzysztof Gomez MD  3/20/2023 9:35 PM CDT  Workstation: [id number]    XR Chest 1 View [[id number]] Collected: 03/20/23 1651     Updated: 03/20/23 1828    Narrative:      EXAMINATION: XR CHEST 1 VIEW    COMPARISON: Chest 2 views November 5, 2020    INDICATION: tachycardia    FINDINGS: Portable AP upright imaging of the chest is submitted -  1 view. A moderate size basilar right pleural effusion is new.  Some degree of right basilar compressive atelectasis is  suspected. Heart size is difficult to assess but appears at least  mildly enlarged. Mild central vascular prominence is noted but no  subpleural interstitial edema is evident. No pneumothorax.       Impression:      Right basilar pleural effusion, right lower lobe  compressive atelectasis, cardiomegaly and mild central vascular  congestion.    Electronically signed by:  Hollis Sousa MD  3/20/2023 6:26 PM CDT  Workstation: [id number]            CT Abdomen Pelvis Without Contrast    Result Date: 3/20/2023  1.  Moderate to large right effusion which appears loculated. There is associated right lower lobe consolidation suggesting pneumonia. 2.  Cardiomegaly. 3.  Prominent main pulmonary artery suggesting pulmonary arterial hypertension. 4.  Small amount of ascites. There appears to be some edema and wall thickening associated with the third portion of the duodenum suggesting duodenitis. Electronically signed by:  Krzysztof Gomez MD  3/20/2023 9:36 PM CDT Workstation: [id number]    CT Chest Without Contrast Diagnostic    Result Date:  3/20/2023  1.  Moderate to large right effusion which appears loculated. There is associated right lower lobe consolidation suggesting pneumonia. 2.  Cardiomegaly. 3.  Prominent main pulmonary artery suggesting pulmonary arterial hypertension. 4.  Small amount of ascites. There appears to be some edema and wall thickening associated with the third portion of the duodenum suggesting duodenitis. Electronically signed by:  Krzysztof Gomez MD  3/20/2023 9:35 PM CDT Workstation: [id number]    US Abdomen Complete    Result Date: 3/22/2023  Findings, impression: Limited examination, portable bedside technique. Echogenic sludge within the gallbladder. Gallbladder  wall thickening. Normal liver. Common bile duct and pancreas are obscured by overlying bowel gas. Normal spleen. Normal right kidney 9.6 x 5.1 x 5.1 cm. Benign cyst left kidney. Left kidney is otherwise unremarkable 11.2 x 5.9 x 5.1 cm Normal aorta and inferior vena cava. Small amount of free fluid, ascites throughout the abdomen. IMPRESSION: Ascites. Gallbladder wall thickening and echogenic sludge within the gallbladder. Electronically signed by:  Clem Balderrama MD  3/22/2023 3:07 PM CDT Workstation: [id number]    US Guided Vascular Access    Result Date: 3/23/2023  Impression: As above. Electronically signed by:  Tony Giron MD  3/23/2023 3:48 PM CDT Workstation: [id number]    XR Chest 1 View    Result Date: 3/22/2023  1. Right pleural effusion and adjacent atelectasis with or without pneumonia, increased in the interval. 2. Cardiomegaly. Electronically signed by:  Issa Burch MD  3/22/2023 11:37 PM CDT Workstation: [id number]    XR Chest 1 View    Result Date: 3/20/2023  Right basilar pleural effusion, right lower lobe compressive atelectasis, cardiomegaly and mild central vascular congestion. Electronically signed by:  Hollis Sousa MD  3/20/2023 6:26 PM CDT Workstation: [id number]      Medication Review:     Current Facility-Administered Medications:      [DISCONTINUED] acetaminophen (TYLENOL) tablet 650 mg, 650 mg, Oral, Q4H PRN **OR** [DISCONTINUED] acetaminophen (TYLENOL) 160 MG/5ML solution 650 mg, 650 mg, Oral, Q4H PRN **OR** acetaminophen (TYLENOL) suppository 650 mg, 650 mg, Rectal, Q4H PRN, Shamai, Prince, DO    acetaminophen (TYLENOL) tablet 650 mg, 650 mg, Oral, Q4H PRN, Shamai, Prince, DO    albumin human 25 % IV SOLN 25 g, 25 g, Intravenous, Once, Kalpana Abreu APRN    amiodarone (PACERONE) tablet 200 mg, 200 mg, Oral, BID With Meals, Kalpana Abreu APRN, 200 mg at 03/30/23 0834    atorvastatin (LIPITOR) tablet 40 mg, 40 mg, Oral, Nightly, Cuco Lopez MD, 40 mg at 03/29/23 2038    digoxin (LANOXIN) tablet 125 mcg, 125 mcg, Oral, Daily, Kalpana Abreu APRN, 125 mcg at 03/29/23 1145    docusate sodium (COLACE) capsule 100 mg, 100 mg, Oral, BID, Niki Meraz MD    furosemide (LASIX) tablet 20 mg, 20 mg, Oral, BID, Luis Alcantar MD, 20 mg at 03/30/23 0834    Hold medication, 1 each, Does not apply, Continuous PRN, Shamai, Prince, DO    hydrocortisone-bacitracin-zinc oxide-nystatin (MAGIC BARRIER) ointment 1 application, 1 application, Topical, Q4H PRN, Shamai, Prince, DO, 1 application at 03/23/23 1740    Magnesium Sulfate - Total Dose 10 grams - Magnesium 1 or Less, 2 g, Intravenous, PRN **OR** Magnesium Sulfate - Total Dose 6 grams - Magnesium 1.1 - 1.5, 2 g, Intravenous, PRN **OR** Magnesium Sulfate - Total Dose 4 grams - Magnesium 1.6 - 1.9, 4 g, Intravenous, PRN, Shamai, Prince, DO, Last Rate: 25 mL/hr at 03/28/23 0800, 4 g at 03/28/23 0800    metoprolol succinate XL (TOPROL-XL) 24 hr half tablet 12.5 mg, 12.5 mg, Oral, Q24H, Nannette Walters MD, 12.5 mg at 03/30/23 0833    ondansetron (ZOFRAN) injection 4 mg, 4 mg, Intravenous, Q6H PRN, Prince Charles DO, 4 mg at 03/22/23 0208    phytonadione (AQUA-MEPHYTON, VITAMIN K) 5 mg in sodium chloride 0.9 % 50 mL IVPB, 5 mg, Intravenous, Once, Prince Charles DO, Held at 03/23/23 1035     potassium chloride (MICRO-K) CR capsule 40 mEq, 40 mEq, Oral, PRN, 40 mEq at 03/28/23 0107 **OR** potassium chloride (KLOR-CON) packet 40 mEq, 40 mEq, Oral, PRN **OR** potassium chloride 10 mEq in 100 mL IVPB, 10 mEq, Intravenous, Q1H PRN, Shamai, Prince, DO, Last Rate: 100 mL/hr at 03/25/23 0335, 10 mEq at 03/25/23 0335    rivaroxaban (XARELTO) tablet 20 mg, 20 mg, Oral, Daily With Dinner, Nannette Walters MD, 20 mg at 03/29/23 1753    sodium chloride 0.9 % flush 10 mL, 10 mL, Intravenous, Q12H, Shamai, Prince, DO, 10 mL at 03/30/23 0834    sodium chloride 0.9 % flush 10 mL, 10 mL, Intravenous, PRN, Shamai, Prince, DO    sodium chloride 0.9 % infusion 40 mL, 40 mL, Intravenous, PRN, Shamai, Prince, DO    spironolactone (ALDACTONE) tablet 25 mg, 25 mg, Oral, Daily, Shamai, Prince, DO, 25 mg at 03/30/23 0833    Assessment & Plan       Atrial fibrillation with RVR ([facility])    NSTEMI, initial episode of care ([facility])    Biventricular CHF (congestive heart failure) ([facility])    Atrial fibrillation ([facility])    #1.  Atrial fibrillation with RVR.  Improving  Has history of paroxysmal atrial fibrillation/SVT.  Has been noncompliant with medication for months.  Presented with worsening shortness of breath, weight gain, edema and chest pain.  EKG showed atrial fibrillation with RVR.  Found to be in heart failure with reduced LVEF.  -Continue amiodarone 200 mg twice daily, Toprol-XL 12.5 mg, digoxin 125mcg  -Continue Xarelto 20 mg, we will need to sample him.  Discussed with patient previously regarding that if he continues to be in A-fib we will need to consider cardioversion as an outpatient in 4 weeks.   -Keep K 4 or greater, Keep mag 2 or greater. Replacement protocols in place.     #2.  Biventricular heart failure, LVEF reduced at 21 to 25%, moderately reduced RVEF.  Etiology NICM: [facility] negative for Obstructive CAD  NYHA Class III, Stage C. Patient appears euvolemic today with third spacing with borderline perfusion and  hemodynamics    Give albumin 25% today, unna boots to lower ext.    BETA-BLOCKER: Toprol XL 12.5mg  ACE/ARB/ENTRESTO: Will plan to add prior to discharge if hemodynamics allows  DIURETIC: Lasix 20 mg po BID  SGLt2: Indicated  ALDOSTERONE ANTAGONIST: Spironolactone 25mg  IMDUR/HYDRALAZINE: N/A  DIGOXIN: N/A  Fluid restriction: 1500 ml  Sodium restriction:2 grams     Cardiac Rehab:  Indicated  ICD:  Indicated    ADHF:  This admission  Daily weight  Strict intake/ output  Continuous cardiac monitoring     #3.  NSTEMI:  High sensitivity troponin at 70, 65, 74, and 79.  Lactic acid 3.8 on arrival.  EKG showing atrial fibrillation with RVR at 149 bpm.    Patient presented with A-fib RVR, metabolic acidosis CHF.  Patient previously has not had an invasive cardiac work-up, noted to have nuclear stress test in 2020, low risk.  Patient be recommended for definitive evaluation of troponin elevation and reduced EF once kidney status and respiratory status improved. He is s/p LHC on 3/27 which showed mild irregularity and negative for obstructive CAD. Normal LVEDP. Consistent with NICM. Radial cath site wnl. H/h and kidney function stable. Denies CP.     #4.  Transaminitis/elevated bilirubin: ALT 35, AST 43, total bilirubin 6.7.  Stable and actually appears to be improving.  He does appear somewhat jaundiced upon exam.  We will need to closely monitor liver function with amiodarone. Check ammonia level    #5. Deconditioning: PT/OT.  Likely will need ARU or rehab     Plan for disposition: Continue 3 west.  We will continue to follow            This document has been electronically signed by ARY Garcia on March 30, 2023 10:23 CDT   Electronically signed by ARY Garcia, 03/30/23, 10:23 AM CDT.    Patient evaluated and events noted.  I agree with assessment and plan as outlined by ARY Walton    Pavan Mccauley is a 67-year-old male who has history of paroxysmal atrial fibrillation/SVT in the past who  has presented with progressive fatigue, dyspnea, weight gain, edema, chest pressure/palpitation and noted to have atrial fibrillation with rapid ventricular response and congestive heart failure consistent with HFrEF.  His symptoms have been going on for the past several months and he quit taking all his medicines several months prior. He has longstanding history of tobacco and alcohol use, though he claims to have quit smoking about 5 months prior.  He had evidence of anasarca, and his blood tests do suggest elevated high-sensitivity troponins.  He has evidence of acute renal failure, hyponatremia, hypoalbuminemia, LFT abnormalities, polycythemia, thrombocytopenia.       Echocardiogram showed severe LV dysfunction with an ejection fraction of 20 to 25%.  There was left ventricular hypertrophy.  RV systolic function was moderately decreased.  There was severe RV dilatation.  Severe biatrial enlargement and RVSP was 35 to 45 mmHg.     Cardiac catheterization performed showed no significant epicardial coronary artery disease.    He continues to have shortness of breath and edema of the lower extremities.  No chest pain reported.    Heart rate 90 bpm, irregular, Blood pressure 105/70 mmHg  Chronically ill-appearing.  Obese  There was icterus noted.  Exam of the heart showed varying intensity of the first heart sound, irregular heart rhythm, no S3 or S4 or heart murmur  Exam of the lungs showed decreased breath sounds in the bases.  Few rales audible.  Abdomen soft with no mass or tenderness.  Extremities showed 2+ pitting edema bilaterally  No focal neurological deficit noted.    Labs today showed sodium of 133, potassium 4.2, chloride of 96, CO2 of 28, BUN 16 and creatinine 1.93 bilirubin was 6.8 AST 47 and ALT 34.  Hemoglobin 17.4 and platelet count 138    Continue current management with rate control for atrial fibrillation with metoprolol succinate 12.5 mg daily, digoxin 125 mcg daily and amiodarone 200 mg twice  daily.  Dose of amiodarone to be adjusted down to 200 mg.  Daily.  Anticoagulation with Xarelto to be continued.  Continue Lasix 20 mg twice daily, Aldactone 25 mg daily and fluid and sodium restriction.  Agree with albumin 25% today.    I do not believe that the patient will be able to manage on his own at home.  Would benefit from PT/OT and will need a period of rehabilitation.    Electronically signed by Nannette Walters MD, 03/30/23, 12:35 PM CDT.

## 2023-03-30 NOTE — PROGRESS NOTES
"  [facility]  [address] [place] [address]. [address]  T - [phone]  F - [phone]     Progress Note          PATIENT  DEMOGRAPHICS   PATIENT NAME: Pavan Mccauley                      PHYSICIAN: Luis Alcantar MD  : 1955  MRN: [id number]   LOS: 10 days    Patient Care Team:  Dilcia Kapoor APRN as PCP - General (Family Medicine)  Subjective   SUBJECTIVE   Doing better. Edema has improved but still has some persistent swelling          Objective   OBJECTIVE   Vital Signs  Temp:  [97 °F (36.1 °C)-98.8 °F (37.1 °C)] 97 °F (36.1 °C)  Heart Rate:  [] 107  Resp:  [18] 18  BP: ()/(60-78) 113/75    Flowsheet Rows    Flowsheet Row First Filed Value   Admission Height 170.2 cm (67\") Documented at 2023 1618   Admission Weight 105 kg (231 lb 14.8 oz) Documented at 2023 1648           I/O last 3 completed shifts:  In: 1080 [P.O.:1080]  Out: 7575 [Urine:7575]    PHYSICAL EXAM    Physical Exam  Constitutional:       Appearance: He is well-developed.   HENT:      Head: Normocephalic.   Eyes:      Pupils: Pupils are equal, round, and reactive to light.   Cardiovascular:      Rate and Rhythm: Normal rate and regular rhythm.      Heart sounds: Normal heart sounds.   Pulmonary:      Effort: Pulmonary effort is normal.      Breath sounds: Normal breath sounds.   Abdominal:      General: Bowel sounds are normal.      Palpations: Abdomen is soft.   Musculoskeletal:         General: Swelling present.   Skin:     Coloration: Skin is not jaundiced.   Neurological:      General: No focal deficit present.      Mental Status: He is alert and oriented to person, place, and time.         RESULTS   Results Review:    Results from last 7 days   Lab Units 23  0546 23  0517 23  0557   SODIUM mmol/L 133* 136 127*   POTASSIUM mmol/L 4.2 3.7 3.9   CHLORIDE mmol/L 96* 95* 87*   CO2 mmol/L 28.0 33.0* 35.0*   BUN mg/dL 16 16 16   CREATININE mg/dL 0.93 0.90 0.83   CALCIUM " mg/dL 8.2* 8.4* 7.8*   BILIRUBIN mg/dL 6.8* 6.7* 6.6*   ALK PHOS U/L 91 96 92   ALT (SGPT) U/L 34 35 32   AST (SGOT) U/L 47* 43* 38   GLUCOSE mg/dL 87 82 84       Estimated Creatinine Clearance: 84.7 mL/min (by C-G formula based on SCr of 0.93 mg/dL).    Results from last 7 days   Lab Units 03/29/23  0517 03/28/23  0557 03/27/23  0316   MAGNESIUM mg/dL 2.3 1.9 2.1             Results from last 7 days   Lab Units 03/30/23  0546 03/29/23  0517 03/28/23  0557 03/27/23 0316 03/26/23  0428   WBC 10*3/mm3 9.09 10.05 8.24 8.43 6.75   HEMOGLOBIN g/dL 17.4 17.1 16.1 16.3 16.5   PLATELETS 10*3/mm3 138* 127* 113* 108* 93*               Imaging Results (Last 24 Hours)     ** No results found for the last 24 hours. **           MEDICATIONS    albumin human, 25 g, Intravenous, Once  amiodarone, 200 mg, Oral, BID With Meals  atorvastatin, 40 mg, Oral, Nightly  digoxin, 125 mcg, Oral, Daily  docusate sodium, 100 mg, Oral, BID  furosemide, 20 mg, Oral, BID  metoprolol succinate XL, 12.5 mg, Oral, Q24H  phytonadione (VITAMIN K) IVPB, 5 mg, Intravenous, Once  rivaroxaban, 20 mg, Oral, Daily With Dinner  sodium chloride, 10 mL, Intravenous, Q12H  spironolactone, 25 mg, Oral, Daily      hold, 1 each        Assessment & Plan   ASSESSMENT / PLAN      Atrial fibrillation with RVR ([facility])    Atrial fibrillation ([facility])    NSTEMI, initial episode of care ([facility])    Biventricular CHF (congestive heart failure) ([facility])       1.  RUTH- baseline creatinine looks to be around 1.1,  and peak up to 1.86.  Etiology of RUTH could be secondary to cardio renal syndrome versus poor perfusion with atrial fibrillation.  He has significant volume overload apparent on exam. Cr is now <1     -good diuretic response. bp remained stable on aldactone.  added aldactone because of marked k loss / low EF. Wt is down to 198 from 232 lbs. No new labs. Still has persistent edema    Did well post contrast. Keep lasix 20mg bid. Keep aldactone. UO is upto 7L/24 hrs    follow up  in office in 2 weeks post discharge      2.  Hyponatremia- likely secondary to hypervolemia, Lasix as above. Na better after tolvaptan x 1      3.  A-fib with RVR- cardiology managing     4.  Acute on chronic systolic heart failure- clinically better     5.  Transaminitis / elevated bilirubin check for direct and indirect bilirubin     6.  Pleural effusion- no plan for thoracentesis    7. Hypokalemia / hypomagnesemia - now better              This document has been electronically signed by Luis Alcantar MD on March 30, 2023 10:57 CDT

## 2023-03-30 NOTE — SIGNIFICANT NOTE
03/30/23 1605   OTHER   Discipline occupational therapist   Rehab Time/Intention   Session Not Performed other (see comments)  (Pt with 2 new DVT in BLE. OT will f/u as able according to the DVT algorithym.)   Recommendation   OT - Next Appointment 03/31/23

## 2023-03-30 NOTE — PROGRESS NOTES
[facility]  INPATIENT WOUND & OSTOMY CARE    PROGRESS NOTE    Today's Date: 03/30/23    Patient Name: Pavan Mccauley  MRN: [id number]  CSN: [id number]  PCP: Dilcia Kapoor APRN  Attending Provider: Gurvinder David DO  Length of Stay: 10    67 year old male consult for wound care by cardiology. Requests unnaboot for bilateral lower extremity swelling.  Findings of study shown below:    Ultrasound venous duplex bilateral lower extremities     HISTORY: Lower extremity swelling     Duplex ultrasound of the deep venous system of each lower  extremity was performed     FINDINGS:  Noncompressible deep venous thrombosis right popliteal vein and  right posterior tibial vein.  Noncompressible the venous thrombus distal left femoral vein,  left popliteal vein and left posterior tibial vein.  Real-time images of the remaining veins demonstrate normal  compressibility without evidence of intraluminal thrombus.  Doppler of the remaining veins shows phasic flow and  augmentation.  Color Doppler of the remaining veins also reveals venous patency.  Subcutaneous edema each calf.     IMPRESSION:  CONCLUSION:  Noncompressible deep venous thrombosis right popliteal vein and  right posterior tibial vein.  Noncompressible the venous thrombus distal left femoral vein,  left popliteal vein and left posterior tibial vein.  Subcutaneous edema each calf.     [id number]     Electronically signed by:  Obed Boyd MD  3/30/2023 11:54 AM  CDT Workstation: [id number]    Plan    Patient not a candidate for unnaboots at this time due to bilateral DVTs. Will sign off. Please reconsult if needed. Call with questions.        This document has been electronically signed by ARY Morley on March 30, 2023 15:06 CDT   This document has been electronically signed by ARY Morley on 3/30/2023 15:04 CDT

## 2023-03-30 NOTE — PLAN OF CARE
Goal Outcome Evaluation:         Pt vitals stable.  No complaints at this time.  Will continue with care plan.  Progress: no change

## 2023-03-30 NOTE — PROGRESS NOTES
[facility] Medicine Services  INPATIENT PROGRESS NOTE      Length of Stay: 10  Date of Admission: 3/20/2023  Primary Care Physician: Dilcia Kapoor APRN    Subjective   Chief Complaint:  Shortness of breath with heart palpitations  HPI:  67-year-old male comes to the ER stating he does not feel well.  He has not felt well for a while.  Patient states he has not taken any of his medicines for the last several months.  He reports having history of A-fib.  Patient is a poor historian, but states he is short of breath that is worse with exertion.     Daily assessment  Patient seen and examined 3/24/2023.  Patient is currently lying in bed.  He states he is comfortable.  Reviewed medications and labs with the patient and treatment plan.  Patient is denying any new problems or complaints.  He actually does feel better.  Radiology is indicated that there is very little fluid to pull off of right pleural effusion and has recommended to hold for now and follow and proceed with procedure as needed.  The patient is not have any chest pain.  Continues to be very short of breath at rest and on exertion.  He has no fevers or chills.  Otherwise he is tolerating current treatment plan.  3/25/2023  Patient admits to approximately 30 pound weight loss over past 2 days.  States breathing slightly better after Lasix diuresis.  Family including wife/daughter present during interview with Dr. Lopez this AM.  3/26/2023  Patient continues to improve on IV diuresis.  Patient's IV dobutamine weaned by 50% over past 24 hours.  Patient has cardiac catheterization planned for tomorrow.  States lower extremity swelling continues to decrease on diuretic therapy.  3/27/2023:  Patient's RVR resolved with IV amnio initiation yesterday night.  Cardiology in room during evaluation with Dr. Lopez.  Patient still tentatively may receive cardiac catheterization procedure today.  No other new  issues overnight.  3/28/2023:  Patient being transitioned from IV amnio to p.o. amiodarone by cardiology.  Truly appreciate cardiology's assistance with this case estimation point.  Status post cardiac catheterization yesterday with nonobstructive CAD noted.  Patient in no acute distress.  States breathing has improved with Lasix diuresis during hospitalization.  Still having bouts of RVR.  3/29/2023:  No new complaints.  Patient states she is tolerating amiodarone without complications.  Denies fevers, chills overnight.  Resting comfortably during exam with Dr. Lopez.  Slightly yellowish-orange, but denies itching.  3/30/2023:  Patient successfully transition to p.o. amiodarone, digoxin, and lopressor therapy.  Patient denies any new issues during interview with Dr. Lopez today.     Review of Systems   Constitutional: Positive for fatigue. Negative for chills and fever.   HENT: Negative.    Eyes: Negative.    Respiratory: Positive for shortness of breath.    Cardiovascular: Positive for chest pain.   Gastrointestinal: Negative.    Endocrine: Negative.    Genitourinary: Negative.    Musculoskeletal: Negative.    Skin: Negative.    Neurological: Positive for weakness.   Hematological: Negative.    Psychiatric/Behavioral: Negative.    Skin slightly jaundiced in coloration      All pertinent negatives and positives are as above. All other systems have been reviewed and are negative unless otherwise stated.     Objective    As of today 03/30/23  Temp:  [97 °F (36.1 °C)-98.8 °F (37.1 °C)] 97 °F (36.1 °C)  Heart Rate:  [] 98  Resp:  [16-18] 16  BP: ()/(56-75) 106/69     Physical Exam  Vitals and nursing note reviewed.   HENT:      Head: Normocephalic and atraumatic.      Right Ear: External ear normal.      Left Ear: External ear normal.      Nose: Nose normal.      Mouth/Throat:      Mouth: Mucous membranes are dry.      Pharynx: Oropharynx is clear.   Eyes:      General: No scleral icterus.     Extraocular  Movements: Extraocular movements intact.      Conjunctiva/sclera: Conjunctivae normal.      Pupils: Pupils are equal, round, and reactive to light.   Cardiovascular:      Rate and Rhythm: Normal rate. Rhythm irregular.      Pulses: Normal pulses.      Heart sounds: Normal heart sounds.   Pulmonary:      Effort: Pulmonary effort is normal.      Breath sounds: Normal breath sounds.      Comments: Decreased breath sounds at the bases otherwise clear  Abdominal:      General: Bowel sounds are normal. There is distension.      Palpations: Abdomen is soft.      Tenderness: There is no abdominal tenderness.      Comments: Soft nontender nondistended normal active bowel sounds   Musculoskeletal:         General: Normal range of motion.      Cervical back: Normal range of motion and neck supple.      Right lower leg: Edema present.      Left lower leg: Edema present.      Comments: Venous stasis changes bilateral lower extremities  Edema bilateral lower extremities improving   Skin:     General: Skin is warm and dry.      Capillary Refill: Capillary refill takes less than 2 seconds.      Coloration: Skin is not jaundiced.   Neurological:      General: No focal deficit present.      Mental Status: He is alert and oriented to person, place, and time.      Motor: Weakness present.   Psychiatric:         Mood and Affect: Mood normal.         Behavior: Behavior normal.           Results Review:  I have reviewed the labs, radiology results, and diagnostic studies.    Laboratory Data:   Results from last 7 days   Lab Units 03/30/23  0546 03/29/23  0517 03/28/23  0557   SODIUM mmol/L 133* 136 127*   POTASSIUM mmol/L 4.2 3.7 3.9   CHLORIDE mmol/L 96* 95* 87*   CO2 mmol/L 28.0 33.0* 35.0*   BUN mg/dL 16 16 16   CREATININE mg/dL 0.93 0.90 0.83   GLUCOSE mg/dL 87 82 84   CALCIUM mg/dL 8.2* 8.4* 7.8*   BILIRUBIN mg/dL 6.8* 6.7* 6.6*   ALK PHOS U/L 91 96 92   ALT (SGPT) U/L 34 35 32   AST (SGOT) U/L 47* 43* 38   ANION GAP mmol/L 9.0 8.0  5.0     Estimated Creatinine Clearance: 84.7 mL/min (by C-G formula based on SCr of 0.93 mg/dL).  Results from last 7 days   Lab Units 03/29/23  0517 03/28/23  0557 03/27/23  0316   MAGNESIUM mg/dL 2.3 1.9 2.1         Results from last 7 days   Lab Units 03/30/23  0546 03/29/23  0517 03/28/23  0557 03/27/23  0316 03/26/23  0428   WBC 10*3/mm3 9.09 10.05 8.24 8.43 6.75   HEMOGLOBIN g/dL 17.4 17.1 16.1 16.3 16.5   HEMATOCRIT % 48.2 47.6 44.5 44.4 44.3   PLATELETS 10*3/mm3 138* 127* 113* 108* 93*           Culture Data:   No results found for: BLOODCX  No results found for: URINECX  No results found for: RESPCX  No results found for: WOUNDCX  No results found for: STOOLCX  No components found for: BODYFLD    Radiology Data:   Imaging Results (Last 24 Hours)     Procedure Component Value Units Date/Time    US Venous Doppler Lower Extremity Bilateral (duplex) [[id number]] Collected: 03/30/23 1111     Updated: 03/30/23 1239    Addenda:         ADDENDUM   ADDENDUM #1       Findings discussed with Dr. Lopez at 12:31 PM CST.    Electronically signed by:  Obed Boyd MD  3/30/2023 12:37 PM  CDT Workstation: [id number]    Signed: 03/30/23 1237 by Sarahi Boyd MD    Narrative:        Ultrasound venous duplex bilateral lower extremities    HISTORY: Lower extremity swelling    Duplex ultrasound of the deep venous system of each lower  extremity was performed    FINDINGS:  Noncompressible deep venous thrombosis right popliteal vein and  right posterior tibial vein.  Noncompressible the venous thrombus distal left femoral vein,  left popliteal vein and left posterior tibial vein.  Real-time images of the remaining veins demonstrate normal  compressibility without evidence of intraluminal thrombus.  Doppler of the remaining veins shows phasic flow and  augmentation.  Color Doppler of the remaining veins also reveals venous patency.  Subcutaneous edema each calf.      Impression:      CONCLUSION:  Noncompressible deep venous  thrombosis right popliteal vein and  right posterior tibial vein.  Noncompressible the venous thrombus distal left femoral vein,  left popliteal vein and left posterior tibial vein.  Subcutaneous edema each calf.    [id number]    Electronically signed by:  Obed Boyd MD  3/30/2023 11:54 AM  CDT Workstation: [id number]      Results for orders placed during the hospital encounter of 03/20/23    Adult Transthoracic Echo Complete W/ Cont if Necessary Per Protocol    Interpretation Summary  •  Left ventricular ejection fraction appears to be 21 - 25%.  •  The left ventricular cavity is dilated.  •  Left ventricular wall thickness is consistent with hypertrophy.  •  Left ventricular diastolic dysfunction is noted.  •  Moderately reduced right ventricular systolic function noted.  •  The right ventricular cavity is severely dilated.  •  Left atrial volume is severely increased.  •  The right atrial cavity is severely  dilated.  •  Estimated right ventricular systolic pressure from tricuspid regurgitation is mildly elevated (35-45 mmHg).      I have reviewed the patient's current medications.   Scheduled Meds:amiodarone, 200 mg, Oral, BID With Meals  atorvastatin, 40 mg, Oral, Nightly  digoxin, 125 mcg, Oral, Daily  furosemide, 20 mg, Oral, BID  lactulose, 10 g, Oral, BID  metoprolol succinate XL, 12.5 mg, Oral, Q24H  phytonadione (VITAMIN K) IVPB, 5 mg, Intravenous, Once  rivaroxaban, 20 mg, Oral, Daily With Dinner  sodium chloride, 10 mL, Intravenous, Q12H  spironolactone, 25 mg, Oral, Daily      Continuous Infusions:hold, 1 each      PRN Meds:.•  [DISCONTINUED] acetaminophen **OR** [DISCONTINUED] acetaminophen **OR** acetaminophen  •  acetaminophen  •  docusate sodium  •  hold  •  hydrocortisone-bacitracin-zinc oxide-nystatin  •  magnesium sulfate **OR** magnesium sulfate **OR** magnesium sulfate  •  ondansetron  •  potassium chloride **OR** potassium chloride **OR** potassium chloride  •  sodium chloride  •  sodium  chloride  Assessment/Plan     Principal Problem:    Atrial fibrillation with RVR ([facility])  Active Problems:    Atrial fibrillation ([facility])    NSTEMI, initial episode of care ([facility])    Biventricular CHF (congestive heart failure) ([facility])    Assessment and Plan    Type II MI secondary to RVR and fluid overload  -Appreciate cardiology assistance!  Continue current management.  Continue fluid restriction 1.1 L/day and salt restriction.  Continue IV Lasix.  - 3/28 status post PCI 3/27 with nonobstructive CAD noted.    Bilateral DVTs:  - 3/30 bilateral ultrasound shows DVTs in patient's lower extremities.  Patient already on Xarelto, so we will continue Xarelto management of DVTs.  Patient would benefit from outpatient follow-up by hematology once discharged to assure resolution of DVT issue.    Acute on chronic, systolic CHF  Will continue with lasix bid; monitor intake and output  - As above.  Improving on IV Lasix and dobutamine gtt.  Wean from dobutamine as tolerated.  Breathing steadily improving with diuresis.  Appreciate both cardiology and nephrology assistance with patient.  -3/27 scheduled for heart catheterization procedure today.  Cardiology currently finalizing arrangements.Procedure may be canceled if patient unable to lay flat  -3/29 Weaning patient's oxygen to room air as tolerated.  Continue metoprolol succinate, digoxin.  Start atorvastatin 3/29.    Atrial fibrillation with RVR  Cardiologist will be consulted and appreciate their assistance. High [id number] score; currently tolerating rivaroxaban therapy.  Cardiac rehab.  Troponin elevated but being followed by cardiology  Left heart cath once fluid status has been stabilized.  Continue Xarelto 20 mg daily  -3/26/2023 at 6:42 PM Patient has suffered from A-fib with RVR issues intermittently since this AM.  Issues not resolved with discontinuation of dobutamine drip.  Patient's blood pressures low normal so extremely hesitant to use beta-blocker or calcium channel  blocker for rate control.  We will therefore load patient with IV amiodarone.  Also recommend dobutamine vasopressor as first-line to maintain MAP above 65.  If dobutamine unsuccessful, would then utilize Levophed as second line to keep MAP greater than 65.  Patient scheduled for cardiac catheterization in a.m. for evaluation of acute on chronic heart failure with reduced ejection fraction.  - 3/27/2023 spoke with cardiology, cardiology currently adjusting patient's atrial fibrillation maintenance medications.  Truly appreciate cardiology's assistance!  -3/29/2023 weaned off IV amiodarone drip and transition to by mouth Lopressor/amiodarone.  Patient also on digoxin.  Appreciate cardiology's assistance!    Hyperbilirubinemia:  - 3/29/2023 chronic issue.  Will follow bilirubin levels.  Continue supportive care for now.  Contemplating whether patient may suffer from undiagnosed Gilbert's syndrome.    RUTH:  - Appreciate nephrology assistance.  Etiology possibly A-fib with RVR related versus cardiorenal syndrome.  Continue Lasix.  -3/29 kidney function now appears at baseline.  Continue oral Lasix.    Hypertension  - currently suffering from hypotension and on dobutamine gtt.  - 3/29 patient weaned from dobutamine 3/27 overnight then transferred out of ICU 3/28.  Patient now successfully restarted on Lasix, metoprolol succinate medications which are well-tolerated.      CODE STATUS full code  DVT prophylaxis Xarelto    Medical Decision Making  Number and Complexity of problems: 3 complex problems  Differential Diagnosis: Atrial fibrillation versus acute coronary syndrome    Conditions and Status:        Condition is unchanged.     [facility] Data  External documents reviewed: Hospital charts  My EKG interpretation:  EKG completed 3/20/2023.  Rhythm: atrial fibrillation   Rate: tachycardic   Clinical impression: abnormal ECG    My plain film interpretation:  Chest x-ray completed 3/20/2023.  IMPRESSION:  Right basilar pleural  effusion, right lower lobe  compressive atelectasis, cardiomegaly and mild central vascular  Congestion.   Tests considered but not ordered: None     Decision rules/scores evaluated (example [id number], Wells, etc): [id number] high and on Rivaroxaban     Discussed with: Patient and agrees to current treatment plan     Treatment Plan  As above    Care Planning  Shared decision making: Patient updated on current status and informed of proposed care plan; is in agreement with plan  Code status and discussions: Full code    Disposition  Social Determinants of Health that impact treatment or disposition: N/A        I have utilized all available immediate resources to obtain, update, or review the patient's current medications (including all prescriptions, over-the-counter products, herbals, cannabis/cannabidiol products, and vitamin/mineral/dietary (nutritional) supplements).     I confirmed that the patient's Advance Care Plan is present, code status is documented, or surrogate decision maker is listed in the patient's medical record.    Discharge Planning:   - 3/28 transferred out of ICU to telemetry floor.  - I expect patient to be discharged home hospital within the next 72 hours once amiodarone loading completed, if RVR resolved, and cardiac/nephrology consults agrees with hospital disposition.      Patient may be appropriate for short-term rehab/SNF at time of hospital disposition.  Patient currently working with PT/OT to determine if patient is short-term rehabilitation candidate.    Cuco Lopez MD     Addendum 3/30 5:57 PM  Ultrasound bilaterally shows DVTs.  We will therefore increase patient's rivaroxaban from 20 mg once a day to 15 mg twice a day for 20 days then return to 20 mg daily.

## 2023-03-31 PROBLEM — I50.814 BIVENTRICULAR HEART FAILURE WITH REDUCED LEFT VENTRICULAR FUNCTION: Status: ACTIVE | Noted: 2023-03-20

## 2023-03-31 PROBLEM — I50.23 ACUTE ON CHRONIC SYSTOLIC (CONGESTIVE) HEART FAILURE: Status: ACTIVE | Noted: 2023-03-31

## 2023-03-31 PROBLEM — R79.89 ABNORMAL LFTS: Chronic | Status: ACTIVE | Noted: 2023-03-31

## 2023-03-31 PROBLEM — R79.89 ABNORMAL LFTS: Status: ACTIVE | Noted: 2023-03-31

## 2023-03-31 PROBLEM — I48.91 ATRIAL FIBRILLATION WITH RVR: Chronic | Status: ACTIVE | Noted: 2023-03-20

## 2023-03-31 PROBLEM — I82.403 LEG DVT (DEEP VENOUS THROMBOEMBOLISM), ACUTE, BILATERAL: Status: ACTIVE | Noted: 2023-03-31

## 2023-03-31 PROBLEM — I50.23 ACUTE ON CHRONIC SYSTOLIC (CONGESTIVE) HEART FAILURE: Chronic | Status: ACTIVE | Noted: 2023-03-31

## 2023-03-31 PROBLEM — I21.4 NON-STEMI (NON-ST ELEVATED MYOCARDIAL INFARCTION): Chronic | Status: ACTIVE | Noted: 2023-03-20

## 2023-03-31 PROBLEM — I82.403 LEG DVT (DEEP VENOUS THROMBOEMBOLISM), ACUTE, BILATERAL: Chronic | Status: ACTIVE | Noted: 2023-03-31

## 2023-03-31 PROBLEM — I50.814 BIVENTRICULAR HEART FAILURE WITH REDUCED LEFT VENTRICULAR FUNCTION: Chronic | Status: ACTIVE | Noted: 2023-03-20

## 2023-03-31 LAB
ALBUMIN SERPL-MCNC: 2.2 G/DL (ref 3.5–5.2)
ALBUMIN/GLOB SERPL: 0.9 G/DL
ALP SERPL-CCNC: 88 U/L (ref 39–117)
ALT SERPL W P-5'-P-CCNC: 30 U/L (ref 1–41)
ANION GAP SERPL CALCULATED.3IONS-SCNC: 8 MMOL/L (ref 5–15)
AST SERPL-CCNC: 36 U/L (ref 1–40)
BASOPHILS # BLD AUTO: 0.05 10*3/MM3 (ref 0–0.2)
BASOPHILS NFR BLD AUTO: 0.6 % (ref 0–1.5)
BILIRUB SERPL-MCNC: 5.4 MG/DL (ref 0–1.2)
BUN SERPL-MCNC: 14 MG/DL (ref 8–23)
BUN/CREAT SERPL: 16.9 (ref 7–25)
CALCIUM SPEC-SCNC: 8.1 MG/DL (ref 8.6–10.5)
CHLORIDE SERPL-SCNC: 97 MMOL/L (ref 98–107)
CO2 SERPL-SCNC: 28 MMOL/L (ref 22–29)
CREAT SERPL-MCNC: 0.83 MG/DL (ref 0.76–1.27)
DEPRECATED RDW RBC AUTO: 62.4 FL (ref 37–54)
EGFRCR SERPLBLD CKD-EPI 2021: 97.1 ML/MIN/1.73
EOSINOPHIL # BLD AUTO: 0.25 10*3/MM3 (ref 0–0.4)
EOSINOPHIL NFR BLD AUTO: 3.1 % (ref 0.3–6.2)
ERYTHROCYTE [DISTWIDTH] IN BLOOD BY AUTOMATED COUNT: 20.9 % (ref 12.3–15.4)
GLOBULIN UR ELPH-MCNC: 2.5 GM/DL
GLUCOSE SERPL-MCNC: 72 MG/DL (ref 65–99)
HCT VFR BLD AUTO: 45 % (ref 37.5–51)
HGB BLD-MCNC: 16 G/DL (ref 13–17.7)
IMM GRANULOCYTES # BLD AUTO: 0.1 10*3/MM3 (ref 0–0.05)
IMM GRANULOCYTES NFR BLD AUTO: 1.2 % (ref 0–0.5)
LYMPHOCYTES # BLD AUTO: 1.3 10*3/MM3 (ref 0.7–3.1)
LYMPHOCYTES NFR BLD AUTO: 16.1 % (ref 19.6–45.3)
MAGNESIUM SERPL-MCNC: 1.9 MG/DL (ref 1.6–2.4)
MCH RBC QN AUTO: 31.2 PG (ref 26.6–33)
MCHC RBC AUTO-ENTMCNC: 35.6 G/DL (ref 31.5–35.7)
MCV RBC AUTO: 87.7 FL (ref 79–97)
MONOCYTES # BLD AUTO: 1.41 10*3/MM3 (ref 0.1–0.9)
MONOCYTES NFR BLD AUTO: 17.5 % (ref 5–12)
NEUTROPHILS NFR BLD AUTO: 4.94 10*3/MM3 (ref 1.7–7)
NEUTROPHILS NFR BLD AUTO: 61.5 % (ref 42.7–76)
NRBC BLD AUTO-RTO: 0 /100 WBC (ref 0–0.2)
PLATELET # BLD AUTO: 171 10*3/MM3 (ref 140–450)
PMV BLD AUTO: 10 FL (ref 6–12)
POTASSIUM SERPL-SCNC: 3.8 MMOL/L (ref 3.5–5.2)
PROT SERPL-MCNC: 4.7 G/DL (ref 6–8.5)
RBC # BLD AUTO: 5.13 10*6/MM3 (ref 4.14–5.8)
SODIUM SERPL-SCNC: 133 MMOL/L (ref 136–145)
WBC NRBC COR # BLD: 8.05 10*3/MM3 (ref 3.4–10.8)

## 2023-03-31 PROCEDURE — 97530 THERAPEUTIC ACTIVITIES: CPT

## 2023-03-31 PROCEDURE — 97110 THERAPEUTIC EXERCISES: CPT

## 2023-03-31 PROCEDURE — 93010 ELECTROCARDIOGRAM REPORT: CPT | Performed by: INTERNAL MEDICINE

## 2023-03-31 PROCEDURE — 80053 COMPREHEN METABOLIC PANEL: CPT | Performed by: INTERNAL MEDICINE

## 2023-03-31 PROCEDURE — 85025 COMPLETE CBC W/AUTO DIFF WBC: CPT | Performed by: INTERNAL MEDICINE

## 2023-03-31 PROCEDURE — 83735 ASSAY OF MAGNESIUM: CPT | Performed by: INTERNAL MEDICINE

## 2023-03-31 PROCEDURE — 93005 ELECTROCARDIOGRAM TRACING: CPT

## 2023-03-31 PROCEDURE — 99232 SBSQ HOSP IP/OBS MODERATE 35: CPT | Performed by: NURSE PRACTITIONER

## 2023-03-31 RX ORDER — AMIODARONE HYDROCHLORIDE 200 MG/1
200 TABLET ORAL
Status: DISCONTINUED | OUTPATIENT
Start: 2023-04-05 | End: 2023-04-04 | Stop reason: HOSPADM

## 2023-03-31 RX ADMIN — LACTULOSE 10 G: 10 SOLUTION ORAL at 20:32

## 2023-03-31 RX ADMIN — Medication 10 ML: at 20:33

## 2023-03-31 RX ADMIN — RIVAROXABAN 15 MG: 15 TABLET, FILM COATED ORAL at 08:10

## 2023-03-31 RX ADMIN — AMIODARONE HYDROCHLORIDE 200 MG: 200 TABLET ORAL at 17:11

## 2023-03-31 RX ADMIN — RIVAROXABAN 15 MG: 15 TABLET, FILM COATED ORAL at 17:11

## 2023-03-31 RX ADMIN — ATORVASTATIN CALCIUM 40 MG: 40 TABLET, FILM COATED ORAL at 20:32

## 2023-03-31 RX ADMIN — SPIRONOLACTONE 25 MG: 25 TABLET ORAL at 08:11

## 2023-03-31 RX ADMIN — AMIODARONE HYDROCHLORIDE 200 MG: 200 TABLET ORAL at 08:12

## 2023-03-31 RX ADMIN — Medication 10 ML: at 11:37

## 2023-03-31 RX ADMIN — FUROSEMIDE 20 MG: 20 TABLET ORAL at 17:10

## 2023-03-31 RX ADMIN — LACTULOSE 10 G: 10 SOLUTION ORAL at 08:11

## 2023-03-31 NOTE — THERAPY TREATMENT NOTE
Acute Care - Physical Therapy Treatment Note  [facility]     Patient Name: Pavan Mccauley  : 1955  MRN: [id number]  Today's Date: 3/31/2023      Visit Dx:     ICD-10-CM ICD-9-CM   1. Atrial fibrillation with RVR ([facility])  I48.91 427.31   2. Urinary tract infection without hematuria, site unspecified  N39.0 599.0   3. Pulmonary vascular congestion  R09.89 514   4. NSTEMI, initial episode of care ([facility])  I21.4 410.71   5. Biventricular CHF (congestive heart failure) ([facility])  I50.82 428.0   6. Impaired mobility and ADLs  Z74.09 V49.89    Z78.9    7. Impaired functional mobility, balance, gait, and endurance  Z74.09 V49.89     Patient Active Problem List   Diagnosis   • General medical exam   • Malaise   • Hyperglycemia   • Cough   • Tick bite   • Screening for colon cancer   • Pneumonia due to organism   • Carotid artery aneurysm ([facility])   • Dizziness   • SVT (supraventricular tachycardia) ([facility])   • Atrial fibrillation ([facility])   • Dyspnea on exertion   • Atrial fibrillation with RVR ([facility])   • Non-STEMI (non-ST elevated myocardial infarction) ([facility])   • Biventricular CHF (congestive heart failure) ([facility])   • Acute on chronic systolic (congestive) heart failure ([facility])     Past Medical History:   Diagnosis Date   • Bronchopneumonia    • Chest pain    • Corneal foreign body     both eyes   • Neck pain    • Perforation of left tympanic membrane     history of   • Prashant [place] spotted fever    • Wheezing      Past Surgical History:   Procedure Laterality Date   • CARDIAC CATHETERIZATION N/A 3/27/2023    Procedure: Left Heart Cath;  Surgeon: Prince Charles DO;  Location: [facility] CATH INVASIVE LOCATION;  Service: Cardiology;  Laterality: N/A;   • EYE FOREIGN BODY REMOVAL  2013    REMOVE FOREIGN BODY CORNEA W/SLIT LAMP 12828 (1)     • INJECTION OF MEDICATION  2011    Kenalog (1)      • SPINE SURGERY  1991    Exploration of the previous C5-6 fusion with refusion, exploration of the C5 nerve root, left side    • TYMPANOPLASTY Left 05/16/1972    Perforation of the left tympanic membrane     PT Assessment (last 12 hours)     PT Evaluation and Treatment     Row Name 03/31/23 0854          Physical Therapy Time and Intention    Document Type therapy note (daily note)  -RW     Mode of Treatment physical therapy  -RW     Patient Effort adequate  -RW     Row Name 03/31/23 0854          General Information    Patient Profile Reviewed yes  -RW     Existing Precautions/Restrictions fall  -RW     Row Name 03/31/23 0854          Pain    Pretreatment Pain Rating 0/10 - no pain  -RW     Posttreatment Pain Rating 0/10 - no pain  -RW     Row Name 03/31/23 0854          Cognition    Affect/Mental Status (Cognition) flat/blunted affect  -RW     Orientation Status (Cognition) oriented to;person;place;situation  -RW     Follows Commands (Cognition) delayed response/completion;increased processing time needed  -RW     Row Name 03/31/23 0854          Bed Mobility    Sit-Supine [place] (Bed Mobility) minimum assist (75% patient effort)  -RW     Assistive Device (Bed Mobility) bed rails;head of bed elevated  -RW     Row Name 03/31/23 0854          Sit-Stand Transfer    Sit-Stand [place] (Transfers) contact guard;minimum assist (75% patient effort)  -RW     Assistive Device (Sit-Stand Transfers) walker, front-wheeled  -RW     Row Name 03/31/23 0854          Stand-Sit Transfer    Stand-Sit [place] (Transfers) contact guard;minimum assist (75% patient effort)  -RW     Row Name 03/31/23 0854          Gait/Stairs (Locomotion)    [place] Level (Gait) contact guard;minimum assist (75% patient effort)  -RW     Assistive Device (Gait) walker, front-wheeled  -RW     Distance in Feet (Gait) 24, 14  -RW     Pattern (Gait) step-through  -RW     Deviations/Abnormal Patterns (Gait) base of support, wide;misael decreased;gait speed decreased  one lob that needed intervention  -RW     Row Name 03/31/23 0854          Safety Issues,  Functional Mobility    Impairments Affecting Function (Mobility) balance;endurance/activity tolerance;strength  -RW     Row Name 03/31/23 0854          Balance    Dynamic Standing Balance minimal assist  -RW     Position/Device Used, Standing Balance walker, front-wheeled  -RW     Row Name 03/31/23 0854          Vital Signs    Pre Systolic BP Rehab 105  -RW     Pre Treatment Diastolic BP 68  -RW     Pretreatment Heart Rate (beats/min) 97  -RW     Pre SpO2 (%) 92  -RW     O2 Delivery Pre Treatment nasal cannula  -RW     Row Name 03/31/23 0854          Positioning and Restraints    Pre-Treatment Position in bed  -RW     Post Treatment Position chair  -RW     In Chair reclined;call light within reach;encouraged to call for assist;exit alarm on  -RW     Row Name 03/31/23 0854          Therapy Assessment/Plan (PT)    Rehab Potential (PT) good, to achieve stated therapy goals  -RW     Criteria for Skilled Interventions Met (PT) yes;meets criteria;skilled treatment is necessary  -RW     Therapy Frequency (PT) other (see comments)  5-7 days/wk  -     Row Name 03/31/23 0854          Progress Summary (PT)    Progress Toward Functional Goals (PT) progress toward functional goals is gradual  -     Row Name 03/31/23 0854          Bed Mobility Goal 1 (PT)    Activity/Assistive Device (Bed Mobility Goal 1, PT) bed mobility activities, all  -RW     [place] Level/Cues Needed (Bed Mobility Goal 1, PT) independent  -RW     Time Frame (Bed Mobility Goal 1, PT) by discharge  -RW     Progress/Outcomes (Bed Mobility Goal 1, PT) goal not met  -     Row Name 03/31/23 0854          Transfer Goal 1 (PT)    Activity/Assistive Device (Transfer Goal 1, PT) sit-to-stand/stand-to-sit;bed-to-chair/chair-to-bed  -RW     [place] Level/Cues Needed (Transfer Goal 1, PT) modified independence;independent  -RW     Time Frame (Transfer Goal 1, PT) by discharge  -RW     Progress/Outcome (Transfer Goal 1, PT) goal not met  -     Row Name  03/31/23 0854          Gait Training Goal 1 (PT)    Activity/Assistive Device (Gait Training Goal 1, PT) gait (walking locomotion);decrease fall risk;increase endurance/gait distance  -RW     [place] Level (Gait Training Goal 1, PT) modified independence;independent  -RW     Distance (Gait Training Goal 1, PT) 150ft each trip  -RW     Time Frame (Gait Training Goal 1, PT) 5 days  -RW     Progress/Outcome (Gait Training Goal 1, PT) goal not met  -RW     Row Name 03/31/23 0854          ROM Goal 1 (PT)    ROM Goal 1 (PT) Pt will tolerate LE exercises OOB in chair with VSS  -RW     Time Frame (ROM Goal 1, PT) by discharge  -RW     Progress/Outcome (ROM Goal 1, PT) goal not met  -RW     Row Name 03/31/23 0854          Stairs Goal 1 (PT)    Activity/Assistive Device (Stairs Goal 1, PT) ascending stairs;descending stairs;using handrail, left;using handrail, right  -RW     [place] Level/Cues Needed (Stairs Goal 1, PT) modified independence  -RW     Number of Stairs (Stairs Goal 1, PT) 3  -RW     Time Frame (Stairs Goal 1, PT) by discharge  -RW     Progress/Outcome (Stairs Goal 1, PT) goal not met  -RW           User Key  (r) = Recorded By, (t) = Taken By, (c) = Cosigned By    Initials Name Provider Type    RW Narciso Raymond, PTA Physical Therapist Assistant                Physical Therapy Education     Title: PT OT SLP Therapies (In Progress)     Topic: Physical Therapy (In Progress)     Point: Mobility training (In Progress)     Learning Progress Summary           Patient Acceptance, E, NR by ORAL at 3/28/2023 1235                   Point: Home exercise program (Not Started)     Learner Progress:  Not documented in this visit.          Point: Body mechanics (In Progress)     Learning Progress Summary           Patient Acceptance, E, NR by ORAL at 3/28/2023 1235                   Point: Precautions (In Progress)     Learning Progress Summary           Patient Acceptance, E, NR by ORAL at 3/28/2023 1235                                User Key     Initials Effective Dates Name Provider Type Discipline    ORAL 06/16/21 -  Blanca Marie, PT Physical Therapist PT              PT Recommendation and Plan  Anticipated Discharge Disposition (PT): skilled nursing facility  Therapy Frequency (PT): other (see comments) (5-7 days/wk)  Progress Summary (PT)  Progress Toward Functional Goals (PT): progress toward functional goals is gradual  Plan of Care Reviewed With: patient  Progress: no change  Outcome Evaluation: ok to treat per PT and nurse. pt in bed . agrees to eob and needs min assist . stands with min assist and is unsteady . takes a few steps with fww and min assist and has a lob that needed intervention. stood to regain bal and amb 24 ' seated rest and then 14 again with assist to recliner. wants bed changed and nurses informed. alarm on . will likely need snf at [place].       Time Calculation:    PT Charges     Row Name 03/31/23 1156             Time Calculation    Start Time 0854  -RW      Stop Time 0920  -RW      Time Calculation (min) 26 min  -RW         Time Calculation- PT    Total Timed Code Minutes- PT 26 minute(s)  -RW         Timed Charges    59244 - PT Therapeutic Activity Minutes 26  -RW         Total Minutes    Timed Charges Total Minutes 26  -RW       Total Minutes 26  -RW            User Key  (r) = Recorded By, (t) = Taken By, (c) = Cosigned By    Initials Name Provider Type    RW Narciso Raymond PTA Physical Therapist Assistant              Therapy Charges for Today     Code Description Service Date Service Provider Modifiers Qty    53090359158 HC PT THERAPEUTIC ACT EA 15 MIN 3/31/2023 Narciso Raymond PTA GP 2          PT G-Codes  Outcome Measure Options: AM-PAC 6 Clicks Daily Activity (OT)  AM-PAC 6 Clicks Score (PT): 19  AM-PAC 6 Clicks Score (OT): 22    Narciso Raymond PTA  3/31/2023

## 2023-03-31 NOTE — PROGRESS NOTES
Progress Note  Jonathon Izquierdo MD  Hospitalist    Date of visit: 3/31/2023     LOS: 11 days   Patient Care Team:  Dilcia Kapoor APRN as PCP - General (Family Medicine)    Chief Complaint: Dyspnea    Subjective     Interval History:     Patient Complaints: Dyspnea, lower extremity edema, improved with the help of diuresis and the other medications.    History taken from: Patient and nursing    Medication Review:   Current Facility-Administered Medications   Medication Dose Route Frequency Provider Last Rate Last Admin   • acetaminophen (TYLENOL) suppository 650 mg  650 mg Rectal Q4H PRN Prince Charles DO       • acetaminophen (TYLENOL) tablet 650 mg  650 mg Oral Q4H PRN Prince Charles DO       • amiodarone (PACERONE) tablet 200 mg  200 mg Oral BID With Meals Kalpana Abreu APRN   200 mg at 03/31/23 0812   • [START ON 4/5/2023] amiodarone (PACERONE) tablet 200 mg  200 mg Oral Q24H Kalpana Abreu APRN       • atorvastatin (LIPITOR) tablet 40 mg  40 mg Oral Nightly Cuco Lopez MD   40 mg at 03/30/23 2042   • digoxin (LANOXIN) tablet 125 mcg  125 mcg Oral Daily Kalpana Abreu APRN   125 mcg at 03/30/23 1221   • docusate sodium (COLACE) capsule 100 mg  100 mg Oral Daily PRN Luis Alcantar MD   100 mg at 03/30/23 1221   • furosemide (LASIX) tablet 20 mg  20 mg Oral BID Luis Alcantar MD   20 mg at 03/30/23 1819   • Hold medication  1 each Does not apply Continuous PRN Prince Charles DO       • hydrocortisone-bacitracin-zinc oxide-nystatin (MAGIC BARRIER) ointment 1 application  1 application Topical Q4H PRN Prince Charles DO   1 application at 03/23/23 1740   • lactulose (CHRONULAC) 10 GM/15ML solution 10 g  10 g Oral BID Luis Alcantar MD   10 g at 03/31/23 0811   • Magnesium Sulfate - Total Dose 10 grams - Magnesium 1 or Less  2 g Intravenous PRN Prince Charlse DO        Or   • Magnesium Sulfate - Total Dose 6 grams - Magnesium 1.1 - 1.5  2 g Intravenous PRN Prince Charles,         Or   • Magnesium  Sulfate - Total Dose 4 grams - Magnesium 1.6 - 1.9  4 g Intravenous PRN Shamai, Prince, DO 25 mL/hr at 03/28/23 0800 4 g at 03/28/23 0800   • metoprolol succinate XL (TOPROL-XL) 24 hr half tablet 12.5 mg  12.5 mg Oral Q24H Nannette Walters MD   12.5 mg at 03/30/23 0833   • ondansetron (ZOFRAN) injection 4 mg  4 mg Intravenous Q6H PRN Shamai, Prince, DO   4 mg at 03/22/23 0208   • phytonadione (AQUA-MEPHYTON, VITAMIN K) 5 mg in sodium chloride 0.9 % 50 mL IVPB  5 mg Intravenous Once Shamai, Prince, DO   Held at 03/23/23 1035   • potassium chloride (MICRO-K) CR capsule 40 mEq  40 mEq Oral PRN Shamai, Prince, DO   40 mEq at 03/28/23 0107    Or   • potassium chloride (KLOR-CON) packet 40 mEq  40 mEq Oral PRN Shamai, Prince, DO        Or   • potassium chloride 10 mEq in 100 mL IVPB  10 mEq Intravenous Q1H PRN Shamai, Prince,  mL/hr at 03/25/23 0335 10 mEq at 03/25/23 0335   • rivaroxaban (XARELTO) tablet 15 mg  15 mg Oral BID With Meals Cuco Lopez MD   15 mg at 03/31/23 0810   • sodium chloride 0.9 % flush 10 mL  10 mL Intravenous Q12H Shamai, Prince, DO   10 mL at 03/31/23 1137   • sodium chloride 0.9 % flush 10 mL  10 mL Intravenous PRN Shamai, Prince, DO       • sodium chloride 0.9 % infusion 40 mL  40 mL Intravenous PRN Shamai, Prince, DO       • spironolactone (ALDACTONE) tablet 25 mg  25 mg Oral Daily Shamai, Prince, DO   25 mg at 03/31/23 0811       Review of Systems:   Review of Systems   Constitutional: Positive for fatigue.   Respiratory: Positive for cough and shortness of breath. Negative for wheezing and stridor.    Cardiovascular: Positive for leg swelling.   Gastrointestinal: Negative for abdominal distention, abdominal pain, blood in stool, constipation, diarrhea and vomiting.   Genitourinary: Negative for frequency, hematuria, penile discharge, penile swelling, scrotal swelling and urgency.   Musculoskeletal: Positive for arthralgias.   Skin: Negative for color change, pallor and wound.   Neurological:  Positive for weakness. Negative for seizures, numbness and headaches.   Psychiatric/Behavioral: Negative for agitation, behavioral problems and confusion.   All other systems reviewed and are negative.      Objective     Vital Signs  Temp:  [96.8 °F (36 °C)-97.9 °F (36.6 °C)] 97.9 °F (36.6 °C)  Heart Rate:  [] 90  Resp:  [16-18] 18  BP: ()/(58-75) 92/58    Physical Exam:  Physical Exam  Vitals reviewed.   Constitutional:       Appearance: He is ill-appearing.   HENT:      Head: Normocephalic and atraumatic.   Eyes:      General: Scleral icterus present.      Extraocular Movements: Extraocular movements intact.      Pupils: Pupils are equal, round, and reactive to light.   Cardiovascular:      Rate and Rhythm: Normal rate. Rhythm irregular.   Pulmonary:      Effort: No respiratory distress.      Breath sounds: No stridor. No wheezing or rales.   Chest:      Chest wall: No tenderness.   Abdominal:      General: There is no distension.      Palpations: There is no mass.      Tenderness: There is no abdominal tenderness. There is no right CVA tenderness, left CVA tenderness or guarding.   Musculoskeletal:         General: No swelling, tenderness or deformity.      Cervical back: Normal range of motion and neck supple. No rigidity or tenderness.      Right lower leg: No edema.      Left lower leg: No edema.   Skin:     General: Skin is dry.      Coloration: Skin is jaundiced. Skin is not pale.      Findings: No erythema or lesion.   Neurological:      General: No focal deficit present.      Mental Status: He is alert and oriented to person, place, and time. Mental status is at baseline.   Psychiatric:         Mood and Affect: Mood normal.         Behavior: Behavior normal.          Results Review:    Lab Results (last 24 hours)     Procedure Component Value Units Date/Time    Comprehensive Metabolic Panel [[id number]]  (Abnormal) Collected: 03/31/23 0530    Specimen: Blood Updated: 03/31/23 0644     Glucose 72  mg/dL      BUN 14 mg/dL      Creatinine 0.83 mg/dL      Sodium 133 mmol/L      Potassium 3.8 mmol/L      Chloride 97 mmol/L      CO2 28.0 mmol/L      Calcium 8.1 mg/dL      Total Protein 4.7 g/dL      Albumin 2.2 g/dL      ALT (SGPT) 30 U/L      AST (SGOT) 36 U/L      Alkaline Phosphatase 88 U/L      Total Bilirubin 5.4 mg/dL      Globulin 2.5 gm/dL      A/G Ratio 0.9 g/dL      BUN/Creatinine Ratio 16.9     Anion Gap 8.0 mmol/L      eGFR 97.1 mL/min/1.73     Narrative:      GFR Normal >60  Chronic Kidney Disease <60  Kidney Failure <15      Magnesium [[id number]]  (Normal) Collected: 03/31/23 0530    Specimen: Blood Updated: 03/31/23 0639     Magnesium 1.9 mg/dL     CBC & Differential [[id number]]  (Abnormal) Collected: 03/31/23 0530    Specimen: Blood Updated: 03/31/23 0618    Narrative:      The following orders were created for panel order CBC & Differential.  Procedure                               Abnormality         Status                     ---------                               -----------         ------                     CBC Auto Differential[[id number]]        Abnormal            Final result                 Please view results for these tests on the individual orders.    CBC Auto Differential [[id number]]  (Abnormal) Collected: 03/31/23 0530    Specimen: Blood Updated: 03/31/23 0618     WBC 8.05 10*3/mm3      RBC 5.13 10*6/mm3      Hemoglobin 16.0 g/dL      Hematocrit 45.0 %      MCV 87.7 fL      MCH 31.2 pg      MCHC 35.6 g/dL      RDW 20.9 %      RDW-SD 62.4 fl      MPV 10.0 fL      Platelets 171 10*3/mm3      Neutrophil % 61.5 %      Lymphocyte % 16.1 %      Monocyte % 17.5 %      Eosinophil % 3.1 %      Basophil % 0.6 %      Immature Grans % 1.2 %      Neutrophils, Absolute 4.94 10*3/mm3      Lymphocytes, Absolute 1.30 10*3/mm3      Monocytes, Absolute 1.41 10*3/mm3      Eosinophils, Absolute 0.25 10*3/mm3      Basophils, Absolute 0.05 10*3/mm3      Immature Grans, Absolute 0.10 10*3/mm3      nRBC 0.0  /100 WBC           Imaging Results (Last 24 Hours)     ** No results found for the last 24 hours. **          Assessment & Plan       Atrial fibrillation with RVR (HCC)    Non-STEMI (non-ST elevated myocardial infarction) (HCC)    Biventricular heart failure with reduced left ventricular function (HCC)    Leg DVT (deep venous thromboembolism), acute, bilateral (HCC)    Abnormal LFTs    Continue with the amiodarone, the Xarelto, beta-blocker and diuretics.  Repeat CMP in AM. Cardiology consult greatly appreciated.    I confirmed that the patient's Advance Care Plan is present, code status is documented, or surrogate decision maker is listed in the patient's medical record.      Jonathon Izquierdo MD  03/31/23  16:11 CDT

## 2023-03-31 NOTE — PLAN OF CARE
Goal Outcome Evaluation:  Plan of Care Reviewed With: patient        Progress: improving   VSS. Pt rested well through the night. No complaints at this time. Will cont to provide pt care.

## 2023-03-31 NOTE — THERAPY TREATMENT NOTE
Patient Name: Pavan Mccauley  : 1955    MRN: [id number]                              Today's Date: 3/31/2023       Admit Date: 3/20/2023    Visit Dx:     ICD-10-CM ICD-9-CM   1. Atrial fibrillation with RVR (Formerly [facility])  I48.91 427.31   2. Urinary tract infection without hematuria, site unspecified  N39.0 599.0   3. Pulmonary vascular congestion  R09.89 514   4. NSTEMI, initial episode of care (Formerly [facility])  I21.4 410.71   5. Biventricular CHF (congestive heart failure) (Formerly [facility])  I50.82 428.0   6. Impaired mobility and ADLs  Z74.09 V49.89    Z78.9    7. Impaired functional mobility, balance, gait, and endurance  Z74.09 V49.89     Patient Active Problem List   Diagnosis   • General medical exam   • Malaise   • Hyperglycemia   • Cough   • Tick bite   • Screening for colon cancer   • Pneumonia due to organism   • Carotid artery aneurysm (Formerly [facility])   • Dizziness   • SVT (supraventricular tachycardia) (Formerly [facility])   • Atrial fibrillation (Formerly [facility])   • Dyspnea on exertion   • Atrial fibrillation with RVR (Formerly [facility])   • Non-STEMI (non-ST elevated myocardial infarction) (Formerly [facility])   • Biventricular CHF (congestive heart failure) (Formerly [facility])   • Acute on chronic systolic (congestive) heart failure (Formerly [facility])     Past Medical History:   Diagnosis Date   • Bronchopneumonia    • Chest pain    • Corneal foreign body     both eyes   • Neck pain    • Perforation of left tympanic membrane     history of   • Prashant [place] spotted fever    • Wheezing      Past Surgical History:   Procedure Laterality Date   • CARDIAC CATHETERIZATION N/A 3/27/2023    Procedure: Left Heart Cath;  Surgeon: Prince Charles DO;  Location: [facility] CATH INVASIVE LOCATION;  Service: Cardiology;  Laterality: N/A;   • EYE FOREIGN BODY REMOVAL  2013    REMOVE FOREIGN BODY CORNEA W/SLIT LAMP 40170 (1)     • INJECTION OF MEDICATION  2011    Kenalog (1)      • SPINE SURGERY  1991    Exploration of the previous C5-6 fusion with refusion, exploration of the C5 nerve root, left side   •  TYMPANOPLASTY Left 05/16/1972    Perforation of the left tympanic membrane      General Information     Row Name 03/31/23 0948          OT Time and Intention    Document Type therapy note (daily note)  -     Mode of Treatment individual therapy;occupational therapy  -     Row Name 03/31/23 0948          General Information    Patient Profile Reviewed yes  -     Existing Precautions/Restrictions fall  -     Row Name 03/31/23 0948          Safety Issues, Functional Mobility    Impairments Affecting Function (Mobility) balance;endurance/activity tolerance;strength  -           User Key  (r) = Recorded By, (t) = Taken By, (c) = Cosigned By    Initials Name Provider Type     Hallie Caraballo OT Occupational Therapist                 Mobility/ADL's     Row Name 03/31/23 0948          Bed Mobility    Bed Mobility sit-supine  -     Sit-Supine [place] (Bed Mobility) modified independence  -     Row Name 03/31/23 0948          Transfers    Transfers sit-stand transfer;stand-sit transfer  -     Row Name 03/31/23 0948          Sit-Stand Transfer    Sit-Stand [place] (Transfers) contact guard  -     Assistive Device (Sit-Stand Transfers) walker, front-wheeled  -     Row Name 03/31/23 0948          Stand-Sit Transfer    Stand-Sit [place] (Transfers) contact guard  -     Assistive Device (Stand-Sit Transfers) walker, front-wheeled  -     Row Name 03/31/23 0948          Activities of Daily Living    BADL Assessment/Intervention lower body dressing  -     Row Name 03/31/23 0948          Lower Body Dressing Assessment/Training    [place] Level (Lower Body Dressing) don;doff;socks;dependent (less than 25% patient effort)  -     Position (Lower Body Dressing) sitting up in bed  -           User Key  (r) = Recorded By, (t) = Taken By, (c) = Cosigned By    Initials Name Provider Type     Hallie Caraballo, OT Occupational Therapist               Obj/Interventions     Row Name  03/31/23 0948          Shoulder (Therapeutic Exercise)    Shoulder (Therapeutic Exercise) strengthening exercise  -     Shoulder Strengthening (Therapeutic Exercise) bilateral;flexion;extension;aBduction;aDduction;horizontal aBduction/aDduction;external rotation;internal rotation;sitting;2 lb free weight;10 repetitions;2 sets  -     Row Name 03/31/23 0948          Elbow/Forearm (Therapeutic Exercise)    Elbow/Forearm (Therapeutic Exercise) strengthening exercise  -     Elbow/Forearm Strengthening (Therapeutic Exercise) bilateral;flexion;extension;supination;pronation;sitting;2 lb free weight;10 repetitions;2 sets  -     Row Name 03/31/23 0948          Motor Skills    Therapeutic Exercise elbow/forearm;shoulder  -           User Key  (r) = Recorded By, (t) = Taken By, (c) = Cosigned By    Initials Name Provider Type    Hallie Dickson OT Occupational Therapist               Goals/Plan     Row Name 03/31/23 0948          Transfer Goal 1 (OT)    Activity/Assistive Device (Transfer Goal 1, OT) transfers, all  -     [place] Level/Cues Needed (Transfer Goal 1, OT) independent  -     Time Frame (Transfer Goal 1, OT) long term goal (LTG);by discharge  -     Progress/Outcome (Transfer Goal 1, OT) goal not met  -     Row Name 03/31/23 0948          Bathing Goal 1 (OT)    Activity/Device (Bathing Goal 1, OT) bathing skills, all  -     [place] Level/Cues Needed (Bathing Goal 1, OT) independent  -     Time Frame (Bathing Goal 1, OT) long term goal (LTG);by discharge  -     Progress/Outcomes (Bathing Goal 1, OT) goal not met  -     Row Name 03/31/23 0948          Dressing Goal 1 (OT)    Activity/Device (Dressing Goal 1, OT) dressing skills, all  -     [place]/Cues Needed (Dressing Goal 1, OT) independent  -     Time Frame (Dressing Goal 1, OT) long term goal (LTG);by discharge  -     Progress/Outcome (Dressing Goal 1, OT) goal not met  -           User Key  (r) = Recorded By,  (t) = Taken By, (c) = Cosigned By    Initials Name Provider Type    Hallie Dickson, OT Occupational Therapist               Clinical Impression     Row Name 03/31/23 0948          Pain Assessment    Pretreatment Pain Rating 0/10 - no pain  -     Posttreatment Pain Rating 0/10 - no pain  -     Row Name 03/31/23 0948          Plan of Care Review    Plan of Care Reviewed With patient  -     Progress improving  -     Outcome Evaluation OT tx completed. Pt sitting up in chair upon entering and agreeable to the session. Pt completed 10 reps x 2 sets of BUE exercises in all planes with 2lb dowel bing while sitting unsupported. Pt is CGA for sit<>stand t/f and for fxnl mobility with RW. Pt fatigues quickly. Pt min A for donning/doffing hospital gown while sitting EOB. Pt I for sit<>sup with HOB flat. Recommend d/c home with assist, HHOT vs rehab to home pending progress in therapy. Pt agrees with OT that it would not be safe for him to go home at this time, in addition pt reports that he does not have someone that can stay with him at his house.  -     Row Name 03/31/23 0948          Therapy Assessment/Plan (OT)    Rehab Potential (OT) good, to achieve stated therapy goals  -     Criteria for Skilled Therapeutic Interventions Met (OT) yes;meets criteria;skilled treatment is necessary  -     Therapy Frequency (OT) other (see comments)  5-7 d/wk  -     Row Name 03/31/23 0948          Therapy Plan Review/Discharge Plan (OT)    Anticipated Discharge Disposition (OT) home with assist;home with home health;other (see comments)  rehab to home  -     Row Name 03/31/23 0948          Vital Signs    Pretreatment Heart Rate (beats/min) 92  -     Pre SpO2 (%) 93  -     O2 Delivery Pre Treatment nasal cannula  2L  -     Row Name 03/31/23 0948          Positioning and Restraints    Pre-Treatment Position sitting in chair/recliner  -     Post Treatment Position bed  -     In Bed fowlers;call light within  reach;encouraged to call for assist;exit alarm on;with family/caregiver  -           User Key  (r) = Recorded By, (t) = Taken By, (c) = Cosigned By    Initials Name Provider Type    Hallie Dickson OT Occupational Therapist               Outcome Measures     Row Name 03/31/23 0948          How much help from another is currently needed...    Putting on and taking off regular lower body clothing? 3  -MC     Bathing (including washing, rinsing, and drying) 3  -MC     Toileting (which includes using toilet bed pan or urinal) 4  -MC     Putting on and taking off regular upper body clothing 4  -MC     Taking care of personal grooming (such as brushing teeth) 4  -MC     Eating meals 4  -MC     AM-PAC 6 Clicks Score (OT) 22  -     Row Name 03/31/23 0900          How much help from another person do you currently need...    Turning from your back to your side while in flat bed without using bedrails? 4  -SB     Moving from lying on back to sitting on the side of a flat bed without bedrails? 3  -SB     Moving to and from a bed to a chair (including a wheelchair)? 3  -SB     Standing up from a chair using your arms (e.g., wheelchair, bedside chair)? 3  -SB     Climbing 3-5 steps with a railing? 3  -SB     To walk in hospital room? 3  -SB     AM-PAC 6 Clicks Score (PT) 19  -SB     Highest level of mobility 6 --> Walked 10 steps or more  -SB     Row Name 03/31/23 0948          Functional Assessment    Outcome Measure Options AM-PAC 6 Clicks Daily Activity (OT)  -           User Key  (r) = Recorded By, (t) = Taken By, (c) = Cosigned By    Initials Name Provider Type    Hallie Dickson OT Occupational Therapist    Gertrude Damon, RN Registered Nurse                Occupational Therapy Education     Title: PT OT SLP Therapies (In Progress)     Topic: Occupational Therapy (In Progress)     Point: ADL training (Done)     Description:   Instruct learner(s) on proper safety adaptation and remediation techniques  during self care or transfers.   Instruct in proper use of assistive devices.              Learning Progress Summary           Patient Acceptance, E,TB, VU by  at 3/31/2023 1049    Comment: POC    Acceptance, E,TB, VU by  at 3/28/2023 1123    Comment: OT role, POC, t/f training                   Point: Home exercise program (Not Started)     Description:   Instruct learner(s) on appropriate technique for monitoring, assisting and/or progressing therapeutic exercises/activities.              Learner Progress:  Not documented in this visit.          Point: Precautions (Done)     Description:   Instruct learner(s) on prescribed precautions during self-care and functional transfers.              Learning Progress Summary           Patient Acceptance, E,TB, VU by  at 3/31/2023 1049    Comment: POC    Acceptance, E,TB, VU by  at 3/28/2023 1123    Comment: OT role, POC, t/f training                   Point: Body mechanics (Done)     Description:   Instruct learner(s) on proper positioning and spine alignment during self-care, functional mobility activities and/or exercises.              Learning Progress Summary           Patient Acceptance, E,TB, VU by  at 3/31/2023 1049    Comment: POC    Acceptance, E,TB, VU by  at 3/28/2023 1123    Comment: OT role, POC, t/f training                               User Key     Initials Effective Dates Name Provider Type Discipline     10/19/22 -  Hallie Caraballo OT Occupational Therapist OT              OT Recommendation and Plan  Planned Therapy Interventions (OT): activity tolerance training, manual therapy/joint mobilization, patient/caregiver education/training, adaptive equipment training, BADL retraining, cognitive/visual perception retraining, edema control/reduction, functional balance retraining, IADL retraining, passive ROM/stretching, occupation/activity based interventions, neuromuscular control/coordination retraining, ROM/therapeutic exercise,  transfer/mobility retraining, strengthening exercise  Therapy Frequency (OT): other (see comments) (5-7 d/wk)  Plan of Care Review  Plan of Care Reviewed With: patient  Progress: improving  Outcome Evaluation: OT tx completed. Pt sitting up in chair upon entering and agreeable to the session. Pt completed 10 reps x 2 sets of BUE exercises in all planes with 2lb dowel bing while sitting unsupported. Pt is CGA for sit<>stand t/f and for fxnl mobility with RW. Pt fatigues quickly. Pt min A for donning/doffing hospital gown while sitting EOB. Pt I for sit<>sup with HOB flat. Recommend d/c home with assist, HHOT vs rehab to home pending progress in therapy. Pt agrees with OT that it would not be safe for him to go home at this time, in addition pt reports that he does not have someone that can stay with him at his house.     Time Calculation:    Time Calculation- OT     Row Name 03/31/23 0948             Time Calculation- OT    OT Start Time 0948  -      OT Stop Time 1042  -      OT Time Calculation (min) 54 min  -      Total Timed Code Minutes- OT 54 minute(s)  -MC      OT Received On 03/31/23  -         Timed Charges    99047 - OT Therapeutic Exercise Minutes 30  -MC      62892 - OT Therapeutic Activity Minutes 24  -MC         Total Minutes    Timed Charges Total Minutes 54  -MC       Total Minutes 54  -MC            User Key  (r) = Recorded By, (t) = Taken By, (c) = Cosigned By    Initials Name Provider Type     Hallie Caraballo OT Occupational Therapist              Therapy Charges for Today     Code Description Service Date Service Provider Modifiers Qty    10255947795 HC OT THER PROC EA 15 MIN 3/31/2023 Hallie Caraballo OT GO 2    [id number]  OT THERAPEUTIC ACT EA 15 MIN 3/31/2023 Hallie Caraballo OT GO 2               Hallie Caraballo OT  3/31/2023

## 2023-03-31 NOTE — DISCHARGE PLACEMENT REQUEST
"Rachel Mccauley (67 y.o. Male)     Date of Birth   1955    Social Security Number       Address   [address] [address]    Home Phone   [phone]    MRN   [id number]       Hoahaoism   Muslim    Marital Status   Single                            Admission Date   3/20/23    Admission Type   Emergency    Admitting Provider   Gurvinder David DO    Attending Provider   Gurvinder David DO    Department, Room/Bed   [address], 307/[address]       Discharge Date       Discharge Disposition       Discharge Destination                               Attending Provider: Gurvinder David DO    Allergies: No Known Allergies    Isolation: None   Infection: None   Code Status: CPR    Ht: 170.2 cm (67\")   Wt: 83.8 kg (184 lb 12.8 oz)    Admission Cmt: None   Principal Problem: Non-STEMI (non-ST elevated myocardial infarction) (HCC) [I21.4]                 Active Insurance as of 3/20/2023     Primary Coverage     Payor Plan Insurance Group Employer/Plan Group    MEDICARE MEDICARE A & B      Payor Plan Address Payor Plan Phone Number Payor Plan Fax Number Effective Dates    [address] [phone]  6/1/2022 - None Entered    [place] [id number]       Subscriber Name Subscriber Birth Date Member ID       RACHEL MCCAULEY 1955 [id number]                 Emergency Contacts      (Rel.) Home Phone Work Phone Mobile Phone    Jennifer Honeycutt (Sister) [phone] -- [phone]    [place] [place] (Relative) -- -- [phone]    paige mccauley -- -- --              "

## 2023-03-31 NOTE — PLAN OF CARE
Goal Outcome Evaluation:  Plan of Care Reviewed With: patient        Progress: no change  Outcome Evaluation: ok to treat per PT and nurse. pt in bed . agrees to eob and needs min assist . stands with min assist and is unsteady . takes a few steps with fww and min assist and has a lob that needed intervention. stood to regain bal and amb 24 ' seated rest and then 14 again with assist to recliner. wants bed changed and nurses informed. alarm on . will likely need snf at [place].

## 2023-03-31 NOTE — PROGRESS NOTES
"[facility] Progress Note   LOS: 11 days   Patient Care Team:  Dilcia Kapoor APRN as PCP - General (Family Medicine)    Chief Complaint:    Chief Complaint   Patient presents with   • Shortness of Breath   • Jaundice   • Edema     feet        Subjective     Interval History:   Patient Denies: chest pain, palpitations and syncope  Patient Complaints: shortness of air and edema  History taken from: patient chart RN     No acute events overnight. He remains in A-fib rate controlled.  Still having shortness of breath and leg swelling.  Venous duplex ultrasound yesterday showing bilateral DVTs, he was changed to acute dosing of Xarelto.      Objective     Vital Sign Min/Max for last 24 hours  Temp  Min: 96.8 °F (36 °C)  Max: 97.6 °F (36.4 °C)   BP  Min: 98/66  Max: 108/71   Pulse  Min: 84  Max: 108   Resp  Min: 16  Max: 18   SpO2  Min: 92 %  Max: 95 %   Flow (L/min)  Min: 2  Max: 2   Weight  Min: 83.8 kg (184 lb 12.8 oz)  Max: 83.8 kg (184 lb 12.8 oz)     Flowsheet Rows      Flowsheet Row First Filed Value   Admission Height 170.2 cm (67\") Documented at 03/20/2023 1618   Admission Weight 105 kg (231 lb 14.8 oz) Documented at 03/20/2023 1648              03/28/23  1314 03/29/23  0500 03/31/23  0630   Weight: 87.5 kg (193 lb) 89.8 kg (198 lb) 83.8 kg (184 lb 12.8 oz)       Physical Exam:  Physical Exam  Vitals and nursing note reviewed.   Constitutional:       General: He is not in acute distress.     Appearance: He is obese. He is ill-appearing. He is not diaphoretic.      Interventions: Nasal cannula in place.   HENT:      Head: Normocephalic.      Right Ear: External ear normal.      Left Ear: External ear normal.   Eyes:      General: Lids are normal.      Pupils: Pupils are equal, round, and reactive to light.   Neck:      Thyroid: No thyromegaly.      Vascular: No carotid bruit or JVD.      Trachea: No tracheal deviation.   Cardiovascular:      Rate and Rhythm: Normal rate. Rhythm irregularly irregular. "      Heart sounds: Normal heart sounds. No murmur heard.    No friction rub. No gallop.   Pulmonary:      Effort: Pulmonary effort is normal. No respiratory distress.      Breath sounds: No stridor. Decreased breath sounds and rales present. No wheezing.   Chest:      Chest wall: No tenderness.   Abdominal:      General: Bowel sounds are normal. There is no distension.      Palpations: Abdomen is soft.      Tenderness: There is no abdominal tenderness.   Musculoskeletal:      Right lower le+ Edema present.      Left lower le+ Edema present.   Skin:     General: Skin is warm and dry.      Coloration: Skin is jaundiced.      Findings: No erythema or rash.   Neurological:      Mental Status: He is alert and oriented to person, place, and time.      Cranial Nerves: No cranial nerve deficit.      Deep Tendon Reflexes: Reflexes are normal and symmetric. Reflexes normal.   Psychiatric:         Behavior: Behavior normal.         Thought Content: Thought content normal.         Judgment: Judgment normal.          Results Review:     Results from last 7 days   Lab Units 23  0530 23  0546 23  0517   SODIUM mmol/L 133* 133* 136   POTASSIUM mmol/L 3.8 4.2 3.7   CHLORIDE mmol/L 97* 96* 95*   CO2 mmol/L 28.0 28.0 33.0*   BUN mg/dL 14 16 16   CREATININE mg/dL 0.83 0.93 0.90   CALCIUM mg/dL 8.1* 8.2* 8.4*   BILIRUBIN mg/dL 5.4* 6.8* 6.7*   ALK PHOS U/L 88 91 96   ALT (SGPT) U/L 30 34 35   AST (SGOT) U/L 36 47* 43*   GLUCOSE mg/dL 72 87 82       Estimated Creatinine Clearance: 91.9 mL/min (by C-G formula based on SCr of 0.83 mg/dL).    Results from last 7 days   Lab Units 23  0530 23  0517 23  0557   MAGNESIUM mg/dL 1.9 2.3 1.9             Results from last 7 days   Lab Units 23  0530 23  0546 23  0517 23  0557 23  0316   WBC 10*3/mm3 8.05 9.09 10.05 8.24 8.43   HEMOGLOBIN g/dL 16.0 17.4 17.1 16.1 16.3   PLATELETS 10*3/mm3 171 138* 127* 113* 108*           Lab  Results   Component Value Date    PROBNP 4,988.0 (H) 03/20/2023       I/O last 3 completed shifts:  In: 1420 [P.O.:1320; IV Piggyback:100]  Out: 7275 [Urine:7275]    Cardiographics:  ECG/EMG Results (last 24 hours)       Procedure Component Value Units Date/Time    ECG 12 Lead Drug Monitoring; Amiodarone [[id number]] Collected: 03/27/23 0003     Updated: 03/27/23 0750     QT Interval 378 ms      QTC Interval 502 ms     Narrative:      Test Reason : MEDICATION START  Blood Pressure :   */*   mmHG  Vent. Rate : 106 BPM     Atrial Rate :  94 BPM     P-R Int :   * ms          QRS Dur : 116 ms      QT Int : 378 ms       P-R-T Axes :   * 125 230 degrees     QTc Int : 502 ms    Atrial fibrillation with rapid ventricular response with premature ventricular or aberrantly conducted complexes  Right axis deviation  Incomplete right bundle branch block  Possible Right ventricular hypertrophy  ST & T wave abnormality, consider lateral ischemia  Abnormal ECG  When compared with ECG of 25-MAR-2023 01:02,  No significant change was found    Referred By: MD           Confirmed By: HERNESTO WALTON    ECG 12 Lead Drug Monitoring; Amiodarone [[id number]] Collected: 03/27/23 0549     Updated: 03/27/23 0750     QT Interval 402 ms      QTC Interval 518 ms     Narrative:      Test Reason : Drug Monitoring  Blood Pressure :   */*   mmHG  Vent. Rate : 100 BPM     Atrial Rate :  98 BPM     P-R Int :   * ms          QRS Dur : 114 ms      QT Int : 402 ms       P-R-T Axes :   * 131 233 degrees     QTc Int : 518 ms    Atrial fibrillation with premature ventricular or aberrantly conducted complexes  Right axis deviation  Incomplete right bundle branch block  Possible Right ventricular hypertrophy  Septal infarct , age undetermined  ST & T wave abnormality, consider lateral ischemia  Prolonged QT  Abnormal ECG  When compared with ECG of 27-MAR-2023 00:03,  No significant change was found    Referred By:            Confirmed By: HERNESTO WALTON           Results for orders placed during the hospital encounter of 03/20/23    Adult Transthoracic Echo Complete W/ Cont if Necessary Per Protocol    Interpretation Summary  •  Left ventricular ejection fraction appears to be 21 - 25%.  •  The left ventricular cavity is dilated.  •  Left ventricular wall thickness is consistent with hypertrophy.  •  Left ventricular diastolic dysfunction is noted.  •  Moderately reduced right ventricular systolic function noted.  •  The right ventricular cavity is severely dilated.  •  Left atrial volume is severely increased.  •  The right atrial cavity is severely  dilated.  •  Estimated right ventricular systolic pressure from tricuspid regurgitation is mildly elevated (35-45 mmHg).      Imaging Results (Most Recent)     Procedure Component Value Units Date/Time    US Venous Doppler Lower Extremity Bilateral (duplex) [[id number]] Collected: 03/30/23 1111     Updated: 03/30/23 1239    Addenda:         ADDENDUM   ADDENDUM #1       Findings discussed with Dr. Lopez at 12:31 PM CST.    Electronically signed by:  Obed Boyd MD  3/30/2023 12:37 PM  CDT Workstation: [id number]    Signed: 03/30/23 1237 by Sarahi Boyd MD    Narrative:        Ultrasound venous duplex bilateral lower extremities    HISTORY: Lower extremity swelling    Duplex ultrasound of the deep venous system of each lower  extremity was performed    FINDINGS:  Noncompressible deep venous thrombosis right popliteal vein and  right posterior tibial vein.  Noncompressible the venous thrombus distal left femoral vein,  left popliteal vein and left posterior tibial vein.  Real-time images of the remaining veins demonstrate normal  compressibility without evidence of intraluminal thrombus.  Doppler of the remaining veins shows phasic flow and  augmentation.  Color Doppler of the remaining veins also reveals venous patency.  Subcutaneous edema each calf.      Impression:      CONCLUSION:  Noncompressible deep venous  thrombosis right popliteal vein and  right posterior tibial vein.  Noncompressible the venous thrombus distal left femoral vein,  left popliteal vein and left posterior tibial vein.  Subcutaneous edema each calf.    [id number]    Electronically signed by:  Obed Boyd MD  3/30/2023 11:54 AM  CDT Workstation: [id number]    US Guided Vascular Access [[id number]] Collected: 03/22/23 1330     Updated: 03/23/23 1550    Narrative:      PROCEDURE: Ultrasound Guidance Vascular Access      Ordering physician(s): NIDHI CARDONA    Clinical Indication: Vascular access      Findings:    The right basilic vein was sonographically evaluated and  determined to be patent. Concurrent realtime ultrasound was used  to visualize needle entry into the right basilic vein  for  midline catheter placement and 2 permanent images acquired for  permanent recording and reporting.         Impression:      Impression:   As above.      Electronically signed by:  Tony Giron MD  3/23/2023 3:48 PM CDT  Workstation: [id number]    XR Chest 1 View [[id number]] Collected: 03/22/23 1327     Updated: 03/22/23 1119    Narrative:      EXAM:  XR CHEST 1 VIEW    TECHNIQUE:   Single frontal radiograph of the chest.    VIEWS:  1 view    CLINICAL HISTORY:   67 years  Male  SOB, I48.91 Unspecified atrial fibrillation N39.0  Urinary tract infection, site not specified R09.89 Other  specified symptoms and signs involving the circulatory and  respiratory systems    COMPARISON:   March 20, 2022 chest x-ray    FINDINGS:   Support lines and tubes: The right PICC tip overlies the right  axillary soft tissues.     Lungs:  Right lung base airspace opacities. The left lung is  clear.  Pleura: No pleural effusion. No pneumothorax.  Heart/mediastinum: The cardiac silhouette is enlarged.  Bones: No acute osseous findings.  Soft tissues: Unremarkable.      Impression:      1. Right pleural effusion and adjacent atelectasis with or  without pneumonia, increased in the interval.  2.  Cardiomegaly.    Electronically signed by:  Issa Burch MD  3/22/2023 11:37  PM CDT Workstation: [id number]    US Abdomen Complete [[id number]] Collected: 03/22/23 1330     Updated: 03/22/23 1509    Narrative:      Ultrasound abdomen complete.    HISTORY: Abdominal distention.    Prior exam: CT chest March 20, 2023.      Impression:      Findings, impression: Limited examination, portable bedside  technique.    Echogenic sludge within the gallbladder.    Gallbladder  wall thickening. Normal liver.    Common bile duct and pancreas are obscured by overlying bowel  gas.    Normal spleen. Normal right kidney 9.6 x 5.1 x 5.1 cm.    Benign cyst left kidney. Left kidney is otherwise unremarkable  11.2 x 5.9 x 5.1 cm    Normal aorta and inferior vena cava.    Small amount of free fluid, ascites throughout the abdomen.    IMPRESSION: Ascites. Gallbladder wall thickening and echogenic  sludge within the gallbladder.    Electronically signed by:  Clem Balderrama MD  3/22/2023 3:07 PM CDT  Workstation: [id number]    IR Insert Midline Without Port Pump 5 Plus [[id number]] Resulted: 03/22/23 1338     Updated: 03/22/23 1338    Narrative:      This procedure was auto-finalized with no dictation required.    CT Abdomen Pelvis Without Contrast [[id number]] Collected: 03/20/23 2011     Updated: 03/20/23 2138    Narrative:        lINDICATION: sob, pulm edema, I48.91 Unspecified atrial  fibrillation N39.0 Urinary tract infection, site not specified  R09.89 Other specified symptoms and signs involving the  circulatory and respiratory systems    EXAMINATION: CT CHEST, ABDOMEN AND PELVIS     TECHNIQUE:  Helically acquired images were obtained of the chest,  abdomen and pelvis. A radiation dose optimization technique was  used for this scan.    IV Contrast dosage and agent: None    Oral contrast: None.    COMPARISON: None.    FINDINGS:    ----Chest:   LUNGS, PLEURA: There is a right lower lobe consolidation  suggesting atelectasis or  pneumonia. This is associated with a  large pleural effusion. This appears slightly loculated. Left  lung is clear. There is no left-sided effusion. No pneumothorax     SOFT TISSUES: There is diffuse subcutaneous edema within the  lower chest wall and upper abdominal wall suggesting anasarca.  There is no evidence of adenopathy or soft tissue gas.     HEART AND PERICARDIUM: Heart is prominent. There is no  pericardial effusion.     VESSELS: Main pulmonary artery is prominent measuring 4.2 cm  suggesting pulmonary arterial hypertension. The aorta is normal  in course and caliber on this noncontrast study.     MEDIASTINUM AND MICHAEL: No mediastinal or hilar adenopathy.  Esophagus is unremarkable. No hiatal hernia.     BONES: No lytic or blastic abnormality.  No fractures are  identified.     ----Abdomen/Pelvis:    LIVER: Homogeneous.  No focal lesion is identified on this  noncontrast study.    GALLBLADDER AND BILIARY TREE: There appears to be dependent  sludge. There are no calcified stones. The gallbladder is not  dilated.    No intra- or extrahepatic biliary ductal dilation.    PANCREAS: There is no evidence of mass or inflammatory process.    SPLEEN: Spleen is normal in size with no focal lesion.    ADRENAL GLANDS: Normal in appearance.      KIDNEYS AND URETERS: Normal renal size and position.  There is no  evidence of renal mass or calcification. No hydronephrosis.    URINARY BLADDER: Unremarkable.     PERITONEUM: There is a small amount of ascites.     BOWEL: The appendix is normal. There is no bowel distention or  evidence of obstruction. There is questionable edema in the  region of the third portion of the duodenum. Possibility of mild  duodenitis  is not excluded.    VASCULAR STRUCTURES AND RETROPERITONEUM: Aorta and IVC are normal  in caliber.  There is no evidence of aneurysm. There is no  evidence of retroperitoneal lymphadenopathy, mass, or fluid  collection    REPRODUCTIVE ORGANS: There is no evidence of  pelvic mass.     ABDOMINAL WALL: There is diffuse anasarca. There is no hernia.     BONES: There is no acute fracture or focal bone lesion.       Impression:      1.  Moderate to large right effusion which appears loculated.  There is associated right lower lobe consolidation suggesting  pneumonia.  2.  Cardiomegaly.  3.  Prominent main pulmonary artery suggesting pulmonary arterial  hypertension.  4.  Small amount of ascites. There appears to be some edema and  wall thickening associated with the third portion of the duodenum  suggesting duodenitis.     Electronically signed by:  Krzysztof Gomez MD  3/20/2023 9:36 PM CDT  Workstation: [id number]    CT Chest Without Contrast Diagnostic [[id number]] Collected: 03/20/23 2011     Updated: 03/20/23 2137    Narrative:        lINDICATION: sob, pulm edema, I48.91 Unspecified atrial  fibrillation N39.0 Urinary tract infection, site not specified  R09.89 Other specified symptoms and signs involving the  circulatory and respiratory systems    EXAMINATION: CT CHEST, ABDOMEN AND PELVIS     TECHNIQUE:  Helically acquired images were obtained of the chest,  abdomen and pelvis. A radiation dose optimization technique was  used for this scan.    IV Contrast dosage and agent: None    Oral contrast: None.    COMPARISON: None.    FINDINGS:    ----Chest:   LUNGS, PLEURA: There is a right lower lobe consolidation  suggesting atelectasis or pneumonia. This is associated with a  large pleural effusion. This appears slightly loculated. Left  lung is clear. There is no left-sided effusion. No pneumothorax     SOFT TISSUES: There is diffuse subcutaneous edema within the  lower chest wall and upper abdominal wall suggesting anasarca.  There is no evidence of adenopathy or soft tissue gas.     HEART AND PERICARDIUM: Heart is prominent. There is no  pericardial effusion.     VESSELS: Main pulmonary artery is prominent measuring 4.2 cm  suggesting pulmonary arterial hypertension. The aorta is  normal  in course and caliber on this noncontrast study.     MEDIASTINUM AND MICHAEL: No mediastinal or hilar adenopathy.  Esophagus is unremarkable. No hiatal hernia.     BONES: No lytic or blastic abnormality.  No fractures are  identified.     ----Abdomen/Pelvis:    LIVER: Homogeneous.  No focal lesion is identified on this  noncontrast study.    GALLBLADDER AND BILIARY TREE: There appears to be dependent  sludge. There are no calcified stones. The gallbladder is not  dilated.    No intra- or extrahepatic biliary ductal dilation.    PANCREAS: There is no evidence of mass or inflammatory process.    SPLEEN: Spleen is normal in size with no focal lesion.    ADRENAL GLANDS: Normal in appearance.      KIDNEYS AND URETERS: Normal renal size and position.  There is no  evidence of renal mass or calcification. No hydronephrosis.    URINARY BLADDER: Unremarkable.     PERITONEUM: There is a small amount of ascites.     BOWEL: The appendix is normal. There is no bowel distention or  evidence of obstruction. There is questionable edema in the  region of the third portion of the duodenum. Possibility of mild  duodenitis  is not excluded.    VASCULAR STRUCTURES AND RETROPERITONEUM: Aorta and IVC are normal  in caliber.  There is no evidence of aneurysm. There is no  evidence of retroperitoneal lymphadenopathy, mass, or fluid  collection    REPRODUCTIVE ORGANS: There is no evidence of pelvic mass.     ABDOMINAL WALL: There is diffuse anasarca. There is no hernia.     BONES: There is no acute fracture or focal bone lesion.       Impression:      1.  Moderate to large right effusion which appears loculated.  There is associated right lower lobe consolidation suggesting  pneumonia.  2.  Cardiomegaly.  3.  Prominent main pulmonary artery suggesting pulmonary arterial  hypertension.  4.  Small amount of ascites. There appears to be some edema and  wall thickening associated with the third portion of the duodenum  suggesting  duodenitis.     Electronically signed by:  Krzysztof Gomez MD  3/20/2023 9:35 PM CDT  Workstation: [id number]    XR Chest 1 View [[id number]] Collected: 03/20/23 1651     Updated: 03/20/23 1828    Narrative:      EXAMINATION: XR CHEST 1 VIEW    COMPARISON: Chest 2 views November 5, 2020    INDICATION: tachycardia    FINDINGS: Portable AP upright imaging of the chest is submitted -  1 view. A moderate size basilar right pleural effusion is new.  Some degree of right basilar compressive atelectasis is  suspected. Heart size is difficult to assess but appears at least  mildly enlarged. Mild central vascular prominence is noted but no  subpleural interstitial edema is evident. No pneumothorax.       Impression:      Right basilar pleural effusion, right lower lobe  compressive atelectasis, cardiomegaly and mild central vascular  congestion.    Electronically signed by:  Hollis Sousa MD  3/20/2023 6:26 PM CDT  Workstation: [id number]          CT Abdomen Pelvis Without Contrast    Result Date: 3/20/2023  1.  Moderate to large right effusion which appears loculated. There is associated right lower lobe consolidation suggesting pneumonia. 2.  Cardiomegaly. 3.  Prominent main pulmonary artery suggesting pulmonary arterial hypertension. 4.  Small amount of ascites. There appears to be some edema and wall thickening associated with the third portion of the duodenum suggesting duodenitis. Electronically signed by:  Krzysztof Gomez MD  3/20/2023 9:36 PM CDT Workstation: [id number]    CT Chest Without Contrast Diagnostic    Result Date: 3/20/2023  1.  Moderate to large right effusion which appears loculated. There is associated right lower lobe consolidation suggesting pneumonia. 2.  Cardiomegaly. 3.  Prominent main pulmonary artery suggesting pulmonary arterial hypertension. 4.  Small amount of ascites. There appears to be some edema and wall thickening associated with the third portion of the duodenum suggesting duodenitis. Electronically  signed by:  Krzysztof Gomez MD  3/20/2023 9:35 PM CDT Workstation: [id number]    US Abdomen Complete    Result Date: 3/22/2023  Findings, impression: Limited examination, portable bedside technique. Echogenic sludge within the gallbladder. Gallbladder  wall thickening. Normal liver. Common bile duct and pancreas are obscured by overlying bowel gas. Normal spleen. Normal right kidney 9.6 x 5.1 x 5.1 cm. Benign cyst left kidney. Left kidney is otherwise unremarkable 11.2 x 5.9 x 5.1 cm Normal aorta and inferior vena cava. Small amount of free fluid, ascites throughout the abdomen. IMPRESSION: Ascites. Gallbladder wall thickening and echogenic sludge within the gallbladder. Electronically signed by:  Clem Balderrama MD  3/22/2023 3:07 PM CDT Workstation: [id number]    US Guided Vascular Access    Result Date: 3/23/2023  Impression: As above. Electronically signed by:  Tony Giron MD  3/23/2023 3:48 PM CDT Workstation: [id number]    XR Chest 1 View    Result Date: 3/22/2023  1. Right pleural effusion and adjacent atelectasis with or without pneumonia, increased in the interval. 2. Cardiomegaly. Electronically signed by:  Issa Burch MD  3/22/2023 11:37 PM CDT Workstation: [id number]    XR Chest 1 View    Result Date: 3/20/2023  Right basilar pleural effusion, right lower lobe compressive atelectasis, cardiomegaly and mild central vascular congestion. Electronically signed by:  Hollis Sousa MD  3/20/2023 6:26 PM CDT Workstation: [id number]    US Venous Doppler Lower Extremity Bilateral (duplex)    Addendum Date: 3/30/2023     ADDENDUM ADDENDUM #1 Findings discussed with Dr. Lopez at 12:31 PM CST. Electronically signed by:  Obed Boyd MD  3/30/2023 12:37 PM CDT Workstation: [id number]     Result Date: 3/30/2023  CONCLUSION: Noncompressible deep venous thrombosis right popliteal vein and right posterior tibial vein. Noncompressible the venous thrombus distal left femoral vein, left popliteal vein and left posterior tibial vein.  Subcutaneous edema each calf. [id number] Electronically signed by:  Obed Boyd MD  3/30/2023 11:54 AM CDT Workstation: [id number]      Medication Review:     Current Facility-Administered Medications:   •  [DISCONTINUED] acetaminophen (TYLENOL) tablet 650 mg, 650 mg, Oral, Q4H PRN **OR** [DISCONTINUED] acetaminophen (TYLENOL) 160 MG/5ML solution 650 mg, 650 mg, Oral, Q4H PRN **OR** acetaminophen (TYLENOL) suppository 650 mg, 650 mg, Rectal, Q4H PRN, Leeann Charlesor, DO  •  acetaminophen (TYLENOL) tablet 650 mg, 650 mg, Oral, Q4H PRN, Araceli, Prince, DO  •  amiodarone (PACERONE) tablet 200 mg, 200 mg, Oral, BID With Meals, Kalpana Abreu APRN, 200 mg at 03/31/23 0812  •  [START ON 4/5/2023] amiodarone (PACERONE) tablet 200 mg, 200 mg, Oral, Q24H, Kalpana Abreu APRN  •  atorvastatin (LIPITOR) tablet 40 mg, 40 mg, Oral, Nightly, Cuco Lopez MD, 40 mg at 03/30/23 2042  •  digoxin (LANOXIN) tablet 125 mcg, 125 mcg, Oral, Daily, Kalpana Abreu APRN, 125 mcg at 03/30/23 1221  •  docusate sodium (COLACE) capsule 100 mg, 100 mg, Oral, Daily PRN, Luis Alcantar MD, 100 mg at 03/30/23 1221  •  furosemide (LASIX) tablet 20 mg, 20 mg, Oral, BID, Luis Alcantar MD, 20 mg at 03/30/23 1819  •  Hold medication, 1 each, Does not apply, Continuous PRN, Prince Charles, DO  •  hydrocortisone-bacitracin-zinc oxide-nystatin (MAGIC BARRIER) ointment 1 application, 1 application, Topical, Q4H PRN, Prince Charles DO, 1 application at 03/23/23 1740  •  lactulose (CHRONULAC) 10 GM/15ML solution 10 g, 10 g, Oral, BID, Luis Alcantar MD, 10 g at 03/31/23 0811  •  Magnesium Sulfate - Total Dose 10 grams - Magnesium 1 or Less, 2 g, Intravenous, PRN **OR** Magnesium Sulfate - Total Dose 6 grams - Magnesium 1.1 - 1.5, 2 g, Intravenous, PRN **OR** Magnesium Sulfate - Total Dose 4 grams - Magnesium 1.6 - 1.9, 4 g, Intravenous, PRN, Araceli, Prince, , Last Rate: 25 mL/hr at 03/28/23 0800, 4 g at 03/28/23 0800  •  metoprolol succinate XL  (TOPROL-XL) 24 hr half tablet 12.5 mg, 12.5 mg, Oral, Q24H, Nannette Walters MD, 12.5 mg at 03/30/23 0833  •  ondansetron (ZOFRAN) injection 4 mg, 4 mg, Intravenous, Q6H PRN, Shamai, Prince, DO, 4 mg at 03/22/23 0208  •  phytonadione (AQUA-MEPHYTON, VITAMIN K) 5 mg in sodium chloride 0.9 % 50 mL IVPB, 5 mg, Intravenous, Once, Shamai, Prince, DO, Held at 03/23/23 1035  •  potassium chloride (MICRO-K) CR capsule 40 mEq, 40 mEq, Oral, PRN, 40 mEq at 03/28/23 0107 **OR** potassium chloride (KLOR-CON) packet 40 mEq, 40 mEq, Oral, PRN **OR** potassium chloride 10 mEq in 100 mL IVPB, 10 mEq, Intravenous, Q1H PRN, Shamai, Prince, DO, Last Rate: 100 mL/hr at 03/25/23 0335, 10 mEq at 03/25/23 0335  •  rivaroxaban (XARELTO) tablet 15 mg, 15 mg, Oral, BID With Meals, Cuco Lopez MD, 15 mg at 03/31/23 0810  •  sodium chloride 0.9 % flush 10 mL, 10 mL, Intravenous, Q12H, Shamai, Prince, DO, 10 mL at 03/31/23 1137  •  sodium chloride 0.9 % flush 10 mL, 10 mL, Intravenous, PRN, Shamai, Prince, DO  •  sodium chloride 0.9 % infusion 40 mL, 40 mL, Intravenous, PRN, Shamai, Prince, DO  •  spironolactone (ALDACTONE) tablet 25 mg, 25 mg, Oral, Daily, Shamai, Prince, DO, 25 mg at 03/31/23 0811    Assessment & Plan       Non-STEMI (non-ST elevated myocardial infarction) (HCC)    Atrial fibrillation with RVR (HCC)    Acute on chronic systolic (congestive) heart failure (HCC)    #1.  Atrial fibrillation with RVR.  Improving  Has history of paroxysmal atrial fibrillation/SVT.  Has been noncompliant with medication for months.  Presented with worsening shortness of breath, weight gain, edema and chest pain.  EKG showed atrial fibrillation with RVR.  Found to be in heart failure with reduced LVEF.  -Continue amiodarone 200 mg twice daily, Toprol-XL 12.5 mg, digoxin 125mcg  -Continue Xarelto we will need to sample him.  Discussed with patient previously regarding that if he continues to be in A-fib we will need to consider cardioversion as an  outpatient in 4 weeks.   -Keep K 4 or greater, Keep mag 2 or greater. Replacement protocols in place.     #2.  Biventricular heart failure, LVEF reduced at 21 to 25%, moderately reduced RVEF.  Etiology NICM: [facility] negative for Obstructive CAD  NYHA Class III, Stage C. Patient appears euvolemic today with third spacing with borderline perfusion and hemodynamics.  He received IV albumin yesterday with some improvement in his third swelling to his legs.    BETA-BLOCKER: Toprol XL 12.5mg  ACE/ARB/ENTRESTO: Will plan to add prior to discharge if hemodynamics allows  DIURETIC: Lasix 20 mg po BID  SGLt2: Indicated  ALDOSTERONE ANTAGONIST: Spironolactone 25mg  IMDUR/HYDRALAZINE: N/A  DIGOXIN: N/A  Fluid restriction: 1500 ml  Sodium restriction:2 grams     Cardiac Rehab:  Indicated  ICD:  Indicated    ADHF:  This admission  Daily weight  Strict intake/ output  Continuous cardiac monitoring     #3.  NSTEMI:  High sensitivity troponin at 70, 65, 74, and 79.  Lactic acid 3.8 on arrival.  EKG showing atrial fibrillation with RVR at 149 bpm.    Patient presented with A-fib RVR, metabolic acidosis CHF.  Patient previously has not had an invasive cardiac work-up, noted to have nuclear stress test in 2020, low risk.  Patient be recommended for definitive evaluation of troponin elevation and reduced EF once kidney status and respiratory status improved. He is s/p C on 3/27 which showed mild irregularity and negative for obstructive CAD. Normal LVEDP. Consistent with NICM. Radial cath site wnl. H/h and kidney function stable. Denies CP.     #4.  Transaminitis/elevated bilirubin: ALT 30, AST 36, total bilirubin 5.4.  Stable and actually appears to be improving.  He does appear somewhat jaundiced upon exam.  We will need to closely monitor liver function with amiodarone.     #5. Deconditioning: PT/OT.  Likely will need ARU or rehab    #6.  DVT: Xarelto acute dosing for anticoagulation.     Plan for disposition: Continue 3 west.    Sultan and Tanya MCALLISTER to provide weekend coverage PRN            This document has been electronically signed by ARY Garcia on March 31, 2023 13:13 CDT   Electronically signed by ARY Garcia, 03/31/23, 1:13 PM CDT.

## 2023-03-31 NOTE — PLAN OF CARE
Goal Outcome Evaluation:  Plan of Care Reviewed With: patient        Progress: improving  Outcome Evaluation: OT tx completed. Pt sitting up in chair upon entering and agreeable to the session. Pt completed 10 reps x 2 sets of BUE exercises in all planes with 2lb dowel bing while sitting unsupported. Pt is CGA for sit<>stand t/f and for fxnl mobility with RW. Pt fatigues quickly. Pt min A for donning/doffing hospital gown while sitting EOB. Pt I for sit<>sup with HOB flat. Recommend d/c home with assist, HHOT vs rehab to home pending progress in therapy. Pt agrees with OT that it would not be safe for him to go home at this time, in addition pt reports that he does not have someone that can stay with him at his house.

## 2023-04-01 LAB
ALBUMIN SERPL-MCNC: 2.2 G/DL (ref 3.5–5.2)
ALBUMIN/GLOB SERPL: 0.9 G/DL
ALP SERPL-CCNC: 96 U/L (ref 39–117)
ALT SERPL W P-5'-P-CCNC: 34 U/L (ref 1–41)
ANION GAP SERPL CALCULATED.3IONS-SCNC: 6 MMOL/L (ref 5–15)
AST SERPL-CCNC: 43 U/L (ref 1–40)
BASOPHILS # BLD AUTO: 0.06 10*3/MM3 (ref 0–0.2)
BASOPHILS NFR BLD AUTO: 0.8 % (ref 0–1.5)
BILIRUB SERPL-MCNC: 4.4 MG/DL (ref 0–1.2)
BUN SERPL-MCNC: 14 MG/DL (ref 8–23)
BUN/CREAT SERPL: 15.1 (ref 7–25)
CALCIUM SPEC-SCNC: 8.1 MG/DL (ref 8.6–10.5)
CHLORIDE SERPL-SCNC: 98 MMOL/L (ref 98–107)
CO2 SERPL-SCNC: 29 MMOL/L (ref 22–29)
CREAT SERPL-MCNC: 0.93 MG/DL (ref 0.76–1.27)
DEPRECATED RDW RBC AUTO: 64.8 FL (ref 37–54)
EGFRCR SERPLBLD CKD-EPI 2021: 91.1 ML/MIN/1.73
EOSINOPHIL # BLD AUTO: 0.25 10*3/MM3 (ref 0–0.4)
EOSINOPHIL NFR BLD AUTO: 3.1 % (ref 0.3–6.2)
ERYTHROCYTE [DISTWIDTH] IN BLOOD BY AUTOMATED COUNT: 21 % (ref 12.3–15.4)
GLOBULIN UR ELPH-MCNC: 2.5 GM/DL
GLUCOSE SERPL-MCNC: 72 MG/DL (ref 65–99)
HCT VFR BLD AUTO: 46.1 % (ref 37.5–51)
HGB BLD-MCNC: 16.2 G/DL (ref 13–17.7)
IMM GRANULOCYTES # BLD AUTO: 0.09 10*3/MM3 (ref 0–0.05)
IMM GRANULOCYTES NFR BLD AUTO: 1.1 % (ref 0–0.5)
LYMPHOCYTES # BLD AUTO: 1.43 10*3/MM3 (ref 0.7–3.1)
LYMPHOCYTES NFR BLD AUTO: 18 % (ref 19.6–45.3)
MCH RBC QN AUTO: 31.2 PG (ref 26.6–33)
MCHC RBC AUTO-ENTMCNC: 35.1 G/DL (ref 31.5–35.7)
MCV RBC AUTO: 88.8 FL (ref 79–97)
MONOCYTES # BLD AUTO: 1.36 10*3/MM3 (ref 0.1–0.9)
MONOCYTES NFR BLD AUTO: 17.1 % (ref 5–12)
NEUTROPHILS NFR BLD AUTO: 4.76 10*3/MM3 (ref 1.7–7)
NEUTROPHILS NFR BLD AUTO: 59.9 % (ref 42.7–76)
NRBC BLD AUTO-RTO: 0 /100 WBC (ref 0–0.2)
PLATELET # BLD AUTO: 192 10*3/MM3 (ref 140–450)
PMV BLD AUTO: 10 FL (ref 6–12)
POTASSIUM SERPL-SCNC: 4.1 MMOL/L (ref 3.5–5.2)
PROT SERPL-MCNC: 4.7 G/DL (ref 6–8.5)
RBC # BLD AUTO: 5.19 10*6/MM3 (ref 4.14–5.8)
SODIUM SERPL-SCNC: 133 MMOL/L (ref 136–145)
WBC NRBC COR # BLD: 7.95 10*3/MM3 (ref 3.4–10.8)

## 2023-04-01 PROCEDURE — 80053 COMPREHEN METABOLIC PANEL: CPT | Performed by: INTERNAL MEDICINE

## 2023-04-01 PROCEDURE — 85025 COMPLETE CBC W/AUTO DIFF WBC: CPT | Performed by: INTERNAL MEDICINE

## 2023-04-01 PROCEDURE — 97530 THERAPEUTIC ACTIVITIES: CPT

## 2023-04-01 PROCEDURE — 97110 THERAPEUTIC EXERCISES: CPT

## 2023-04-01 PROCEDURE — 97116 GAIT TRAINING THERAPY: CPT

## 2023-04-01 RX ADMIN — ATORVASTATIN CALCIUM 40 MG: 40 TABLET, FILM COATED ORAL at 20:10

## 2023-04-01 RX ADMIN — AMIODARONE HYDROCHLORIDE 200 MG: 200 TABLET ORAL at 17:27

## 2023-04-01 RX ADMIN — Medication 12.5 MG: at 08:00

## 2023-04-01 RX ADMIN — DIGOXIN 125 MCG: 125 TABLET ORAL at 11:43

## 2023-04-01 RX ADMIN — SPIRONOLACTONE 25 MG: 25 TABLET ORAL at 08:00

## 2023-04-01 RX ADMIN — Medication 10 ML: at 08:05

## 2023-04-01 RX ADMIN — RIVAROXABAN 15 MG: 15 TABLET, FILM COATED ORAL at 08:01

## 2023-04-01 RX ADMIN — Medication 10 ML: at 20:10

## 2023-04-01 RX ADMIN — AMIODARONE HYDROCHLORIDE 200 MG: 200 TABLET ORAL at 08:01

## 2023-04-01 RX ADMIN — FUROSEMIDE 20 MG: 20 TABLET ORAL at 17:27

## 2023-04-01 RX ADMIN — FUROSEMIDE 20 MG: 20 TABLET ORAL at 08:01

## 2023-04-01 RX ADMIN — RIVAROXABAN 15 MG: 15 TABLET, FILM COATED ORAL at 17:27

## 2023-04-01 RX ADMIN — DOCUSATE SODIUM 100 MG: 100 CAPSULE, LIQUID FILLED ORAL at 17:31

## 2023-04-01 NOTE — THERAPY TREATMENT NOTE
Acute Care - Physical Therapy Treatment Note  [facility]     Patient Name: Pavan Mccauley  : 1955  MRN: [id number]  Today's Date: 2023      Visit Dx:     ICD-10-CM ICD-9-CM   1. Atrial fibrillation with RVR  I48.91 427.31   2. Urinary tract infection without hematuria, site unspecified  N39.0 599.0   3. Pulmonary vascular congestion  R09.89 514   4. NSTEMI, initial episode of care  I21.4 410.71   5. Biventricular CHF (congestive heart failure)  I50.82 428.0   6. Impaired mobility and ADLs  Z74.09 V49.89    Z78.9    7. Impaired functional mobility, balance, gait, and endurance  Z74.09 V49.89     Patient Active Problem List   Diagnosis   • General medical exam   • Malaise   • Hyperglycemia   • Cough   • Tick bite   • Screening for colon cancer   • Pneumonia due to organism   • Carotid artery aneurysm   • Dizziness   • SVT (supraventricular tachycardia)   • Atrial fibrillation (HCC)   • Dyspnea on exertion   • Atrial fibrillation with RVR   • Non-STEMI (non-ST elevated myocardial infarction)   • Biventricular heart failure with reduced left ventricular function   • Acute on chronic systolic (congestive) heart failure   • Leg DVT (deep venous thromboembolism), acute, bilateral   • Abnormal LFTs     Past Medical History:   Diagnosis Date   • Bronchopneumonia    • Chest pain    • Corneal foreign body     both eyes   • Neck pain    • Perforation of left tympanic membrane     history of   • Prashant [place] spotted fever    • Wheezing      Past Surgical History:   Procedure Laterality Date   • CARDIAC CATHETERIZATION N/A 3/27/2023    Procedure: Left Heart Cath;  Surgeon: Prince Charles DO;  Location: [facility] INVASIVE LOCATION;  Service: Cardiology;  Laterality: N/A;   • EYE FOREIGN BODY REMOVAL  2013    REMOVE FOREIGN BODY CORNEA W/SLIT LAMP 18857 (1)     • INJECTION OF MEDICATION  2011    Kenalog (1)      • SPINE SURGERY  1991    Exploration of the previous C5-6 fusion with refusion,  exploration of the C5 nerve root, left side   • TYMPANOPLASTY Left 05/16/1972    Perforation of the left tympanic membrane     PT Assessment (last 12 hours)     PT Evaluation and Treatment     Row Name 04/01/23 0854          Physical Therapy Time and Intention    Subjective Information complains of;pain  Pt reports his stomach pain has increased from yesterday. He states it is a dull pain that comes and goes.  -LW     Document Type therapy note (daily note)  -LW     Mode of Treatment individual therapy;physical therapy  -     Row Name 04/01/23 0854          General Information    Patient Profile Reviewed yes  -LW     Existing Precautions/Restrictions fall  -LW     Row Name 04/01/23 0854          Pain    Pretreatment Pain Rating 5/10  -LW     Posttreatment Pain Rating 3/10  -LW     Pain Location lower  -LW     Pain Location - abdomen  -     Pain Intervention(s) Repositioned;Nursing Notified;Ambulation/increased activity  -     Row Name 04/01/23 0854          Cognition    Orientation Status (Cognition) oriented x 4  -LW     Personal Safety Interventions fall prevention program maintained;gait belt;muscle strengthening facilitated;nonskid shoes/slippers when out of bed;supervised activity  -     Row Name 04/01/23 0854          Bed Mobility    Bed Mobility supine-sit;sit-supine  -     Supine-Sit [place] (Bed Mobility) modified independence  -     Sit-Supine [place] (Bed Mobility) modified independence  -     Assistive Device (Bed Mobility) bed rails;head of bed elevated  -     Row Name 04/01/23 0854          Transfers    Transfers sit-stand transfer;stand-sit transfer;chair-bed transfer  -     Row Name 04/01/23 0854          Chair-Bed Transfer    Chair-Bed [place] (Transfers) contact guard  -     Assistive Device (Chair-Bed Transfers) walker, front-wheeled  -     Row Name 04/01/23 0854          Sit-Stand Transfer    Sit-Stand [place] (Transfers) contact guard  -     Assistive  Device (Sit-Stand Transfers) walker, front-wheeled  -LW     Row Name 04/01/23 0854          Stand-Sit Transfer    Stand-Sit [place] (Transfers) contact guard  -LW     Assistive Device (Stand-Sit Transfers) walker, front-wheeled  -LW     Row Name 04/01/23 0854          Gait/Stairs (Locomotion)    [place] Level (Gait) contact guard  -LW     Assistive Device (Gait) walker, front-wheeled  Pt required frequent VC to set all 4 points of walker on ground instead of carrying during gait.  -LW     Distance in Feet (Gait) 1x40' and approx 6'  -LW     Pattern (Gait) step-through  -LW     Deviations/Abnormal Patterns (Gait) base of support, wide;misael decreased;gait speed decreased  -     Row Name 04/01/23 0854          Motor Skills    Therapeutic Exercise hip;knee;ankle  -     Row Name 04/01/23 0854          Hip (Therapeutic Exercise)    Hip (Therapeutic Exercise) strengthening exercise  -LW     Hip Strengthening (Therapeutic Exercise) bilateral;aBduction;aDduction;flexion;marching while seated;sitting;10 repetitions  -     Row Name 04/01/23 0854          Knee (Therapeutic Exercise)    Knee (Therapeutic Exercise) strengthening exercise  -LW     Knee Strengthening (Therapeutic Exercise) bilateral;LAQ (long arc quad);sitting;10 repetitions  -     Row Name 04/01/23 0854          Ankle (Therapeutic Exercise)    Ankle (Therapeutic Exercise) strengthening exercise  -LW     Ankle Strengthening (Therapeutic Exercise) bilateral;dorsiflexion;plantarflexion;sitting;20 repititions  -     Row Name 04/01/23 0854          Plan of Care Review    Plan of Care Reviewed With patient  -LW     Progress improving  -LW     Outcome Evaluation Pt performed sup<>sit with mod. independence and sit<>stand with CGA. Pt demonstrating progress as evidenced by pt's ability to ambulate 1x40' in room with FWW and CGA before requiring seated rest. Pt does require frequent VC to keep FWW on the ground during ambulation. He tolerated seated  LE strengthening exercises in chair with VSS and safely performed chair to bed transfer with CGA. Pt's HR fluctuated between 67-98 with activity and SpO2 remained above 90% on 2L. RN notified of noted B LE edema and pt's reported pre and post tx abdominal pain levels. Pt will benefit from skilled treatment to improve mobility, strength, endurance, and safety.  -     Row Name 04/01/23 0854          Vital Signs    Pre Systolic BP Rehab 98  -LW     Pre Treatment Diastolic BP 62  -LW     Post Systolic BP Rehab 102  -LW     Post Treatment Diastolic BP 70  -LW     Pretreatment Heart Rate (beats/min) 96  -LW     Intratreatment Heart Rate (beats/min) 67  HR ranged 67-98 during and post activity  -LW     Posttreatment Heart Rate (beats/min) 98  -LW     Pre SpO2 (%) 95  -LW     O2 Delivery Pre Treatment nasal cannula  2L  -LW     Intra SpO2 (%) 92  -LW     O2 Delivery Intra Treatment nasal cannula  2 L  -LW     Post SpO2 (%) 95  -LW     O2 Delivery Post Treatment nasal cannula  2 L  -LW     Pre Patient Position Supine  -LW     Intra Patient Position Sitting  -LW     Post Patient Position Supine  -LW     Row Name 04/01/23 0854          Positioning and Restraints    Pre-Treatment Position in bed  -LW     Post Treatment Position bed  -LW     In Bed supine;call light within reach;encouraged to call for assist;exit alarm on;notified nsg;side rails up x2;heels elevated  Noted B LE edema. Removed socks and elevated B heels and notified RN  -LW     Row Name 04/01/23 0854          Bed Mobility Goal 1 (PT)    Activity/Assistive Device (Bed Mobility Goal 1, PT) bed mobility activities, all  -LW     [place] Level/Cues Needed (Bed Mobility Goal 1, PT) independent  -LW     Time Frame (Bed Mobility Goal 1, PT) by discharge  -LW     Progress/Outcomes (Bed Mobility Goal 1, PT) goal not met  -LW     Row Name 04/01/23 0854          Transfer Goal 1 (PT)    Activity/Assistive Device (Transfer Goal 1, PT)  sit-to-stand/stand-to-sit;bed-to-chair/chair-to-bed  -LW     [place] Level/Cues Needed (Transfer Goal 1, PT) modified independence;independent  -LW     Time Frame (Transfer Goal 1, PT) by discharge  -LW     Progress/Outcome (Transfer Goal 1, PT) goal not met  -LW     Row Name 04/01/23 0854          Gait Training Goal 1 (PT)    Activity/Assistive Device (Gait Training Goal 1, PT) gait (walking locomotion);decrease fall risk;increase endurance/gait distance  -LW     [place] Level (Gait Training Goal 1, PT) modified independence;independent  -LW     Distance (Gait Training Goal 1, PT) 150ft each trip  -LW     Time Frame (Gait Training Goal 1, PT) 5 days  -LW     Progress/Outcome (Gait Training Goal 1, PT) goal not met  -LW     Row Name 04/01/23 0854          ROM Goal 1 (PT)    ROM Goal 1 (PT) Pt will tolerate LE exercises OOB in chair with VSS  -LW     Time Frame (ROM Goal 1, PT) by discharge  -LW     Progress/Outcome (ROM Goal 1, PT) goal met  -LW     Row Name 04/01/23 0854          Stairs Goal 1 (PT)    Activity/Assistive Device (Stairs Goal 1, PT) ascending stairs;descending stairs;using handrail, left;using handrail, right  -LW     [place] Level/Cues Needed (Stairs Goal 1, PT) modified independence  -LW     Number of Stairs (Stairs Goal 1, PT) 3  -LW     Time Frame (Stairs Goal 1, PT) by discharge  -LW     Progress/Outcome (Stairs Goal 1, PT) goal not met  -LW           User Key  (r) = Recorded By, (t) = Taken By, (c) = Cosigned By    Initials Name Provider Type    Maxine Michel PTA Physical Therapist Assistant                Physical Therapy Education     Title: PT OT SLP Therapies (In Progress)     Topic: Physical Therapy (In Progress)     Point: Mobility training (In Progress)     Learning Progress Summary           Patient Acceptance, E, NR by ORAL at 3/28/2023 2336                   Point: Home exercise program (Not Started)     Learner Progress:  Not documented in this visit.           Point: Body mechanics (In Progress)     Learning Progress Summary           Patient Acceptance, E, NR by ORAL at 3/28/2023 1235                   Point: Precautions (In Progress)     Learning Progress Summary           Patient Acceptance, E, NR by ORAL at 3/28/2023 1235                               User Key     Initials Effective Dates Name Provider Type Discipline    ORAL 06/16/21 -  Blanca Marie, PT Physical Therapist PT              PT Recommendation and Plan     Plan of Care Reviewed With: patient  Progress: improving  Outcome Evaluation: Pt performed sup<>sit with mod. independence and sit<>stand with CGA. Pt demonstrating progress as evidenced by pt's ability to ambulate 1x40' in room with FWW and CGA before requiring seated rest. Pt does require frequent VC to keep FWW on the ground during ambulation. He tolerated seated LE strengthening exercises in chair with VSS and safely performed chair to bed transfer with CGA. Pt's HR fluctuated between 67-98 with activity and SpO2 remained above 90% on 2L. RN notified of noted B LE edema and pt's reported pre and post tx abdominal pain levels. Pt will benefit from skilled treatment to improve mobility, strength, endurance, and safety.   Outcome Measures     Row Name 04/01/23 0854             How much help from another person do you currently need...    Turning from your back to your side while in flat bed without using bedrails? 4  -LW      Moving from lying on back to sitting on the side of a flat bed without bedrails? 3  -LW      Moving to and from a bed to a chair (including a wheelchair)? 3  -LW      Standing up from a chair using your arms (e.g., wheelchair, bedside chair)? 3  -LW      Climbing 3-5 steps with a railing? 3  -LW      To walk in hospital room? 3  -LW      AM-PAC 6 Clicks Score (PT) 19  -LW            User Key  (r) = Recorded By, (t) = Taken By, (c) = Cosigned By    Initials Name Provider Type    Maxine Michel PTA Physical Therapist Assistant                  Time Calculation:    PT Charges     Row Name 04/01/23 1004             Time Calculation    Start Time 0854  -LW      Stop Time 0948  -LW      Time Calculation (min) 54 min  -LW      PT Received On 04/01/23  -LW         Time Calculation- PT    Total Timed Code Minutes- PT 54 minute(s)  -LW         Timed Charges    75946 - PT Therapeutic Exercise Minutes 15  -LW      57954 - Gait Training Minutes  15  -LW      10120 - PT Therapeutic Activity Minutes 24  -LW         Total Minutes    Timed Charges Total Minutes 54  -LW       Total Minutes 54  -LW            User Key  (r) = Recorded By, (t) = Taken By, (c) = Cosigned By    Initials Name Provider Type    LW Maxine Kelsey PTA Physical Therapist Assistant              Therapy Charges for Today     Code Description Service Date Service Provider Modifiers Qty    28692116656 HC PT THER PROC EA 15 MIN 4/1/2023 Maxine Kelsey PTA GP 1    12268180162 HC GAIT TRAINING EA 15 MIN 4/1/2023 Maxine Kelsey PTA GP 1    84285772561 HC PT THERAPEUTIC ACT EA 15 MIN 4/1/2023 Maxine Kelsey PTA GP 2          PT G-Codes  Outcome Measure Options: AM-PAC 6 Clicks Daily Activity (OT)  AM-PAC 6 Clicks Score (PT): 20  AM-PAC 6 Clicks Score (OT): 22    Maxine Kelsey PTA  4/1/2023

## 2023-04-01 NOTE — PLAN OF CARE
Problem: Dysrhythmia  Goal: Normalized Cardiac Rhythm  Outcome: Ongoing, Progressing  Intervention: Monitor and Manage Cardiac Rhythm Effect  Recent Flowsheet Documentation  Taken 3/31/2023 2030 by Licha Alaniz RN  VTE Prevention/Management: (see mar) other (see comments)     Problem: Asthma Comorbidity  Goal: Maintenance of Asthma Control  Outcome: Ongoing, Progressing     Problem: Behavioral Health Comorbidity  Goal: Maintenance of Behavioral Health Symptom Control  Outcome: Ongoing, Progressing     Problem: COPD (Chronic Obstructive Pulmonary Disease) Comorbidity  Goal: Maintenance of COPD Symptom Control  Outcome: Ongoing, Progressing  Intervention: Maintain COPD-Symptom Control  Recent Flowsheet Documentation  Taken 3/31/2023 2030 by Licha Alaniz, RN  Supportive Measures: active listening utilized     Problem: Diabetes Comorbidity  Goal: Blood Glucose Level Within Targeted Range  Outcome: Ongoing, Progressing     Problem: Heart Failure Comorbidity  Goal: Maintenance of Heart Failure Symptom Control  Outcome: Ongoing, Progressing     Problem: Hypertension Comorbidity  Goal: Blood Pressure in Desired Range  Outcome: Ongoing, Progressing     Problem: Obstructive Sleep Apnea Risk or Actual Comorbidity Management  Goal: Unobstructed Breathing During Sleep  Outcome: Ongoing, Progressing     Problem: Osteoarthritis Comorbidity  Goal: Maintenance of Osteoarthritis Symptom Control  Outcome: Ongoing, Progressing  Intervention: Maintain Osteoarthritis Symptom Control  Recent Flowsheet Documentation  Taken 3/31/2023 2212 by Licha Alaniz, RN  Activity Management: activity adjusted per tolerance  Assistive Device Utilized: walker  Taken 3/31/2023 2125 by Licha Alaniz RN  Activity Management: activity adjusted per tolerance  Assistive Device Utilized: walker  Taken 3/31/2023 2030 by Licha Alaniz RN  Activity Management: activity adjusted per tolerance  Assistive Device Utilized: walker  Taken 3/31/2023 1910 by  Licha Alaniz RN  Activity Management: activity adjusted per tolerance  Assistive Device Utilized: walker     Problem: Pain Chronic (Persistent) (Comorbidity Management)  Goal: Acceptable Pain Control and Functional Ability  Outcome: Ongoing, Progressing  Intervention: Optimize Psychosocial Wellbeing  Recent Flowsheet Documentation  Taken 3/31/2023 2030 by Licha Alaniz RN  Supportive Measures: active listening utilized     Problem: Seizure Disorder Comorbidity  Goal: Maintenance of Seizure Control  Outcome: Ongoing, Progressing     Problem: Adult Inpatient Plan of Care  Goal: Plan of Care Review  Outcome: Ongoing, Progressing  Flowsheets (Taken 4/1/2023 0518)  Progress: no change  Plan of Care Reviewed With: patient  Goal: Patient-Specific Goal (Individualized)  Outcome: Ongoing, Progressing  Goal: Absence of Hospital-Acquired Illness or Injury  Outcome: Ongoing, Progressing  Intervention: Identify and Manage Fall Risk  Recent Flowsheet Documentation  Taken 3/31/2023 2212 by Licha Alaniz RN  Safety Promotion/Fall Prevention: safety round/check completed  Taken 3/31/2023 2125 by Licha Alaniz RN  Safety Promotion/Fall Prevention: safety round/check completed  Taken 3/31/2023 2030 by Licha Alaniz RN  Safety Promotion/Fall Prevention: safety round/check completed  Taken 3/31/2023 1910 by Licha Alaniz RN  Safety Promotion/Fall Prevention: safety round/check completed  Intervention: Prevent Skin Injury  Recent Flowsheet Documentation  Taken 3/31/2023 2030 by Licha Alaniz RN  Body Position:   tilted   right  Intervention: Prevent and Manage VTE (Venous Thromboembolism) Risk  Recent Flowsheet Documentation  Taken 3/31/2023 2212 by Licha Alaniz RN  Activity Management: activity adjusted per tolerance  Taken 3/31/2023 2125 by Licha Alaniz RN  Activity Management: activity adjusted per tolerance  Taken 3/31/2023 2030 by Licha Alaniz RN  Activity Management: activity adjusted per tolerance  VTE  Prevention/Management: (see mar) other (see comments)  Taken 3/31/2023 1910 by Licha Alaniz, RN  Activity Management: activity adjusted per tolerance  Goal: Optimal Comfort and Wellbeing  Outcome: Ongoing, Progressing  Intervention: Provide Person-Centered Care  Recent Flowsheet Documentation  Taken 3/31/2023 2030 by Licha Alaniz RN  Trust Relationship/Rapport: care explained  Goal: Readiness for Transition of Care  Outcome: Ongoing, Progressing     Problem: Skin Injury Risk Increased  Goal: Skin Health and Integrity  Outcome: Ongoing, Progressing  Intervention: Optimize Skin Protection  Recent Flowsheet Documentation  Taken 3/31/2023 2030 by Licha Alaniz RN  Pressure Reduction Techniques: frequent weight shift encouraged  Head of Bed (HOB) Positioning: HOB elevated     Problem: Fall Injury Risk  Goal: Absence of Fall and Fall-Related Injury  Outcome: Ongoing, Progressing  Intervention: Promote Injury-Free Environment  Recent Flowsheet Documentation  Taken 3/31/2023 2212 by Licha Alaniz, RN  Safety Promotion/Fall Prevention: safety round/check completed  Taken 3/31/2023 2125 by Licha Alaniz RN  Safety Promotion/Fall Prevention: safety round/check completed  Taken 3/31/2023 2030 by Licha Alaniz RN  Safety Promotion/Fall Prevention: safety round/check completed  Taken 3/31/2023 1910 by Licha Alaniz RN  Safety Promotion/Fall Prevention: safety round/check completed   Goal Outcome Evaluation:  Plan of Care Reviewed With: patient        Progress: no change

## 2023-04-01 NOTE — PROGRESS NOTES
[facility] Medicine Services  INPATIENT PROGRESS NOTE      Length of Stay: 12  Date of Admission: 3/20/2023  Primary Care Physician: Dilcia Kapoor APRN    Subjective   Chief Complaint:  Shortness of breath with heart palpitations  HPI:  67-year-old male comes to the ER stating he does not feel well.  He has not felt well for a while.  Patient states he has not taken any of his medicines for the last several months.  He reports having history of A-fib.  Patient is a poor historian, but states he is short of breath that is worse with exertion.     Daily assessment  Patient seen and examined April 1, 2023.  The patient is currently sitting up in bed.  He remains very weak.  He remains short of breath on exertion.  He states that overall things are slightly better.  He has no chest pain.  No fevers or chills.  Current blood pressure is 90/64.  He is tolerating current medication regimen currently on amiodarone and remains relatively stable.  He has no headache or dizziness.  His current heart rate is 97.  O2 saturation is 93% on 2 L.  He deysi afebrile.     Review of Systems   Constitutional: Positive for fatigue. Negative for chills and fever.   HENT: Negative.    Eyes: Negative.    Respiratory: Positive for shortness of breath. Negative for chest tightness.    Cardiovascular: Negative for chest pain and palpitations.   Gastrointestinal: Negative.    Endocrine: Negative.    Genitourinary: Negative.    Musculoskeletal: Negative.    Skin: Negative.    Neurological: Positive for weakness. Negative for dizziness.   Hematological: Negative.    Psychiatric/Behavioral: Negative.    Skin slightly jaundiced in coloration      All pertinent negatives and positives are as above. All other systems have been reviewed and are negative unless otherwise stated.     Objective    As of today 04/01/23  Temp:  [96.8 °F (36 °C)-98.7 °F (37.1 °C)] 98.7 °F (37.1 °C)  Heart Rate:   [] 97  Resp:  [16-18] 18  BP: ()/(56-69) 90/64     Physical Exam  Vitals and nursing note reviewed.   HENT:      Head: Normocephalic and atraumatic.      Right Ear: External ear normal.      Left Ear: External ear normal.      Nose: Nose normal.      Mouth/Throat:      Mouth: Mucous membranes are dry.      Pharynx: Oropharynx is clear.   Eyes:      General: No scleral icterus.     Extraocular Movements: Extraocular movements intact.      Conjunctiva/sclera: Conjunctivae normal.      Pupils: Pupils are equal, round, and reactive to light.   Cardiovascular:      Rate and Rhythm: Normal rate. Rhythm irregular.      Pulses: Normal pulses.      Heart sounds: Normal heart sounds.   Pulmonary:      Effort: Pulmonary effort is normal.      Breath sounds: Normal breath sounds.      Comments: Decreased breath sounds at the bases otherwise clear  Abdominal:      General: Bowel sounds are normal. There is distension.      Palpations: Abdomen is soft.      Tenderness: There is no abdominal tenderness.      Comments: Soft nontender nondistended normal active bowel sounds   Musculoskeletal:         General: Normal range of motion.      Cervical back: Normal range of motion and neck supple.      Right lower leg: Edema present.      Left lower leg: Edema present.      Comments: Venous stasis changes bilateral lower extremities  Edema improving   Skin:     General: Skin is warm and dry.      Capillary Refill: Capillary refill takes less than 2 seconds.      Coloration: Skin is not jaundiced.   Neurological:      General: No focal deficit present.      Mental Status: He is alert and oriented to person, place, and time.      Motor: Weakness present.   Psychiatric:         Mood and Affect: Mood normal.         Behavior: Behavior normal.           Results Review:  I have reviewed the labs, radiology results, and diagnostic studies.    Laboratory Data:   Results from last 7 days   Lab Units 04/01/23  0749 03/31/23  0596  03/30/23  0546   SODIUM mmol/L 133* 133* 133*   POTASSIUM mmol/L 4.1 3.8 4.2   CHLORIDE mmol/L 98 97* 96*   CO2 mmol/L 29.0 28.0 28.0   BUN mg/dL 14 14 16   CREATININE mg/dL 0.93 0.83 0.93   GLUCOSE mg/dL 72 72 87   CALCIUM mg/dL 8.1* 8.1* 8.2*   BILIRUBIN mg/dL 4.4* 5.4* 6.8*   ALK PHOS U/L 96 88 91   ALT (SGPT) U/L 34 30 34   AST (SGOT) U/L 43* 36 47*   ANION GAP mmol/L 6.0 8.0 9.0     Estimated Creatinine Clearance: 82.1 mL/min (by C-G formula based on SCr of 0.93 mg/dL).  Results from last 7 days   Lab Units 03/31/23  0530 03/29/23  0517 03/28/23  0557   MAGNESIUM mg/dL 1.9 2.3 1.9         Results from last 7 days   Lab Units 04/01/23  0749 03/31/23  0530 03/30/23  0546 03/29/23  0517 03/28/23  0557   WBC 10*3/mm3 7.95 8.05 9.09 10.05 8.24   HEMOGLOBIN g/dL 16.2 16.0 17.4 17.1 16.1   HEMATOCRIT % 46.1 45.0 48.2 47.6 44.5   PLATELETS 10*3/mm3 192 171 138* 127* 113*           Culture Data:   No results found for: BLOODCX  No results found for: URINECX  No results found for: RESPCX  No results found for: WOUNDCX  No results found for: STOOLCX  No components found for: BODYFLD    Radiology Data:   Imaging Results (Last 24 Hours)     ** No results found for the last 24 hours. **      Results for orders placed during the hospital encounter of 03/20/23    Adult Transthoracic Echo Complete W/ Cont if Necessary Per Protocol    Interpretation Summary  •  Left ventricular ejection fraction appears to be 21 - 25%.  •  The left ventricular cavity is dilated.  •  Left ventricular wall thickness is consistent with hypertrophy.  •  Left ventricular diastolic dysfunction is noted.  •  Moderately reduced right ventricular systolic function noted.  •  The right ventricular cavity is severely dilated.  •  Left atrial volume is severely increased.  •  The right atrial cavity is severely  dilated.  •  Estimated right ventricular systolic pressure from tricuspid regurgitation is mildly elevated (35-45 mmHg).      I have reviewed the  patient's current medications.   Scheduled Meds:amiodarone, 200 mg, Oral, BID With Meals  [START ON 4/5/2023] amiodarone, 200 mg, Oral, Q24H  atorvastatin, 40 mg, Oral, Nightly  digoxin, 125 mcg, Oral, Daily  furosemide, 20 mg, Oral, BID  metoprolol succinate XL, 12.5 mg, Oral, Q24H  phytonadione (VITAMIN K) IVPB, 5 mg, Intravenous, Once  rivaroxaban, 15 mg, Oral, BID With Meals  sodium chloride, 10 mL, Intravenous, Q12H  spironolactone, 25 mg, Oral, Daily      Continuous Infusions:hold, 1 each      PRN Meds:.•  [DISCONTINUED] acetaminophen **OR** [DISCONTINUED] acetaminophen **OR** acetaminophen  •  acetaminophen  •  docusate sodium  •  hold  •  hydrocortisone-bacitracin-zinc oxide-nystatin  •  magnesium sulfate **OR** magnesium sulfate **OR** magnesium sulfate  •  ondansetron  •  potassium chloride **OR** potassium chloride **OR** potassium chloride  •  sodium chloride  •  sodium chloride  Assessment/Plan     Principal Problem:    Atrial fibrillation with RVR  Active Problems:    Non-STEMI (non-ST elevated myocardial infarction)    Biventricular heart failure with reduced left ventricular function    Leg DVT (deep venous thromboembolism), acute, bilateral    Abnormal LFTs    Assessment and Plan    Type II MI secondary to RVR and fluid overload  Continue current management.  Continue fluid restriction 1.5 L/day and salt restriction.  Continue IV Lasix.  - 3/28 status post PCI 3/27 with nonobstructive CAD noted.    Bilateral DVTs:  - 3/30 bilateral ultrasound shows DVTs in patient's lower extremities.  Patient already on Xarelto, so we will continue Xarelto management of DVTs.  Patient would benefit from outpatient follow-up by hematology once discharged to assure resolution of DVT issue.    Acute on chronic, systolic CHF  Will continue with lasix bid; monitor intake and output  - As above.  Improving on IV Lasix and Aldactone    Atrial fibrillation with RVR  Cardiologist will be consulted and appreciate their  assistance. High [id number] score; currently tolerating rivaroxaban therapy.  Cardiac rehab.  Troponin elevated but being followed by cardiology  Left heart cath once fluid status has been stabilized.  Continue Xarelto 20 mg daily  Continue amiodarone at current dosing.  Continue digoxin.  Continue metoprolol.  Anticoagulation with Xarelto      Hyperbilirubinemia:  Bilirubin continues to trend downward.  Liver function test ALT is 34 AST is 43.  Total bili is 4.4.  We will continue to monitor    RUTH:  - Appreciate nephrology assistance.  Etiology possibly A-fib with RVR related versus cardiorenal syndrome.  Continue Lasix.  BUN is 14 creatinine 0.93 and GFR is 91.  Remains stable  We will monitor    Hypertension  - currently suffering from hypotension  Currently on metoprolol 12.5 mg daily.  And Aldactone 25 daily      CODE STATUS full code  DVT prophylaxis Xarelto    Medical Decision Making  Number and Complexity of problems: 3 complex problems  Differential Diagnosis: Atrial fibrillation versus acute coronary syndrome    Conditions and Status:        Condition is unchanged.     MDM Data  External documents reviewed: Hospital charts  My EKG interpretation:  EKG completed 3/20/2023.  Rhythm: atrial fibrillation   Rate: tachycardic   Clinical impression: abnormal ECG    My plain film interpretation:  Chest x-ray completed 3/20/2023.  IMPRESSION:  Right basilar pleural effusion, right lower lobe  compressive atelectasis, cardiomegaly and mild central vascular  Congestion.   Tests considered but not ordered: None     Decision rules/scores evaluated (example [id number], Wells, etc): [id number] high and on Rivaroxaban     Discussed with: Patient and agrees to current treatment plan     Treatment Plan  As above    Care Planning  Shared decision making: Patient updated on current status and informed of proposed care plan; is in agreement with plan  Code status and discussions: Full code    Disposition  Social  Determinants of Health that impact treatment or disposition: N/A        I have utilized all available immediate resources to obtain, update, or review the patient's current medications (including all prescriptions, over-the-counter products, herbals, cannabis/cannabidiol products, and vitamin/mineral/dietary (nutritional) supplements).     I confirmed that the patient's Advance Care Plan is present, code status is documented, or surrogate decision maker is listed in the patient's medical record.    Discharge Planning:   - 3/28 transferred out of ICU to telemetry floor.  - I expect patient to be discharged home hospital within the next 72 hours once amiodarone loading completed, if RVR resolved, and cardiac/nephrology consults agrees with hospital disposition.      Patient may be appropriate for short-term rehab/SNF at time of hospital disposition.  Patient currently working with PT/OT to determine if patient is short-term rehabilitation candidate.    Gurvinder David DO     Addendum 3/30 5:57 PM  Ultrasound bilaterally shows DVTs.  We will therefore increase patient's rivaroxaban from 20 mg once a day to 15 mg twice a day for 20 days then return to 20 mg daily.

## 2023-04-01 NOTE — PROGRESS NOTES
Nutrition Services    Patient Name:  Pavan Mccauley  YOB: 1955  MRN: [id number]  Admit Date:  3/20/2023  Nutrition:  Pt visited to offer menu suggestions and alternatives.  He declined most items.  Unhappy about menu items and fluid restrictions.  He still wants milk as his main fluid.  We will send spaghetti when it is available.  Obtained him a pimento sandwich.  I feel he is tired and a little down due to his prolonged hospitalization.  Will continue to monitor POC and make suggestions as appropriate.      Electronically signed by:  Amalia Costa RD  04/01/23 12:33 CDT

## 2023-04-01 NOTE — PLAN OF CARE
Goal Outcome Evaluation:  Plan of Care Reviewed With: patient        Progress: improving  Outcome Evaluation: Pt performed sup<>sit with mod. independence and sit<>stand with CGA. Pt demonstrating progress as evidenced by pt's ability to ambulate 1x40' in room with FWW and CGA before requiring seated rest. Pt does require frequent VC to keep FWW on the ground during ambulation. He tolerated seated LE strengthening exercises in chair with VSS and safely performed chair to bed transfer with CGA. Pt's HR fluctuated between 67-98 with activity and SpO2 remained above 90% on 2L. RN notified of noted B LE edema and pt's reported pre and post tx abdominal pain levels. Pt will benefit from skilled treatment to improve mobility, strength, endurance, and safety.

## 2023-04-01 NOTE — NURSING NOTE
Pt is complaining to whoever will listen.  He wants more to drink but pt is on a fluid restriction and pt is upset that we do not have Cocoa puffs.

## 2023-04-02 LAB
ALBUMIN SERPL-MCNC: 2.2 G/DL (ref 3.5–5.2)
ALBUMIN/GLOB SERPL: 0.8 G/DL
ALP SERPL-CCNC: 105 U/L (ref 39–117)
ALT SERPL W P-5'-P-CCNC: 38 U/L (ref 1–41)
ANION GAP SERPL CALCULATED.3IONS-SCNC: 8 MMOL/L (ref 5–15)
AST SERPL-CCNC: 50 U/L (ref 1–40)
BASOPHILS # BLD AUTO: 0.08 10*3/MM3 (ref 0–0.2)
BASOPHILS NFR BLD AUTO: 1 % (ref 0–1.5)
BILIRUB SERPL-MCNC: 3.8 MG/DL (ref 0–1.2)
BUN SERPL-MCNC: 17 MG/DL (ref 8–23)
BUN/CREAT SERPL: 21 (ref 7–25)
CALCIUM SPEC-SCNC: 8.1 MG/DL (ref 8.6–10.5)
CHLORIDE SERPL-SCNC: 101 MMOL/L (ref 98–107)
CO2 SERPL-SCNC: 25 MMOL/L (ref 22–29)
CREAT SERPL-MCNC: 0.81 MG/DL (ref 0.76–1.27)
DEPRECATED RDW RBC AUTO: 62.9 FL (ref 37–54)
EGFRCR SERPLBLD CKD-EPI 2021: 97.8 ML/MIN/1.73
EOSINOPHIL # BLD AUTO: 0.34 10*3/MM3 (ref 0–0.4)
EOSINOPHIL NFR BLD AUTO: 4 % (ref 0.3–6.2)
ERYTHROCYTE [DISTWIDTH] IN BLOOD BY AUTOMATED COUNT: 21 % (ref 12.3–15.4)
GLOBULIN UR ELPH-MCNC: 2.6 GM/DL
GLUCOSE SERPL-MCNC: 80 MG/DL (ref 65–99)
HCT VFR BLD AUTO: 44.1 % (ref 37.5–51)
HGB BLD-MCNC: 15.9 G/DL (ref 13–17.7)
IMM GRANULOCYTES # BLD AUTO: 0.17 10*3/MM3 (ref 0–0.05)
IMM GRANULOCYTES NFR BLD AUTO: 2 % (ref 0–0.5)
LYMPHOCYTES # BLD AUTO: 1.57 10*3/MM3 (ref 0.7–3.1)
LYMPHOCYTES NFR BLD AUTO: 18.7 % (ref 19.6–45.3)
MCH RBC QN AUTO: 31.2 PG (ref 26.6–33)
MCHC RBC AUTO-ENTMCNC: 36.1 G/DL (ref 31.5–35.7)
MCV RBC AUTO: 86.5 FL (ref 79–97)
MONOCYTES # BLD AUTO: 1.27 10*3/MM3 (ref 0.1–0.9)
MONOCYTES NFR BLD AUTO: 15.1 % (ref 5–12)
NEUTROPHILS NFR BLD AUTO: 4.98 10*3/MM3 (ref 1.7–7)
NEUTROPHILS NFR BLD AUTO: 59.2 % (ref 42.7–76)
NRBC BLD AUTO-RTO: 0 /100 WBC (ref 0–0.2)
PLATELET # BLD AUTO: 204 10*3/MM3 (ref 140–450)
PMV BLD AUTO: 9.5 FL (ref 6–12)
POTASSIUM SERPL-SCNC: 3.9 MMOL/L (ref 3.5–5.2)
PROT SERPL-MCNC: 4.8 G/DL (ref 6–8.5)
QT INTERVAL: 368 MS
QTC INTERVAL: 452 MS
RBC # BLD AUTO: 5.1 10*6/MM3 (ref 4.14–5.8)
SODIUM SERPL-SCNC: 134 MMOL/L (ref 136–145)
WBC NRBC COR # BLD: 8.41 10*3/MM3 (ref 3.4–10.8)

## 2023-04-02 PROCEDURE — 97535 SELF CARE MNGMENT TRAINING: CPT

## 2023-04-02 PROCEDURE — 85025 COMPLETE CBC W/AUTO DIFF WBC: CPT | Performed by: INTERNAL MEDICINE

## 2023-04-02 PROCEDURE — 80053 COMPREHEN METABOLIC PANEL: CPT | Performed by: INTERNAL MEDICINE

## 2023-04-02 RX ADMIN — Medication 10 ML: at 21:38

## 2023-04-02 RX ADMIN — AMIODARONE HYDROCHLORIDE 200 MG: 200 TABLET ORAL at 08:24

## 2023-04-02 RX ADMIN — RIVAROXABAN 15 MG: 15 TABLET, FILM COATED ORAL at 08:25

## 2023-04-02 RX ADMIN — FUROSEMIDE 20 MG: 20 TABLET ORAL at 08:24

## 2023-04-02 RX ADMIN — Medication 12.5 MG: at 08:25

## 2023-04-02 RX ADMIN — FUROSEMIDE 20 MG: 20 TABLET ORAL at 17:19

## 2023-04-02 RX ADMIN — Medication 10 ML: at 08:25

## 2023-04-02 RX ADMIN — SPIRONOLACTONE 25 MG: 25 TABLET ORAL at 08:24

## 2023-04-02 RX ADMIN — AMIODARONE HYDROCHLORIDE 200 MG: 200 TABLET ORAL at 17:19

## 2023-04-02 RX ADMIN — RIVAROXABAN 15 MG: 15 TABLET, FILM COATED ORAL at 17:19

## 2023-04-02 RX ADMIN — DIGOXIN 125 MCG: 125 TABLET ORAL at 11:38

## 2023-04-02 NOTE — PROGRESS NOTES
[facility] Medicine Services  INPATIENT PROGRESS NOTE      Length of Stay: 13  Date of Admission: 3/20/2023  Primary Care Physician: Dilcia Kapoor APRN    Subjective   Chief Complaint:  Shortness of breath with heart palpitations  HPI:  67-year-old male comes to the ER stating he does not feel well.  He has not felt well for a while.  Patient states he has not taken any of his medicines for the last several months.  He reports having history of A-fib.  Patient is a poor historian, but states he is short of breath that is worse with exertion.     Daily assessment  Patient seen and examined April 2, 2023.  Upon evaluation this morning the patient is awake and alert.  He states he is starting to feel a little bit better overall.  He continues to have some shortness of breath on exertion however overall he feels he is getting stronger and shortness of breath is improving.  Evaluation of lower extremity shows some venous stasis changes however minimal edema is noted.  Homans' sign is negative.  The patient denies any chest pain headache or dizziness.  He is requesting to shower today.  He is also on a fluid restriction and is wanting boost.  As long as he stays within his fluid restriction limits this should be fine.     Review of Systems   Constitutional: Positive for fatigue. Negative for chills and fever.   HENT: Negative.    Eyes: Negative.    Respiratory: Positive for shortness of breath. Negative for chest tightness.         Less shortness of breath on exertion.   Cardiovascular: Negative for chest pain and palpitations.   Gastrointestinal: Negative.    Endocrine: Negative.    Genitourinary: Negative.    Musculoskeletal: Negative.    Skin: Negative.    Neurological: Positive for weakness. Negative for dizziness.        Overall remains weak however he feels as though he is getting stronger.  Requesting permission to shower   Hematological: Negative.     Psychiatric/Behavioral: Negative.    Skin slightly jaundiced in coloration      All pertinent negatives and positives are as above. All other systems have been reviewed and are negative unless otherwise stated.     Objective    As of today 04/02/23  Temp:  [96.4 °F (35.8 °C)-98.8 °F (37.1 °C)] 97.4 °F (36.3 °C)  Heart Rate:  [] 90  Resp:  [18] 18  BP: ()/(54-66) 100/66     Physical Exam  Vitals and nursing note reviewed.   HENT:      Head: Normocephalic and atraumatic.      Right Ear: External ear normal.      Left Ear: External ear normal.      Nose: Nose normal.      Mouth/Throat:      Mouth: Mucous membranes are moist.      Pharynx: Oropharynx is clear.   Eyes:      General: No scleral icterus.     Extraocular Movements: Extraocular movements intact.      Conjunctiva/sclera: Conjunctivae normal.      Pupils: Pupils are equal, round, and reactive to light.   Neck:      Comments: No JVD or bruits  Cardiovascular:      Rate and Rhythm: Normal rate. Rhythm irregular.      Pulses: Normal pulses.      Heart sounds: Normal heart sounds.   Pulmonary:      Effort: Pulmonary effort is normal.      Breath sounds: Normal breath sounds.      Comments: Decreased breath sounds at the bases otherwise clear  Abdominal:      General: Bowel sounds are normal.      Palpations: Abdomen is soft.      Tenderness: There is no abdominal tenderness.      Comments: Soft nontender nondistended normal active bowel sounds   Musculoskeletal:         General: Normal range of motion.      Cervical back: Normal range of motion and neck supple.      Right lower leg: Edema present.      Left lower leg: Edema present.      Comments: Venous stasis changes bilateral lower extremities  Edema improving   Skin:     General: Skin is warm and dry.      Capillary Refill: Capillary refill takes less than 2 seconds.      Coloration: Skin is not jaundiced.   Neurological:      General: No focal deficit present.      Mental Status: He is alert and  oriented to person, place, and time.      Motor: Weakness present.   Psychiatric:         Mood and Affect: Mood normal.         Behavior: Behavior normal.           Results Review:  I have reviewed the labs, radiology results, and diagnostic studies.    Laboratory Data:   Results from last 7 days   Lab Units 04/02/23  0514 04/01/23  0749 03/31/23  0530   SODIUM mmol/L 134* 133* 133*   POTASSIUM mmol/L 3.9 4.1 3.8   CHLORIDE mmol/L 101 98 97*   CO2 mmol/L 25.0 29.0 28.0   BUN mg/dL 17 14 14   CREATININE mg/dL 0.81 0.93 0.83   GLUCOSE mg/dL 80 72 72   CALCIUM mg/dL 8.1* 8.1* 8.1*   BILIRUBIN mg/dL 3.8* 4.4* 5.4*   ALK PHOS U/L 105 96 88   ALT (SGPT) U/L 38 34 30   AST (SGOT) U/L 50* 43* 36   ANION GAP mmol/L 8.0 6.0 8.0     Estimated Creatinine Clearance: 93.9 mL/min (by C-G formula based on SCr of 0.81 mg/dL).  Results from last 7 days   Lab Units 03/31/23  0530 03/29/23  0517 03/28/23  0557   MAGNESIUM mg/dL 1.9 2.3 1.9         Results from last 7 days   Lab Units 04/02/23  0514 04/01/23  0749 03/31/23  0530 03/30/23  0546 03/29/23  0517   WBC 10*3/mm3 8.41 7.95 8.05 9.09 10.05   HEMOGLOBIN g/dL 15.9 16.2 16.0 17.4 17.1   HEMATOCRIT % 44.1 46.1 45.0 48.2 47.6   PLATELETS 10*3/mm3 204 192 171 138* 127*           Culture Data:   No results found for: BLOODCX  No results found for: URINECX  No results found for: RESPCX  No results found for: WOUNDCX  No results found for: STOOLCX  No components found for: BODYFLD    Radiology Data:   Imaging Results (Last 24 Hours)     ** No results found for the last 24 hours. **      Results for orders placed during the hospital encounter of 03/20/23    Adult Transthoracic Echo Complete W/ Cont if Necessary Per Protocol    Interpretation Summary  •  Left ventricular ejection fraction appears to be 21 - 25%.  •  The left ventricular cavity is dilated.  •  Left ventricular wall thickness is consistent with hypertrophy.  •  Left ventricular diastolic dysfunction is noted.  •  Moderately  reduced right ventricular systolic function noted.  •  The right ventricular cavity is severely dilated.  •  Left atrial volume is severely increased.  •  The right atrial cavity is severely  dilated.  •  Estimated right ventricular systolic pressure from tricuspid regurgitation is mildly elevated (35-45 mmHg).      I have reviewed the patient's current medications.   Scheduled Meds:amiodarone, 200 mg, Oral, BID With Meals  [START ON 4/5/2023] amiodarone, 200 mg, Oral, Q24H  atorvastatin, 40 mg, Oral, Nightly  digoxin, 125 mcg, Oral, Daily  furosemide, 20 mg, Oral, BID  metoprolol succinate XL, 12.5 mg, Oral, Q24H  phytonadione (VITAMIN K) IVPB, 5 mg, Intravenous, Once  rivaroxaban, 15 mg, Oral, BID With Meals  sodium chloride, 10 mL, Intravenous, Q12H  spironolactone, 25 mg, Oral, Daily      Continuous Infusions:hold, 1 each      PRN Meds:.•  [DISCONTINUED] acetaminophen **OR** [DISCONTINUED] acetaminophen **OR** acetaminophen  •  acetaminophen  •  docusate sodium  •  hold  •  hydrocortisone-bacitracin-zinc oxide-nystatin  •  magnesium sulfate **OR** magnesium sulfate **OR** magnesium sulfate  •  ondansetron  •  potassium chloride **OR** potassium chloride **OR** potassium chloride  •  sodium chloride  •  sodium chloride  Assessment/Plan     Principal Problem:    Atrial fibrillation with RVR  Active Problems:    Non-STEMI (non-ST elevated myocardial infarction)    Biventricular heart failure with reduced left ventricular function    Leg DVT (deep venous thromboembolism), acute, bilateral    Abnormal LFTs    Assessment and Plan    Type II MI secondary to RVR and fluid overload  Continue current management.  Continue fluid restriction 1.5 L/day and salt restriction.  Continue IV Lasix.  - 3/28 status post PCI 3/27 with nonobstructive CAD noted.  Remained stable at this point.  The patient appears as though he will benefit from skilled nursing facility on discharge.      Bilateral DVTs:  - 3/30 bilateral ultrasound  shows DVTs in patient's lower extremities.  Patient already on Xarelto, will continue Xarelto management of DVTs.  Patient would benefit from outpatient follow-up by hematology once discharged to assure resolution of DVT issue..  Remains stable.    Acute on chronic, systolic CHF  Will continue with lasix bid; monitor intake and output   Improving on IV Lasix and Aldactone  Continue current care and monitor    Atrial fibrillation with RVR  Cardiologist will be consulted and appreciate their assistance. High [id number] score; currently tolerating rivaroxaban therapy.  Cardiac rehab.  Troponin elevated but being followed by cardiology  Left heart cath once fluid status has been stabilized.  Continue Xarelto 20 mg daily  Continue amiodarone at current dosing.  Continue digoxin.  And metoprolol.  Anticoagulation with Xarelto      Hyperbilirubinemia:  Bilirubin continues to trend downward.  Liver function test ALT is 38 AST is 50 total bili is 3.8  Currently on statin for hyperlipidemia we will hold and continue to monitor liver function test.  We will continue to monitor    RUTH:  Resolved  - Appreciate nephrology assistance.  Etiology possibly A-fib with RVR related versus cardiorenal syndrome.  Continue Lasix.      Hypertension  Current blood pressure 100/66  O2 saturation 94% 2 L  Currently on metoprolol 12.5 mg daily.  And Aldactone 25 daily    Deconditioning  Continue PT OT  We will need skilled nursing facility on discharge.      CODE STATUS full code  DVT prophylaxis Xarelto    Medical Decision Making  Number and Complexity of problems: 3 complex problems  Differential Diagnosis: Atrial fibrillation versus acute coronary syndrome    Conditions and Status:        Condition is unchanged.     [facility] Data  External documents reviewed: Hospital charts  My EKG interpretation:  EKG completed 3/20/2023.  Rhythm: atrial fibrillation   Rate: tachycardic   Clinical impression: abnormal ECG    My plain film interpretation:  Chest  x-ray completed 3/20/2023.  IMPRESSION:  Right basilar pleural effusion, right lower lobe  compressive atelectasis, cardiomegaly and mild central vascular  Congestion.   Tests considered but not ordered: None     Decision rules/scores evaluated (example [id number], Wells, etc): [id number] high and on Rivaroxaban     Discussed with: Patient and agrees to current treatment plan     Treatment Plan  As above    Care Planning  Shared decision making: Patient updated on current status and informed of proposed care plan; is in agreement with plan  Code status and discussions: Full code    Disposition  Social Determinants of Health that impact treatment or disposition: N/A        I have utilized all available immediate resources to obtain, update, or review the patient's current medications (including all prescriptions, over-the-counter products, herbals, cannabis/cannabidiol products, and vitamin/mineral/dietary (nutritional) supplements).     I confirmed that the patient's Advance Care Plan is present, code status is documented, or surrogate decision maker is listed in the patient's medical record.    Discharge Planning:   - 3/28 transferred out of ICU to telemetry floor.  - I expect patient to be discharged home hospital within the next 72 hours once amiodarone loading completed, if RVR resolved, and cardiac/nephrology consults agrees with hospital disposition.      Patient may be appropriate for short-term rehab/SNF at time of hospital disposition.  Patient currently working with PT/OT to determine if patient is short-term rehabilitation candidate.    Gurvinder David DO     Addendum 3/30 5:57 PM  Ultrasound bilaterally shows DVTs.  We will therefore increase patient's rivaroxaban from 20 mg once a day to 15 mg twice a day for 20 days then return to 20 mg daily.

## 2023-04-02 NOTE — PLAN OF CARE
Problem: Dysrhythmia  Goal: Normalized Cardiac Rhythm  Outcome: Ongoing, Progressing  Intervention: Monitor and Manage Cardiac Rhythm Effect  Recent Flowsheet Documentation  Taken 4/1/2023 2010 by Licha Alaniz RN  VTE Prevention/Management: (see mar) --     Problem: Asthma Comorbidity  Goal: Maintenance of Asthma Control  Outcome: Ongoing, Progressing     Problem: Behavioral Health Comorbidity  Goal: Maintenance of Behavioral Health Symptom Control  Outcome: Ongoing, Progressing     Problem: COPD (Chronic Obstructive Pulmonary Disease) Comorbidity  Goal: Maintenance of COPD Symptom Control  Outcome: Ongoing, Progressing     Problem: Diabetes Comorbidity  Goal: Blood Glucose Level Within Targeted Range  Outcome: Ongoing, Progressing     Problem: Heart Failure Comorbidity  Goal: Maintenance of Heart Failure Symptom Control  Outcome: Ongoing, Progressing     Problem: Hypertension Comorbidity  Goal: Blood Pressure in Desired Range  Outcome: Ongoing, Progressing     Problem: Obstructive Sleep Apnea Risk or Actual Comorbidity Management  Goal: Unobstructed Breathing During Sleep  Outcome: Ongoing, Progressing     Problem: Osteoarthritis Comorbidity  Goal: Maintenance of Osteoarthritis Symptom Control  Outcome: Ongoing, Progressing  Intervention: Maintain Osteoarthritis Symptom Control  Recent Flowsheet Documentation  Taken 4/2/2023 0405 by Licha Alaniz, RN  Activity Management: activity adjusted per tolerance  Taken 4/2/2023 0205 by Licha Alaniz, RN  Activity Management: activity adjusted per tolerance  Assistive Device Utilized: walker  Taken 4/2/2023 0005 by Licha Alaniz, RN  Activity Management: activity adjusted per tolerance  Assistive Device Utilized: walker  Taken 4/1/2023 2220 by Licha Alaniz, RN  Activity Management: activity adjusted per tolerance  Assistive Device Utilized: walker  Taken 4/1/2023 2120 by Licha Alaniz, RN  Activity Management: activity adjusted per tolerance  Assistive Device  Utilized: walker  Taken 4/1/2023 2010 by Licha Alaniz, RN  Activity Management: activity adjusted per tolerance  Taken 4/1/2023 1910 by Licha Alaniz RN  Activity Management: activity adjusted per tolerance  Assistive Device Utilized: walker     Problem: Pain Chronic (Persistent) (Comorbidity Management)  Goal: Acceptable Pain Control and Functional Ability  Outcome: Ongoing, Progressing     Problem: Seizure Disorder Comorbidity  Goal: Maintenance of Seizure Control  Outcome: Ongoing, Progressing     Problem: Adult Inpatient Plan of Care  Goal: Plan of Care Review  Outcome: Ongoing, Progressing  Flowsheets (Taken 4/2/2023 0546)  Progress: improving  Plan of Care Reviewed With: patient  Goal: Patient-Specific Goal (Individualized)  Outcome: Ongoing, Progressing  Goal: Absence of Hospital-Acquired Illness or Injury  Outcome: Ongoing, Progressing  Intervention: Identify and Manage Fall Risk  Recent Flowsheet Documentation  Taken 4/2/2023 0405 by Licha Alaniz RN  Safety Promotion/Fall Prevention: safety round/check completed  Taken 4/2/2023 0205 by Licha Alaniz RN  Safety Promotion/Fall Prevention: safety round/check completed  Taken 4/2/2023 0005 by Licha Alaniz RN  Safety Promotion/Fall Prevention: safety round/check completed  Taken 4/1/2023 2220 by Licha Alaniz RN  Safety Promotion/Fall Prevention: safety round/check completed  Taken 4/1/2023 2120 by Licha Alaniz RN  Safety Promotion/Fall Prevention: safety round/check completed  Taken 4/1/2023 2010 by Licha Alaniz RN  Safety Promotion/Fall Prevention: safety round/check completed  Taken 4/1/2023 1910 by Licha Alaniz RN  Safety Promotion/Fall Prevention: safety round/check completed  Intervention: Prevent Skin Injury  Recent Flowsheet Documentation  Taken 4/2/2023 0405 by Licha Alaniz RN  Body Position:   left   tilted  Taken 4/2/2023 0205 by Licha Alaniz RN  Body Position:   right   side-lying  Taken 4/2/2023 0005 by Licha Alaniz  RN  Body Position: sitting up in bed  Taken 4/1/2023 2220 by Licha Alaniz RN  Body Position: supine  Taken 4/1/2023 2010 by Licha Alaniz RN  Body Position:   right   side-lying  Intervention: Prevent and Manage VTE (Venous Thromboembolism) Risk  Recent Flowsheet Documentation  Taken 4/2/2023 0405 by Licha Alaniz, RN  Activity Management: activity adjusted per tolerance  Taken 4/2/2023 0205 by Licha Alaniz RN  Activity Management: activity adjusted per tolerance  Taken 4/2/2023 0005 by Licha Alaniz RN  Activity Management: activity adjusted per tolerance  Taken 4/1/2023 2220 by Licha Alaniz RN  Activity Management: activity adjusted per tolerance  Taken 4/1/2023 2120 by Licha Alaniz RN  Activity Management: activity adjusted per tolerance  Taken 4/1/2023 2010 by Licha Alaniz RN  Activity Management: activity adjusted per tolerance  VTE Prevention/Management: (see mar) --  Taken 4/1/2023 1910 by Licha Alaniz, RN  Activity Management: activity adjusted per tolerance  Goal: Optimal Comfort and Wellbeing  Outcome: Ongoing, Progressing  Intervention: Provide Person-Centered Care  Recent Flowsheet Documentation  Taken 4/1/2023 2010 by Licha Alaniz RN  Trust Relationship/Rapport: care explained  Goal: Readiness for Transition of Care  Outcome: Ongoing, Progressing     Problem: Skin Injury Risk Increased  Goal: Skin Health and Integrity  Outcome: Ongoing, Progressing  Intervention: Optimize Skin Protection  Recent Flowsheet Documentation  Taken 4/2/2023 0205 by Licha Alaniz RN  Head of Bed (HOB) Positioning: HOB elevated  Taken 4/1/2023 2220 by Licha Alaniz RN  Head of Bed (HOB) Positioning: HOB elevated  Taken 4/1/2023 2010 by Licha Alaniz RN  Head of Bed (HOB) Positioning: HOB elevated     Problem: Fall Injury Risk  Goal: Absence of Fall and Fall-Related Injury  Outcome: Ongoing, Progressing  Intervention: Promote Injury-Free Environment  Recent Flowsheet Documentation  Taken 4/2/2023  0405 by Licha Alaniz RN  Safety Promotion/Fall Prevention: safety round/check completed  Taken 4/2/2023 0205 by Licha Alaniz RN  Safety Promotion/Fall Prevention: safety round/check completed  Taken 4/2/2023 0005 by Licha Alaniz RN  Safety Promotion/Fall Prevention: safety round/check completed  Taken 4/1/2023 2220 by Licha Alaniz RN  Safety Promotion/Fall Prevention: safety round/check completed  Taken 4/1/2023 2120 by Licha Alaniz RN  Safety Promotion/Fall Prevention: safety round/check completed  Taken 4/1/2023 2010 by Licha Alaniz, RN  Safety Promotion/Fall Prevention: safety round/check completed  Taken 4/1/2023 1910 by Licha Alaniz RN  Safety Promotion/Fall Prevention: safety round/check completed   Goal Outcome Evaluation:  Plan of Care Reviewed With: patient        Progress: improving

## 2023-04-02 NOTE — PLAN OF CARE
Problem: Dysrhythmia  Goal: Normalized Cardiac Rhythm  4/2/2023 1832 by Caitlin Whiting RN  Outcome: Ongoing, Progressing  4/2/2023 1831 by Caitlin Whiting RN  Outcome: Ongoing, Progressing  4/2/2023 1830 by Caitlin Whiting RN  Outcome: Ongoing, Progressing     Problem: Asthma Comorbidity  Goal: Maintenance of Asthma Control  4/2/2023 1832 by Caitlin Whiting RN  Outcome: Ongoing, Progressing  4/2/2023 1831 by Caitlin Whiting RN  Outcome: Ongoing, Progressing  4/2/2023 1830 by Caitlin Whiting RN  Outcome: Ongoing, Progressing  Intervention: Maintain Asthma Symptom Control  Recent Flowsheet Documentation  Taken 4/2/2023 0827 by Caitlin Whiting RN  Medication Review/Management: medications reviewed     Problem: Behavioral Health Comorbidity  Goal: Maintenance of Behavioral Health Symptom Control  4/2/2023 1832 by Caitlin Whiting RN  Outcome: Ongoing, Progressing  4/2/2023 1831 by Caitlin Whiting RN  Outcome: Ongoing, Progressing  4/2/2023 1830 by Caitlin Whiting RN  Outcome: Ongoing, Progressing  Intervention: Maintain Behavioral Health Symptom Control  Recent Flowsheet Documentation  Taken 4/2/2023 0827 by Caitlin Whiting RN  Medication Review/Management: medications reviewed     Problem: COPD (Chronic Obstructive Pulmonary Disease) Comorbidity  Goal: Maintenance of COPD Symptom Control  4/2/2023 1832 by Caitlin Whiting RN  Outcome: Ongoing, Progressing  4/2/2023 1831 by Caitlin Whiting RN  Outcome: Ongoing, Progressing  4/2/2023 1830 by Caitlin Whiting RN  Outcome: Ongoing, Progressing  Intervention: Maintain COPD-Symptom Control  Recent Flowsheet Documentation  Taken 4/2/2023 0827 by Caitlin Whiting RN  Medication Review/Management: medications reviewed   Goal Outcome Evaluation:  Plan of Care Reviewed With: patient        Progress: no change  Outcome Evaluation: OT assisted pt in shower today, pt denies pain or discomfort, heart rhythm remains in a fib with controlled rate, vital signs stable.

## 2023-04-02 NOTE — PLAN OF CARE
Goal Outcome Evaluation:  Plan of Care Reviewed With: patient        Progress: improving  Outcome Evaluation: Pt performed sup<>sit-Mod I w/ HOB elevated. Sit<>stand-CGA. Pt amb ~5' to shower CGA of 1 w/ RW. Shower t/f CGA. UB/LB bathing-SBA w/ LH sponge while seated in shower chair. UB dressing-SBA. LB dressing (socks) dep. Oral care-set up.

## 2023-04-02 NOTE — THERAPY TREATMENT NOTE
Patient Name: Pavan Mccauley  : 1955    MRN: [id number]                              Today's Date: 2023       Admit Date: 3/20/2023    Visit Dx:     ICD-10-CM ICD-9-CM   1. Atrial fibrillation with RVR  I48.91 427.31   2. Urinary tract infection without hematuria, site unspecified  N39.0 599.0   3. Pulmonary vascular congestion  R09.89 514   4. NSTEMI, initial episode of care  I21.4 410.71   5. Biventricular CHF (congestive heart failure)  I50.82 428.0   6. Impaired mobility and ADLs  Z74.09 V49.89    Z78.9    7. Impaired functional mobility, balance, gait, and endurance  Z74.09 V49.89     Patient Active Problem List   Diagnosis   • General medical exam   • Malaise   • Hyperglycemia   • Cough   • Tick bite   • Screening for colon cancer   • Pneumonia due to organism   • Carotid artery aneurysm   • Dizziness   • SVT (supraventricular tachycardia)   • Atrial fibrillation (HCC)   • Dyspnea on exertion   • Atrial fibrillation with RVR   • Non-STEMI (non-ST elevated myocardial infarction)   • Biventricular heart failure with reduced left ventricular function   • Acute on chronic systolic (congestive) heart failure   • Leg DVT (deep venous thromboembolism), acute, bilateral   • Abnormal LFTs     Past Medical History:   Diagnosis Date   • Bronchopneumonia    • Chest pain    • Corneal foreign body     both eyes   • Neck pain    • Perforation of left tympanic membrane     history of   • Prashant [place] spotted fever    • Wheezing      Past Surgical History:   Procedure Laterality Date   • CARDIAC CATHETERIZATION N/A 3/27/2023    Procedure: Left Heart Cath;  Surgeon: [place] DO;  Location: [facility] INVASIVE LOCATION;  Service: Cardiology;  Laterality: N/A;   • EYE FOREIGN BODY REMOVAL  2013    REMOVE FOREIGN BODY CORNEA W/SLIT LAMP 28284 (1)     • INJECTION OF MEDICATION  2011    Kenalog (1)      • SPINE SURGERY  1991    Exploration of the previous C5-6 fusion with refusion,  exploration of the C5 nerve root, left side   • TYMPANOPLASTY Left 05/16/1972    Perforation of the left tympanic membrane      General Information     Row Name 04/02/23 0821          OT Time and Intention    Document Type therapy note (daily note)  -     Mode of Treatment individual therapy;occupational therapy  -     Row Name 04/02/23 0821          General Information    Patient Profile Reviewed yes  -KD     Existing Precautions/Restrictions fall  -KD     Row Name 04/02/23 0821          Cognition    Orientation Status (Cognition) oriented x 4  -KD     Row Name 04/02/23 0821          Safety Issues, Functional Mobility    Impairments Affecting Function (Mobility) balance;endurance/activity tolerance;strength  -KD           User Key  (r) = Recorded By, (t) = Taken By, (c) = Cosigned By    Initials Name Provider Type     Shelby Carrillo COTA Occupational Therapist Assistant                 Mobility/ADL's     Row Name 04/02/23 0821          Bed Mobility    Supine-Sit [place] (Bed Mobility) modified independence  -KD     Sit-Supine [place] (Bed Mobility) modified independence  -KD     Assistive Device (Bed Mobility) bed rails;head of bed elevated  -KD     Row Name 04/02/23 0821          Sit-Stand Transfer    Sit-Stand [place] (Transfers) contact guard  -KD     Assistive Device (Sit-Stand Transfers) walker, front-wheeled  -KD     Row Name 04/02/23 0821          Stand-Sit Transfer    Stand-Sit [place] (Transfers) contact guard  -KD     Assistive Device (Stand-Sit Transfers) walker, front-wheeled  -KD     Row Name 04/02/23 0821          Functional Mobility    Functional Mobility- Ind. Level contact guard assist  -KD     Functional Mobility- Device walker, front-wheeled  -     Functional Mobility-Distance (Feet) 10  -KD     Row Name 04/02/23 0821          Activities of Daily Living    BADL Assessment/Intervention bathing;upper body dressing;lower body dressing;grooming;toileting  -     Row  Name 04/02/23 0821          Bathing Assessment/Intervention    [place] Level (Bathing) bathing skills;upper body;lower body;standby assist  -KD     Assistive Devices (Bathing) bath mitt;grab bar, tub/shower;long-handled sponge;shower chair  -KD     Position (Bathing) unsupported sitting  -KD     Row Name 04/02/23 0821          Upper Body Dressing Assessment/Training    [place] Level (Upper Body Dressing) upper body dressing skills;doff;don;standby assist  -KD     Position (Upper Body Dressing) edge of bed sitting  -KD     Row Name 04/02/23 0821          Lower Body Dressing Assessment/Training    [place] Level (Lower Body Dressing) lower body dressing skills;don;socks;maximum assist (25% patient effort)  -KD     Position (Lower Body Dressing) edge of bed sitting  -KD     Row Name 04/02/23 0821          Grooming Assessment/Training    [place] Level (Grooming) grooming skills;wash face, hands;oral care regimen;set up  -KD     Position (Grooming) long sitting  -KD           User Key  (r) = Recorded By, (t) = Taken By, (c) = Cosigned By    Initials Name Provider Type    KD Shelby Carrillo COTA Occupational Therapist Assistant               Obj/Interventions    No documentation.                Goals/Plan     Row Name 04/02/23 0821          Transfer Goal 1 (OT)    Activity/Assistive Device (Transfer Goal 1, OT) transfers, all  -KD     [place] Level/Cues Needed (Transfer Goal 1, OT) independent  -KD     Time Frame (Transfer Goal 1, OT) long term goal (LTG);by discharge  -KD     Progress/Outcome (Transfer Goal 1, OT) goal not met  -     Row Name 04/02/23 0821          Bathing Goal 1 (OT)    Activity/Device (Bathing Goal 1, OT) bathing skills, all  -KD     [place] Level/Cues Needed (Bathing Goal 1, OT) independent  -KD     Time Frame (Bathing Goal 1, OT) long term goal (LTG);by discharge  -KD     Progress/Outcomes (Bathing Goal 1, OT) goal not met  -     Row Name 04/02/23 0821           Dressing Goal 1 (OT)    Activity/Device (Dressing Goal 1, OT) dressing skills, all  -KD     [place]/Cues Needed (Dressing Goal 1, OT) independent  -KD     Time Frame (Dressing Goal 1, OT) long term goal (LTG);by discharge  -KD     Progress/Outcome (Dressing Goal 1, OT) goal not met  -KD           User Key  (r) = Recorded By, (t) = Taken By, (c) = Cosigned By    Initials Name Provider Type     Shelby Carrillo COTA Occupational Therapist Assistant               Clinical Impression     Row Name 04/02/23 1253          Pain Assessment    Pretreatment Pain Rating 0/10 - no pain  -KD     Posttreatment Pain Rating 0/10 - no pain  -KD     Row Name 04/02/23 1253          Plan of Care Review    Plan of Care Reviewed With patient  -KD     Progress improving  -KD     Outcome Evaluation Pt performed sup<>sit-Mod I w/ HOB elevated. Sit<>stand-CGA. Pt amb ~5' to shower CGA of 1 w/ RW. Shower t/f CGA. UB/LB bathing-SBA w/ LH sponge while seated in shower chair. UB dressing-SBA. LB dressing (socks) dep. Oral care-set up.  -KD     Row Name 04/02/23 1253          Therapy Assessment/Plan (OT)    Therapy Frequency (OT) other (see comments)  5-7 days a week  -KD     Row Name 04/02/23 1253          Therapy Plan Review/Discharge Plan (OT)    Anticipated Discharge Disposition (OT) home with assist;home with home health;other (see comments)  -KD     Row Name 04/02/23 1253          Vital Signs    Pre Systolic BP Rehab 110  -KD     Pre Treatment Diastolic BP 64  -KD     Pretreatment Heart Rate (beats/min) 93  -KD     Pre SpO2 (%) 94  -KD     O2 Delivery Pre Treatment room air  -KD     Pre Patient Position Supine  -KD     Intra Patient Position Standing  -KD     Post Patient Position Sitting  -KD     Row Name 04/02/23 1253          Positioning and Restraints    Post Treatment Position chair  -KD     In Chair reclined;call light within reach;encouraged to call for assist;exit alarm on  -KD           User Key  (r) = Recorded By, (t) =  Taken By, (c) = Cosigned By    Initials Name Provider Type    Shelby Ann COTA Occupational Therapist Assistant               Outcome Measures     Row Name 04/02/23 0821          How much help from another is currently needed...    Putting on and taking off regular lower body clothing? 3  -KD     Bathing (including washing, rinsing, and drying) 3  -KD     Toileting (which includes using toilet bed pan or urinal) 4  -KD     Putting on and taking off regular upper body clothing 4  -KD     Taking care of personal grooming (such as brushing teeth) 4  -KD     Eating meals 4  -KD     AM-PAC 6 Clicks Score (OT) 22  -KD     Row Name 04/02/23 0827          How much help from another person do you currently need...    Turning from your back to your side while in flat bed without using bedrails? 4  -EB     Moving from lying on back to sitting on the side of a flat bed without bedrails? 4  -EB     Moving to and from a bed to a chair (including a wheelchair)? 3  -EB     Standing up from a chair using your arms (e.g., wheelchair, bedside chair)? 3  -EB     Climbing 3-5 steps with a railing? 3  -EB     To walk in hospital room? 3  -EB     AM-PAC 6 Clicks Score (PT) 20  -EB     Highest level of mobility 6 --> Walked 10 steps or more  -EB           User Key  (r) = Recorded By, (t) = Taken By, (c) = Cosigned By    Initials Name Provider Type    Caitlin gN RN Registered Nurse    Shelby Ann COTA Occupational Therapist Assistant                Occupational Therapy Education     Title: PT OT SLP Therapies (In Progress)     Topic: Occupational Therapy (In Progress)     Point: ADL training (Done)     Description:   Instruct learner(s) on proper safety adaptation and remediation techniques during self care or transfers.   Instruct in proper use of assistive devices.              Learning Progress Summary           Patient Acceptance, ALMA ROSA GLOVER, GALO by  at 3/31/2023 [id number]    Comment: POC    AcceptanceADALBERTO,ALMA ROSA, GALO by  at  3/28/2023 1123    Comment: OT role, POC, t/f training                   Point: Home exercise program (Not Started)     Description:   Instruct learner(s) on appropriate technique for monitoring, assisting and/or progressing therapeutic exercises/activities.              Learner Progress:  Not documented in this visit.          Point: Precautions (Done)     Description:   Instruct learner(s) on prescribed precautions during self-care and functional transfers.              Learning Progress Summary           Patient Acceptance, E,TB, VU by  at 3/31/2023 1049    Comment: POC    Acceptance, E,TB, VU by  at 3/28/2023 1123    Comment: OT role, POC, t/f training                   Point: Body mechanics (Done)     Description:   Instruct learner(s) on proper positioning and spine alignment during self-care, functional mobility activities and/or exercises.              Learning Progress Summary           Patient Acceptance, E,TB, VU by  at 3/31/2023 1049    Comment: POC    Acceptance, E,TB, VU by  at 3/28/2023 1123    Comment: OT role, POC, t/f training                               User Key     Initials Effective Dates Name Provider Type Discipline     10/19/22 -  Hallie Caraballo OT Occupational Therapist OT              OT Recommendation and Plan  Therapy Frequency (OT): other (see comments) (5-7 days a week)  Plan of Care Review  Plan of Care Reviewed With: patient  Progress: improving  Outcome Evaluation: Pt performed sup<>sit-Mod I w/ HOB elevated. Sit<>stand-CGA. Pt amb ~5' to shower CGA of 1 w/ RW. Shower t/f CGA. UB/LB bathing-SBA w/ LH sponge while seated in shower chair. UB dressing-SBA. LB dressing (socks) dep. Oral care-set up.     Time Calculation:    Time Calculation- OT     Row Name 04/02/23 0821             Time Calculation- OT    OT Start Time 0821  -KD      OT Stop Time 0930  -KD      OT Time Calculation (min) 69 min  -KD      Total Timed Code Minutes- OT 69 minute(s)  -KD      OT Received On  04/02/23  -KD         Timed Charges    66099 - OT Self Care/Mgmt Minutes 69  -KD         Total Minutes    Timed Charges Total Minutes 69  -KD       Total Minutes 69  -KD            User Key  (r) = Recorded By, (t) = Taken By, (c) = Cosigned By    Initials Name Provider Type    Shelby Ann COTA Occupational Therapist Assistant              Therapy Charges for Today     Code Description Service Date Service Provider Modifiers Qty    [id number] HC OT SELF CARE/MGMT/TRAIN EA 15 MIN 4/2/2023 Shelby Carrillo COTA GO 5               EMILY Corbett  4/2/2023

## 2023-04-03 LAB
ALBUMIN SERPL-MCNC: 2.3 G/DL (ref 3.5–5.2)
ALBUMIN/GLOB SERPL: 0.9 G/DL
ALP SERPL-CCNC: 113 U/L (ref 39–117)
ALT SERPL W P-5'-P-CCNC: 45 U/L (ref 1–41)
ANION GAP SERPL CALCULATED.3IONS-SCNC: 8 MMOL/L (ref 5–15)
AST SERPL-CCNC: 57 U/L (ref 1–40)
BASOPHILS # BLD AUTO: 0.09 10*3/MM3 (ref 0–0.2)
BASOPHILS NFR BLD AUTO: 0.9 % (ref 0–1.5)
BILIRUB SERPL-MCNC: 4.4 MG/DL (ref 0–1.2)
BUN SERPL-MCNC: 15 MG/DL (ref 8–23)
BUN/CREAT SERPL: 21.1 (ref 7–25)
CALCIUM SPEC-SCNC: 8.1 MG/DL (ref 8.6–10.5)
CHLORIDE SERPL-SCNC: 100 MMOL/L (ref 98–107)
CO2 SERPL-SCNC: 25 MMOL/L (ref 22–29)
CREAT SERPL-MCNC: 0.71 MG/DL (ref 0.76–1.27)
DEPRECATED RDW RBC AUTO: 61.2 FL (ref 37–54)
EGFRCR SERPLBLD CKD-EPI 2021: 101.8 ML/MIN/1.73
EOSINOPHIL # BLD AUTO: 0.28 10*3/MM3 (ref 0–0.4)
EOSINOPHIL NFR BLD AUTO: 2.9 % (ref 0.3–6.2)
ERYTHROCYTE [DISTWIDTH] IN BLOOD BY AUTOMATED COUNT: 20.8 % (ref 12.3–15.4)
GLOBULIN UR ELPH-MCNC: 2.7 GM/DL
GLUCOSE SERPL-MCNC: 71 MG/DL (ref 65–99)
HCT VFR BLD AUTO: 45.2 % (ref 37.5–51)
HGB BLD-MCNC: 16.4 G/DL (ref 13–17.7)
IMM GRANULOCYTES # BLD AUTO: 0.18 10*3/MM3 (ref 0–0.05)
IMM GRANULOCYTES NFR BLD AUTO: 1.9 % (ref 0–0.5)
LYMPHOCYTES # BLD AUTO: 1.89 10*3/MM3 (ref 0.7–3.1)
LYMPHOCYTES NFR BLD AUTO: 19.7 % (ref 19.6–45.3)
MAGNESIUM SERPL-MCNC: 2 MG/DL (ref 1.6–2.4)
MCH RBC QN AUTO: 31 PG (ref 26.6–33)
MCHC RBC AUTO-ENTMCNC: 36.3 G/DL (ref 31.5–35.7)
MCV RBC AUTO: 85.4 FL (ref 79–97)
MONOCYTES # BLD AUTO: 1.36 10*3/MM3 (ref 0.1–0.9)
MONOCYTES NFR BLD AUTO: 14.2 % (ref 5–12)
NEUTROPHILS NFR BLD AUTO: 5.81 10*3/MM3 (ref 1.7–7)
NEUTROPHILS NFR BLD AUTO: 60.4 % (ref 42.7–76)
NRBC BLD AUTO-RTO: 0 /100 WBC (ref 0–0.2)
PLATELET # BLD AUTO: 224 10*3/MM3 (ref 140–450)
PMV BLD AUTO: 9.5 FL (ref 6–12)
POTASSIUM SERPL-SCNC: 4 MMOL/L (ref 3.5–5.2)
PROT SERPL-MCNC: 5 G/DL (ref 6–8.5)
RBC # BLD AUTO: 5.29 10*6/MM3 (ref 4.14–5.8)
SODIUM SERPL-SCNC: 133 MMOL/L (ref 136–145)
WBC NRBC COR # BLD: 9.61 10*3/MM3 (ref 3.4–10.8)

## 2023-04-03 PROCEDURE — 97530 THERAPEUTIC ACTIVITIES: CPT

## 2023-04-03 PROCEDURE — 97535 SELF CARE MNGMENT TRAINING: CPT

## 2023-04-03 PROCEDURE — 97116 GAIT TRAINING THERAPY: CPT

## 2023-04-03 PROCEDURE — 85025 COMPLETE CBC W/AUTO DIFF WBC: CPT | Performed by: INTERNAL MEDICINE

## 2023-04-03 PROCEDURE — 80053 COMPREHEN METABOLIC PANEL: CPT | Performed by: INTERNAL MEDICINE

## 2023-04-03 PROCEDURE — 83735 ASSAY OF MAGNESIUM: CPT | Performed by: HOSPITALIST

## 2023-04-03 RX ORDER — FUROSEMIDE 20 MG/1
20 TABLET ORAL 2 TIMES DAILY
Qty: 60 TABLET | Refills: 0 | Status: SHIPPED | OUTPATIENT
Start: 2023-04-03 | End: 2023-04-13 | Stop reason: SDUPTHER

## 2023-04-03 RX ORDER — DIGOXIN 125 MCG
125 TABLET ORAL
Qty: 30 TABLET | Refills: 0 | Status: SHIPPED | OUTPATIENT
Start: 2023-04-03 | End: 2023-04-03 | Stop reason: SDUPTHER

## 2023-04-03 RX ORDER — AMIODARONE HYDROCHLORIDE 200 MG/1
200 TABLET ORAL
Qty: 30 TABLET | Refills: 0 | Status: SHIPPED | OUTPATIENT
Start: 2023-04-05 | End: 2023-04-13 | Stop reason: SDUPTHER

## 2023-04-03 RX ORDER — AMIODARONE HYDROCHLORIDE 200 MG/1
200 TABLET ORAL
Qty: 30 TABLET | Refills: 0 | Status: SHIPPED | OUTPATIENT
Start: 2023-04-05 | End: 2023-04-03 | Stop reason: SDUPTHER

## 2023-04-03 RX ORDER — DIGOXIN 125 MCG
125 TABLET ORAL
Qty: 30 TABLET | Refills: 0 | Status: SHIPPED | OUTPATIENT
Start: 2023-04-03 | End: 2023-04-13 | Stop reason: SDUPTHER

## 2023-04-03 RX ORDER — FUROSEMIDE 20 MG/1
20 TABLET ORAL 2 TIMES DAILY
Qty: 60 TABLET | Refills: 0 | Status: SHIPPED | OUTPATIENT
Start: 2023-04-03 | End: 2023-04-03 | Stop reason: SDUPTHER

## 2023-04-03 RX ORDER — SPIRONOLACTONE 25 MG/1
25 TABLET ORAL DAILY
Qty: 30 TABLET | Refills: 0 | Status: SHIPPED | OUTPATIENT
Start: 2023-04-04 | End: 2023-04-13 | Stop reason: SDUPTHER

## 2023-04-03 RX ADMIN — Medication 10 ML: at 20:14

## 2023-04-03 RX ADMIN — RIVAROXABAN 15 MG: 15 TABLET, FILM COATED ORAL at 17:21

## 2023-04-03 RX ADMIN — AMIODARONE HYDROCHLORIDE 200 MG: 200 TABLET ORAL at 17:21

## 2023-04-03 RX ADMIN — AMIODARONE HYDROCHLORIDE 200 MG: 200 TABLET ORAL at 09:47

## 2023-04-03 RX ADMIN — FUROSEMIDE 20 MG: 20 TABLET ORAL at 17:21

## 2023-04-03 RX ADMIN — DIGOXIN 125 MCG: 125 TABLET ORAL at 13:28

## 2023-04-03 RX ADMIN — RIVAROXABAN 15 MG: 15 TABLET, FILM COATED ORAL at 09:47

## 2023-04-03 NOTE — PROGRESS NOTES
[facility] Medicine Services  INPATIENT PROGRESS NOTE      Length of Stay: 14  Date of Admission: 3/20/2023  Primary Care Physician: Dilcia Kapoor APRN    Subjective   Chief Complaint:  Shortness of breath with heart palpitations  HPI:  67-year-old male comes to the ER stating he does not feel well.  He has not felt well for a while.  Patient states he has not taken any of his medicines for the last several months.  He reports having history of A-fib.  Patient is a poor historian, but states he is short of breath that is worse with exertion.     Daily assessment  Patient seen and examined April 3, 2023.  Stable today.  Remains weak.  Remains short of breath on exertion.  Recommendations by cardiology for patient to be sent to skilled nursing facility versus ARU.  Awaiting final decision.  When accepted can be discharged.  Current blood pressure is noted to be low at 95/67.  Patient remains essentially asymptomatic.  Continues to work with PT OT.  Feels he is getting stronger but remains very weak and slow to respond.     Review of Systems   Constitutional: Positive for fatigue. Negative for chills and fever.   HENT: Negative.    Eyes: Negative.    Respiratory: Positive for shortness of breath. Negative for chest tightness.         Less shortness of breath on exertion.   Cardiovascular: Negative for chest pain and palpitations.   Gastrointestinal: Negative.    Endocrine: Negative.    Genitourinary: Negative.    Musculoskeletal: Negative.    Skin: Negative.    Neurological: Positive for weakness. Negative for dizziness.        Overall remains weak however he feels as though he is getting stronger.  Requesting permission to shower   Hematological: Negative.    Psychiatric/Behavioral: Negative.    Skin slightly jaundiced in coloration      All pertinent negatives and positives are as above. All other systems have been reviewed and are negative unless otherwise  stated.     Objective    As of today 04/03/23  Temp:  [97.3 °F (36.3 °C)-98 °F (36.7 °C)] 97.8 °F (36.6 °C)  Heart Rate:  [] 68  Resp:  [16-18] 18  BP: ()/(55-76) 95/67     Physical Exam  Vitals and nursing note reviewed.   HENT:      Head: Normocephalic and atraumatic.      Right Ear: External ear normal.      Left Ear: External ear normal.      Nose: Nose normal.      Mouth/Throat:      Mouth: Mucous membranes are moist.      Pharynx: Oropharynx is clear.   Eyes:      General: No scleral icterus.     Extraocular Movements: Extraocular movements intact.      Conjunctiva/sclera: Conjunctivae normal.      Pupils: Pupils are equal, round, and reactive to light.   Neck:      Comments: No JVD or bruits  Cardiovascular:      Rate and Rhythm: Normal rate. Rhythm irregular.      Pulses: Normal pulses.      Heart sounds: Normal heart sounds.   Pulmonary:      Effort: Pulmonary effort is normal.      Breath sounds: Normal breath sounds.      Comments: Decreased breath sounds at the bases otherwise clear  Abdominal:      General: Bowel sounds are normal.      Palpations: Abdomen is soft.      Tenderness: There is no abdominal tenderness.      Comments: Soft nontender nondistended normal active bowel sounds   Musculoskeletal:         General: Normal range of motion.      Cervical back: Normal range of motion and neck supple.      Right lower leg: Edema present.      Left lower leg: Edema present.      Comments: Venous stasis changes bilateral lower extremities  Edema improving   Skin:     General: Skin is warm and dry.      Capillary Refill: Capillary refill takes less than 2 seconds.      Coloration: Skin is not jaundiced.   Neurological:      General: No focal deficit present.      Mental Status: He is alert and oriented to person, place, and time.      Motor: Weakness present.   Psychiatric:         Mood and Affect: Mood normal.         Behavior: Behavior normal.           Results Review:  I have reviewed the  labs, radiology results, and diagnostic studies.    Laboratory Data:   Results from last 7 days   Lab Units 04/03/23  0647 04/02/23  0514 04/01/23  0749   SODIUM mmol/L 133* 134* 133*   POTASSIUM mmol/L 4.0 3.9 4.1   CHLORIDE mmol/L 100 101 98   CO2 mmol/L 25.0 25.0 29.0   BUN mg/dL 15 17 14   CREATININE mg/dL 0.71* 0.81 0.93   GLUCOSE mg/dL 71 80 72   CALCIUM mg/dL 8.1* 8.1* 8.1*   BILIRUBIN mg/dL 4.4* 3.8* 4.4*   ALK PHOS U/L 113 105 96   ALT (SGPT) U/L 45* 38 34   AST (SGOT) U/L 57* 50* 43*   ANION GAP mmol/L 8.0 8.0 6.0     Estimated Creatinine Clearance: 106.1 mL/min (A) (by C-G formula based on SCr of 0.71 mg/dL (L)).  Results from last 7 days   Lab Units 03/31/23  0530 03/29/23  0517 03/28/23  0557   MAGNESIUM mg/dL 1.9 2.3 1.9         Results from last 7 days   Lab Units 04/03/23  0647 04/02/23  0514 04/01/23  0749 03/31/23  0530 03/30/23  0546   WBC 10*3/mm3 9.61 8.41 7.95 8.05 9.09   HEMOGLOBIN g/dL 16.4 15.9 16.2 16.0 17.4   HEMATOCRIT % 45.2 44.1 46.1 45.0 48.2   PLATELETS 10*3/mm3 224 204 192 171 138*           Culture Data:   No results found for: BLOODCX  No results found for: URINECX  No results found for: RESPCX  No results found for: WOUNDCX  No results found for: STOOLCX  No components found for: BODYFLD    Radiology Data:   Imaging Results (Last 24 Hours)     ** No results found for the last 24 hours. **      Results for orders placed during the hospital encounter of 03/20/23    Adult Transthoracic Echo Complete W/ Cont if Necessary Per Protocol    Interpretation Summary  •  Left ventricular ejection fraction appears to be 21 - 25%.  •  The left ventricular cavity is dilated.  •  Left ventricular wall thickness is consistent with hypertrophy.  •  Left ventricular diastolic dysfunction is noted.  •  Moderately reduced right ventricular systolic function noted.  •  The right ventricular cavity is severely dilated.  •  Left atrial volume is severely increased.  •  The right atrial cavity is severely   dilated.  •  Estimated right ventricular systolic pressure from tricuspid regurgitation is mildly elevated (35-45 mmHg).      I have reviewed the patient's current medications.   Scheduled Meds:amiodarone, 200 mg, Oral, BID With Meals  [START ON 4/5/2023] amiodarone, 200 mg, Oral, Q24H  digoxin, 125 mcg, Oral, Daily  furosemide, 20 mg, Oral, BID  metoprolol succinate XL, 12.5 mg, Oral, Q24H  phytonadione (VITAMIN K) IVPB, 5 mg, Intravenous, Once  rivaroxaban, 15 mg, Oral, BID With Meals  sodium chloride, 10 mL, Intravenous, Q12H  spironolactone, 25 mg, Oral, Daily      Continuous Infusions:hold, 1 each      PRN Meds:.•  [DISCONTINUED] acetaminophen **OR** [DISCONTINUED] acetaminophen **OR** acetaminophen  •  acetaminophen  •  docusate sodium  •  hold  •  hydrocortisone-bacitracin-zinc oxide-nystatin  •  magnesium sulfate **OR** magnesium sulfate **OR** magnesium sulfate  •  ondansetron  •  potassium chloride **OR** potassium chloride **OR** potassium chloride  •  sodium chloride  •  sodium chloride  Assessment/Plan     Principal Problem:    Atrial fibrillation with RVR  Active Problems:    Non-STEMI (non-ST elevated myocardial infarction)    Biventricular heart failure with reduced left ventricular function    Leg DVT (deep venous thromboembolism), acute, bilateral    Abnormal LFTs    Assessment and Plan    Type II MI secondary to RVR and fluid overload  Continue current management.  Continue fluid restriction 1.5 L/day and salt restriction.  Continue IV Lasix.  - 3/28 status post PCI 3/27 with nonobstructive CAD noted.  Remained stable at this point.  The patient appears as though he will benefit from skilled nursing facility on discharge.  The patient is agreeable to placement.  Case management to evaluate for ARU placement.      Bilateral DVTs:  - 3/30 bilateral ultrasound shows DVTs in patient's lower extremities.  Patient already on Xarelto, will continue Xarelto management of DVTs.  Patient would benefit from  outpatient follow-up by hematology once discharged to assure resolution of DVT issue..  Remains stable.    Acute on chronic, systolic CHF  Will continue with lasix bid; monitor intake and output   Improving on IV Lasix and Aldactone  Continue current care and monitor  Remained stable edema improved    Atrial fibrillation with RVR  Rate controlled with current regimen.  Cardiologist will be consulted and appreciate their assistance. High [id number] score; currently tolerating rivaroxaban therapy.  Cardiac rehab.  Troponin elevated but being followed by cardiology  Left heart cath once fluid status has been stabilized.  Continue Xarelto 20 mg daily  Continue amiodarone at current dosing.  Continue digoxin.  And metoprolol.  Anticoagulation with Xarelto      Hyperbilirubinemia:  Bilirubin continues to trend downward.  Liver function test ALT is 38 AST is 50 total bili is 3.8  Currently on statin for hyperlipidemia we will hold and continue to monitor liver function test.  We will continue to monitor    RUTH:  Resolved  - Appreciate nephrology assistance.  Etiology possibly A-fib with RVR related versus cardiorenal syndrome.  Continue Lasix.      Hypertension  Current blood pressure 95/67 this morning.  O2 saturation 93% on room air   Currently on metoprolol 12.5 mg daily.  And Aldactone 25 daily    Deconditioning  Continue PT OT  We will need skilled nursing facility versus ARU on discharge.      CODE STATUS full code  DVT prophylaxis Xarelto    Addendum:  The patient was originally scheduled to be discharged had been accepted by skilled nursing facility.  ARU was questioning whether the patient could be placed at their facility.  Due to complications with arrangements for acceptance the patient is now going to be continued and the hospital and hopefully final arrangements be completed tomorrow.      Medical Decision Making  Number and Complexity of problems: 3 complex problems  Differential Diagnosis: Atrial  fibrillation versus acute coronary syndrome    Conditions and Status:        Condition is unchanged.     [facility] Data  External documents reviewed: Hospital charts  My EKG interpretation:  EKG completed 3/20/2023.  Rhythm: atrial fibrillation   Rate: tachycardic   Clinical impression: abnormal ECG    My plain film interpretation:  Chest x-ray completed 3/20/2023.  IMPRESSION:  Right basilar pleural effusion, right lower lobe  compressive atelectasis, cardiomegaly and mild central vascular  Congestion.   Tests considered but not ordered: None     Decision rules/scores evaluated (example [id number], Wells, etc): [id number] high and on Rivaroxaban     Discussed with: Patient and agrees to current treatment plan     Treatment Plan  As above    Care Planning  Shared decision making: Patient updated on current status and informed of proposed care plan; is in agreement with plan  Code status and discussions: Full code    Disposition  Social Determinants of Health that impact treatment or disposition: N/A        I have utilized all available immediate resources to obtain, update, or review the patient's current medications (including all prescriptions, over-the-counter products, herbals, cannabis/cannabidiol products, and vitamin/mineral/dietary (nutritional) supplements).     I confirmed that the patient's Advance Care Plan is present, code status is documented, or surrogate decision maker is listed in the patient's medical record.    Discharge Planning:   - 3/28 transferred out of ICU to telemetry floor.  - I expect patient to be discharged home hospital within the next 72 hours once amiodarone loading completed, if RVR resolved, and cardiac/nephrology consults agrees with hospital disposition.      Patient may be appropriate for short-term rehab/SNF at time of hospital disposition.  Patient currently working with PT/OT to determine if patient is short-term rehabilitation candidate.    Gurvinder David DO     Addendum  3/30 5:57 PM  Ultrasound bilaterally shows DVTs.  We will therefore increase patient's rivaroxaban from 20 mg once a day to 15 mg twice a day for 20 days then return to 20 mg daily.

## 2023-04-03 NOTE — PLAN OF CARE
Goal Outcome Evaluation:  Plan of Care Reviewed With: patient         OT tx on this date.  Pt was modified independent with bed mobility.  He required CGA for sit to stand, toilet transfer and shower transfer.  He also needed CGA for shower bath and LB dressing.

## 2023-04-03 NOTE — PLAN OF CARE
Problem: Dysrhythmia  Goal: Normalized Cardiac Rhythm  Outcome: Ongoing, Progressing     Problem: Asthma Comorbidity  Goal: Maintenance of Asthma Control  Outcome: Ongoing, Progressing     Problem: Behavioral Health Comorbidity  Goal: Maintenance of Behavioral Health Symptom Control  Outcome: Ongoing, Progressing     Problem: COPD (Chronic Obstructive Pulmonary Disease) Comorbidity  Goal: Maintenance of COPD Symptom Control  Outcome: Ongoing, Progressing  Intervention: Maintain COPD-Symptom Control  Recent Flowsheet Documentation  Taken 4/2/2023 2140 by Licha Alaniz RN  Supportive Measures: active listening utilized     Problem: Diabetes Comorbidity  Goal: Blood Glucose Level Within Targeted Range  Outcome: Ongoing, Progressing     Problem: Heart Failure Comorbidity  Goal: Maintenance of Heart Failure Symptom Control  Outcome: Ongoing, Progressing     Problem: Hypertension Comorbidity  Goal: Blood Pressure in Desired Range  Outcome: Ongoing, Progressing     Problem: Obstructive Sleep Apnea Risk or Actual Comorbidity Management  Goal: Unobstructed Breathing During Sleep  Outcome: Ongoing, Progressing     Problem: Osteoarthritis Comorbidity  Goal: Maintenance of Osteoarthritis Symptom Control  Outcome: Ongoing, Progressing  Intervention: Maintain Osteoarthritis Symptom Control  Recent Flowsheet Documentation  Taken 4/2/2023 2140 by Licha Alaniz RN  Activity Management: activity adjusted per tolerance  Taken 4/2/2023 1903 by Licha Alaniz RN  Activity Management: activity adjusted per tolerance  Assistive Device Utilized: walker     Problem: Pain Chronic (Persistent) (Comorbidity Management)  Goal: Acceptable Pain Control and Functional Ability  Outcome: Ongoing, Progressing  Intervention: Optimize Psychosocial Wellbeing  Recent Flowsheet Documentation  Taken 4/2/2023 2140 by Licha Alaniz RN  Supportive Measures: active listening utilized     Problem: Seizure Disorder Comorbidity  Goal: Maintenance of  Seizure Control  Outcome: Ongoing, Progressing     Problem: Adult Inpatient Plan of Care  Goal: Plan of Care Review  Outcome: Ongoing, Progressing  Flowsheets (Taken 4/3/2023 0519)  Progress: no change  Plan of Care Reviewed With: patient  Goal: Patient-Specific Goal (Individualized)  Outcome: Ongoing, Progressing  Goal: Absence of Hospital-Acquired Illness or Injury  Outcome: Ongoing, Progressing  Intervention: Identify and Manage Fall Risk  Recent Flowsheet Documentation  Taken 4/2/2023 2140 by Licha Alaniz RN  Safety Promotion/Fall Prevention: safety round/check completed  Taken 4/2/2023 1903 by Licha Alaniz RN  Safety Promotion/Fall Prevention: safety round/check completed  Intervention: Prevent and Manage VTE (Venous Thromboembolism) Risk  Recent Flowsheet Documentation  Taken 4/2/2023 2140 by Licha Alaniz RN  Activity Management: activity adjusted per tolerance  Taken 4/2/2023 1903 by Licha Alaniz RN  Activity Management: activity adjusted per tolerance  Goal: Optimal Comfort and Wellbeing  Outcome: Ongoing, Progressing  Intervention: Provide Person-Centered Care  Recent Flowsheet Documentation  Taken 4/2/2023 2140 by Licha Alaniz RN  Trust Relationship/Rapport: care explained  Goal: Readiness for Transition of Care  Outcome: Ongoing, Progressing     Problem: Skin Injury Risk Increased  Goal: Skin Health and Integrity  Outcome: Ongoing, Progressing     Problem: Fall Injury Risk  Goal: Absence of Fall and Fall-Related Injury  Outcome: Ongoing, Progressing  Intervention: Promote Injury-Free Environment  Recent Flowsheet Documentation  Taken 4/2/2023 2140 by Licha Alaniz RN  Safety Promotion/Fall Prevention: safety round/check completed  Taken 4/2/2023 1903 by Licha Alaniz RN  Safety Promotion/Fall Prevention: safety round/check completed   Goal Outcome Evaluation:  Plan of Care Reviewed With: patient        Progress: no change

## 2023-04-03 NOTE — PLAN OF CARE
Goal Outcome Evaluation:  Plan of Care Reviewed With: patient           Outcome Evaluation: sup-sit mod ind,sit-stand-sit cga of 1;amb 80' with rw in room cga of 1 with several standing rest breaks-sats >92% with activity

## 2023-04-03 NOTE — DISCHARGE SUMMARY
[facility] Medicine Services  DISCHARGE SUMMARY       Date of Admission: 3/20/2023  Date of Discharge:  4/3/2023  Primary Care Physician: Dilcia Kapoor APRN    Presenting Problem/History of Present Illness:  Atrial fibrillation [I48.91]  Atrial fibrillation with RVR [I48.91]  Pulmonary vascular congestion [R09.89]  Urinary tract infection without hematuria, site unspecified [N39.0]  A-fib [I48.91]       Final Discharge Diagnoses:  Active Hospital Problems    Diagnosis    • **Atrial fibrillation with RVR    • Leg DVT (deep venous thromboembolism), acute, bilateral    • Abnormal LFTs    • Non-STEMI (non-ST elevated myocardial infarction)    • Biventricular heart failure with reduced left ventricular function        Consults:   Consults     Date and Time Order Name Status Description    3/22/2023  4:27 PM Inpatient Nephrology Consult Completed     3/22/2023  3:40 PM Inpatient Cardiology Consult Completed           Procedures Performed: Procedure(s):  Left Heart Cath                Pertinent Test Results:   Lab Results (most recent)     Procedure Component Value Units Date/Time    Comprehensive Metabolic Panel [[id number]]  (Abnormal) Collected: 04/03/23 0647    Specimen: Blood Updated: 04/03/23 0805     Glucose 71 mg/dL      BUN 15 mg/dL      Creatinine 0.71 mg/dL      Sodium 133 mmol/L      Potassium 4.0 mmol/L      Chloride 100 mmol/L      CO2 25.0 mmol/L      Calcium 8.1 mg/dL      Total Protein 5.0 g/dL      Albumin 2.3 g/dL      ALT (SGPT) 45 U/L      AST (SGOT) 57 U/L      Alkaline Phosphatase 113 U/L      Total Bilirubin 4.4 mg/dL      Globulin 2.7 gm/dL      A/G Ratio 0.9 g/dL      BUN/Creatinine Ratio 21.1     Anion Gap 8.0 mmol/L      eGFR 101.8 mL/min/1.73     Narrative:      GFR Normal >60  Chronic Kidney Disease <60  Kidney Failure <15      CBC & Differential [[id number]]  (Abnormal) Collected: 04/03/23 0647    Specimen: Blood Updated: 04/03/23  0757    Narrative:      The following orders were created for panel order CBC & Differential.  Procedure                               Abnormality         Status                     ---------                               -----------         ------                     CBC Auto Differential[[id number]]        Abnormal            Final result                 Please view results for these tests on the individual orders.    CBC Auto Differential [[id number]]  (Abnormal) Collected: 04/03/23 0647    Specimen: Blood Updated: 04/03/23 0757     WBC 9.61 10*3/mm3      RBC 5.29 10*6/mm3      Hemoglobin 16.4 g/dL      Hematocrit 45.2 %      MCV 85.4 fL      MCH 31.0 pg      MCHC 36.3 g/dL      RDW 20.8 %      RDW-SD 61.2 fl      MPV 9.5 fL      Platelets 224 10*3/mm3      Neutrophil % 60.4 %      Lymphocyte % 19.7 %      Monocyte % 14.2 %      Eosinophil % 2.9 %      Basophil % 0.9 %      Immature Grans % 1.9 %      Neutrophils, Absolute 5.81 10*3/mm3      Lymphocytes, Absolute 1.89 10*3/mm3      Monocytes, Absolute 1.36 10*3/mm3      Eosinophils, Absolute 0.28 10*3/mm3      Basophils, Absolute 0.09 10*3/mm3      Immature Grans, Absolute 0.18 10*3/mm3      nRBC 0.0 /100 WBC     Comprehensive Metabolic Panel [[id number]]  (Abnormal) Collected: 04/02/23 0514    Specimen: Blood Updated: 04/02/23 0634     Glucose 80 mg/dL      BUN 17 mg/dL      Creatinine 0.81 mg/dL      Sodium 134 mmol/L      Potassium 3.9 mmol/L      Chloride 101 mmol/L      CO2 25.0 mmol/L      Calcium 8.1 mg/dL      Total Protein 4.8 g/dL      Albumin 2.2 g/dL      ALT (SGPT) 38 U/L      AST (SGOT) 50 U/L      Alkaline Phosphatase 105 U/L      Total Bilirubin 3.8 mg/dL      Globulin 2.6 gm/dL      A/G Ratio 0.8 g/dL      BUN/Creatinine Ratio 21.0     Anion Gap 8.0 mmol/L      eGFR 97.8 mL/min/1.73     Narrative:      GFR Normal >60  Chronic Kidney Disease <60  Kidney Failure <15      CBC & Differential [[id number]]  (Abnormal) Collected: 04/02/23 0514    Specimen:  Blood Updated: 04/02/23 0545    Narrative:      The following orders were created for panel order CBC & Differential.  Procedure                               Abnormality         Status                     ---------                               -----------         ------                     CBC Auto Differential[[id number]]        Abnormal            Final result                 Please view results for these tests on the individual orders.    CBC Auto Differential [[id number]]  (Abnormal) Collected: 04/02/23 0514    Specimen: Blood Updated: 04/02/23 0545     WBC 8.41 10*3/mm3      RBC 5.10 10*6/mm3      Hemoglobin 15.9 g/dL      Hematocrit 44.1 %      MCV 86.5 fL      MCH 31.2 pg      MCHC 36.1 g/dL      RDW 21.0 %      RDW-SD 62.9 fl      MPV 9.5 fL      Platelets 204 10*3/mm3      Neutrophil % 59.2 %      Lymphocyte % 18.7 %      Monocyte % 15.1 %      Eosinophil % 4.0 %      Basophil % 1.0 %      Immature Grans % 2.0 %      Neutrophils, Absolute 4.98 10*3/mm3      Lymphocytes, Absolute 1.57 10*3/mm3      Monocytes, Absolute 1.27 10*3/mm3      Eosinophils, Absolute 0.34 10*3/mm3      Basophils, Absolute 0.08 10*3/mm3      Immature Grans, Absolute 0.17 10*3/mm3      nRBC 0.0 /100 WBC     Magnesium [[id number]]  (Normal) Collected: 03/31/23 0530    Specimen: Blood Updated: 03/31/23 0639     Magnesium 1.9 mg/dL     Ammonia [[id number]]  (Normal) Collected: 03/30/23 1030    Specimen: Blood Updated: 03/30/23 1105     Ammonia 60 umol/L     Magnesium [[id number]]  (Normal) Collected: 03/29/23 0517    Specimen: Blood Updated: 03/29/23 1046     Magnesium 2.3 mg/dL     Potassium [[id number]]  (Abnormal) Collected: 03/27/23 1938    Specimen: Blood Updated: 03/27/23 2000     Potassium 3.4 mmol/L     Potassium [[id number]]  (Abnormal) Collected: 03/26/23 2303    Specimen: Blood Updated: 03/26/23 2332     Potassium 3.4 mmol/L      Comment: Slight hemolysis detected by analyzer. Results may be affected.       Extra Tubes [[id number]]  Collected: 03/25/23 0828    Specimen: Blood, Venous Line Updated: 03/25/23 0930    Narrative:      The following orders were created for panel order Extra Tubes.  Procedure                               Abnormality         Status                     ---------                               -----------         ------                     Lavender Top[[id number]]                                     Final result                 Please view results for these tests on the individual orders.    Lavender Top [[id number]] Collected: 03/25/23 0828    Specimen: Blood Updated: 03/25/23 0930     Extra Tube hold for add-on     Comment: Auto resulted       Creatinine Urine Random (kidney function) GFR component - Urine, Clean Catch [[id number]] Collected: 03/23/23 1037    Specimen: Urine, Clean Catch Updated: 03/23/23 2004     Creatinine, Urine 51.7 mg/dL     Narrative:      Reference intervals for random urine have not been established.  Clinical usage is dependent upon physician's interpretation in combination with other laboratory tests.       Osmolality, Urine - Urine, Clean Catch [[id number]]  (Normal) Collected: 03/23/23 1037    Specimen: Urine, Clean Catch Updated: 03/23/23 1055     Osmolality, Urine 369 mOsm/kg     Sodium, Urine, Random - Urine, Clean Catch [[id number]] Collected: 03/23/23 1037    Specimen: Urine, Clean Catch Updated: 03/23/23 1044     Sodium, Urine 26 mmol/L     Narrative:      Reference intervals for random urine have not been established.  Clinical usage is dependent upon physician's interpretation in combination with other laboratory tests.       High Sensitivity Troponin T 2Hr [[id number]]  (Abnormal) Collected: 03/22/23 1412    Specimen: Blood Updated: 03/22/23 1436     HS Troponin T 79 ng/L      Troponin T Delta 5 ng/L     Narrative:      High Sensitive Troponin T Reference Range:  <10.0 ng/L- Negative Female for AMI  <15.0 ng/L- Negative Male for AMI  >=10 - Abnormal Female indicating possible myocardial  injury.  >=15 - Abnormal Male indicating possible myocardial injury.   Clinicians would have to utilize clinical acumen, EKG, Troponin, and serial changes to determine if it is an Acute Myocardial Infarction or myocardial injury due to an underlying chronic condition.         High Sensitivity Troponin T [[id number]]  (Abnormal) Collected: 03/22/23 1222    Specimen: Blood Updated: 03/22/23 1300     HS Troponin T 74 ng/L     Narrative:      High Sensitive Troponin T Reference Range:  <10.0 ng/L- Negative Female for AMI  <15.0 ng/L- Negative Male for AMI  >=10 - Abnormal Female indicating possible myocardial injury.  >=15 - Abnormal Male indicating possible myocardial injury.   Clinicians would have to utilize clinical acumen, EKG, Troponin, and serial changes to determine if it is an Acute Myocardial Infarction or myocardial injury due to an underlying chronic condition.         Urine Culture - Urine, Urine, Clean Catch [[id number]]  (Abnormal)  (Susceptibility) Collected: 03/20/23 1900    Specimen: Urine, Clean Catch Updated: 03/22/23 1141     Urine Culture 25,000 CFU/mL Enterococcus faecalis    Narrative:      Colonization of the urinary tract without infection is common. Treatment is discouraged unless the patient is symptomatic, pregnant, or undergoing an invasive urologic procedure.    Susceptibility      Enterococcus faecalis      AMINATA      Ampicillin Susceptible      Levofloxacin Susceptible      Nitrofurantoin Susceptible      Tetracycline Susceptible      Vancomycin Susceptible                           STAT Lactic Acid, Reflex [[id number]]  (Abnormal) Collected: 03/21/23 1400    Specimen: Blood Updated: 03/21/23 1438     Lactate 3.9 mmol/L     Extra Tubes [[id number]] Collected: 03/21/23 0756    Specimen: Blood, Venous Line Updated: 03/21/23 0900    Narrative:      The following orders were created for panel order Extra Tubes.  Procedure                               Abnormality         Status                      ---------                               -----------         ------                     Lavender Top[[id number]]                                     Final result                 Please view results for these tests on the individual orders.    Lavender Top [[id number]] Collected: 03/21/23 0756    Specimen: Blood Updated: 03/21/23 0900     Extra Tube hold for add-on     Comment: Auto resulted       High Sensitivity Troponin T 2Hr [[id number]]  (Abnormal) Collected: 03/21/23 0756    Specimen: Blood Updated: 03/21/23 0841     HS Troponin T 65 ng/L      Troponin T Delta -5 ng/L     Narrative:      High Sensitive Troponin T Reference Range:  <10.0 ng/L- Negative Female for AMI  <15.0 ng/L- Negative Male for AMI  >=10 - Abnormal Female indicating possible myocardial injury.  >=15 - Abnormal Male indicating possible myocardial injury.   Clinicians would have to utilize clinical acumen, EKG, Troponin, and serial changes to determine if it is an Acute Myocardial Infarction or myocardial injury due to an underlying chronic condition.         STAT Lactic Acid, Reflex [[id number]]  (Abnormal) Collected: 03/21/23 0756    Specimen: Blood Updated: 03/21/23 0838     Lactate 4.0 mmol/L     High Sensitivity Troponin T [[id number]]  (Abnormal) Collected: 03/21/23 0542    Specimen: Blood Updated: 03/21/23 0717     HS Troponin T 70 ng/L      Comment: Specimen hemolyzed.  Results may be affected.       Narrative:      High Sensitive Troponin T Reference Range:  <10.0 ng/L- Negative Female for AMI  <15.0 ng/L- Negative Male for AMI  >=10 - Abnormal Female indicating possible myocardial injury.  >=15 - Abnormal Male indicating possible myocardial injury.   Clinicians would have to utilize clinical acumen, EKG, Troponin, and serial changes to determine if it is an Acute Myocardial Infarction or myocardial injury due to an underlying chronic condition.         Urine Drug Screen - Urine, Clean Catch [[id number]]  (Abnormal) Collected: 03/20/23 1900     Specimen: Urine, Clean Catch Updated: 03/20/23 1917     THC, Screen, Urine Positive     Phencyclidine (PCP), Urine Negative     Cocaine Screen, Urine Negative     Methamphetamine, Ur Negative     Opiate Screen Negative     Amphetamine Screen, Urine Negative     Benzodiazepine Screen, Urine Negative     Tricyclic Antidepressants Screen Negative     Methadone Screen, Urine Negative     Barbiturates Screen, Urine Negative     Oxycodone Screen, Urine Negative     Propoxyphene Screen Negative     Buprenorphine, Screen, Urine Negative    Narrative:      Cutoff For Drugs Screened:    Amphetamines               500 ng/ml  Barbiturates               200 ng/ml  Benzodiazepines            150 ng/ml  Cocaine                    150 ng/ml  Methadone                  200 ng/ml  Opiates                    100 ng/ml  Phencyclidine               25 ng/ml  THC                            50 ng/ml  Methamphetamine            500 ng/ml  Tricyclic Antidepressants  300 ng/ml  Oxycodone                  100 ng/ml  Propoxyphene               300 ng/ml  Buprenorphine               10 ng/ml    The normal value for all drugs tested is negative. This report includes unconfirmed screening results, with the cutoff values listed, to be used for medical treatment purposes only.  Unconfirmed results must not be used for non-medical purposes such as employment or legal testing.  Clinical consideration should be applied to any drug of abuse test, particularly when unconfirmed results are used.      Urinalysis, Microscopic Only - Urine, Clean Catch [[id number]]  (Abnormal) Collected: 03/20/23 1900    Specimen: Urine, Clean Catch Updated: 03/20/23 1911     RBC, UA 0-2 /HPF      WBC, UA 0-2 /HPF      Bacteria, UA None Seen /HPF      Squamous Epithelial Cells, UA 0-2 /HPF      Hyaline Casts, UA 0-2 /LPF      Methodology Automated Microscopy    Urinalysis With Microscopic If Indicated (No Culture) - Urine, Clean Catch [[id number]]  (Abnormal) Collected:  03/20/23 1900    Specimen: Urine, Clean Catch Updated: 03/20/23 1911     Color, UA Roane     Appearance, UA Clear     pH, UA 5.5     Specific Gravity, UA 1.025     Glucose, UA Negative     Ketones, UA Negative     Bilirubin, UA Moderate (2+)     Blood, UA Negative     Protein, UA 30 mg/dL (1+)     Leuk Esterase, UA Small (1+)     Nitrite, UA Positive     Urobilinogen, UA 2.0 E.U./dL    [facility] [[id number]] Collected: 03/20/23 1702    Specimen: Blood Updated: 03/20/23 1815     Extra Tube Hold for add-ons.     Comment: Auto resulted.       Scan Slide [[id number]] Collected: 03/20/23 1642    Specimen: Blood Updated: 03/20/23 1736     Acanthocytes Large/3+     Polychromasia Slight/1+     WBC Morphology Normal     Platelet Estimate Decreased     Clumped Platelets --     Comment: NONE SEEN       Protime-INR [[id number]]  (Abnormal) Collected: 03/20/23 1642    Specimen: Blood Updated: 03/20/23 1727     Protime 21.5 Seconds      INR 1.86    Narrative:      Therapeutic range for most indications is 2.0-3.0 INR,  or 2.5-3.5 for mechanical heart valves.    aPTT [[id number]]  (Normal) Collected: 03/20/23 1642    Specimen: Blood Updated: 03/20/23 1727     PTT 36.0 seconds     Narrative:      The recommended Heparin therapeutic range is 68-97 seconds.    Lipase [[id number]]  (Normal) Collected: 03/20/23 1642    Specimen: Blood Updated: 03/20/23 1722     Lipase 40 U/L     Acetaminophen Level [[id number]]  (Normal) Collected: 03/20/23 1642    Specimen: Blood Updated: 03/20/23 1722     Acetaminophen <5.0 mcg/mL     Ethanol [[id number]] Collected: 03/20/23 1642    Specimen: Blood Updated: 03/20/23 1722     Ethanol <10 mg/dL      Ethanol % <0.010 %     Single High Sensitivity Troponin T [[id number]]  (Abnormal) Collected: 03/20/23 1642    Specimen: Blood Updated: 03/20/23 1722     HS Troponin T 62 ng/L     Narrative:      High Sensitive Troponin T Reference Range:  <10.0 ng/L- Negative Female for AMI  <15.0 ng/L- Negative Male for  AMI  >=10 - Abnormal Female indicating possible myocardial injury.  >=15 - Abnormal Male indicating possible myocardial injury.   Clinicians would have to utilize clinical acumen, EKG, Troponin, and serial changes to determine if it is an Acute Myocardial Infarction or myocardial injury due to an underlying chronic condition.         Ammonia [[id number]]  (Normal) Collected: 03/20/23 1642    Specimen: Blood Updated: 03/20/23 1721     Ammonia 34 umol/L     Lactic Acid, Plasma [[id number]]  (Abnormal) Collected: 03/20/23 1642    Specimen: Blood Updated: 03/20/23 1720     Lactate 3.6 mmol/L     BNP [[id number]]  (Abnormal) Collected: 03/20/23 1642    Specimen: Blood Updated: 03/20/23 1720     proBNP 4,988.0 pg/mL     Narrative:      Among patients with dyspnea, NT-proBNP is highly sensitive for the detection of acute congestive heart failure. In addition NT-proBNP of <300 pg/ml effectively rules out acute congestive heart failure with 99% negative predictive value.          Imaging Results (Most Recent)     Procedure Component Value Units Date/Time    US Venous Doppler Lower Extremity Bilateral (duplex) [[id number]] Collected: 03/30/23 1111     Updated: 03/30/23 1239    Addenda:         ADDENDUM   ADDENDUM #1       Findings discussed with Dr. Lopez at 12:31 PM CST.    Electronically signed by:  Obed Boyd MD  3/30/2023 12:37 PM  CDT Workstation: [id number]    Signed: 03/30/23 1237 by Sarahi Boyd MD    Narrative:        Ultrasound venous duplex bilateral lower extremities    HISTORY: Lower extremity swelling    Duplex ultrasound of the deep venous system of each lower  extremity was performed    FINDINGS:  Noncompressible deep venous thrombosis right popliteal vein and  right posterior tibial vein.  Noncompressible the venous thrombus distal left femoral vein,  left popliteal vein and left posterior tibial vein.  Real-time images of the remaining veins demonstrate normal  compressibility without evidence of  intraluminal thrombus.  Doppler of the remaining veins shows phasic flow and  augmentation.  Color Doppler of the remaining veins also reveals venous patency.  Subcutaneous edema each calf.      Impression:      CONCLUSION:  Noncompressible deep venous thrombosis right popliteal vein and  right posterior tibial vein.  Noncompressible the venous thrombus distal left femoral vein,  left popliteal vein and left posterior tibial vein.  Subcutaneous edema each calf.    [id number]    Electronically signed by:  Obed Boyd MD  3/30/2023 11:54 AM  CDT Workstation: [id number]    US Guided Vascular Access [[id number]] Collected: 03/22/23 1330     Updated: 03/23/23 1550    Narrative:      PROCEDURE: Ultrasound Guidance Vascular Access      Ordering physician(s): NIDHI CARDONA    Clinical Indication: Vascular access      Findings:    The right basilic vein was sonographically evaluated and  determined to be patent. Concurrent realtime ultrasound was used  to visualize needle entry into the right basilic vein  for  midline catheter placement and 2 permanent images acquired for  permanent recording and reporting.         Impression:      Impression:   As above.      Electronically signed by:  Tony Giron MD  3/23/2023 3:48 PM CDT  Workstation: [id number]    XR Chest 1 View [[id number]] Collected: 03/22/23 1327     Updated: 03/22/23 2479    Narrative:      EXAM:  XR CHEST 1 VIEW    TECHNIQUE:   Single frontal radiograph of the chest.    VIEWS:  1 view    CLINICAL HISTORY:   67 years  Male  SOB, I48.91 Unspecified atrial fibrillation N39.0  Urinary tract infection, site not specified R09.89 Other  specified symptoms and signs involving the circulatory and  respiratory systems    COMPARISON:   March 20, 2022 chest x-ray    FINDINGS:   Support lines and tubes: The right PICC tip overlies the right  axillary soft tissues.     Lungs:  Right lung base airspace opacities. The left lung is  clear.  Pleura: No pleural effusion. No  pneumothorax.  Heart/mediastinum: The cardiac silhouette is enlarged.  Bones: No acute osseous findings.  Soft tissues: Unremarkable.      Impression:      1. Right pleural effusion and adjacent atelectasis with or  without pneumonia, increased in the interval.  2. Cardiomegaly.    Electronically signed by:  Issa Burch MD  3/22/2023 11:37  PM CDT Workstation: [id number]    US Abdomen Complete [[id number]] Collected: 03/22/23 1330     Updated: 03/22/23 1509    Narrative:      Ultrasound abdomen complete.    HISTORY: Abdominal distention.    Prior exam: CT chest March 20, 2023.      Impression:      Findings, impression: Limited examination, portable bedside  technique.    Echogenic sludge within the gallbladder.    Gallbladder  wall thickening. Normal liver.    Common bile duct and pancreas are obscured by overlying bowel  gas.    Normal spleen. Normal right kidney 9.6 x 5.1 x 5.1 cm.    Benign cyst left kidney. Left kidney is otherwise unremarkable  11.2 x 5.9 x 5.1 cm    Normal aorta and inferior vena cava.    Small amount of free fluid, ascites throughout the abdomen.    IMPRESSION: Ascites. Gallbladder wall thickening and echogenic  sludge within the gallbladder.    Electronically signed by:  Clem Balderrama MD  3/22/2023 3:07 PM CDT  Workstation: [id number]    IR Insert Midline Without Port Pump 5 Plus [[id number]] Resulted: 03/22/23 1338     Updated: 03/22/23 1338    Narrative:      This procedure was auto-finalized with no dictation required.    CT Abdomen Pelvis Without Contrast [[id number]] Collected: 03/20/23 2011     Updated: 03/20/23 2138    Narrative:        lINDICATION: sob, pulm edema, I48.91 Unspecified atrial  fibrillation N39.0 Urinary tract infection, site not specified  R09.89 Other specified symptoms and signs involving the  circulatory and respiratory systems    EXAMINATION: CT CHEST, ABDOMEN AND PELVIS     TECHNIQUE:  Helically acquired images were obtained of the chest,  abdomen and pelvis. A  radiation dose optimization technique was  used for this scan.    IV Contrast dosage and agent: None    Oral contrast: None.    COMPARISON: None.    FINDINGS:    ----Chest:   LUNGS, PLEURA: There is a right lower lobe consolidation  suggesting atelectasis or pneumonia. This is associated with a  large pleural effusion. This appears slightly loculated. Left  lung is clear. There is no left-sided effusion. No pneumothorax     SOFT TISSUES: There is diffuse subcutaneous edema within the  lower chest wall and upper abdominal wall suggesting anasarca.  There is no evidence of adenopathy or soft tissue gas.     HEART AND PERICARDIUM: Heart is prominent. There is no  pericardial effusion.     VESSELS: Main pulmonary artery is prominent measuring 4.2 cm  suggesting pulmonary arterial hypertension. The aorta is normal  in course and caliber on this noncontrast study.     MEDIASTINUM AND MICHAEL: No mediastinal or hilar adenopathy.  Esophagus is unremarkable. No hiatal hernia.     BONES: No lytic or blastic abnormality.  No fractures are  identified.     ----Abdomen/Pelvis:    LIVER: Homogeneous.  No focal lesion is identified on this  noncontrast study.    GALLBLADDER AND BILIARY TREE: There appears to be dependent  sludge. There are no calcified stones. The gallbladder is not  dilated.    No intra- or extrahepatic biliary ductal dilation.    PANCREAS: There is no evidence of mass or inflammatory process.    SPLEEN: Spleen is normal in size with no focal lesion.    ADRENAL GLANDS: Normal in appearance.      KIDNEYS AND URETERS: Normal renal size and position.  There is no  evidence of renal mass or calcification. No hydronephrosis.    URINARY BLADDER: Unremarkable.     PERITONEUM: There is a small amount of ascites.     BOWEL: The appendix is normal. There is no bowel distention or  evidence of obstruction. There is questionable edema in the  region of the third portion of the duodenum. Possibility of mild  duodenitis  is not  excluded.    VASCULAR STRUCTURES AND RETROPERITONEUM: Aorta and IVC are normal  in caliber.  There is no evidence of aneurysm. There is no  evidence of retroperitoneal lymphadenopathy, mass, or fluid  collection    REPRODUCTIVE ORGANS: There is no evidence of pelvic mass.     ABDOMINAL WALL: There is diffuse anasarca. There is no hernia.     BONES: There is no acute fracture or focal bone lesion.       Impression:      1.  Moderate to large right effusion which appears loculated.  There is associated right lower lobe consolidation suggesting  pneumonia.  2.  Cardiomegaly.  3.  Prominent main pulmonary artery suggesting pulmonary arterial  hypertension.  4.  Small amount of ascites. There appears to be some edema and  wall thickening associated with the third portion of the duodenum  suggesting duodenitis.     Electronically signed by:  Krzysztof Gomez MD  3/20/2023 9:36 PM CDT  Workstation: [id number]    CT Chest Without Contrast Diagnostic [[id number]] Collected: 03/20/23 2011     Updated: 03/20/23 2137    Narrative:        lINDICATION: sob, pulm edema, I48.91 Unspecified atrial  fibrillation N39.0 Urinary tract infection, site not specified  R09.89 Other specified symptoms and signs involving the  circulatory and respiratory systems    EXAMINATION: CT CHEST, ABDOMEN AND PELVIS     TECHNIQUE:  Helically acquired images were obtained of the chest,  abdomen and pelvis. A radiation dose optimization technique was  used for this scan.    IV Contrast dosage and agent: None    Oral contrast: None.    COMPARISON: None.    FINDINGS:    ----Chest:   LUNGS, PLEURA: There is a right lower lobe consolidation  suggesting atelectasis or pneumonia. This is associated with a  large pleural effusion. This appears slightly loculated. Left  lung is clear. There is no left-sided effusion. No pneumothorax     SOFT TISSUES: There is diffuse subcutaneous edema within the  lower chest wall and upper abdominal wall suggesting anasarca.  There is  no evidence of adenopathy or soft tissue gas.     HEART AND PERICARDIUM: Heart is prominent. There is no  pericardial effusion.     VESSELS: Main pulmonary artery is prominent measuring 4.2 cm  suggesting pulmonary arterial hypertension. The aorta is normal  in course and caliber on this noncontrast study.     MEDIASTINUM AND MICHAEL: No mediastinal or hilar adenopathy.  Esophagus is unremarkable. No hiatal hernia.     BONES: No lytic or blastic abnormality.  No fractures are  identified.     ----Abdomen/Pelvis:    LIVER: Homogeneous.  No focal lesion is identified on this  noncontrast study.    GALLBLADDER AND BILIARY TREE: There appears to be dependent  sludge. There are no calcified stones. The gallbladder is not  dilated.    No intra- or extrahepatic biliary ductal dilation.    PANCREAS: There is no evidence of mass or inflammatory process.    SPLEEN: Spleen is normal in size with no focal lesion.    ADRENAL GLANDS: Normal in appearance.      KIDNEYS AND URETERS: Normal renal size and position.  There is no  evidence of renal mass or calcification. No hydronephrosis.    URINARY BLADDER: Unremarkable.     PERITONEUM: There is a small amount of ascites.     BOWEL: The appendix is normal. There is no bowel distention or  evidence of obstruction. There is questionable edema in the  region of the third portion of the duodenum. Possibility of mild  duodenitis  is not excluded.    VASCULAR STRUCTURES AND RETROPERITONEUM: Aorta and IVC are normal  in caliber.  There is no evidence of aneurysm. There is no  evidence of retroperitoneal lymphadenopathy, mass, or fluid  collection    REPRODUCTIVE ORGANS: There is no evidence of pelvic mass.     ABDOMINAL WALL: There is diffuse anasarca. There is no hernia.     BONES: There is no acute fracture or focal bone lesion.       Impression:      1.  Moderate to large right effusion which appears loculated.  There is associated right lower lobe consolidation suggesting  pneumonia.  2.   Cardiomegaly.  3.  Prominent main pulmonary artery suggesting pulmonary arterial  hypertension.  4.  Small amount of ascites. There appears to be some edema and  wall thickening associated with the third portion of the duodenum  suggesting duodenitis.     Electronically signed by:  Krzysztof Gomez MD  3/20/2023 9:35 PM CDT  Workstation: [id number]    XR Chest 1 View [[id number]] Collected: 03/20/23 1651     Updated: 03/20/23 1828    Narrative:      EXAMINATION: XR CHEST 1 VIEW    COMPARISON: Chest 2 views November 5, 2020    INDICATION: tachycardia    FINDINGS: Portable AP upright imaging of the chest is submitted -  1 view. A moderate size basilar right pleural effusion is new.  Some degree of right basilar compressive atelectasis is  suspected. Heart size is difficult to assess but appears at least  mildly enlarged. Mild central vascular prominence is noted but no  subpleural interstitial edema is evident. No pneumothorax.       Impression:      Right basilar pleural effusion, right lower lobe  compressive atelectasis, cardiomegaly and mild central vascular  congestion.    Electronically signed by:  Hollis Sousa MD  3/20/2023 6:26 PM CDT  Workstation: [id number]          Chief Complaint on Day of Discharge:   No complaints on day of discharge  Hospital Course:  The patient is a 67 y.o. male who presented to [facility] with shortness of breath with heart palpitations secondary to atrial fibs  Patient is admitted to the hospital 3/20/2023.  Discharged to home with home health on April 3, 2023.    67-year-old male comes to the ER stating he does not feel well.  He has not felt well for a while.  Patient states he has not taken any of his medicines for the last several months.  He reports having history of A-fib.  Patient is a poor historian, but states he is short of breath that is worse with exertion.      Impression/hospital treatment plan  Type II MI secondary to RVR and fluid overload  Continue  current management.  Continue fluid restriction 1.5 L/day and salt restriction.  Continue IV Lasix.  - 3/28 status post PCI 3/27 with nonobstructive CAD noted.  Remained stable at this point.  The patient appears as though he will benefit from skilled nursing facility on discharge.  The patient is agreeable to placement.  Case management to evaluate for ARU placement.        Bilateral DVTs:  - 3/30 bilateral ultrasound shows DVTs in patient's lower extremities.  Patient already on Xarelto, will continue Xarelto management of DVTs.  Patient would benefit from outpatient follow-up by hematology once discharged to assure resolution of DVT issue..  Remains stable.     Acute on chronic, systolic CHF  Continue Lasix twice a day and discharged     Atrial fibrillation with RVR  Rate controlled with current regimen.  Cardiologist will be consulted and appreciate their assistance. High [id number] score; currently tolerating rivaroxaban therapy.  Cardiac rehab.  Troponin elevated but being followed by cardiology  Left heart cath once fluid status has been stabilized.  Continue Xarelto 20 mg daily  Continue amiodarone at current dosing.  Continue digoxin.  And metoprolol.  Anticoagulation with Xarelto        Hyperbilirubinemia:  Resolved  Bilirubin continues to trend downward.  Liver function test ALT is 38 AST is 50 total bili is 3.8     RUTH:  Resolved  - Appreciate nephrology assistance.  Etiology possibly A-fib with RVR related versus cardiorenal syndrome.  Continue Lasix.        Hypertension  Current blood pressure 95/67 this morning.  O2 saturation 93% on room air   Currently on metoprolol 12.5 mg daily.  And Aldactone 25 daily     Deconditioning  Participated with PT OT his admission.  Doing better and getting stronger.        CODE STATUS full code     Seen and examined April 3, 2023  .  The patient on evaluation today he is sitting up in bed he is awake and alert.  He is doing much better.  He states he is feeling better  "cardiology has signed off and stated the patient could go home.  Multiple medications were adjusted during the patient's stay and overall he is feeling a lot better.  He has no new problems or complaints.  He will be discharged home with medications as noted.  His vital signs are stable he is afebrile.  He has no nausea vomiting.  No fevers or pills.  No chest pain headache or dizziness.  I reviewed all findings discharge medications and follow-up instructions with the patient he states he understands and will follow discharge recommendations.    Condition on Discharge: Stable    Physical Exam on Discharge:  BP 98/64 (BP Location: Right arm, Patient Position: Sitting)   Pulse 77   Temp 97.8 °F (36.6 °C) (Oral)   Resp 18   Ht 170.2 cm (67\")   Wt 81.6 kg (180 lb)   SpO2 98%   BMI 28.19 kg/m²   Physical Exam  Vitals and nursing note reviewed.   HENT:      Head: Normocephalic and atraumatic.      Right Ear: External ear normal.      Left Ear: External ear normal.      Nose: Nose normal.      Mouth/Throat:      Mouth: Mucous membranes are moist.      Pharynx: Oropharynx is clear.   Eyes:      General: No scleral icterus.     Extraocular Movements: Extraocular movements intact.      Conjunctiva/sclera: Conjunctivae normal.      Pupils: Pupils are equal, round, and reactive to light.   Neck:      Comments: No JVD or bruits  Cardiovascular:      Rate and Rhythm: Normal rate. Rhythm irregular.      Pulses: Normal pulses.      Heart sounds: Normal heart sounds.   Pulmonary:      Effort: Pulmonary effort is normal.      Breath sounds: Normal breath sounds.      Comments: Decreased breath sounds at the bases otherwise clear  Abdominal:      General: Bowel sounds are normal.      Palpations: Abdomen is soft.      Tenderness: There is no abdominal tenderness.      Comments: Soft nontender nondistended normal active bowel sounds   Musculoskeletal:         General: Normal range of motion.      Cervical back: Normal range of " motion and neck supple.      Right lower leg: Edema present.      Left lower leg: Edema present.      Comments: Venous stasis changes bilateral lower extremities  Edema improving   Skin:     General: Skin is warm and dry.      Capillary Refill: Capillary refill takes less than 2 seconds.      Coloration: Skin is not jaundiced.   Neurological:      General: No focal deficit present.      Mental Status: He is alert and oriented to person, place, and time.      Motor: Weakness present.   Psychiatric:         Mood and Affect: Mood normal.         Behavior: Behavior normal.           Discharge Disposition:      Discharge Medications:     Discharge Medications      Continue These Medications      Instructions Start Date   dilTIAZem  MG 24 hr capsule  Commonly known as: CARDIZEM CD   240 mg, Oral, Every 24 Hours Scheduled      metoprolol succinate  MG 24 hr tablet  Commonly known as: TOPROL-XL   100 mg, Oral, Every 24 Hours Scheduled      rivaroxaban 20 MG tablet  Commonly known as: XARELTO   Take 1 tablet by mouth once daily.             Discharge Diet:     Activity at Discharge:     Discharge Care Plan/Instructions:     Follow-up Appointments:   No future appointments.    Test Results Pending at Discharge: None      Gurvinder David DO    Time: Time to complete discharge 35 minutes

## 2023-04-03 NOTE — THERAPY TREATMENT NOTE
Acute Care - Physical Therapy Treatment Note  [facility]     Patient Name: Pavan Mccauley  : 1955  MRN: [id number]  Today's Date: 4/3/2023      Visit Dx:     ICD-10-CM ICD-9-CM   1. Atrial fibrillation with RVR  I48.91 427.31   2. Urinary tract infection without hematuria, site unspecified  N39.0 599.0   3. Pulmonary vascular congestion  R09.89 514   4. NSTEMI, initial episode of care  I21.4 410.71   5. Biventricular CHF (congestive heart failure)  I50.82 428.0   6. Impaired mobility and ADLs  Z74.09 V49.89    Z78.9    7. Impaired functional mobility, balance, gait, and endurance  Z74.09 V49.89     Patient Active Problem List   Diagnosis   • General medical exam   • Malaise   • Hyperglycemia   • Cough   • Tick bite   • Screening for colon cancer   • Pneumonia due to organism   • Carotid artery aneurysm   • Dizziness   • SVT (supraventricular tachycardia)   • Atrial fibrillation (HCC)   • Dyspnea on exertion   • Atrial fibrillation with RVR   • Non-STEMI (non-ST elevated myocardial infarction)   • Biventricular heart failure with reduced left ventricular function   • Acute on chronic systolic (congestive) heart failure   • Leg DVT (deep venous thromboembolism), acute, bilateral   • Abnormal LFTs     Past Medical History:   Diagnosis Date   • Bronchopneumonia    • Chest pain    • Corneal foreign body     both eyes   • Neck pain    • Perforation of left tympanic membrane     history of   • Prashant [place] spotted fever    • Wheezing      Past Surgical History:   Procedure Laterality Date   • CARDIAC CATHETERIZATION N/A 3/27/2023    Procedure: Left Heart Cath;  Surgeon: [place] DO;  Location: [facility] INVASIVE LOCATION;  Service: Cardiology;  Laterality: N/A;   • EYE FOREIGN BODY REMOVAL  2013    REMOVE FOREIGN BODY CORNEA W/SLIT LAMP 18077 (1)     • INJECTION OF MEDICATION  2011    Kenalog (1)      • SPINE SURGERY  1991    Exploration of the previous C5-6 fusion with refusion,  exploration of the C5 nerve root, left side   • TYMPANOPLASTY Left 05/16/1972    Perforation of the left tympanic membrane     PT Assessment (last 12 hours)     PT Evaluation and Treatment     Row Name 04/03/23 1130          Physical Therapy Time and Intention    Subjective Information complains of;fatigue;weakness  -LN     Document Type therapy note (daily note)  -LN     Mode of Treatment individual therapy;physical therapy  -LN     Row Name 04/03/23 1130          General Information    Patient Profile Reviewed yes  -LN     Existing Precautions/Restrictions fall  -LN     Row Name 04/03/23 1130          Pain    Pretreatment Pain Rating 0/10 - no pain  -LN     Posttreatment Pain Rating 0/10 - no pain  -LN     Row Name 04/03/23 1130          Cognition    Orientation Status (Cognition) oriented x 4  -LN     Row Name 04/03/23 1130          Bed Mobility    Bed Mobility supine-sit;sit-supine  -LN     Supine-Sit [place] (Bed Mobility) modified independence  -LN     Sit-Supine [place] (Bed Mobility) not tested  -LN     Assistive Device (Bed Mobility) bed rails;head of bed elevated  -LN     Row Name 04/03/23 1130          Transfers    Transfers sit-stand transfer;stand-sit transfer;chair-bed transfer  -LN     Row Name 04/03/23 1130          Bed-Chair Transfer    Bed-Chair [place] (Transfers) contact guard  -LN     Assistive Device (Bed-Chair Transfers) walker, front-wheeled  -LN     Row Name 04/03/23 1130          Chair-Bed Transfer    Chair-Bed [place] (Transfers) not tested  -LN     Assistive Device (Chair-Bed Transfers) walker, front-wheeled  -LN     Row Name 04/03/23 1130          Sit-Stand Transfer    Sit-Stand [place] (Transfers) contact guard  -LN     Assistive Device (Sit-Stand Transfers) walker, front-wheeled  -LN     Row Name 04/03/23 1130          Stand-Sit Transfer    Stand-Sit [place] (Transfers) contact guard  -LN     Assistive Device (Stand-Sit Transfers) walker, front-wheeled   -LN     Row Name 04/03/23 1130          Gait/Stairs (Locomotion)    [place] (Gait) contact guard  -LN     Assistive Device (Gait) walker, front-wheeled  Pt required frequent VC to set all 4 points of walker on ground instead of carrying during gait.  -LN     Distance in Feet (Gait) 80'  -LN     Pattern (Gait) step-through  -LN     Deviations/Abnormal Patterns (Gait) base of support, wide;misael decreased;gait speed decreased  -LN     Row Name 04/03/23 1130          Balance    Static Standing Balance standby assist  -LN     Dynamic Standing Balance contact guard;minimal assist  -LN     Row Name 04/03/23 1130          Plan of Care Review    Plan of Care Reviewed With patient  -LN     Outcome Evaluation sup-sit mod ind,sit-stand-sit cga of 1;amb 80' with rw in room cga of 1 with several standing rest breaks-sats >92% with activity  -LN     Row Name 04/03/23 1130          Vital Signs    Pre Systolic BP Rehab 98  -LN     Pre Treatment Diastolic BP 64  -LN     Post Systolic BP Rehab 104  -LN     Post Treatment Diastolic BP 64  -LN     Pretreatment Heart Rate (beats/min) 77  -LN     Pre SpO2 (%) 94  -LN     O2 Delivery Pre Treatment room air  -LN     Intra SpO2 (%) 92  -LN     Post SpO2 (%) 93  -LN     Pre Patient Position Supine  -LN     Intra Patient Position Standing  -LN     Post Patient Position Sitting  -LN     Row Name 04/03/23 1130          Positioning and Restraints    In Chair sitting;call light within reach;notified nsg;encouraged to call for assist;exit alarm on  -LN     Row Name 04/03/23 1130          Bed Mobility Goal 1 (PT)    Activity/Assistive Device (Bed Mobility Goal 1, PT) bed mobility activities, all  -LN     [place]/Cues Needed (Bed Mobility Goal 1, PT) independent  -LN     Time Frame (Bed Mobility Goal 1, PT) by discharge  -LN     Progress/Outcomes (Bed Mobility Goal 1, PT) goal not met  -LN     Row Name 04/03/23 1130          Transfer Goal 1 (PT)    Activity/Assistive Device  (Transfer Goal 1, PT) sit-to-stand/stand-to-sit;bed-to-chair/chair-to-bed  -LN     [place] Level/Cues Needed (Transfer Goal 1, PT) modified independence;independent  -LN     Time Frame (Transfer Goal 1, PT) by discharge  -LN     Progress/Outcome (Transfer Goal 1, PT) goal not met  -LN     Row Name 04/03/23 1130          Gait Training Goal 1 (PT)    Activity/Assistive Device (Gait Training Goal 1, PT) gait (walking locomotion);decrease fall risk;increase endurance/gait distance  -LN     [place] Level (Gait Training Goal 1, PT) modified independence;independent  -LN     Distance (Gait Training Goal 1, PT) 150ft each trip  -LN     Time Frame (Gait Training Goal 1, PT) 5 days  -LN     Progress/Outcome (Gait Training Goal 1, PT) goal not met  -LN     Row Name 04/03/23 1130          ROM Goal 1 (PT)    ROM Goal 1 (PT) Pt will tolerate LE exercises OOB in chair with VSS  -LN     Time Frame (ROM Goal 1, PT) by discharge  -LN     Progress/Outcome (ROM Goal 1, PT) goal met  -LN     Row Name 04/03/23 1130          Stairs Goal 1 (PT)    Activity/Assistive Device (Stairs Goal 1, PT) ascending stairs;descending stairs;using handrail, left;using handrail, right  -LN     [place] Level/Cues Needed (Stairs Goal 1, PT) modified independence  -LN     Number of Stairs (Stairs Goal 1, PT) 3  -LN     Time Frame (Stairs Goal 1, PT) by discharge  -LN     Progress/Outcome (Stairs Goal 1, PT) goal not met  -LN           User Key  (r) = Recorded By, (t) = Taken By, (c) = Cosigned By    Initials Name Provider Type    LN Rosalind Dickens PTA Physical Therapist Assistant                Physical Therapy Education     Title: PT OT SLP Therapies (In Progress)     Topic: Physical Therapy (In Progress)     Point: Mobility training (In Progress)     Learning Progress Summary           Patient Acceptance, E, NR by ORAL at 3/28/2023 6041                   Point: Home exercise program (Not Started)     Learner Progress:  Not documented in this  visit.          Point: Body mechanics (In Progress)     Learning Progress Summary           Patient Acceptance, E, NR by ORAL at 3/28/2023 1235                   Point: Precautions (In Progress)     Learning Progress Summary           Patient Acceptance, E, NR by ORAL at 3/28/2023 1235                               User Key     Initials Effective Dates Name Provider Type Discipline    ORAL 06/16/21 -  Blanca Marie, PT Physical Therapist PT              PT Recommendation and Plan     Plan of Care Reviewed With: patient  Outcome Evaluation: sup-sit mod ind,sit-stand-sit cga of 1;amb 80' with rw in room cga of 1 with several standing rest breaks-sats >92% with activity   Outcome Measures     Row Name 04/03/23 1130 04/01/23 0854          How much help from another person do you currently need...    Turning from your back to your side while in flat bed without using bedrails? 4  -LN 4  -LW     Moving from lying on back to sitting on the side of a flat bed without bedrails? 4  -LN 3  -LW     Moving to and from a bed to a chair (including a wheelchair)? 3  -LN 3  -LW     Standing up from a chair using your arms (e.g., wheelchair, bedside chair)? 3  -LN 3  -LW     Climbing 3-5 steps with a railing? 3  -LN 3  -LW     To walk in hospital room? 3  -LN 3  -LW     AM-PAC 6 Clicks Score (PT) 20  -LN 19  -LW        Functional Assessment    Outcome Measure Options AM-PAC 6 Clicks Daily Activity (OT)  -LN --           User Key  (r) = Recorded By, (t) = Taken By, (c) = Cosigned By    Initials Name Provider Type    LN Rosalind Dickens PTA Physical Therapist Assistant    Maxine Michel PTA Physical Therapist Assistant                 Time Calculation:    PT Charges     Row Name 04/03/23 1256             Time Calculation    Start Time 1130  -LN      Stop Time 1158  -LN      Time Calculation (min) 28 min  -LN      PT Received On 04/03/23  -LN         Time Calculation- PT    Total Timed Code Minutes- PT 28 minute(s)  -LN            User  Key  (r) = Recorded By, (t) = Taken By, (c) = Cosigned By    Initials Name Provider Type    LN Rosalind Dickens PTA Physical Therapist Assistant              Therapy Charges for Today     Code Description Service Date Service Provider Modifiers Qty    91361659594 HC GAIT TRAINING EA 15 MIN 4/3/2023 Rosalind Dickens PTA GP 1    [id number] HC PT THERAPEUTIC ACT EA 15 MIN 4/3/2023 Rosalind Dickens PTA GP 1          PT G-Codes  Outcome Measure Options: AM-PAC 6 Clicks Daily Activity (OT)  AM-PAC 6 Clicks Score (PT): 20  AM-PAC 6 Clicks Score (OT): 22    Rosalind Dickens PTA  4/3/2023

## 2023-04-03 NOTE — PLAN OF CARE
Goal Outcome Evaluation:  Plan of Care Reviewed With: caregiver, patient        Progress: improving  Outcome Evaluation: Nutrition F/U:  Pt with improved intake.  Still on diuresis.  Receiving milk as fluid at each meal.  Will monitor

## 2023-04-03 NOTE — PROGRESS NOTES
Adult Nutrition  Assessment/PES    Patient Name:  Pavan Mccauley  YOB: 1955  MRN: [id number]  Admit Date:  3/20/2023    Assessment Date:  4/3/2023    Comments:  Nutrition F/U:  Pt with improved oral intake, 96% average.  He enjoyed the spaghetti that we made for him on Saturday.  He claims that he will be going t o rehab at some point.  He continues to be managed for AFib; NSTEMI; Biventricular Heart failure; and Bilateral leg DVTs.   RD reviewed the importance of following a Low sodium diet at home--limiting salt in the diet as well as limiting processed foods.  Wt is significantly down since admit.    Wt on admit 3/20--231#  CBW: 180#--a difference of ~50#    Rd will continue to monitor POC and po intake.       Reason for Assessment     Row Name 04/03/23 1300          Reason for Assessment    Reason For Assessment follow-up protocol                Nutrition/Diet History     Row Name 04/03/23 1300          Nutrition/Diet History    Typical Intake (Food/Fluid/EN/PN) Pt said that he is going to have to go to rehab for therapy.  He enjoyed the spaghetti.  We once again discussed limiting High sodium processed foods.                Labs/Tests/Procedures/Meds     Row Name 04/03/23 1301          Labs/Procedures/Meds    Lab Results Reviewed reviewed, pertinent     Lab Results Comments Na 133; Creat 0.71; ALT 45        Diagnostic Tests/Procedures    Diagnostic Test/Procedure Reviewed reviewed, pertinent     Diagnostic Test/Procedures Comments Diuresis; Cardiology        Medications    Pertinent Medications Reviewed reviewed, pertinent     Pertinent Medications Comments Dig; Lasix; Aldactone                    Nutrition Prescription Ordered     Row Name 04/03/23 1304          Nutrition Prescription PO    Current PO Diet Regular     Supplement Milk     Supplement Frequency 3 times a day     Common Modifiers Cardiac;Fluid Restriction     Fluid Restriction mL per Tray 250 mL     Fluid Restriction mL per Day  1500 mL                Evaluation of Received Nutrient/Fluid Intake     Row Name 04/03/23 1305          PO Evaluation    Number of Meals 6     % PO Intake 96% average                   Problem/Interventions:   Problem 1     Row Name 04/03/23 1305          Nutrition Diagnoses Problem 1    Problem 1 Knowledge Deficit     Etiology (related to) Medical Diagnosis     Cardiac CHF     Signs/Symptoms (evidenced by) Potential Information Deficit                Problem 2     Row Name 04/03/23 1306          Nutrition Diagnoses Problem 2    Problem 2 Altered Nutrition Related to Labs     Etiology (related to) Medical Diagnosis     Metabolic/ICU Elevated LFTs     Signs/Symptoms (evidenced by) Biochemical     Labs Reviewed Done     Specific Labs Noted ALT                Problem 3     Row Name 04/03/23 1306          Nutrition Diagnoses Problem 3    Problem 3 Inadequate Intake/Infusion     Inadequate Intake Type Oral     Macronutrient Kcal;Protein     Micronutrient Vitamin;Mineral     Etiology (related to) Factors Affecting Nutrition     Appetite Improved     Food Habit/Preferences Limited Food Preferences     Signs/Symptoms (evidenced by) PO Intake     Percent (%) intake recorded 96 %     Over number of meals 6                  Intervention Goal     Row Name 04/03/23 1307          Intervention Goal    General Meet nutritional needs for age/condition     PO Tolerate PO;Maintain intake;Meet estimated needs     Weight Maintain weight                Nutrition Intervention     Row Name 04/03/23 1308          Nutrition Intervention    RD/Tech Action Follow Tx progress;Care plan reviewd;Advise available snack;Interview for preference;Menu provided;Advise alternate selection                  Education/Evaluation     Row Name 04/03/23 1308          Education    Education Education topics;Advised regarding habits/behavior     Education Topics Basic nutrition;Na+     Na+ (mg/day) 1500 mg/day     Advised Regarding Habits/Behavior Food  choices;Food prep;Seasoning food        Monitor/Evaluation    Monitor Per protocol;I&O;Supplement intake;Pertinent labs;PO intake;Weight;Skin status;Symptoms     Education Follow-up Reinforce PRN                 Electronically signed by:  Amalia Costa RD  04/03/23 13:13 CDT

## 2023-04-03 NOTE — THERAPY TREATMENT NOTE
Acute Care - Occupational Therapy Treatment Note  [facility]     Patient Name: Pavan Mccauley  : 1955  MRN: [id number]  Today's Date: 4/3/2023             Admit Date: 3/20/2023       ICD-10-CM ICD-9-CM   1. Atrial fibrillation with RVR  I48.91 427.31   2. Urinary tract infection without hematuria, site unspecified  N39.0 599.0   3. Pulmonary vascular congestion  R09.89 514   4. NSTEMI, initial episode of care  I21.4 410.71   5. Biventricular CHF (congestive heart failure)  I50.82 428.0   6. Impaired mobility and ADLs  Z74.09 V49.89    Z78.9    7. Impaired functional mobility, balance, gait, and endurance  Z74.09 V49.89   8. Impaired mobility and activities of daily living  Z74.09 V49.89    Z78.9      Patient Active Problem List   Diagnosis   • General medical exam   • Malaise   • Hyperglycemia   • Cough   • Tick bite   • Screening for colon cancer   • Pneumonia due to organism   • Carotid artery aneurysm   • Dizziness   • SVT (supraventricular tachycardia)   • Atrial fibrillation (HCC)   • Dyspnea on exertion   • Atrial fibrillation with RVR   • Non-STEMI (non-ST elevated myocardial infarction)   • Biventricular heart failure with reduced left ventricular function   • Acute on chronic systolic (congestive) heart failure   • Leg DVT (deep venous thromboembolism), acute, bilateral   • Abnormal LFTs     Past Medical History:   Diagnosis Date   • Bronchopneumonia    • Chest pain    • Corneal foreign body     both eyes   • Neck pain    • Perforation of left tympanic membrane     history of   • Prashant [place] spotted fever    • Wheezing      Past Surgical History:   Procedure Laterality Date   • CARDIAC CATHETERIZATION N/A 3/27/2023    Procedure: Left Heart Cath;  Surgeon: [place] DO;  Location: [facility] CATH INVASIVE LOCATION;  Service: Cardiology;  Laterality: N/A;   • EYE FOREIGN BODY REMOVAL  2013    REMOVE FOREIGN BODY CORNEA W/SLIT LAMP 05124 (1)     • INJECTION OF MEDICATION  2011     Kenalog (1)      • SPINE SURGERY  03/12/1991    Exploration of the previous C5-6 fusion with refusion, exploration of the C5 nerve root, left side   • TYMPANOPLASTY Left 05/16/1972    Perforation of the left tympanic membrane         OT ASSESSMENT FLOWSHEET (last 12 hours)     OT Evaluation and Treatment     Row Name 04/03/23 3597                   OT Time and Intention    Subjective Information no complaints  -RB        Document Type therapy note (daily note)  -RB        Mode of Treatment individual therapy;occupational therapy  -RB        Patient Effort adequate  -RB        Symptoms Noted During/After Treatment fatigue;shortness of breath  -RB           General Information    Patient Profile Reviewed yes  -RB        Existing Precautions/Restrictions fall  -RB           Pain Assessment    Pretreatment Pain Rating 0/10 - no pain  -RB        Posttreatment Pain Rating 0/10 - no pain  -RB           Cognition    Orientation Status (Cognition) oriented x 4  -RB           Activities of Daily Living    BADL Assessment/Intervention bathing;lower body dressing  -RB           Bathing Assessment/Intervention    [place] Level (Bathing) bathing skills;contact guard assist  -RB        Assistive Devices (Bathing) long-handled sponge;grab bar, tub/shower;shower chair  -RB           Lower Body Dressing Assessment/Training    [place] Level (Lower Body Dressing) lower body dressing skills;doff;don;pants/bottoms;socks;contact guard assist  -RB        Position (Lower Body Dressing) edge of bed sitting;supported sitting;supported standing  -RB           Bed Mobility    Bed Mobility supine-sit;sit-supine  -RB        Supine-Sit [place] (Bed Mobility) modified independence  -RB        Sit-Supine [place] (Bed Mobility) not tested  -RB        Assistive Device (Bed Mobility) bed rails;head of bed elevated  -RB           Functional Mobility    Functional Mobility- Ind. Level contact guard assist  -RB        Functional  Mobility- Device walker, front-wheeled  -RB        Functional Mobility-Distance (Feet) 10  -RB           Transfer Assessment/Treatment    Transfers sit-stand transfer;stand-sit transfer;chair-bed transfer;shower transfer;toilet transfer  -RB           Bed-Chair Transfer    Bed-Chair [place] (Transfers) --  -RB        Assistive Device (Bed-Chair Transfers) --  -RB           Chair-Bed Transfer    Chair-Bed [place] (Transfers) --  -RB        Assistive Device (Chair-Bed Transfers) --  -RB           Sit-Stand Transfer    Sit-Stand [place] (Transfers) contact guard  -RB        Assistive Device (Sit-Stand Transfers) walker, front-wheeled  -RB           Stand-Sit Transfer    Stand-Sit [place] (Transfers) contact guard  -RB        Assistive Device (Stand-Sit Transfers) walker, front-wheeled  -RB           Toilet Transfer    Type (Toilet Transfer) sit-stand;stand-sit  -RB        [place] Level (Toilet Transfer) contact guard  -RB        Assistive Device (Toilet Transfer) grab bars/safety frame;walker, front-wheeled;raised toilet seat  -RB           Shower Transfer    Type (Shower Transfer) sit-stand;stand-sit  -RB        [place] Level (Shower Transfer) contact guard  -RB        Assistive Device (Shower Transfer) grab bar, tub/shower;shower chair  -RB           Safety Issues, Functional Mobility    Safety Issues Affecting Function (Mobility) insight into deficits/self-awareness;judgment;awareness of need for assistance  -RB           Plan of Care Review    Plan of Care Reviewed With patient  -RB           Vital Signs    Pre Systolic BP Rehab 118  -RB        Pre Treatment Diastolic BP 67  -RB        Pretreatment Heart Rate (beats/min) 101  -RB        Pre SpO2 (%) 93  -RB        O2 Delivery Pre Treatment room air  -RB        Pre Patient Position Supine  -RB        Intra Patient Position Standing  -RB        Post Patient Position Supine  -RB           Positioning and Restraints    Pre-Treatment  Position in bed  -RB        Post Treatment Position bed  -RB           Therapy Plan Review/Discharge Plan (OT)    Anticipated Discharge Disposition (OT) home with assist;inpatient rehabilitation facility;skilled nursing facility  -              User Key  (r) = Recorded By, (t) = Taken By, (c) = Cosigned By    Initials Name Effective Dates    RB Pavan Brunner OT 06/16/21 -                  Occupational Therapy Education     Title: PT OT SLP Therapies (In Progress)     Topic: Occupational Therapy (In Progress)     Point: ADL training (Done)     Description:   Instruct learner(s) on proper safety adaptation and remediation techniques during self care or transfers.   Instruct in proper use of assistive devices.              Learning Progress Summary           Patient Acceptance, E,TB, VU by  at 3/31/2023 1049    Comment: POC    Acceptance, E,TB, VU by  at 3/28/2023 1123    Comment: OT role, POC, t/f training                   Point: Home exercise program (Not Started)     Description:   Instruct learner(s) on appropriate technique for monitoring, assisting and/or progressing therapeutic exercises/activities.              Learner Progress:  Not documented in this visit.          Point: Precautions (Done)     Description:   Instruct learner(s) on prescribed precautions during self-care and functional transfers.              Learning Progress Summary           Patient Acceptance, E, VU by RB at 4/3/2023 1659    Comment: [facility] pt on no OOB without assist.    Acceptance, E,TB, VU by MC at 3/31/2023 1049    Comment: POC    Acceptance, E,TB, VU by  at 3/28/2023 1123    Comment: OT role, POC, t/f training                   Point: Body mechanics (Done)     Description:   Instruct learner(s) on proper positioning and spine alignment during self-care, functional mobility activities and/or exercises.              Learning Progress Summary           Patient Acceptance, E,TB, VU by MC at 3/31/2023 1049    Comment: POC     Acceptance, E,TB, VU by  at 3/28/2023 1123    Comment: OT role, POC, t/f training                               User Key     Initials Effective Dates Name Provider Type Discipline     06/16/21 -  Pavan Brunner OT Occupational Therapist OT     10/19/22 -  Hallie Caraballo OT Occupational Therapist OT                  OT Recommendation and Plan     Plan of Care Review  Plan of Care Reviewed With: patient  Plan of Care Reviewed With: patient     Outcome Measures     Row Name 04/03/23 1530 04/03/23 1130 04/01/23 0854       How much help from another person do you currently need...    Turning from your back to your side while in flat bed without using bedrails? -- 4  -LN 4  -LW    Moving from lying on back to sitting on the side of a flat bed without bedrails? -- 4  -LN 3  -LW    Moving to and from a bed to a chair (including a wheelchair)? -- 3  -LN 3  -LW    Standing up from a chair using your arms (e.g., wheelchair, bedside chair)? -- 3  -LN 3  -LW    Climbing 3-5 steps with a railing? -- 3  -LN 3  -LW    To walk in hospital room? -- 3  -LN 3  -LW    AM-PAC 6 Clicks Score (PT) -- 20  -LN 19  -LW       How much help from another is currently needed...    Putting on and taking off regular lower body clothing? 3  -RB -- --    Bathing (including washing, rinsing, and drying) 3  -RB -- --    Toileting (which includes using toilet bed pan or urinal) 4  -RB -- --    Putting on and taking off regular upper body clothing 4  -RB -- --    Taking care of personal grooming (such as brushing teeth) 4  -RB -- --    Eating meals 4  -RB -- --    AM-PAC 6 Clicks Score (OT) 22  -RB -- --       Functional Assessment    Outcome Measure Options -- AM-PAC 6 Clicks Daily Activity (OT)  -LN --          User Key  (r) = Recorded By, (t) = Taken By, (c) = Cosigned By    Initials Name Provider Type    RB Pavan Brunner, OT Occupational Therapist    Rosalind Carson PTA Physical Therapist Assistant    Maxine Michel PTA Physical  Therapist Assistant                Time Calculation:    Time Calculation- OT     Row Name 04/03/23 1705             Time Calculation- OT    OT Start Time 1530  -RB      OT Stop Time 1647  -RB      OT Time Calculation (min) 77 min  -RB      OT Received On 04/03/23  -RB            User Key  (r) = Recorded By, (t) = Taken By, (c) = Cosigned By    Initials Name Provider Type    RB Pavan Brunner OT Occupational Therapist              Therapy Charges for Today     Code Description Service Date Service Provider Modifiers Qty    [id number] HC OT SELF CARE/MGMT/TRAIN EA 15 MIN 4/3/2023 Pavan Brunner OT GO 5               Pavan Brunner OT  4/3/2023

## 2023-04-04 ENCOUNTER — HOSPITAL ENCOUNTER (INPATIENT)
Facility: HOSPITAL | Age: 68
LOS: 7 days | Discharge: HOME | DRG: 280 | End: 2023-04-11
Attending: ORTHOPAEDIC SURGERY | Admitting: ORTHOPAEDIC SURGERY
Payer: MEDICARE

## 2023-04-04 VITALS
HEART RATE: 84 BPM | BODY MASS INDEX: 28.25 KG/M2 | TEMPERATURE: 97.8 F | SYSTOLIC BLOOD PRESSURE: 100 MMHG | OXYGEN SATURATION: 94 % | WEIGHT: 180 LBS | DIASTOLIC BLOOD PRESSURE: 69 MMHG | HEIGHT: 67 IN | RESPIRATION RATE: 18 BRPM

## 2023-04-04 DIAGNOSIS — Z74.09 IMPAIRED FUNCTIONAL MOBILITY, BALANCE, GAIT, AND ENDURANCE: ICD-10-CM

## 2023-04-04 DIAGNOSIS — I50.9 PLEURAL EFFUSION DUE TO CHF (CONGESTIVE HEART FAILURE): Chronic | ICD-10-CM

## 2023-04-04 DIAGNOSIS — R48.9 SYMBOLIC DYSFUNCTION: ICD-10-CM

## 2023-04-04 DIAGNOSIS — Z78.9 IMPAIRED MOBILITY AND ADLS: ICD-10-CM

## 2023-04-04 DIAGNOSIS — Z78.9 MEDICALLY COMPLEX PATIENT: Chronic | ICD-10-CM

## 2023-04-04 DIAGNOSIS — I50.814 BIVENTRICULAR HEART FAILURE WITH REDUCED LEFT VENTRICULAR FUNCTION: Chronic | ICD-10-CM

## 2023-04-04 DIAGNOSIS — I48.91 ATRIAL FIBRILLATION WITH RVR: Chronic | ICD-10-CM

## 2023-04-04 DIAGNOSIS — I21.4 NON-STEMI (NON-ST ELEVATED MYOCARDIAL INFARCTION): Primary | Chronic | ICD-10-CM

## 2023-04-04 DIAGNOSIS — I50.23 ACUTE ON CHRONIC SYSTOLIC (CONGESTIVE) HEART FAILURE: Chronic | ICD-10-CM

## 2023-04-04 DIAGNOSIS — Z74.09 IMPAIRED MOBILITY AND ADLS: ICD-10-CM

## 2023-04-04 LAB
ALBUMIN SERPL-MCNC: 2.2 G/DL (ref 3.5–5.2)
ALBUMIN/GLOB SERPL: 0.8 G/DL
ALP SERPL-CCNC: 110 U/L (ref 39–117)
ALT SERPL W P-5'-P-CCNC: 46 U/L (ref 1–41)
ANION GAP SERPL CALCULATED.3IONS-SCNC: 5 MMOL/L (ref 5–15)
AST SERPL-CCNC: 54 U/L (ref 1–40)
BASOPHILS # BLD AUTO: 0.1 10*3/MM3 (ref 0–0.2)
BASOPHILS NFR BLD AUTO: 1.2 % (ref 0–1.5)
BILIRUB SERPL-MCNC: 4 MG/DL (ref 0–1.2)
BUN SERPL-MCNC: 14 MG/DL (ref 8–23)
BUN/CREAT SERPL: 16.5 (ref 7–25)
CALCIUM SPEC-SCNC: 8 MG/DL (ref 8.6–10.5)
CHLORIDE SERPL-SCNC: 100 MMOL/L (ref 98–107)
CO2 SERPL-SCNC: 30 MMOL/L (ref 22–29)
CREAT SERPL-MCNC: 0.85 MG/DL (ref 0.76–1.27)
DEPRECATED RDW RBC AUTO: 64.3 FL (ref 37–54)
EGFRCR SERPLBLD CKD-EPI 2021: 96.4 ML/MIN/1.73
EOSINOPHIL # BLD AUTO: 0.24 10*3/MM3 (ref 0–0.4)
EOSINOPHIL NFR BLD AUTO: 2.9 % (ref 0.3–6.2)
ERYTHROCYTE [DISTWIDTH] IN BLOOD BY AUTOMATED COUNT: 20.6 % (ref 12.3–15.4)
GLOBULIN UR ELPH-MCNC: 2.6 GM/DL
GLUCOSE SERPL-MCNC: 80 MG/DL (ref 65–99)
HCT VFR BLD AUTO: 44.1 % (ref 37.5–51)
HGB BLD-MCNC: 15.3 G/DL (ref 13–17.7)
IMM GRANULOCYTES # BLD AUTO: 0.21 10*3/MM3 (ref 0–0.05)
IMM GRANULOCYTES NFR BLD AUTO: 2.6 % (ref 0–0.5)
LYMPHOCYTES # BLD AUTO: 1.9 10*3/MM3 (ref 0.7–3.1)
LYMPHOCYTES NFR BLD AUTO: 23.3 % (ref 19.6–45.3)
MCH RBC QN AUTO: 31 PG (ref 26.6–33)
MCHC RBC AUTO-ENTMCNC: 34.7 G/DL (ref 31.5–35.7)
MCV RBC AUTO: 89.5 FL (ref 79–97)
MONOCYTES # BLD AUTO: 1.19 10*3/MM3 (ref 0.1–0.9)
MONOCYTES NFR BLD AUTO: 14.6 % (ref 5–12)
NEUTROPHILS NFR BLD AUTO: 4.51 10*3/MM3 (ref 1.7–7)
NEUTROPHILS NFR BLD AUTO: 55.4 % (ref 42.7–76)
NRBC BLD AUTO-RTO: 0 /100 WBC (ref 0–0.2)
PLATELET # BLD AUTO: 239 10*3/MM3 (ref 140–450)
PMV BLD AUTO: 9.4 FL (ref 6–12)
POTASSIUM SERPL-SCNC: 4 MMOL/L (ref 3.5–5.2)
PROT SERPL-MCNC: 4.8 G/DL (ref 6–8.5)
RBC # BLD AUTO: 4.93 10*6/MM3 (ref 4.14–5.8)
SODIUM SERPL-SCNC: 135 MMOL/L (ref 136–145)
WBC NRBC COR # BLD: 8.15 10*3/MM3 (ref 3.4–10.8)

## 2023-04-04 PROCEDURE — 97166 OT EVAL MOD COMPLEX 45 MIN: CPT

## 2023-04-04 PROCEDURE — 85025 COMPLETE CBC W/AUTO DIFF WBC: CPT | Performed by: INTERNAL MEDICINE

## 2023-04-04 PROCEDURE — 80053 COMPREHEN METABOLIC PANEL: CPT | Performed by: INTERNAL MEDICINE

## 2023-04-04 PROCEDURE — 99222 1ST HOSP IP/OBS MODERATE 55: CPT | Performed by: ORTHOPAEDIC SURGERY

## 2023-04-04 RX ORDER — FUROSEMIDE 20 MG/1
20 TABLET ORAL 2 TIMES DAILY
Status: DISCONTINUED | OUTPATIENT
Start: 2023-04-04 | End: 2023-04-04

## 2023-04-04 RX ORDER — METOPROLOL SUCCINATE 25 MG
12.5 TABLET, EXTENDED RELEASE 24 HR ORAL
Status: DISCONTINUED | OUTPATIENT
Start: 2023-04-04 | End: 2023-04-11 | Stop reason: HOSPADM

## 2023-04-04 RX ORDER — DIGOXIN 125 MCG
125 TABLET ORAL
Status: DISCONTINUED | OUTPATIENT
Start: 2023-04-04 | End: 2023-04-11 | Stop reason: HOSPADM

## 2023-04-04 RX ORDER — SPIRONOLACTONE 25 MG/1
25 TABLET ORAL DAILY
Status: DISCONTINUED | OUTPATIENT
Start: 2023-04-04 | End: 2023-04-11 | Stop reason: HOSPADM

## 2023-04-04 RX ORDER — DOCUSATE SODIUM 100 MG/1
100 CAPSULE, LIQUID FILLED ORAL 2 TIMES DAILY
Status: DISCONTINUED | OUTPATIENT
Start: 2023-04-04 | End: 2023-04-11 | Stop reason: HOSPADM

## 2023-04-04 RX ORDER — BISACODYL 10 MG
10 SUPPOSITORY, RECTAL RECTAL DAILY PRN
Status: DISCONTINUED | OUTPATIENT
Start: 2023-04-04 | End: 2023-04-11 | Stop reason: HOSPADM

## 2023-04-04 RX ORDER — CALCIUM CARBONATE 200(500)MG
2 TABLET,CHEWABLE ORAL 2 TIMES DAILY PRN
Status: DISCONTINUED | OUTPATIENT
Start: 2023-04-04 | End: 2023-04-11 | Stop reason: HOSPADM

## 2023-04-04 RX ORDER — FUROSEMIDE 20 MG/1
20 TABLET ORAL
Status: DISCONTINUED | OUTPATIENT
Start: 2023-04-04 | End: 2023-04-11 | Stop reason: HOSPADM

## 2023-04-04 RX ORDER — AMIODARONE HYDROCHLORIDE 200 MG/1
200 TABLET ORAL
Status: DISCONTINUED | OUTPATIENT
Start: 2023-04-05 | End: 2023-04-11 | Stop reason: HOSPADM

## 2023-04-04 RX ORDER — ONDANSETRON 4 MG/1
4 TABLET, FILM COATED ORAL EVERY 6 HOURS PRN
Status: DISCONTINUED | OUTPATIENT
Start: 2023-04-04 | End: 2023-04-11 | Stop reason: HOSPADM

## 2023-04-04 RX ORDER — CHOLECALCIFEROL (VITAMIN D3) 125 MCG
5 CAPSULE ORAL NIGHTLY PRN
Status: DISCONTINUED | OUTPATIENT
Start: 2023-04-04 | End: 2023-04-11 | Stop reason: HOSPADM

## 2023-04-04 RX ORDER — ACETAMINOPHEN 325 MG/1
650 TABLET ORAL EVERY 6 HOURS PRN
Status: DISCONTINUED | OUTPATIENT
Start: 2023-04-04 | End: 2023-04-11 | Stop reason: HOSPADM

## 2023-04-04 RX ADMIN — AMIODARONE HYDROCHLORIDE 200 MG: 200 TABLET ORAL at 08:22

## 2023-04-04 RX ADMIN — SPIRONOLACTONE 25 MG: 25 TABLET ORAL at 14:13

## 2023-04-04 RX ADMIN — Medication 12.5 MG: at 14:12

## 2023-04-04 RX ADMIN — DOCUSATE SODIUM 100 MG: 100 CAPSULE, LIQUID FILLED ORAL at 20:34

## 2023-04-04 RX ADMIN — DIGOXIN 125 MCG: 125 TABLET ORAL at 14:11

## 2023-04-04 RX ADMIN — RIVAROXABAN 15 MG: 15 TABLET, FILM COATED ORAL at 17:25

## 2023-04-04 RX ADMIN — Medication 10 ML: at 08:22

## 2023-04-04 RX ADMIN — FUROSEMIDE 20 MG: 20 TABLET ORAL at 08:22

## 2023-04-04 RX ADMIN — FUROSEMIDE 20 MG: 20 TABLET ORAL at 17:25

## 2023-04-04 RX ADMIN — DOCUSATE SODIUM 100 MG: 100 CAPSULE, LIQUID FILLED ORAL at 14:10

## 2023-04-04 RX ADMIN — RIVAROXABAN 15 MG: 15 TABLET, FILM COATED ORAL at 08:22

## 2023-04-04 NOTE — PLAN OF CARE
Problem: Dysrhythmia  Goal: Normalized Cardiac Rhythm  Outcome: Ongoing, Progressing     Problem: Asthma Comorbidity  Goal: Maintenance of Asthma Control  Outcome: Ongoing, Progressing     Problem: Behavioral Health Comorbidity  Goal: Maintenance of Behavioral Health Symptom Control  Outcome: Ongoing, Progressing     Problem: COPD (Chronic Obstructive Pulmonary Disease) Comorbidity  Goal: Maintenance of COPD Symptom Control  Outcome: Ongoing, Progressing     Problem: Diabetes Comorbidity  Goal: Blood Glucose Level Within Targeted Range  Outcome: Ongoing, Progressing     Problem: Heart Failure Comorbidity  Goal: Maintenance of Heart Failure Symptom Control  Outcome: Ongoing, Progressing     Problem: Hypertension Comorbidity  Goal: Blood Pressure in Desired Range  Outcome: Ongoing, Progressing     Problem: Obstructive Sleep Apnea Risk or Actual Comorbidity Management  Goal: Unobstructed Breathing During Sleep  Outcome: Ongoing, Progressing     Problem: Osteoarthritis Comorbidity  Goal: Maintenance of Osteoarthritis Symptom Control  Outcome: Ongoing, Progressing  Intervention: Maintain Osteoarthritis Symptom Control     Problem: Pain Chronic (Persistent) (Comorbidity Management)  Goal: Acceptable Pain Control and Functional Ability  Outcome: Ongoing, Progressing     Problem: Seizure Disorder Comorbidity  Goal: Maintenance of Seizure Control  Outcome: Ongoing, Progressing     Problem: Adult Inpatient Plan of Care  Goal: Plan of Care Review  Outcome: Ongoing, Progressing  Goal: Patient-Specific Goal (Individualized)  Outcome: Ongoing, Progressing  Goal: Absence of Hospital-Acquired Illness or Injury  Outcome: Ongoing, Progressing  Intervention: Identify and Manage Fall Risk  Intervention: Prevent Skin Injury  Intervention: Prevent and Manage VTE (Venous Thromboembolism) Risk  Goal: Optimal Comfort and Wellbeing  Outcome: Ongoing, Progressing  Goal: Readiness for Transition of Care  Outcome: Ongoing, Progressing      Problem: Skin Injury Risk Increased  Goal: Skin Health and Integrity  Outcome: Ongoing, Progressing  Intervention: Optimize Skin Protection     Problem: Fall Injury Risk  Goal: Absence of Fall and Fall-Related Injury  Outcome: Ongoing, Progressing  Intervention: Promote Injury-Free Environment   Goal Outcome Evaluation:      Patient rested during the night. Eager to go to rehab to get stronger. Plan of care continues.

## 2023-04-04 NOTE — DISCHARGE SUMMARY
[facility] Medicine Services  DISCHARGE SUMMARY       Date of Admission: 3/20/2023  Date of Discharge:  4/4/2023  Primary Care Physician: Dilcia Kapoor APRN    Presenting Problem/History of Present Illness:  Atrial fibrillation [I48.91]  Atrial fibrillation with RVR [I48.91]  Pulmonary vascular congestion [R09.89]  Urinary tract infection without hematuria, site unspecified [N39.0]  A-fib [I48.91]       Final Discharge Diagnoses:  Active Hospital Problems    Diagnosis    • **Atrial fibrillation with RVR    • Leg DVT (deep venous thromboembolism), acute, bilateral    • Abnormal LFTs    • Non-STEMI (non-ST elevated myocardial infarction)    • Biventricular heart failure with reduced left ventricular function        Consults:   Consults     Date and Time Order Name Status Description    3/22/2023  4:27 PM Inpatient Nephrology Consult Completed     3/22/2023  3:40 PM Inpatient Cardiology Consult Completed           Procedures Performed: Procedure(s):  Left Heart Cath                Pertinent Test Results:   Lab Results (most recent)     Procedure Component Value Units Date/Time    Comprehensive Metabolic Panel [[id number]]  (Abnormal) Collected: 04/03/23 0647    Specimen: Blood Updated: 04/03/23 0805     Glucose 71 mg/dL      BUN 15 mg/dL      Creatinine 0.71 mg/dL      Sodium 133 mmol/L      Potassium 4.0 mmol/L      Chloride 100 mmol/L      CO2 25.0 mmol/L      Calcium 8.1 mg/dL      Total Protein 5.0 g/dL      Albumin 2.3 g/dL      ALT (SGPT) 45 U/L      AST (SGOT) 57 U/L      Alkaline Phosphatase 113 U/L      Total Bilirubin 4.4 mg/dL      Globulin 2.7 gm/dL      A/G Ratio 0.9 g/dL      BUN/Creatinine Ratio 21.1     Anion Gap 8.0 mmol/L      eGFR 101.8 mL/min/1.73     Narrative:      GFR Normal >60  Chronic Kidney Disease <60  Kidney Failure <15      CBC & Differential [[id number]]  (Abnormal) Collected: 04/03/23 0647    Specimen: Blood Updated: 04/03/23  0757    Narrative:      The following orders were created for panel order CBC & Differential.  Procedure                               Abnormality         Status                     ---------                               -----------         ------                     CBC Auto Differential[[id number]]        Abnormal            Final result                 Please view results for these tests on the individual orders.    CBC Auto Differential [[id number]]  (Abnormal) Collected: 04/03/23 0647    Specimen: Blood Updated: 04/03/23 0757     WBC 9.61 10*3/mm3      RBC 5.29 10*6/mm3      Hemoglobin 16.4 g/dL      Hematocrit 45.2 %      MCV 85.4 fL      MCH 31.0 pg      MCHC 36.3 g/dL      RDW 20.8 %      RDW-SD 61.2 fl      MPV 9.5 fL      Platelets 224 10*3/mm3      Neutrophil % 60.4 %      Lymphocyte % 19.7 %      Monocyte % 14.2 %      Eosinophil % 2.9 %      Basophil % 0.9 %      Immature Grans % 1.9 %      Neutrophils, Absolute 5.81 10*3/mm3      Lymphocytes, Absolute 1.89 10*3/mm3      Monocytes, Absolute 1.36 10*3/mm3      Eosinophils, Absolute 0.28 10*3/mm3      Basophils, Absolute 0.09 10*3/mm3      Immature Grans, Absolute 0.18 10*3/mm3      nRBC 0.0 /100 WBC     Comprehensive Metabolic Panel [[id number]]  (Abnormal) Collected: 04/02/23 0514    Specimen: Blood Updated: 04/02/23 0634     Glucose 80 mg/dL      BUN 17 mg/dL      Creatinine 0.81 mg/dL      Sodium 134 mmol/L      Potassium 3.9 mmol/L      Chloride 101 mmol/L      CO2 25.0 mmol/L      Calcium 8.1 mg/dL      Total Protein 4.8 g/dL      Albumin 2.2 g/dL      ALT (SGPT) 38 U/L      AST (SGOT) 50 U/L      Alkaline Phosphatase 105 U/L      Total Bilirubin 3.8 mg/dL      Globulin 2.6 gm/dL      A/G Ratio 0.8 g/dL      BUN/Creatinine Ratio 21.0     Anion Gap 8.0 mmol/L      eGFR 97.8 mL/min/1.73     Narrative:      GFR Normal >60  Chronic Kidney Disease <60  Kidney Failure <15      CBC & Differential [[id number]]  (Abnormal) Collected: 04/02/23 0514    Specimen:  Blood Updated: 04/02/23 0545    Narrative:      The following orders were created for panel order CBC & Differential.  Procedure                               Abnormality         Status                     ---------                               -----------         ------                     CBC Auto Differential[[id number]]        Abnormal            Final result                 Please view results for these tests on the individual orders.    CBC Auto Differential [[id number]]  (Abnormal) Collected: 04/02/23 0514    Specimen: Blood Updated: 04/02/23 0545     WBC 8.41 10*3/mm3      RBC 5.10 10*6/mm3      Hemoglobin 15.9 g/dL      Hematocrit 44.1 %      MCV 86.5 fL      MCH 31.2 pg      MCHC 36.1 g/dL      RDW 21.0 %      RDW-SD 62.9 fl      MPV 9.5 fL      Platelets 204 10*3/mm3      Neutrophil % 59.2 %      Lymphocyte % 18.7 %      Monocyte % 15.1 %      Eosinophil % 4.0 %      Basophil % 1.0 %      Immature Grans % 2.0 %      Neutrophils, Absolute 4.98 10*3/mm3      Lymphocytes, Absolute 1.57 10*3/mm3      Monocytes, Absolute 1.27 10*3/mm3      Eosinophils, Absolute 0.34 10*3/mm3      Basophils, Absolute 0.08 10*3/mm3      Immature Grans, Absolute 0.17 10*3/mm3      nRBC 0.0 /100 WBC     Magnesium [[id number]]  (Normal) Collected: 03/31/23 0530    Specimen: Blood Updated: 03/31/23 0639     Magnesium 1.9 mg/dL     Ammonia [[id number]]  (Normal) Collected: 03/30/23 1030    Specimen: Blood Updated: 03/30/23 1105     Ammonia 60 umol/L     Magnesium [[id number]]  (Normal) Collected: 03/29/23 0517    Specimen: Blood Updated: 03/29/23 1046     Magnesium 2.3 mg/dL     Potassium [[id number]]  (Abnormal) Collected: 03/27/23 1938    Specimen: Blood Updated: 03/27/23 2000     Potassium 3.4 mmol/L     Potassium [[id number]]  (Abnormal) Collected: 03/26/23 2303    Specimen: Blood Updated: 03/26/23 2332     Potassium 3.4 mmol/L      Comment: Slight hemolysis detected by analyzer. Results may be affected.       Extra Tubes [[id number]]  Collected: 03/25/23 0828    Specimen: Blood, Venous Line Updated: 03/25/23 0930    Narrative:      The following orders were created for panel order Extra Tubes.  Procedure                               Abnormality         Status                     ---------                               -----------         ------                     Lavender Top[[id number]]                                     Final result                 Please view results for these tests on the individual orders.    Lavender Top [[id number]] Collected: 03/25/23 0828    Specimen: Blood Updated: 03/25/23 0930     Extra Tube hold for add-on     Comment: Auto resulted       Creatinine Urine Random (kidney function) GFR component - Urine, Clean Catch [[id number]] Collected: 03/23/23 1037    Specimen: Urine, Clean Catch Updated: 03/23/23 2004     Creatinine, Urine 51.7 mg/dL     Narrative:      Reference intervals for random urine have not been established.  Clinical usage is dependent upon physician's interpretation in combination with other laboratory tests.       Osmolality, Urine - Urine, Clean Catch [[id number]]  (Normal) Collected: 03/23/23 1037    Specimen: Urine, Clean Catch Updated: 03/23/23 1055     Osmolality, Urine 369 mOsm/kg     Sodium, Urine, Random - Urine, Clean Catch [[id number]] Collected: 03/23/23 1037    Specimen: Urine, Clean Catch Updated: 03/23/23 1044     Sodium, Urine 26 mmol/L     Narrative:      Reference intervals for random urine have not been established.  Clinical usage is dependent upon physician's interpretation in combination with other laboratory tests.       High Sensitivity Troponin T 2Hr [[id number]]  (Abnormal) Collected: 03/22/23 1412    Specimen: Blood Updated: 03/22/23 1436     HS Troponin T 79 ng/L      Troponin T Delta 5 ng/L     Narrative:      High Sensitive Troponin T Reference Range:  <10.0 ng/L- Negative Female for AMI  <15.0 ng/L- Negative Male for AMI  >=10 - Abnormal Female indicating possible myocardial  injury.  >=15 - Abnormal Male indicating possible myocardial injury.   Clinicians would have to utilize clinical acumen, EKG, Troponin, and serial changes to determine if it is an Acute Myocardial Infarction or myocardial injury due to an underlying chronic condition.         High Sensitivity Troponin T [[id number]]  (Abnormal) Collected: 03/22/23 1222    Specimen: Blood Updated: 03/22/23 1300     HS Troponin T 74 ng/L     Narrative:      High Sensitive Troponin T Reference Range:  <10.0 ng/L- Negative Female for AMI  <15.0 ng/L- Negative Male for AMI  >=10 - Abnormal Female indicating possible myocardial injury.  >=15 - Abnormal Male indicating possible myocardial injury.   Clinicians would have to utilize clinical acumen, EKG, Troponin, and serial changes to determine if it is an Acute Myocardial Infarction or myocardial injury due to an underlying chronic condition.         Urine Culture - Urine, Urine, Clean Catch [[id number]]  (Abnormal)  (Susceptibility) Collected: 03/20/23 1900    Specimen: Urine, Clean Catch Updated: 03/22/23 1141     Urine Culture 25,000 CFU/mL Enterococcus faecalis    Narrative:      Colonization of the urinary tract without infection is common. Treatment is discouraged unless the patient is symptomatic, pregnant, or undergoing an invasive urologic procedure.    Susceptibility      Enterococcus faecalis      AMINATA      Ampicillin Susceptible      Levofloxacin Susceptible      Nitrofurantoin Susceptible      Tetracycline Susceptible      Vancomycin Susceptible                           STAT Lactic Acid, Reflex [[id number]]  (Abnormal) Collected: 03/21/23 1400    Specimen: Blood Updated: 03/21/23 1438     Lactate 3.9 mmol/L     Extra Tubes [[id number]] Collected: 03/21/23 0756    Specimen: Blood, Venous Line Updated: 03/21/23 0900    Narrative:      The following orders were created for panel order Extra Tubes.  Procedure                               Abnormality         Status                      ---------                               -----------         ------                     Lavender Top[[id number]]                                     Final result                 Please view results for these tests on the individual orders.    Lavender Top [[id number]] Collected: 03/21/23 0756    Specimen: Blood Updated: 03/21/23 0900     Extra Tube hold for add-on     Comment: Auto resulted       High Sensitivity Troponin T 2Hr [[id number]]  (Abnormal) Collected: 03/21/23 0756    Specimen: Blood Updated: 03/21/23 0841     HS Troponin T 65 ng/L      Troponin T Delta -5 ng/L     Narrative:      High Sensitive Troponin T Reference Range:  <10.0 ng/L- Negative Female for AMI  <15.0 ng/L- Negative Male for AMI  >=10 - Abnormal Female indicating possible myocardial injury.  >=15 - Abnormal Male indicating possible myocardial injury.   Clinicians would have to utilize clinical acumen, EKG, Troponin, and serial changes to determine if it is an Acute Myocardial Infarction or myocardial injury due to an underlying chronic condition.         STAT Lactic Acid, Reflex [[id number]]  (Abnormal) Collected: 03/21/23 0756    Specimen: Blood Updated: 03/21/23 0838     Lactate 4.0 mmol/L     High Sensitivity Troponin T [[id number]]  (Abnormal) Collected: 03/21/23 0542    Specimen: Blood Updated: 03/21/23 0717     HS Troponin T 70 ng/L      Comment: Specimen hemolyzed.  Results may be affected.       Narrative:      High Sensitive Troponin T Reference Range:  <10.0 ng/L- Negative Female for AMI  <15.0 ng/L- Negative Male for AMI  >=10 - Abnormal Female indicating possible myocardial injury.  >=15 - Abnormal Male indicating possible myocardial injury.   Clinicians would have to utilize clinical acumen, EKG, Troponin, and serial changes to determine if it is an Acute Myocardial Infarction or myocardial injury due to an underlying chronic condition.         Urine Drug Screen - Urine, Clean Catch [[id number]]  (Abnormal) Collected: 03/20/23 1900     Specimen: Urine, Clean Catch Updated: 03/20/23 1917     THC, Screen, Urine Positive     Phencyclidine (PCP), Urine Negative     Cocaine Screen, Urine Negative     Methamphetamine, Ur Negative     Opiate Screen Negative     Amphetamine Screen, Urine Negative     Benzodiazepine Screen, Urine Negative     Tricyclic Antidepressants Screen Negative     Methadone Screen, Urine Negative     Barbiturates Screen, Urine Negative     Oxycodone Screen, Urine Negative     Propoxyphene Screen Negative     Buprenorphine, Screen, Urine Negative    Narrative:      Cutoff For Drugs Screened:    Amphetamines               500 ng/ml  Barbiturates               200 ng/ml  Benzodiazepines            150 ng/ml  Cocaine                    150 ng/ml  Methadone                  200 ng/ml  Opiates                    100 ng/ml  Phencyclidine               25 ng/ml  THC                            50 ng/ml  Methamphetamine            500 ng/ml  Tricyclic Antidepressants  300 ng/ml  Oxycodone                  100 ng/ml  Propoxyphene               300 ng/ml  Buprenorphine               10 ng/ml    The normal value for all drugs tested is negative. This report includes unconfirmed screening results, with the cutoff values listed, to be used for medical treatment purposes only.  Unconfirmed results must not be used for non-medical purposes such as employment or legal testing.  Clinical consideration should be applied to any drug of abuse test, particularly when unconfirmed results are used.      Urinalysis, Microscopic Only - Urine, Clean Catch [[id number]]  (Abnormal) Collected: 03/20/23 1900    Specimen: Urine, Clean Catch Updated: 03/20/23 1911     RBC, UA 0-2 /HPF      WBC, UA 0-2 /HPF      Bacteria, UA None Seen /HPF      Squamous Epithelial Cells, UA 0-2 /HPF      Hyaline Casts, UA 0-2 /LPF      Methodology Automated Microscopy    Urinalysis With Microscopic If Indicated (No Culture) - Urine, Clean Catch [[id number]]  (Abnormal) Collected:  03/20/23 1900    Specimen: Urine, Clean Catch Updated: 03/20/23 1911     Color, UA [place]     Appearance, UA Clear     pH, UA 5.5     Specific Gravity, UA 1.025     Glucose, UA Negative     Ketones, UA Negative     Bilirubin, UA Moderate (2+)     Blood, UA Negative     Protein, UA 30 mg/dL (1+)     Leuk Esterase, UA Small (1+)     Nitrite, UA Positive     Urobilinogen, UA 2.0 E.U./dL    [facility] [[id number]] Collected: 03/20/23 1702    Specimen: Blood Updated: 03/20/23 1815     Extra Tube Hold for add-ons.     Comment: Auto resulted.       Scan Slide [[id number]] Collected: 03/20/23 1642    Specimen: Blood Updated: 03/20/23 1736     Acanthocytes Large/3+     Polychromasia Slight/1+     WBC Morphology Normal     Platelet Estimate Decreased     Clumped Platelets --     Comment: NONE SEEN       Protime-INR [[id number]]  (Abnormal) Collected: 03/20/23 1642    Specimen: Blood Updated: 03/20/23 1727     Protime 21.5 Seconds      INR 1.86    Narrative:      Therapeutic range for most indications is 2.0-3.0 INR,  or 2.5-3.5 for mechanical heart valves.    aPTT [[id number]]  (Normal) Collected: 03/20/23 1642    Specimen: Blood Updated: 03/20/23 1727     PTT 36.0 seconds     Narrative:      The recommended Heparin therapeutic range is 68-97 seconds.    Lipase [[id number]]  (Normal) Collected: 03/20/23 1642    Specimen: Blood Updated: 03/20/23 1722     Lipase 40 U/L     Acetaminophen Level [[id number]]  (Normal) Collected: 03/20/23 1642    Specimen: Blood Updated: 03/20/23 1722     Acetaminophen <5.0 mcg/mL     Ethanol [[id number]] Collected: 03/20/23 1642    Specimen: Blood Updated: 03/20/23 1722     Ethanol <10 mg/dL      Ethanol % <0.010 %     Single High Sensitivity Troponin T [[id number]]  (Abnormal) Collected: 03/20/23 1642    Specimen: Blood Updated: 03/20/23 1722     HS Troponin T 62 ng/L     Narrative:      High Sensitive Troponin T Reference Range:  <10.0 ng/L- Negative Female for AMI  <15.0 ng/L- Negative Male for  AMI  >=10 - Abnormal Female indicating possible myocardial injury.  >=15 - Abnormal Male indicating possible myocardial injury.   Clinicians would have to utilize clinical acumen, EKG, Troponin, and serial changes to determine if it is an Acute Myocardial Infarction or myocardial injury due to an underlying chronic condition.         Ammonia [[id number]]  (Normal) Collected: 03/20/23 1642    Specimen: Blood Updated: 03/20/23 1721     Ammonia 34 umol/L     Lactic Acid, Plasma [[id number]]  (Abnormal) Collected: 03/20/23 1642    Specimen: Blood Updated: 03/20/23 1720     Lactate 3.6 mmol/L     BNP [[id number]]  (Abnormal) Collected: 03/20/23 1642    Specimen: Blood Updated: 03/20/23 1720     proBNP 4,988.0 pg/mL     Narrative:      Among patients with dyspnea, NT-proBNP is highly sensitive for the detection of acute congestive heart failure. In addition NT-proBNP of <300 pg/ml effectively rules out acute congestive heart failure with 99% negative predictive value.          Imaging Results (Most Recent)     Procedure Component Value Units Date/Time    US Venous Doppler Lower Extremity Bilateral (duplex) [[id number]] Collected: 03/30/23 1111     Updated: 03/30/23 1239    Addenda:         ADDENDUM   ADDENDUM #1       Findings discussed with Dr. Lopez at 12:31 PM CST.    Electronically signed by:  Obed Boyd MD  3/30/2023 12:37 PM  CDT Workstation: [id number]    Signed: 03/30/23 1237 by Sarahi Boyd MD    Narrative:        Ultrasound venous duplex bilateral lower extremities    HISTORY: Lower extremity swelling    Duplex ultrasound of the deep venous system of each lower  extremity was performed    FINDINGS:  Noncompressible deep venous thrombosis right popliteal vein and  right posterior tibial vein.  Noncompressible the venous thrombus distal left femoral vein,  left popliteal vein and left posterior tibial vein.  Real-time images of the remaining veins demonstrate normal  compressibility without evidence of  intraluminal thrombus.  Doppler of the remaining veins shows phasic flow and  augmentation.  Color Doppler of the remaining veins also reveals venous patency.  Subcutaneous edema each calf.      Impression:      CONCLUSION:  Noncompressible deep venous thrombosis right popliteal vein and  right posterior tibial vein.  Noncompressible the venous thrombus distal left femoral vein,  left popliteal vein and left posterior tibial vein.  Subcutaneous edema each calf.    [id number]    Electronically signed by:  Obed Boyd MD  3/30/2023 11:54 AM  CDT Workstation: [id number]    US Guided Vascular Access [[id number]] Collected: 03/22/23 1330     Updated: 03/23/23 1550    Narrative:      PROCEDURE: Ultrasound Guidance Vascular Access      Ordering physician(s): NIDHI CARDONA    Clinical Indication: Vascular access      Findings:    The right basilic vein was sonographically evaluated and  determined to be patent. Concurrent realtime ultrasound was used  to visualize needle entry into the right basilic vein  for  midline catheter placement and 2 permanent images acquired for  permanent recording and reporting.         Impression:      Impression:   As above.      Electronically signed by:  Tony Giron MD  3/23/2023 3:48 PM CDT  Workstation: [id number]    XR Chest 1 View [[id number]] Collected: 03/22/23 1327     Updated: 03/22/23 1239    Narrative:      EXAM:  XR CHEST 1 VIEW    TECHNIQUE:   Single frontal radiograph of the chest.    VIEWS:  1 view    CLINICAL HISTORY:   67 years  Male  SOB, I48.91 Unspecified atrial fibrillation N39.0  Urinary tract infection, site not specified R09.89 Other  specified symptoms and signs involving the circulatory and  respiratory systems    COMPARISON:   March 20, 2022 chest x-ray    FINDINGS:   Support lines and tubes: The right PICC tip overlies the right  axillary soft tissues.     Lungs:  Right lung base airspace opacities. The left lung is  clear.  Pleura: No pleural effusion. No  pneumothorax.  Heart/mediastinum: The cardiac silhouette is enlarged.  Bones: No acute osseous findings.  Soft tissues: Unremarkable.      Impression:      1. Right pleural effusion and adjacent atelectasis with or  without pneumonia, increased in the interval.  2. Cardiomegaly.    Electronically signed by:  Issa Burch MD  3/22/2023 11:37  PM CDT Workstation: [id number]    US Abdomen Complete [[id number]] Collected: 03/22/23 1330     Updated: 03/22/23 1509    Narrative:      Ultrasound abdomen complete.    HISTORY: Abdominal distention.    Prior exam: CT chest March 20, 2023.      Impression:      Findings, impression: Limited examination, portable bedside  technique.    Echogenic sludge within the gallbladder.    Gallbladder  wall thickening. Normal liver.    Common bile duct and pancreas are obscured by overlying bowel  gas.    Normal spleen. Normal right kidney 9.6 x 5.1 x 5.1 cm.    Benign cyst left kidney. Left kidney is otherwise unremarkable  11.2 x 5.9 x 5.1 cm    Normal aorta and inferior vena cava.    Small amount of free fluid, ascites throughout the abdomen.    IMPRESSION: Ascites. Gallbladder wall thickening and echogenic  sludge within the gallbladder.    Electronically signed by:  Clem Balderrama MD  3/22/2023 3:07 PM CDT  Workstation: [id number]    IR Insert Midline Without Port Pump 5 Plus [[id number]] Resulted: 03/22/23 1338     Updated: 03/22/23 1338    Narrative:      This procedure was auto-finalized with no dictation required.    CT Abdomen Pelvis Without Contrast [[id number]] Collected: 03/20/23 2011     Updated: 03/20/23 2138    Narrative:        lINDICATION: sob, pulm edema, I48.91 Unspecified atrial  fibrillation N39.0 Urinary tract infection, site not specified  R09.89 Other specified symptoms and signs involving the  circulatory and respiratory systems    EXAMINATION: CT CHEST, ABDOMEN AND PELVIS     TECHNIQUE:  Helically acquired images were obtained of the chest,  abdomen and pelvis. A  radiation dose optimization technique was  used for this scan.    IV Contrast dosage and agent: None    Oral contrast: None.    COMPARISON: None.    FINDINGS:    ----Chest:   LUNGS, PLEURA: There is a right lower lobe consolidation  suggesting atelectasis or pneumonia. This is associated with a  large pleural effusion. This appears slightly loculated. Left  lung is clear. There is no left-sided effusion. No pneumothorax     SOFT TISSUES: There is diffuse subcutaneous edema within the  lower chest wall and upper abdominal wall suggesting anasarca.  There is no evidence of adenopathy or soft tissue gas.     HEART AND PERICARDIUM: Heart is prominent. There is no  pericardial effusion.     VESSELS: Main pulmonary artery is prominent measuring 4.2 cm  suggesting pulmonary arterial hypertension. The aorta is normal  in course and caliber on this noncontrast study.     MEDIASTINUM AND MICHAEL: No mediastinal or hilar adenopathy.  Esophagus is unremarkable. No hiatal hernia.     BONES: No lytic or blastic abnormality.  No fractures are  identified.     ----Abdomen/Pelvis:    LIVER: Homogeneous.  No focal lesion is identified on this  noncontrast study.    GALLBLADDER AND BILIARY TREE: There appears to be dependent  sludge. There are no calcified stones. The gallbladder is not  dilated.    No intra- or extrahepatic biliary ductal dilation.    PANCREAS: There is no evidence of mass or inflammatory process.    SPLEEN: Spleen is normal in size with no focal lesion.    ADRENAL GLANDS: Normal in appearance.      KIDNEYS AND URETERS: Normal renal size and position.  There is no  evidence of renal mass or calcification. No hydronephrosis.    URINARY BLADDER: Unremarkable.     PERITONEUM: There is a small amount of ascites.     BOWEL: The appendix is normal. There is no bowel distention or  evidence of obstruction. There is questionable edema in the  region of the third portion of the duodenum. Possibility of mild  duodenitis  is not  excluded.    VASCULAR STRUCTURES AND RETROPERITONEUM: Aorta and IVC are normal  in caliber.  There is no evidence of aneurysm. There is no  evidence of retroperitoneal lymphadenopathy, mass, or fluid  collection    REPRODUCTIVE ORGANS: There is no evidence of pelvic mass.     ABDOMINAL WALL: There is diffuse anasarca. There is no hernia.     BONES: There is no acute fracture or focal bone lesion.       Impression:      1.  Moderate to large right effusion which appears loculated.  There is associated right lower lobe consolidation suggesting  pneumonia.  2.  Cardiomegaly.  3.  Prominent main pulmonary artery suggesting pulmonary arterial  hypertension.  4.  Small amount of ascites. There appears to be some edema and  wall thickening associated with the third portion of the duodenum  suggesting duodenitis.     Electronically signed by:  Krzysztof Gomez MD  3/20/2023 9:36 PM CDT  Workstation: [id number]    CT Chest Without Contrast Diagnostic [[id number]] Collected: 03/20/23 2011     Updated: 03/20/23 2137    Narrative:        lINDICATION: sob, pulm edema, I48.91 Unspecified atrial  fibrillation N39.0 Urinary tract infection, site not specified  R09.89 Other specified symptoms and signs involving the  circulatory and respiratory systems    EXAMINATION: CT CHEST, ABDOMEN AND PELVIS     TECHNIQUE:  Helically acquired images were obtained of the chest,  abdomen and pelvis. A radiation dose optimization technique was  used for this scan.    IV Contrast dosage and agent: None    Oral contrast: None.    COMPARISON: None.    FINDINGS:    ----Chest:   LUNGS, PLEURA: There is a right lower lobe consolidation  suggesting atelectasis or pneumonia. This is associated with a  large pleural effusion. This appears slightly loculated. Left  lung is clear. There is no left-sided effusion. No pneumothorax     SOFT TISSUES: There is diffuse subcutaneous edema within the  lower chest wall and upper abdominal wall suggesting anasarca.  There is  no evidence of adenopathy or soft tissue gas.     HEART AND PERICARDIUM: Heart is prominent. There is no  pericardial effusion.     VESSELS: Main pulmonary artery is prominent measuring 4.2 cm  suggesting pulmonary arterial hypertension. The aorta is normal  in course and caliber on this noncontrast study.     MEDIASTINUM AND MICHAEL: No mediastinal or hilar adenopathy.  Esophagus is unremarkable. No hiatal hernia.     BONES: No lytic or blastic abnormality.  No fractures are  identified.     ----Abdomen/Pelvis:    LIVER: Homogeneous.  No focal lesion is identified on this  noncontrast study.    GALLBLADDER AND BILIARY TREE: There appears to be dependent  sludge. There are no calcified stones. The gallbladder is not  dilated.    No intra- or extrahepatic biliary ductal dilation.    PANCREAS: There is no evidence of mass or inflammatory process.    SPLEEN: Spleen is normal in size with no focal lesion.    ADRENAL GLANDS: Normal in appearance.      KIDNEYS AND URETERS: Normal renal size and position.  There is no  evidence of renal mass or calcification. No hydronephrosis.    URINARY BLADDER: Unremarkable.     PERITONEUM: There is a small amount of ascites.     BOWEL: The appendix is normal. There is no bowel distention or  evidence of obstruction. There is questionable edema in the  region of the third portion of the duodenum. Possibility of mild  duodenitis  is not excluded.    VASCULAR STRUCTURES AND RETROPERITONEUM: Aorta and IVC are normal  in caliber.  There is no evidence of aneurysm. There is no  evidence of retroperitoneal lymphadenopathy, mass, or fluid  collection    REPRODUCTIVE ORGANS: There is no evidence of pelvic mass.     ABDOMINAL WALL: There is diffuse anasarca. There is no hernia.     BONES: There is no acute fracture or focal bone lesion.       Impression:      1.  Moderate to large right effusion which appears loculated.  There is associated right lower lobe consolidation suggesting  pneumonia.  2.   Cardiomegaly.  3.  Prominent main pulmonary artery suggesting pulmonary arterial  hypertension.  4.  Small amount of ascites. There appears to be some edema and  wall thickening associated with the third portion of the duodenum  suggesting duodenitis.     Electronically signed by:  Krzysztof Gomez MD  3/20/2023 9:35 PM CDT  Workstation: [id number]    XR Chest 1 View [[id number]] Collected: 03/20/23 1651     Updated: 03/20/23 1828    Narrative:      EXAMINATION: XR CHEST 1 VIEW    COMPARISON: Chest 2 views November 5, 2020    INDICATION: tachycardia    FINDINGS: Portable AP upright imaging of the chest is submitted -  1 view. A moderate size basilar right pleural effusion is new.  Some degree of right basilar compressive atelectasis is  suspected. Heart size is difficult to assess but appears at least  mildly enlarged. Mild central vascular prominence is noted but no  subpleural interstitial edema is evident. No pneumothorax.       Impression:      Right basilar pleural effusion, right lower lobe  compressive atelectasis, cardiomegaly and mild central vascular  congestion.    Electronically signed by:  Hollis Sousa MD  3/20/2023 6:26 PM CDT  Workstation: [id number]          Chief Complaint on Day of Discharge:   No complaints on day of discharge  Hospital Course:  The patient is a 67 y.o. male who presented to [facility] with shortness of breath with heart palpitations secondary to atrial fibs  Patient is admitted to the hospital 3/20/2023.  Discharged to home with home health on April 3, 2023.    67-year-old male comes to the ER stating he does not feel well.  He has not felt well for a while.  Patient states he has not taken any of his medicines for the last several months.  He reports having history of A-fib.  Patient is a poor historian, but states he is short of breath that is worse with exertion.      Impression/hospital treatment plan  Type II MI secondary to RVR and fluid overload  Continue  current management.  Continue fluid restriction 1.5 L/day and salt restriction.  Continue IV Lasix.  - 3/28 status post PCI 3/27 with nonobstructive CAD noted.  Remained stable at this point.  The patient appears as though he will benefit from skilled nursing facility on discharge.  The patient is agreeable to placement.  Case management to evaluate for ARU placement.        Bilateral DVTs:  - 3/30 bilateral ultrasound shows DVTs in patient's lower extremities.  Patient already on Xarelto, will continue Xarelto management of DVTs.  Patient would benefit from outpatient follow-up by hematology once discharged to assure resolution of DVT issue..  Remains stable.     Acute on chronic, systolic CHF  Continue Lasix twice a day and discharged     Atrial fibrillation with RVR  Rate controlled with current regimen.  Cardiologist will be consulted and appreciate their assistance. High Exqi1ldcb score; currently tolerating rivaroxaban therapy.  Cardiac rehab.  Troponin elevated but being followed by cardiology  Left heart cath once fluid status has been stabilized.  Continue Xarelto 20 mg daily  Continue amiodarone at current dosing.  Continue digoxin.  And metoprolol.  Anticoagulation with Xarelto        Hyperbilirubinemia:  Resolved  Bilirubin continues to trend downward.  Liver function test ALT is 38 AST is 50 total bili is 3.8     RUTH:  Resolved  - Appreciate nephrology assistance.  Etiology possibly A-fib with RVR related versus cardiorenal syndrome.  Continue Lasix.        Hypertension  Current blood pressure 95/67 this morning.  O2 saturation 93% on room air   Currently on metoprolol 12.5 mg daily.  And Aldactone 25 daily     Deconditioning  Participated with PT OT his admission.  Doing better and getting stronger.        CODE STATUS full code     Seen and examined April 4 2023  .  The patient on evaluation today he is sitting up in bed he is awake and alert.  He is doing much better.  He states he is feeling better  "cardiology has signed off and stated the patient could go home.  Multiple medications were adjusted during the patient's stay and overall he is feeling a lot better.  He has no new problems or complaints.  He will be discharged home with medications as noted.  His vital signs are stable he is afebrile.  He has no nausea vomiting.  No fevers or pills.  No chest pain headache or dizziness.  I reviewed all findings discharge medications and follow-up instructions with the patient he states he understands and will follow discharge recommendations.  Addendum the patient was scheduled to be transferred on April 3, 2023 however due to complications the patient was not able to be transferred until today.  No changes have occurred he is stable.  Has been accepted to ARU today.    Condition on Discharge: Stable    Physical Exam on Discharge:  /69 (BP Location: Right arm, Patient Position: Lying)   Pulse 84   Temp 97.8 °F (36.6 °C) (Oral)   Resp 18   Ht 170.2 cm (67\")   Wt 81.6 kg (180 lb)   SpO2 94%   BMI 28.19 kg/m²   Physical Exam  Vitals and nursing note reviewed.   HENT:      Head: Normocephalic and atraumatic.      Right Ear: External ear normal.      Left Ear: External ear normal.      Nose: Nose normal.      Mouth/Throat:      Mouth: Mucous membranes are moist.      Pharynx: Oropharynx is clear.   Eyes:      General: No scleral icterus.     Extraocular Movements: Extraocular movements intact.      Conjunctiva/sclera: Conjunctivae normal.      Pupils: Pupils are equal, round, and reactive to light.   Neck:      Comments: No JVD or bruits  Cardiovascular:      Rate and Rhythm: Normal rate. Rhythm irregular.      Pulses: Normal pulses.      Heart sounds: Normal heart sounds.   Pulmonary:      Effort: Pulmonary effort is normal.      Breath sounds: Normal breath sounds.      Comments: Decreased breath sounds at the bases otherwise clear  Abdominal:      General: Bowel sounds are normal.      Palpations: " Abdomen is soft.      Tenderness: There is no abdominal tenderness.      Comments: Soft nontender nondistended normal active bowel sounds   Musculoskeletal:         General: Normal range of motion.      Cervical back: Normal range of motion and neck supple.      Right lower leg: Edema present.      Left lower leg: Edema present.      Comments: Venous stasis changes bilateral lower extremities  Edema improving   Skin:     General: Skin is warm and dry.      Capillary Refill: Capillary refill takes less than 2 seconds.      Coloration: Skin is not jaundiced.   Neurological:      General: No focal deficit present.      Mental Status: He is alert and oriented to person, place, and time.      Motor: Weakness present.   Psychiatric:         Mood and Affect: Mood normal.         Behavior: Behavior normal.           Discharge Disposition:  Short Term Hospital (DC - External)    Discharge Medications:     Discharge Medications      New Medications      Instructions Start Date   amiodarone 200 MG tablet  Commonly known as: PACERONE   200 mg, Oral, Every 24 Hours Scheduled   Start Date: April 5, 2023     digoxin 125 MCG tablet  Commonly known as: LANOXIN   125 mcg, Oral, Daily Digoxin      furosemide 20 MG tablet  Commonly known as: LASIX   20 mg, Oral, 2 Times Daily      spironolactone 25 MG tablet  Commonly known as: ALDACTONE   25 mg, Oral, Daily         Continue These Medications      Instructions Start Date   rivaroxaban 20 MG tablet  Commonly known as: XARELTO   Take 1 tablet by mouth once daily.             Discharge Diet:   Diet Instructions     Diet: Cardiac Diets, Diabetic Diets; Healthy Heart (2-3 Na+); Consistent Carbohydrate; Texture: Regular Texture (IDDSI 7); Fluid Consistency: Thin (IDDSI 0)      Discharge Diet:  Cardiac Diets  Diabetic Diets       Cardiac Diet: Healthy Heart (2-3 Na+)    Diabetic Diet: Consistent Carbohydrate    Texture: Regular Texture (IDDSI 7)    Fluid Consistency: Thin (IDDSI 0)           Activity at Discharge:   Activity Instructions     Activity as Tolerated            Discharge Care Plan/Instructions:     Follow-up Appointments:   Future Appointments   Date Time Provider Department Center   4/13/2023 10:45 AM Dilcia Kapoor APRN [facility]  [facility]       Test Results Pending at Discharge: None      Gurvinder David DO    Time: Time to complete discharge 35 minutes

## 2023-04-04 NOTE — PLAN OF CARE
Goal Outcome Evaluation:  Plan of Care Reviewed With: patient           Outcome Evaluation: OT eval completed. Pt sitting EOB eating upon arrival. VSS. Pt first completed 9-hole peg test with R measuring 41.98 and L measuring 33.29. Pt completed  strengthening with dynanometer with R measuring 26, 30, 34 and L measuring 32, 32, 30. Pt was SPV for sit <> stand t/f, mobility in room with FWW, and with toilet t/f. Pt was (I) for toileting. Pt was SBA for 120 foot mobility with FWW. Pt completed tub t/f with CGA. Pt was SBA for 140 foot mobility back to room. Pt required frequent rest breaks throughout session d/t becoming SOA and fatigued. Pt presents with decreased endurance and activity tolerance, impairing his safety and (I) with ADLs, IADLS, and mobility. Goals established.

## 2023-04-04 NOTE — PLAN OF CARE
Problem: Rehabilitation ([facility]) Plan of Care  Goal: Plan of Care Review  Outcome: Ongoing, Progressing  Flowsheets (Taken 4/4/2023 1516)  Plan of Care Reviewed With: patient   Goal Outcome Evaluation:  Plan of Care Reviewed With: patient            RD seeing for initial nutrition assessment.    Pt reports appetite is pretty good.  100% for most all meals for the last 18 meals.  Pt was getting milk TID but does not want any more - agreed to pudding BID.  Pt has lost significant wt while on acute care r/t fluid loss ~40#.  First standing scale wt 221# and #.  Pt previously edu by RD staff for low sodium diet.  RD will attach edu to d/c papers.

## 2023-04-04 NOTE — CONSULTS
"Adult Nutrition  Assessment/PES    Patient Name:  Pavan Mccauley  YOB: 1955  MRN: [id number]  Admit Date:  4/4/2023    Assessment Date:  4/4/2023    Comments:  RD seeing for initial nutrition assessment.  RD staff has been following pt on acute care.  Pt reports appetite is pretty good.  Intakes seem to have improved over that last several days.  100% for most all meals for the last 18 meals.  Pt denies chewing/swallowing difficulty.  Pt relayed some food preferences- will add to diet message.  Pt was getting milk TID but does not want any more - agreed to pudding BID.  Pt has lost significant wt while on acute care r/t fluid loss ~40#.  First standing scale wt 221# and #.  Pt previously edu by RD staff for low sodium diet.  RD will attach edu to d/c papers.  RD will follow ARU course.      Reason for Assessment     Row Name 04/04/23 1507          Reason for Assessment    Reason For Assessment physician consult     Identified At Risk by Screening Criteria other (see comments)  new admit                Nutrition/Diet History     Row Name 04/04/23 1508          Nutrition/Diet History    Typical Intake (Food/Fluid/EN/PN) Pt reports appetite is pretty good                Labs/Tests/Procedures/Meds     Row Name 04/04/23 1508          Labs/Procedures/Meds    Lab Results Reviewed reviewed     Lab Results Comments Na 135, ALT 46, Alb 2.2, Tbili 4.0        Diagnostic Tests/Procedures    Diagnostic Test/Procedure Reviewed reviewed        Medications    Pertinent Medications Reviewed reviewed, pertinent     Pertinent Medications Comments colace, lasix                  Estimated/Assessed Needs - Anthropometrics     Row Name 04/04/23 1510 04/04/23 1010       Anthropometrics    Height -- 167.6 cm (66\")    Weight -- 81.5 kg (179 lb 11.2 oz)    Weight for Calculation 81.5 kg (179 lb 10.8 oz) --       Estimated/Assessed Needs    Additional Documentation KCAL/KG (Group);Protein Requirements (Group);Fluid " Requirements (Group) --       KCAL/KG    KCAL/KG 25 Kcal/Kg (kcal) --    25 Kcal/Kg (kcal) 2037.5 --       Protein Requirements    Weight Used For Protein Calculations 81.5 kg (179 lb 10.8 oz) --    Est Protein Requirement Amount (gms/kg) 0.8 gm protein --    Estimated Protein Requirements (gms/day) 65.2 --       Fluid Requirements    Fluid Requirements (mL/day) 2000 --    RDA Method (mL) 2000 --               Nutrition Prescription Ordered     Row Name 04/04/23 1511          Nutrition Prescription PO    Current PO Diet Regular     Common Modifiers Cardiac;Fluid Restriction     Fluid Restriction mL per Day 1500 mL                Evaluation of Received Nutrient/Fluid Intake     Row Name 04/04/23 1511          PO Evaluation    Number of Meals 18     % PO Intake 100% for most all meals                   Problem/Interventions:   Problem 1     Row Name 04/04/23 1511          Nutrition Diagnoses Problem 1    Problem 1 Knowledge Deficit     Etiology (related to) Medical Diagnosis     Cardiac CHF     Signs/Symptoms (evidenced by) Potential Information Deficit                      Intervention Goal     Row Name 04/04/23 1512          Intervention Goal    General Maintain nutrition;Meet nutritional needs for age/condition     PO Meet estimated needs;Continue positive trend     Weight No significant weight loss                Nutrition Intervention     Row Name 04/04/23 1512          Nutrition Intervention    RD/Tech Action Follow Tx progress;Care plan reviewd;Recommend/ordered     Recommended/Ordered Supplement                Nutrition Prescription     Row Name 04/04/23 1512          Nutrition Prescription PO    PO Prescription Begin/change supplement     Supplement Other (comment)  pudding                Education/Evaluation     Row Name 04/04/23 1512          Education    Education Education topics;Previous education by RD/LD     Education Topics Basic nutrition        Monitor/Evaluation    Monitor Per protocol      Education Follow-up Reinforce PRN                 Electronically signed by:  Kamila Walker RD  04/04/23 15:13 CDT

## 2023-04-04 NOTE — H&P
HISTORY AND PHYSICAL    Admit date: 04/04/23     Chief complaint: Heart failure    History of present illness (4):  Patient is a 67 y.o. male presents with patient has a long history of atrial fibrillation and apparently congestive heart failure approximately 3 months ago he stopped taking his medicines because he got too expensive.  He has a past history of heavy drinking and prolonged smoking but states he is gave that up months ago.  He immediately admitted to the acute hospital with progressive shortness of breath bilateral lower extremity edema he has a echo which showed approximately 20% ejection fraction obviously had fluid overload was diuresed for fluid restrictions and diet when in acute renal failure that apparently is corrected followed by Doctor Bernal the cardiologist and the nephrologist    Personal/Family/Social History: After examined this later did not know his status for home  Past Medical History:   Diagnosis Date   • Atrial fibrillation 12/01/2022    5 months   • Bronchopneumonia    • Chest pain    • Corneal foreign body     both eyes   • Hypertension    • Neck pain    • Perforation of left tympanic membrane     history of   • Prashant Mountain spotted fever    • Wheezing      Past Surgical History:   Procedure Laterality Date   • CARDIAC CATHETERIZATION N/A 3/27/2023    Procedure: Left Heart Cath;  Surgeon: [place] Charles DO;  Location: [facility] CATH INVASIVE LOCATION;  Service: Cardiology;  Laterality: N/A;   • EYE FOREIGN BODY REMOVAL  07/29/2013    REMOVE FOREIGN BODY CORNEA W/SLIT LAMP 65771 (1)     • INJECTION OF MEDICATION  07/12/2011    Kenalog (1)      • SPINE SURGERY  03/12/1991    Exploration of the previous C5-6 fusion with refusion, exploration of the C5 nerve root, left side   • TYMPANOPLASTY Left 05/16/1972    Perforation of the left tympanic membrane     History reviewed. No pertinent family history.  Social History     Tobacco Use   • Smoking status: Every Day     Types: Cigarettes    • Smokeless tobacco: Never   Vaping Use   • Vaping Use: Never used   Substance Use Topics   • Alcohol use: Yes     Comment: nominal   • Drug use: No       Review of Systems:(detailed)    Constitutional: Other than heart failure     HENT: Negative    Eyes: Negative    Respiratory: Negative    Cardiovascular: History of atrial fibs with conversion to normal sinus when he went back in atrial for we did not check with the doctors apparently is not very compliant    Gastrointestinal: Negative    Endocrine: Negative    Genitourinary: Negative      Musculoskeletal: Surgery on the cervical spine disc excision and fusion years ago no complications    Skin: Negative    Allergic/Immunologic:  No Known Allergies    Neurological: Negative    Hematological: Negative    Psychiatric: Negative    [x]  All other systems reviewed and are negative      Prior to Admission medications    Medication Sig Start Date End Date Taking? Authorizing Provider   amiodarone (PACERONE) 200 MG tablet Take 1 tablet by mouth Daily for 30 days. 4/5/23 5/5/23 Yes Gurvinder David DO   digoxin (LANOXIN) 125 MCG tablet Take 1 tablet by mouth Daily for 30 days. 4/3/23 5/3/23 Yes Gurvinder David DO   furosemide (LASIX) 20 MG tablet Take 1 tablet by mouth 2 (Two) Times a Day for 30 days. 4/3/23 5/3/23 Yes Gurvinder David DO   rivaroxaban (XARELTO) 20 MG tablet Take 1 tablet by mouth once daily. 3/17/21  Yes Hilary Sims MD   dilTIAZem CD (CARDIZEM CD) 240 MG 24 hr capsule Take 1 capsule by mouth Daily. 5/11/22 4/4/23 Yes Hilary Sims MD   spironolactone (ALDACTONE) 25 MG tablet Take 1 tablet by mouth Daily for 30 days. 4/4/23 5/4/23  Gurvinder David DO   metoprolol succinate XL (TOPROL-XL) 100 MG 24 hr tablet Take 1 tablet by mouth Daily. 5/11/22 4/4/23  Hilary Sims MD     No Known Allergies      Vital Signs  Temp:  [96.9 °F (36.1 °C)-98.3 °F (36.8 °C)] 98.3 °F (36.8 °C)  Heart Rate:  [] 112  Resp:  [18]  18  BP: ()/(58-74) 137/63    Physical Exam:    Constitutional: Patient is alert and oriented stable carries a little bit of peripheral edema and is to have somewhat distended abdomen     HENT: Thin normals for age    Eyes: EOM intact with normal pupillary reaction    Neck: Full range of motion no carotid bruits scar right side cervical spine from approximately 3 cm in length    Cardiovascular:  irregular rhythm with ejection bruit    Pulmonary: Lungs clear occasional wheezing sound tight more on the right base than left    Abdominal: Distended no masses palpable    Genitourinary/Anorectal: Furred      Musculoskeletal: No abnormality noted    Skin: Intact    Neurological: No neurological findings    Psychiatric/Behavioral: Cooperative but restrained    Results Review:   Lab Results (last 24 hours)     ** No results found for the last 24 hours. **        Imaging Results (Last 24 Hours)     ** No results found for the last 24 hours. **          Assessment/Plan  Principal Problem:    Medically complex patient case for admission because of his heart failure and deconditioning the concern of course is mobilization and Kyle and causing further deterioration of heart function as we try to regain his strength and independent function  Active Problems:    Atrial fibrillation with RVR stable    Non-STEMI (non-ST elevated myocardial infarction) stable    Biventricular heart failure with reduced left ventricular function able stable    Acute on chronic systolic (congestive) heart failure stable    Leg DVT (deep venous thromboembolism), acute, bilateral under treatment  Notation congestive heart failure cardiac problems are all under treatment        Gilmer Solomon MD  04/04/23  11:35 CDT            This document has been electronically signed by Gilmer Solomon MD on April 4, 2023 11:35 CDT      Part of this note may be an electronic transcription/translation of spoken language to printed text using the Dragon Dictation  System.

## 2023-04-04 NOTE — THERAPY EVALUATION
Inpatient Rehabilitation - Occupational Therapy Initial Evaluation    [facility]     Patient Name: Pavan Mccauley  : 1955  MRN: [id number]    Today's Date: 2023                 Admit Date: 2023         ICD-10-CM ICD-9-CM   1. Impaired mobility and ADLs  Z74.09 V49.89    Z78.9        Patient Active Problem List   Diagnosis   • General medical exam   • Malaise   • Hyperglycemia   • Cough   • Tick bite   • Screening for colon cancer   • Pneumonia due to organism   • Carotid artery aneurysm   • Dizziness   • SVT (supraventricular tachycardia)   • Atrial fibrillation (HCC)   • Dyspnea on exertion   • Atrial fibrillation with RVR   • Non-STEMI (non-ST elevated myocardial infarction)   • Biventricular heart failure with reduced left ventricular function   • Acute on chronic systolic (congestive) heart failure   • Leg DVT (deep venous thromboembolism), acute, bilateral   • Abnormal LFTs   • Medically complex patient       Past Medical History:   Diagnosis Date   • Atrial fibrillation 2022    5 months   • Bronchopneumonia    • Chest pain    • Corneal foreign body     both eyes   • Hypertension    • Neck pain    • Perforation of left tympanic membrane     history of   • Prashant Mountain spotted fever    • Wheezing        Past Surgical History:   Procedure Laterality Date   • CARDIAC CATHETERIZATION N/A 3/27/2023    Procedure: Left Heart Cath;  Surgeon: [place] DO;  Location: [facility] CATH INVASIVE LOCATION;  Service: Cardiology;  Laterality: N/A;   • EYE FOREIGN BODY REMOVAL  2013    REMOVE FOREIGN BODY CORNEA W/SLIT LAMP 82396 (1)     • INJECTION OF MEDICATION  2011    Kenalog (1)      • SPINE SURGERY  1991    Exploration of the previous C5-6 fusion with refusion, exploration of the C5 nerve root, left side   • TYMPANOPLASTY Left 1972    Perforation of the left tympanic membrane             IRF OT ASSESSMENT FLOWSHEET (last 12 hours)     IRF OT Evaluation and Treatment      Row Name 04/04/23 1238          OT Time and Intention    Document Type initial evaluation  -CM     Mode of Treatment occupational therapy  -CM     Total Minutes, Occupational Therapy 75  -CM     Symptoms Noted During/After Treatment fatigue;shortness of breath  -CM     Row Name 04/04/23 1238          General Information    Patient Profile Reviewed yes  -CM     Existing Precautions/Restrictions fall  -CM     Row Name 04/04/23 1238          Previous Level of Function/Home Environm    Bathing/Grooming, Premorbid Functional Level independent  -CM     Dressing, Premorbid Functional Level independent  -CM     Eating/Feeding, Premorbid Functional Level independent  -CM     Toileting, Premorbid Functional Level independent  -CM     BADLs, Premorbid Functional Level independent  -CM     IADLs, Premorbid Functional Level independent  -CM     Bed Mobility, Premorbid Functional Level independent  -CM     Transfers, Premorbid Functional Level independent  -CM     Household Ambulation, Premorbid Functional Level independent  -CM     Stairs, Premorbid Functional Level independent  -CM     Community Ambulation, Premorbid Functional Level independent  -CM     Row Name 04/04/23 1238          Living Environment    Current Living Arrangements apartment  -CM     People in Home alone  -CM     Primary Care Provided by self  -CM     Row Name 04/04/23 1238          Home Main Entrance    Number of Stairs, Main Entrance none  -CM     Stair Railings, Main Entrance none  -CM     Row Name 04/04/23 1238          Stairs Within Home, Primary    Stairs, Within Home, Primary tub shower. No seat. Regular toilet. Regular bed.  -CM     Number of Stairs, Within Home, Primary none  -CM     Stair Railings, Within Home, Primary none  -CM     Row Name 04/04/23 1238          Home Use of Assistive/Adaptive Equipment    Equipment Currently Used at Home none  -CM     Row Name 04/04/23 1238          Pain Assessment    Pretreatment Pain Rating 0/10 - no pain   -CM     Posttreatment Pain Rating 0/10 - no pain  -CM     Row Name 04/04/23 1238          Cognition/Psychosocial    Affect/Mental Status (Cognition) flat/blunted affect  -CM     Orientation Status (Cognition) oriented x 4  -CM     Personal Safety Interventions fall prevention program maintained;gait belt;nonskid shoes/slippers when out of bed;supervised activity  -CM     Row Name 04/04/23 1238          Range of Motion Comprehensive    General Range of Motion bilateral upper extremity ROM WFL  -CM     Row Name 04/04/23 1238          Strength Comprehensive (MMT)    Comment, General Manual Muscle Testing (MMT) Assessment BUE 4-/5 grossly  -CM     Row Name 04/04/23 1238          Sensory Assessment (Somatosensory)    Sensory Assessment (Somatosensory) UE sensation intact  -CM     Row Name 04/04/23 1238          Bed Mobility    Bed Mobility sit-supine  -CM     Sit-Supine Theodore (Bed Mobility) modified independence  -CM     Assistive Device (Bed Mobility) bed rails;head of bed elevated  -CM     Row Name 04/04/23 1238          Transfer Assessment/Treatment    Transfers sit-stand transfer;stand-sit transfer;toilet transfer  -CM     Row Name 04/04/23 1238          Sit-Stand Transfer    Sit-Stand Theodore (Transfers) supervision  -CM     Assistive Device (Sit-Stand Transfers) walker, front-wheeled  -CM     Row Name 04/04/23 1238          Stand-Sit Transfer    Stand-Sit Theodore (Transfers) supervision  -CM     Assistive Device (Stand-Sit Transfers) walker, front-wheeled  -CM     Row Name 04/04/23 1238          Toilet Transfer    Type (Toilet Transfer) sit-stand;stand-sit  -CM     Theodore Level (Toilet Transfer) supervision  -CM     Assistive Device (Toilet Transfer) grab bars/safety frame;walker, front-wheeled  -CM     Row Name 04/04/23 1238          Safety Issues, Functional Mobility    Safety Issues Affecting Function (Mobility) insight into deficits/self-awareness;safety precaution awareness;safety  precautions follow-through/compliance  -CM     Row Name 04/04/23 1238          Balance    Balance Assessment sitting static balance;sitting dynamic balance  -CM     Static Sitting Balance independent  -CM     Dynamic Sitting Balance independent  -CM     Position, Sitting Balance sitting edge of bed  -CM     Row Name 04/04/23 1238          Positioning and Restraints    Pre-Treatment Position in bed  -CM     Post Treatment Position bed  -CM     Row Name 04/04/23 1238          Vital Signs    Pre Systolic BP Rehab 115  -CM     Pre Treatment Diastolic BP 79  -CM     Pretreatment Heart Rate (beats/min) 113  -CM     Intratreatment Heart Rate (beats/min) 92  -CM     Posttreatment Heart Rate (beats/min) 79  -CM     Pre SpO2 (%) 91  -CM     O2 Delivery Pre Treatment room air  -CM     Intra SpO2 (%) 92  -CM     O2 Delivery Intra Treatment room air  -CM     Post SpO2 (%) 96  -CM     O2 Delivery Post Treatment room air  -CM     Pre Patient Position Sitting  -CM     Intra Patient Position Standing  -CM     Post Patient Position Supine  -CM     Row Name 04/04/23 1238          Therapy Assessment/Plan (OT)    Functional Level at Time of Evaluation (OT) Impaired mob and ADL  -CM     OT Diagnosis Impaired mob and ADL  -CM     Rehab Potential/Prognosis (OT) good, to achieve stated therapy goals  -CM     Frequency of Treatment (OT) other (see comments)  5-6 d/wk  -CM     Estimated Duration of Therapy (OT) until facility discharge  -CM     Problem List (OT) balance;mobility;strength  -CM     Activity Limitations Related to Problem List (OT) unable to transfer safely;IADLs not performed adequately or safely;unable to ambulate safely;BADLs not performed adequately or safely  -CM     Planned Therapy Interventions (OT) activity tolerance training;adaptive equipment training;functional balance retraining;IADL retraining;occupation/activity based interventions;BADL retraining;cognitive/visual perception retraining;patient/caregiver  education/training;strengthening exercise;transfer/mobility retraining  -CM     Row Name 04/04/23 1238          Evaluation Complexity (OT)    Review Occupational Profile/Medical/Therapy History Complexity expanded/moderate complexity  -CM     Assessment, Occupational Performance/Identification of Deficit Complexity 3-5 performance deficits  -CM     Clinical Decision Making Complexity (OT) detailed assessment/moderate complexity  -CM     Overall Complexity of Evaluation (OT) moderate complexity  -CM     Row Name 04/04/23 1238          Therapy Plan Review/Discharge Plan (OT)    Therapy Plan Review (OT) evaluation/treatment results reviewed;care plan/treatment goals reviewed;risks/benefits reviewed;current/potential barriers reviewed;participants voiced agreement with care plan;participants included;patient  -CM     Anticipated Equipment Needs At Discharge (OT) other (see comments)  TBD  -CM     Anticipated Discharge Disposition (OT) home  -CM     Row Name 04/04/23 1238          IRF OT Goals    Bathing Goal Selection (OT-IRF) bathing, OT goal 1  -CM     Endurance Goal Selection (OT) endurance, OT goal 1  -CM     Activity Tolerance Goal Selection (OT) activity tolerance, OT goal 1  -CM     Row Name 04/04/23 1238          Bathing Goal 1 (OT-IRF)    Activity/Device (Bathing Goal 1, OT-IRF) bathing skills, all;grab bar, tub/shower;long-handled sponge;shower chair  -CM     Collin Level (Bathing Goal 1, OT-IRF) modified independence  -CM     Time Frame (Bathing Goal 1, OT-IRF) long-term goal (LTG);by discharge  -CM     Progress/Outcomes (Bathing Goal 1, OT-IRF) goal not met  -CM     Row Name 04/04/23 1238          Strength Goal 1 (OT-IRF)    Strength Goal 1 (OT-IRF) Pt will tolerate at least 20 minutes BUE strengthening activities OOB/EOB with VSS and with only 4 rest breaks needed for increased strenth and endurance required for ADLs and mobility.  -CM     Time Frame (Strength Goal 1, OT-IRF) long-term goal (LTG);by  discharge  -CM     Progress/Outcomes (Strength Goal 1, OT-IRF) goal not met  -CM     Row Name 04/04/23 1238           Endurance Goal 1 (OT)    Endurance Goal 1 (OT) Pt will tolerate completion of at least 20 consecutive minutes of OOB IADLs (food prep, laundry, etc.) with VSS with use of EC/WS as needed for increased endurance required for IADLs and mobility.  -CM     Activity Level (Endurance Goal 1, OT) endurance 2 good  -CM     Time Frame (Endurance Goal 1, OT) long term goal (LTG);by discharge  -CM     Progress/Outcome (Endurance Goal 1, OT) goal not met  -CM     Row Name 04/04/23 1238           Activity Tolerance Goal 1 (OT)    Activity Tolerance Goal 1 (OT) Pt will complete entire morning ADL routine OOB/EOB with VSS and only 4 rest breaks needed for increased endurance, activity tolerance, and safety required for ADLs and mobility.  -CM     Activity Level (Endurance Goal 1, OT) 20 min activity  -CM     Time Frame (Activity Tolerance Goal 1, OT) long term goal (LTG);by discharge  -CM     Progress/Outcome (Activity Tolerance Goal 1, OT) goal not met  -CM           User Key  (r) = Recorded By, (t) = Taken By, (c) = Cosigned By    Initials Name Effective Dates    CM Jimena Harman, OT 11/18/22 -                  Occupational Therapy Education     Title: PT OT SLP Therapies (Not Started)     Topic: Occupational Therapy (Not Started)     Point: ADL training (Not Started)     Description:   Instruct learner(s) on proper safety adaptation and remediation techniques during self care or transfers.   Instruct in proper use of assistive devices.              Learner Progress:  Not documented in this visit.          Point: Home exercise program (Not Started)     Description:   Instruct learner(s) on appropriate technique for monitoring, assisting and/or progressing therapeutic exercises/activities.              Learner Progress:  Not documented in this visit.          Point: Precautions (Not Started)     Description:    Instruct learner(s) on prescribed precautions during self-care and functional transfers.              Learner Progress:  Not documented in this visit.          Point: Body mechanics (Not Started)     Description:   Instruct learner(s) on proper positioning and spine alignment during self-care, functional mobility activities and/or exercises.              Learner Progress:  Not documented in this visit.                                   Self-Care Admission Goal Date Discharge   Eating 6 6     Oral hygiene 6 6     Toileting hygiene 6 6     Shower/bathe self 4 6     Upper body dressing 6 6     Lower body dressing 4 6     Putting on/taking off footwear 4 6             OT Recommendation and Plan    Planned Therapy Interventions (OT): activity tolerance training, adaptive equipment training, functional balance retraining, IADL retraining, occupation/activity based interventions, BADL retraining, cognitive/visual perception retraining, patient/caregiver education/training, strengthening exercise, transfer/mobility retraining    Plan of Care Review  Plan of Care Reviewed With: patient  Outcome Evaluation: OT eval completed. Pt sitting EOB eating upon arrival. VSS. Pt first completed 9-hole peg test with R measuring 41.98 and L measuring 33.29. Pt completed  strengthening with dynanometer with R measuring 26, 30, 34 and L measuring 32, 32, 30. Pt was SPV for sit <> stand t/f, mobility in room with FWW, and with toilet t/f. Pt was (I) for toileting. Pt was SBA for 120 foot mobility with FWW. Pt completed tub t/f with CGA. Pt was SBA for 140 foot mobility back to room. Pt required frequent rest breaks throughout session d/t becoming SOA and fatigued. Pt presents with decreased endurance and activity tolerance, impairing his safety and (I) with ADLs, IADLS, and mobility. Goals established.  Plan of Care Reviewed With: patient       Outcome Measures     Row Name 04/04/23 1238             9 Hole Peg    9-Hole Peg Left 33.29   -CM      9-Hole Peg Right 41.98  -CM         How much help from another is currently needed...    Putting on and taking off regular lower body clothing? 3  -CM      Bathing (including washing, rinsing, and drying) 3  -CM      Toileting (which includes using toilet bed pan or urinal) 4  -CM      Putting on and taking off regular upper body clothing 4  -CM      Taking care of personal grooming (such as brushing teeth) 4  -CM      Eating meals 4  -CM      AM-PAC 6 Clicks Score (OT) 22  -CM         Functional Assessment    Outcome Measure Options 9 Hole Peg;AM-PAC 6 Clicks Daily Activity (OT)  -CM            User Key  (r) = Recorded By, (t) = Taken By, (c) = Cosigned By    Initials Name Provider Type    Jimena Snyder OT Occupational Therapist                  Time Calculation:      Time Calculation- OT     Row Name 04/04/23 1409             Time Calculation- OT    OT Start Time 1238  -CM      OT Stop Time 1353  -CM      OT Time Calculation (min) 75 min  -CM      OT Received On 04/04/23  -CM      OT Goal Re-Cert Due Date 04/17/23  -CM         Untimed Charges    OT Eval/Re-eval Minutes 75  -CM         Total Minutes    Untimed Charges Total Minutes 75  -CM       Total Minutes 75  -CM            User Key  (r) = Recorded By, (t) = Taken By, (c) = Cosigned By    Initials Name Provider Type    Jimena Snyder OT Occupational Therapist              Therapy Charges for Today     Code Description Service Date Service Provider Modifiers Qty    [id number] HC-OT EVAL MOD COMPLEXITY 5 4/4/2023 Jimena Harman OT  1                   Jimena Harman OT  4/4/2023

## 2023-04-04 NOTE — PLAN OF CARE
Goal Outcome Evaluation:    Care plan goals have been initiated. Pt did participate in OT therapies provided. Pt appears to need some encouragement, prefers room dark and door shut, which pt was advised against especially during the day when it is expected that he should be awake so that proper rest can occur at bedtime. Pt denied trouble sleeping last night  and was encouraged to try and stay awake or sitting upright in chair, bed or even coming out to the dining room. Pt declined asked for the curtain to be pulled if door could not be closed. Curtain pulled at his request. No additional needs voiced.

## 2023-04-05 PROCEDURE — 92523 SPEECH SOUND LANG COMPREHEN: CPT | Performed by: SPEECH-LANGUAGE PATHOLOGIST

## 2023-04-05 PROCEDURE — 97116 GAIT TRAINING THERAPY: CPT

## 2023-04-05 PROCEDURE — 97530 THERAPEUTIC ACTIVITIES: CPT

## 2023-04-05 PROCEDURE — 99231 SBSQ HOSP IP/OBS SF/LOW 25: CPT | Performed by: ORTHOPAEDIC SURGERY

## 2023-04-05 PROCEDURE — 99232 SBSQ HOSP IP/OBS MODERATE 35: CPT | Performed by: INTERNAL MEDICINE

## 2023-04-05 PROCEDURE — 97110 THERAPEUTIC EXERCISES: CPT

## 2023-04-05 PROCEDURE — 97162 PT EVAL MOD COMPLEX 30 MIN: CPT

## 2023-04-05 RX ADMIN — RIVAROXABAN 15 MG: 15 TABLET, FILM COATED ORAL at 08:00

## 2023-04-05 RX ADMIN — DIGOXIN 125 MCG: 125 TABLET ORAL at 11:33

## 2023-04-05 RX ADMIN — Medication 12.5 MG: at 08:01

## 2023-04-05 RX ADMIN — RIVAROXABAN 15 MG: 15 TABLET, FILM COATED ORAL at 17:25

## 2023-04-05 RX ADMIN — FUROSEMIDE 20 MG: 20 TABLET ORAL at 17:25

## 2023-04-05 RX ADMIN — SPIRONOLACTONE 25 MG: 25 TABLET ORAL at 08:00

## 2023-04-05 RX ADMIN — DOCUSATE SODIUM 100 MG: 100 CAPSULE, LIQUID FILLED ORAL at 20:05

## 2023-04-05 RX ADMIN — AMIODARONE HYDROCHLORIDE 200 MG: 200 TABLET ORAL at 08:00

## 2023-04-05 RX ADMIN — FUROSEMIDE 20 MG: 20 TABLET ORAL at 08:01

## 2023-04-05 NOTE — THERAPY EVALUATION
Inpatient Rehabilitation - Physical Therapy Initial Evaluation       [facility]     Patient Name: Pavan Mccauley  : 1955  MRN: [id number]    Today's Date: 2023                    Admit Date: 2023      Visit Dx:     ICD-10-CM ICD-9-CM   1. Impaired mobility and ADLs  Z74.09 V49.89    Z78.9    2. Impaired functional mobility, balance, gait, and endurance  Z74.09 V49.89       Patient Active Problem List   Diagnosis   • General medical exam   • Malaise   • Hyperglycemia   • Cough   • Tick bite   • Screening for colon cancer   • Pneumonia due to organism   • Carotid artery aneurysm   • Dizziness   • SVT (supraventricular tachycardia)   • Atrial fibrillation (HCC)   • Dyspnea on exertion   • Atrial fibrillation with RVR   • Non-STEMI (non-ST elevated myocardial infarction)   • Biventricular heart failure with reduced left ventricular function   • Acute on chronic systolic (congestive) heart failure   • Leg DVT (deep venous thromboembolism), acute, bilateral   • Abnormal LFTs   • Medically complex patient       Past Medical History:   Diagnosis Date   • Atrial fibrillation 2022    5 months   • Bronchopneumonia    • Chest pain    • Corneal foreign body     both eyes   • Hypertension    • Neck pain    • Perforation of left tympanic membrane     history of   • Prashant Mountain spotted fever    • Wheezing        Past Surgical History:   Procedure Laterality Date   • CARDIAC CATHETERIZATION N/A 3/27/2023    Procedure: Left Heart Cath;  Surgeon: [place] DO;  Location: [facility] INVASIVE LOCATION;  Service: Cardiology;  Laterality: N/A;   • EYE FOREIGN BODY REMOVAL  2013    REMOVE FOREIGN BODY CORNEA W/SLIT LAMP 44719 (1)     • INJECTION OF MEDICATION  2011    Kenalog (1)      • SPINE SURGERY  1991    Exploration of the previous C5-6 fusion with refusion, exploration of the C5 nerve root, left side   • TYMPANOPLASTY Left 1972    Perforation of the left tympanic membrane        PT ASSESSMENT (last 12 hours)     IRF PT Evaluation and Treatment     Row Name 04/05/23 1025          PT Time and Intention    Document Type initial evaluation  -LR     Mode of Treatment physical therapy  -LR     Row Name 04/05/23 1025          General Information    Patient Profile Reviewed yes  -LR     Existing Precautions/Restrictions fall  -LR     Row Name 04/05/23 1025          Previous Level of Function/Home Environm    Bathing/Grooming, Premorbid Functional Level independent  -LR     Dressing, Premorbid Functional Level independent  -LR     Eating/Feeding, Premorbid Functional Level independent  -LR     Toileting, Premorbid Functional Level independent  -LR     BADLs, Premorbid Functional Level independent  -LR     IADLs, Premorbid Functional Level independent  -LR     Bed Mobility, Premorbid Functional Level independent  -LR     Transfers, Premorbid Functional Level independent  -LR     Household Ambulation, Premorbid Functional Level independent  -LR     Stairs, Premorbid Functional Level independent  -LR     Community Ambulation, Premorbid Functional Level independent  -LR     Row Name 04/05/23 1025          Living Environment    People in Home alone  -LR     Primary Care Provided by self  -LR     Row Name 04/05/23 1025          Stairs Within Home, Primary    Number of Stairs, Within Home, Primary none  -LR     Row Name 04/05/23 1025          Home Use of Assistive/Adaptive Equipment    Equipment Currently Used at Home none  -LR     Row Name 04/05/23 1025          Pain Assessment    Pretreatment Pain Rating 0/10 - no pain  -LR     Posttreatment Pain Rating 0/10 - no pain  -LR     Row Name 04/05/23 1025          Cognition/Psychosocial    Affect/Mental Status (Cognition) flat/blunted affect  -LR     Orientation Status (Cognition) oriented x 4  -LR     Follows Commands (Cognition) delayed response/completion;increased processing time needed  -LR     Personal Safety Interventions safety round/check  completed;elopement precautions initiated;fall prevention program maintained;gait belt;nonskid shoes/slippers when out of bed  -LR     Row Name 04/05/23 1025          Range of Motion Comprehensive    General Range of Motion bilateral lower extremity ROM WFL  -LR     Comment, General Range of Motion BLE grossly WFL  -LR     Row Name 04/05/23 1025          Strength Comprehensive (MMT)    Comment, General Manual Muscle Testing (MMT) Assessment BLE grossly 4/5  -LR     Row Name 04/05/23 1025          Bed Mobility    Bed Mobility sit-supine;rolling left;rolling right  -LR     Rolling Left [place] (Bed Mobility) independent  -LR     Rolling Right [place] (Bed Mobility) independent  -LR     Supine-Sit [place] (Bed Mobility) independent  -LR     Row Name 04/05/23 1025          Transfer Assessment/Treatment    Transfers sit-stand transfer;stand-sit transfer;toilet transfer  -LR     Row Name 04/05/23 1025          Sit-Stand Transfer    Sit-Stand [place] (Transfers) supervision  -LR     Assistive Device (Sit-Stand Transfers) walker, front-wheeled  -LR     Row Name 04/05/23 1025          Stand-Sit Transfer    Stand-Sit [place] (Transfers) supervision  -LR     Assistive Device (Stand-Sit Transfers) walker, front-wheeled  -LR     Row Name 04/05/23 1025          Gait/Stairs (Locomotion)    [place] Level (Gait) contact guard  -LR     Assistive Device (Gait) walker, front-wheeled  -LR     Distance in Feet (Gait) 20'x1, 64'x4, 155'x1  -LR     Pattern (Gait) step-through  -LR     Deviations/Abnormal Patterns (Gait) base of support, wide;misael decreased;gait speed decreased  -LR     [place] Level (Stairs) contact guard  -LR     Assistive Device (Stairs) walker, front-wheeled  -LR     Handrail Location (Stairs) both sides  -LR     Number of Steps (Stairs) up/down 2 curb steps x1, up/down 4 and 12 gym steps  -LR     Ascending Technique (Stairs) step-to-step  -LR     Descending Technique (Stairs)  step-to-step  -LR     Stairs, Safety Issues balance decreased during turns  -LR     Row Name 04/05/23 1025          Safety Issues, Functional Mobility    Safety Issues Affecting Function (Mobility) ability to follow commands;at risk behavior observed;awareness of need for assistance;insight into deficits/self-awareness;safety precaution awareness;safety precautions follow-through/compliance  -LR     Impairments Affecting Function (Mobility) balance;endurance/activity tolerance;strength;shortness of breath;sensation/sensory awareness;pain  -LR     Row Name 04/05/23 1025          Positioning and Restraints    Pre-Treatment Position in bed  -LR     Post Treatment Position bed  -LR     In Bed notified nsg;call light within reach;encouraged to call for assist;exit alarm on  -LR     In Wheelchair --  -LR     Row Name 04/05/23 1025          Vital Signs    Pre Systolic BP Rehab 120  -LR     Pre Treatment Diastolic BP 70  -LR     Pretreatment Heart Rate (beats/min) 85  -LR     Pre SpO2 (%) 93  -LR     O2 Delivery Pre Treatment room air  -LR     Pre Patient Position Supine  -LR     Row Name 04/05/23 1025          Therapy Assessment/Plan (PT)    Rehab Potential/Prognosis (PT) good, to achieve stated therapy goals  -LR     Frequency of Treatment (PT) other (see comments)  5-6d/week for 2 weeks  -LR     Estimated Duration of Therapy (PT) 2 weeks  -LR     Problem List (PT) problems related to;balance;coordination;mobility;strength;pain  -LR     Activity Limitations Related to Problem List (PT) unable to transfer safely;unable to ambulate safely  -LR     Row Name 04/05/23 1025          Daily Progress Summary (PT)    Functional Goal Overall Progress (PT) progressing toward functional goals as expected  -     Row Name 04/05/23 1025          Therapy Plan Review/Discharge Plan (PT)    Therapy Plan Review (PT) evaluation/treatment results reviewed;care plan/treatment goals reviewed;risks/benefits reviewed;current/potential barriers  reviewed;participants voiced agreement with care plan;participants included;patient  -LR     Anticipated Discharge Disposition (PT) home with assist;home with home health  -LR           User Key  (r) = Recorded By, (t) = Taken By, (c) = Cosigned By    Initials Name Provider Type    Aj Mahmood Physical Therapist                 Physical Therapy Education     Title: PT OT SLP Therapies (Not Started)     Topic: Physical Therapy (Not Started)     Point: Mobility training (Not Started)     Learner Progress:  Not documented in this visit.          Point: Home exercise program (Not Started)     Learner Progress:  Not documented in this visit.          Point: Body mechanics (Not Started)     Learner Progress:  Not documented in this visit.          Point: Precautions (Not Started)     Learner Progress:  Not documented in this visit.                            PT Recommendation and Plan    Planned Therapy Interventions (PT): balance training, bed mobility training, gait training, home exercise program, joint mobilization, lumbar stabilization, orthotic fitting/training, neuromuscular re-education, manual therapy techniques, patient/family education, postural re-education, prosthetic fitting/training, ROM (range of motion), stair training, strengthening, stretching, transfer training, vestibular therapy, wheelchair management/propulsion training  Frequency of Treatment (PT): other (see comments) (5-6d/week for 2 weeks)     Daily Progress Summary (PT)  Functional Goal Overall Progress (PT): progressing toward functional goals as expected       Outcome Measures     Row Name 04/05/23 1025 04/04/23 1238          10 Meter Walk Test Self-Selected Velocity    Self-Selected Velocity: Trial 1 17.1 sec.  -LR --     Self-Selected Velocity: Trial 2 15.1 sec.  -LR --     Self-Selected Velocity: Trial 3 16.1 sec.  -LR --     Self-Selected Velocity: Average Time 16.1 sec.  -LR --        10 Meter Walk Test Fast Velocity    10 Meter  Walk Fast Velocity: Trial 1 12.5 sec.  -LR --     10 Meter Walk Fast Velocity: Trial 2 11.5 sec.  -LR --     10 Meter Walk Fast Velocity: Trial 3 12 sec.  -LR --     10 Meter Walk Fast Velocity: Average time 12 sec.  -LR --        10 Meter Walk Test Actual Velocity    Actual Self-Selected Velocity 0.37 m/s  -LR --     Actual Fast-Velocity 0.5 m/s  -LR --        9 Hole Peg    9-Hole Peg Left -- 33.29  -CM     9-Hole Peg Right -- 41.98  -CM        How much help from another person do you currently need...    Turning from your back to your side while in flat bed without using bedrails? 4  -LR --     Moving from lying on back to sitting on the side of a flat bed without bedrails? 4  -LR --     Moving to and from a bed to a chair (including a wheelchair)? 3  -LR --     Standing up from a chair using your arms (e.g., wheelchair, bedside chair)? 3  -LR --     Climbing 3-5 steps with a railing? 3  -LR --     To walk in hospital room? 3  -LR --     AM-PAC 6 Clicks Score (PT) 20  -LR --        How much help from another is currently needed...    Putting on and taking off regular lower body clothing? -- 3  -CM     Bathing (including washing, rinsing, and drying) -- 3  -CM     Toileting (which includes using toilet bed pan or urinal) -- 4  -CM     Putting on and taking off regular upper body clothing -- 4  -CM     Taking care of personal grooming (such as brushing teeth) -- 4  -CM     Eating meals -- 4  -CM     AM-PAC 6 Clicks Score (OT) -- 22  -CM        Reis Balance Scale    Sitting to Standing 4  -LR --     Standing Unsupported 4  -LR --     Sitting with Back Unsupported but Feet Supported on Floor or on Stool 4  -LR --     Standing to Sitting 3  -LR --     Transfers 3  -LR --     Standing Unsupported with Eyes Closed 4  -LR --     Standing Unsupported with Feet Together 1  -LR --     Reaching Forward with Outstretched Arm While Standing 4  -LR --      Object From the Floor From a Standing Position 4  -LR --      Turning to Look Behind Over Left and Right Shoulders While Standing 4  -LR --     Turn 360 Degrees 1  -LR --     Place Alternate Foot on Step or Stool While Standing Unsupported 0  -LR --     Standing Unsupported with One Foot in Front 0  -LR --     Standing on One Leg 1  -LR --     Reis Total Score 37  -LR --        Functional Assessment    Outcome Measure Options 10 Meter Walk;AM-PAC 6 Clicks Basic Mobility (PT);Reis Balance  -LR 9 Hole Peg;AM-PAC 6 Clicks Daily Activity (OT)  -CM           User Key  (r) = Recorded By, (t) = Taken By, (c) = Cosigned By    Initials Name Provider Type    LR Aj Zimmerman Physical Therapist    Jimena Snyder OT Occupational Therapist                     Time Calculation:      PT Charges     Row Name 04/05/23 1234             Time Calculation    Start Time 1025  -LR      Stop Time 1125  -LR      Time Calculation (min) 60 min  -LR      PT Received On 04/05/23  -LR      PT Goal Re-Cert Due Date 04/18/23  -LR         Untimed Charges    PT Eval/Re-eval Minutes 60  -LR         Total Minutes    Untimed Charges Total Minutes 60  -LR       Total Minutes 60  -LR            User Key  (r) = Recorded By, (t) = Taken By, (c) = Cosigned By    Initials Name Provider Type    LR Aj Zimmerman Physical Therapist                Therapy Charges for Today     Code Description Service Date Service Provider Modifiers Qty    25595030554 HC PT EVAL MOD COMPLEXITY 4 4/5/2023 Aj Zimmerman GP 1        Mobility  Admission Goal Date Discharge   A.Roll left and right 6 6     B.Sit to lying 6 6     C. Lying to sitting on side of bed 6 6     D.Sit to stand 4 6     E. Chair/bed-to-chair transfer   4 6     F.Toilet transfer    4 6     G. Car Transfer 88 88     I.Walk 10 feet 4 6     J.Walk 50 feet with two turns. 4 6     K.Walk 150 feet:  4 6     L.Walking 10 feet on uneven surfaces  4 6     M.1 step (curb) 4 6     N.4 steps 4 6     O.12 steps 4 6     P.Picking up object 4 6     R. Wheel 50 feet with two  turns 6 6     S.Wheel 150 feet 6 6           PT G-Codes  Outcome Measure Options: 10 Meter Walk, AM-PAC 6 Clicks Basic Mobility (PT), Reis Balance  AM-PAC 6 Clicks Score (PT): 20  AM-PAC 6 Clicks Score (OT): 22  Reis Total Score: 37      Aj Zimmerman  4/5/2023

## 2023-04-05 NOTE — PLAN OF CARE
Goal Outcome Evaluation:  Plan of Care Reviewed With: patient         Pt vs wdl, pt has been up to the toilet with the assist of one staff and susanna. Pt has also utilized his urinal while in the bed a couple of times. Pt has rested well this evening, no c/o pain or discomfort this shift.

## 2023-04-05 NOTE — CONSULTS
[facility] [place] [facility] [place]    Referring Provider: Gilmer Solomon MD      Reason for Consultation: Follow-up congestive heart failure    Patient Care Team:  Dilcia Kapoor APRN as PCP - General (Family Medicine)    Chief complaint No chief complaint on file.      Subjective .     History of present illness:     History Pavan Mccauley is a 67-year-old  male with medical history of paroxysmal atrial fibrillation, former smoker, and supraventricular tachycardia.      On 3/20/2023 he presented to emergency room and found to be in atrial fibrillation with RVR, acute renal failure, LFT abnormality, polycythemia, thrombocytopenia, elevated troponins, uroseptic, anasarca, hyponatremia, and congestive heart failure, new onset.  In discussing health with patient, he was not taking medications due to cost.  Also found out that he was using alcohol daily.      Echocardiogram performed that admission showing biventricular heart failure, LVEF at 21 to 25%, LV cavity dilated, moderately reduced RV systolic function, LA and RA cavity severely dilated.  RVSP from TVR 35 to 45 mmHg.  Potential worsening LV dysfunction contributed to possible tachycardia induced cardiomyopathy. He underwent left heart catheterization and found to have nonobstructive CAD.  He underwent diuresis with nephrology's help.  Improved and was admitted to ARU due to deconditioning    The CVD Risk score (D'Agostino, et al., 2008) failed to calculate for the following reasons:    The patient has a prior MI, stroke, CHF, or peripheral vascular disease diagnosis      Review of Systems  Review of Systems   Constitutional: Positive for fatigue. Negative for chills, diaphoresis and fever.   HENT: Negative.    Respiratory: Positive for shortness of breath. Negative for cough, chest tightness and wheezing.    Cardiovascular: Positive for leg swelling. Negative for chest pain and palpitations.   Gastrointestinal: Negative for  abdominal distention, abdominal pain, nausea and vomiting.   Endocrine: Negative for polydipsia, polyphagia and polyuria.   Genitourinary: Negative for dysuria, flank pain and hematuria.   Musculoskeletal: Negative for joint swelling, myalgias and neck pain.   Neurological: Negative for dizziness, weakness, light-headedness and numbness.   Hematological: Negative for adenopathy. Does not bruise/bleed easily.   Psychiatric/Behavioral: Positive for suicidal ideas.       History  Past Medical History:   Diagnosis Date   • Atrial fibrillation 12/01/2022    5 months   • Bronchopneumonia    • Chest pain    • Corneal foreign body     both eyes   • Hypertension    • Neck pain    • Perforation of left tympanic membrane     history of   • Prashant Mountain spotted fever    • Wheezing    ,   Past Surgical History:   Procedure Laterality Date   • CARDIAC CATHETERIZATION N/A 3/27/2023    Procedure: Left Heart Cath;  Surgeon: [place] DO;  Location: [facility] CATH INVASIVE LOCATION;  Service: Cardiology;  Laterality: N/A;   • EYE FOREIGN BODY REMOVAL  07/29/2013    REMOVE FOREIGN BODY CORNEA W/SLIT LAMP 14080 (1)     • INJECTION OF MEDICATION  07/12/2011    Kenalog (1)      • SPINE SURGERY  03/12/1991    Exploration of the previous C5-6 fusion with refusion, exploration of the C5 nerve root, left side   • TYMPANOPLASTY Left 05/16/1972    Perforation of the left tympanic membrane   , History reviewed. No pertinent family history.,   Social History     Tobacco Use   • Smoking status: Every Day     Types: Cigarettes   • Smokeless tobacco: Never   Vaping Use   • Vaping Use: Never used   Substance Use Topics   • Alcohol use: Yes     Comment: nominal   • Drug use: No   ,   Medications Prior to Admission   Medication Sig Dispense Refill Last Dose   • amiodarone (PACERONE) 200 MG tablet Take 1 tablet by mouth Daily for 30 days. 30 tablet 0 4/4/2023 at 0822   • digoxin (LANOXIN) 125 MCG tablet Take 1 tablet by mouth Daily for 30 days. 30  "tablet 0 4/3/2023 at 1328   • furosemide (LASIX) 20 MG tablet Take 1 tablet by mouth 2 (Two) Times a Day for 30 days. 60 tablet 0 4/4/2023 at 0822   • rivaroxaban (XARELTO) 20 MG tablet Take 1 tablet by mouth once daily. 90 tablet 3 4/4/2023 at 0822   • spironolactone (ALDACTONE) 25 MG tablet Take 1 tablet by mouth Daily for 30 days. 30 tablet 0 4/2/2023 at 0824      ALLERGIES  Patient has no known allergies.    The following portions of the patient's history were reviewed and updated as appropriate: allergies, current medications, past family history, past medical history, past social history, past surgical history and problem list.     Old and outside records reviewed (if available) and pertinent information is included in the objective data.     Objective     Vital Sign Min/Max for last 24 hours  Temp  Min: 97.7 °F (36.5 °C)  Max: 98.5 °F (36.9 °C)   BP  Min: 104/75  Max: 118/73   Pulse  Min: 78  Max: 104   Resp  Min: 18  Max: 18   SpO2  Min: 92 %  Max: 92 %   No data recorded   No data recorded     Flowsheet Rows    Flowsheet Row First Filed Value   Admission Height 167.6 cm (66\") Documented at 04/04/2023 1010   Admission Weight 81.5 kg (179 lb 11.2 oz) Documented at 04/04/2023 1010             Physical Exam:  Physical Exam  Vitals and nursing note reviewed.   Constitutional:       Appearance: He is overweight. He is not diaphoretic.      Comments: Chronically ill-appearing   HENT:      Head: Normocephalic and atraumatic.      Right Ear: External ear normal.      Left Ear: External ear normal.      Nose: Nose normal.      Mouth/Throat:      Mouth: Mucous membranes are moist.      Pharynx: Oropharynx is clear.   Eyes:      General: Lids are normal.         Right eye: No discharge.         Left eye: No discharge.      Conjunctiva/sclera: Conjunctivae normal.   Neck:      Vascular: JVD (Mild) present.      Trachea: Trachea normal.   Cardiovascular:      Rate and Rhythm: Normal rate. Rhythm regularly irregular.      " Pulses: Normal pulses.      Heart sounds: No murmur heard.    No gallop.   Pulmonary:      Effort: Pulmonary effort is normal. No prolonged expiration or respiratory distress.      Breath sounds: Normal breath sounds and air entry. No rales.   Abdominal:      General: Bowel sounds are normal. There is no distension.      Palpations: Abdomen is soft.      Tenderness: There is no abdominal tenderness. There is no guarding.   Musculoskeletal:      Left lower leg: Edema present.   Skin:     General: Skin is warm and dry.      Capillary Refill: Capillary refill takes less than 2 seconds.      Coloration: Skin is not jaundiced or pale.   Neurological:      Mental Status: He is alert and oriented to person, place, and time.   Psychiatric:         Mood and Affect: Mood normal.         Speech: Speech normal.         Behavior: Behavior normal.         Thought Content: Thought content normal.            Results Reviewed by myself:     Results from last 7 days   Lab Units 04/04/23  0527 04/03/23  0647 04/02/23  0514   SODIUM mmol/L 135* 133* 134*   POTASSIUM mmol/L 4.0 4.0 3.9   CHLORIDE mmol/L 100 100 101   CO2 mmol/L 30.0* 25.0 25.0   BUN mg/dL 14 15 17   CREATININE mg/dL 0.85 0.71* 0.81   CALCIUM mg/dL 8.0* 8.1* 8.1*   BILIRUBIN mg/dL 4.0* 4.4* 3.8*   ALK PHOS U/L 110 113 105   ALT (SGPT) U/L 46* 45* 38   AST (SGOT) U/L 54* 57* 50*   GLUCOSE mg/dL 80 71 80       Estimated Creatinine Clearance: 86.9 mL/min (by C-G formula based on SCr of 0.85 mg/dL).    Results from last 7 days   Lab Units 04/03/23  0647 03/31/23  0530   MAGNESIUM mg/dL 2.0 1.9       Results from last 7 days   Lab Units 04/04/23  0527 04/03/23  0647 04/02/23  0514 04/01/23  0749 03/31/23  0530   WBC 10*3/mm3 8.15 9.61 8.41 7.95 8.05   HEMOGLOBIN g/dL 15.3 16.4 15.9 16.2 16.0   PLATELETS 10*3/mm3 239 224 204 192 171             Lab Results   Component Value Date    CKTOTAL 116 04/19/2018    CKMB 1.94 04/19/2018    TROPONINI <0.012 04/19/2018    TROPONINT 79  (C) 03/22/2023     Lab Results   Component Value Date    PROBNP 4,988.0 (H) 03/20/2023       I/O last 3 completed shifts:  In: 1320 [P.O.:1320]  Out: 3925 [Urine:3925]    Cardiographics  ECG/EMG Results (last 24 hours)     ** No results found for the last 24 hours. **          Results for orders placed during the hospital encounter of 03/20/23    Adult Transthoracic Echo Complete W/ Cont if Necessary Per Protocol    Interpretation Summary  •  Left ventricular ejection fraction appears to be 21 - 25%.  •  The left ventricular cavity is dilated.  •  Left ventricular wall thickness is consistent with hypertrophy.  •  Left ventricular diastolic dysfunction is noted.  •  Moderately reduced right ventricular systolic function noted.  •  The right ventricular cavity is severely dilated.  •  Left atrial volume is severely increased.  •  The right atrial cavity is severely  dilated.  •  Estimated right ventricular systolic pressure from tricuspid regurgitation is mildly elevated (35-45 mmHg).      CT Abdomen Pelvis Without Contrast    Result Date: 3/20/2023  1.  Moderate to large right effusion which appears loculated. There is associated right lower lobe consolidation suggesting pneumonia. 2.  Cardiomegaly. 3.  Prominent main pulmonary artery suggesting pulmonary arterial hypertension. 4.  Small amount of ascites. There appears to be some edema and wall thickening associated with the third portion of the duodenum suggesting duodenitis. Electronically signed by:  Krzysztof Gomez MD  3/20/2023 9:36 PM CDT Workstation: [id number]    CT Chest Without Contrast Diagnostic    Result Date: 3/20/2023  1.  Moderate to large right effusion which appears loculated. There is associated right lower lobe consolidation suggesting pneumonia. 2.  Cardiomegaly. 3.  Prominent main pulmonary artery suggesting pulmonary arterial hypertension. 4.  Small amount of ascites. There appears to be some edema and wall thickening associated with the third  portion of the duodenum suggesting duodenitis. Electronically signed by:  Krzysztof Gomez MD  3/20/2023 9:35 PM CDT Workstation: [id number]    US Abdomen Complete    Result Date: 3/22/2023  Findings, impression: Limited examination, portable bedside technique. Echogenic sludge within the gallbladder. Gallbladder  wall thickening. Normal liver. Common bile duct and pancreas are obscured by overlying bowel gas. Normal spleen. Normal right kidney 9.6 x 5.1 x 5.1 cm. Benign cyst left kidney. Left kidney is otherwise unremarkable 11.2 x 5.9 x 5.1 cm Normal aorta and inferior vena cava. Small amount of free fluid, ascites throughout the abdomen. IMPRESSION: Ascites. Gallbladder wall thickening and echogenic sludge within the gallbladder. Electronically signed by:  Clem Balderrama MD  3/22/2023 3:07 PM CDT Workstation: [id number]    US Guided Vascular Access    Result Date: 3/23/2023  Impression: As above. Electronically signed by:  Tony Giron MD  3/23/2023 3:48 PM CDT Workstation: [id number]    XR Chest 1 View    Result Date: 3/22/2023  1. Right pleural effusion and adjacent atelectasis with or without pneumonia, increased in the interval. 2. Cardiomegaly. Electronically signed by:  Issa Burch MD  3/22/2023 11:37 PM CDT Workstation: [id number]    XR Chest 1 View    Result Date: 3/20/2023  Right basilar pleural effusion, right lower lobe compressive atelectasis, cardiomegaly and mild central vascular congestion. Electronically signed by:  Hollis Sousa MD  3/20/2023 6:26 PM CDT Workstation: [id number]    US Venous Doppler Lower Extremity Bilateral (duplex)    Addendum Date: 3/30/2023     ADDENDUM ADDENDUM #1 Findings discussed with Dr. Lopez at 12:31 PM CST. Electronically signed by:  Obed Boyd MD  3/30/2023 12:37 PM CDT Workstation: [id number]     Result Date: 3/30/2023  CONCLUSION: Noncompressible deep venous thrombosis right popliteal vein and right posterior tibial vein. Noncompressible the venous thrombus distal left  femoral vein, left popliteal vein and left posterior tibial vein. Subcutaneous edema each calf. [id number] Electronically signed by:  Obed Boyd MD  3/30/2023 11:54 AM CDT Workstation: [id number]      Intake/Output       04/04/23 0700 - 04/05/23 0659 04/05/23 0700 - 04/06/23 0659    Intake (ml) 1080 520    Output (ml) 3750 925    Net (ml) -2670 -405    Last Weight 81.5 kg (179 lb 11.2 oz) --            Assessment & Plan       Medically complex patient    Atrial fibrillation with RVR    Non-STEMI (non-ST elevated myocardial infarction)    Biventricular heart failure with reduced left ventricular function    Acute on chronic systolic (congestive) heart failure    Leg DVT (deep venous thromboembolism), acute, bilateral    Abnormal LFTs    1. Atrial fibrillation, rate controlled.     Continue amiodarone 100 mg daily  Continue digoxin 125 mcg daily.  Continue Xarelto    2. Bi-ventricular heart failure    LVEF reduced at 21 to 25%, moderately reduced RVEF.  Etiology NICM: LHC negative for Obstructive CAD  NYHA Class III, Stage C. Patient appears slightly hypervolemic today.  Mild JVD, lower leg edema 1+.  No longer has abdominal anasarca.  He appears to be in a well perfused state.  Hemodynamics are acceptable..     BETA-BLOCKER: Toprol XL 12.5mg  ACE/ARB/ENTRESTO: Can consider losartan if blood pressure improves.    DIURETIC: Lasix 20 mg po BID  SGLt2: Start Jardiance  ALDOSTERONE ANTAGONIST: Spironolactone 25mg  IMDUR/HYDRALAZINE: N/A  DIGOXIN: Digoxin 125 mcg daily  Fluid restriction: 1500 ml  Sodium restriction:2 grams     Cardiac Rehab:  Indicated  ICD:  Indicated    ADHF:  3/20/23    Recommend BMP in 1 week to assess for side effects from heart failure medications.    Will appointment with CHF clinic within 1 week of discharge from ARU.     3. DVT. Ultrasound of lower extremity showing DVT of right popliteqal vein and right posterior tibial vein.      NOAC acute dosing, Xarelto 15 mg BID   Will transition of daily  dosing, Xarelto on 4/20/22  Disposition: Continue current location    4.  Deconditioning.  On ARU with PT/OT services.    I discussed the patient's findings and my recommendations with patient and nursing staff and Dr. Walters      This document has been electronically signed by ARY Renteria on April 5, 2023 12:22 CDT   Electronically signed by ARY Renteria, 04/05/23, 12:22 PM CDT.

## 2023-04-05 NOTE — PLAN OF CARE
Goal Outcome Evaluation:  Plan of Care Reviewed With: patient        Progress: improving  Outcome Evaluation: Pt performed supine therex x20 reps in bed this p.m. Pt t/f supine to sit, sit to supine w/ ind. Pt t/f sit to stand, stand to sit SBA, v c'ing required to push off from bed when standing and reach back for bed when sitting. Pt ambulated 300' RW CGA, required cueing for hand placement on walker for safety this p.m.

## 2023-04-05 NOTE — PROGRESS NOTES
Progress Note      Admit date: 4/4/2023 10:10 AM       Treatment Team     Provider Relationship Specialty Contact    Gilmer Solomon MD Attending Orthopedic Surgery   [phone]      Maik Gentile PTA Physical Therapy Assistant Physical Therapy --    Ibeth Guzmán COTA Occupational Therapy Assistant Occupational Therapy --    Niki Street CCC-SLP Speech Language Pathologist Speech Pathology   [phone]      Stephanie Traore, APRN Nurse Practitioner Cardiology   [phone]      Miguel Ángel Motley BSW  -- --    Magali Gorman LPN Licensed Practical Nurse --   [phone]      Aj Zimmerman Physical Therapist Physical Therapy --    Marianne Caruso RN Registered Nurse -- --    Nannette Walters MD Consulting Physician Cardiology   [phone]               Chief Complaint:  Medically complex patient    HPI: No changes since admission    Vital Signs  Temp:  [97.7 °F (36.5 °C)-98.5 °F (36.9 °C)] 98.5 °F (36.9 °C)  Heart Rate:  [] 100  Resp:  [18] 18  BP: (104-137)/(63-75) 104/75    Physical Exam 1:    Constitutional: Alert and stable down to therapy this morning     HENT:      Eyes:     Neck:      Cardiovascular: No chest pain or evidence of failure    Pulmonary:      Abdominal:      Genitourinary/Anorectal:       Musculoskeletal: Wearing THERESA stockings to control edema continue on fluid restrictions    Skin:     Neurological:      Psychiatric/Behavioral: Cooperative therapy     Results Review:    I reviewed the patient's new clinical results.    Nursing notes and therapy notes have been reviewed.    I have reviewed medications.    Assessment/Plan  Principal Problem:    Medically complex patient occasion for admission no significant problems congestive heart failure  Active Problems:    Atrial fibrillation with RVR stable    Non-STEMI (non-ST elevated myocardial infarction) stable    Biventricular heart failure with reduced left ventricular function stable    Acute  on chronic systolic (congestive) heart failure stable    Leg DVT (deep venous thromboembolism), acute, bilateral stable    Abnormal LFTs stable           Gilmer Solomon MD  04/05/23  08:41 CDT          This document has been electronically signed by Gilmer Solomon MD on April 5, 2023 08:41 CDT      Part of this note may be an electronic transcription/translation of spoken language to printed text using the Dragon Dictation System.

## 2023-04-05 NOTE — PLAN OF CARE
Goal Outcome Evaluation:  Plan of Care Reviewed With: patient        Progress: improving   Pt participated in there act to increase endurance for adl tasks, fx mobility, transfers and ue strengthening working well w/ OT rest breaks PRN  cont poc

## 2023-04-05 NOTE — THERAPY TREATMENT NOTE
Inpatient Rehabilitation - Occupational Therapy Treatment Note    [facility]     Patient Name: Pavan Mccauley  : 1955  MRN: [id number]    Today's Date: 2023                 Admit Date: 2023         ICD-10-CM ICD-9-CM   1. Impaired mobility and ADLs  Z74.09 V49.89    Z78.9    2. Impaired functional mobility, balance, gait, and endurance  Z74.09 V49.89   3. Symbolic dysfunction  R48.9 784.60       Patient Active Problem List   Diagnosis   • General medical exam   • Malaise   • Hyperglycemia   • Cough   • Tick bite   • Screening for colon cancer   • Pneumonia due to organism   • Carotid artery aneurysm   • Dizziness   • SVT (supraventricular tachycardia)   • Atrial fibrillation (HCC)   • Dyspnea on exertion   • Atrial fibrillation with RVR   • Non-STEMI (non-ST elevated myocardial infarction)   • Biventricular heart failure with reduced left ventricular function   • Acute on chronic systolic (congestive) heart failure   • Leg DVT (deep venous thromboembolism), acute, bilateral   • Abnormal LFTs   • Medically complex patient       Past Medical History:   Diagnosis Date   • Atrial fibrillation 2022    5 months   • Bronchopneumonia    • Chest pain    • Corneal foreign body     both eyes   • Hypertension    • Neck pain    • Perforation of left tympanic membrane     history of   • Prashant [place] spotted fever    • Wheezing        Past Surgical History:   Procedure Laterality Date   • CARDIAC CATHETERIZATION N/A 3/27/2023    Procedure: Left Heart Cath;  Surgeon: [place] DO;  Location: [facility] INVASIVE LOCATION;  Service: Cardiology;  Laterality: N/A;   • EYE FOREIGN BODY REMOVAL  2013    REMOVE FOREIGN BODY CORNEA W/SLIT LAMP 35081 (1)     • INJECTION OF MEDICATION  2011    Kenalog (1)      • SPINE SURGERY  1991    Exploration of the previous C5-6 fusion with refusion, exploration of the C5 nerve root, left side   • TYMPANOPLASTY Left 1972    Perforation of the  left tympanic membrane             IRF OT ASSESSMENT FLOWSHEET (last 12 hours)     IRF OT Evaluation and Treatment     Row Name 04/05/23 0745          OT Time and Intention    Document Type daily treatment  -     Mode of Treatment occupational therapy;individual therapy  -     Total Minutes, Occupational Therapy 90  -RC     Patient Effort good  -     Symptoms Noted During/After Treatment fatigue  -RC     Row Name 04/05/23 0745          General Information    Patient Profile Reviewed yes  -     Existing Precautions/Restrictions fall  -     Row Name 04/05/23 0745          Living Environment    Primary Care Provided by self  -     Row Name 04/05/23 0745          Home Use of Assistive/Adaptive Equipment    Equipment Currently Used at Home none  -     Row Name 04/05/23 0745          Pain Assessment    Pretreatment Pain Rating 5/10  -     Posttreatment Pain Rating 5/10  -     Pain Location - head  -     Row Name 04/05/23 0745          Pain Scale: Word Pre/Post-Treatment    Pain Intervention(s) Declines  -     Row Name 04/05/23 0745          Cognition/Psychosocial    Affect/Mental Status (Cognition) flat/blunted affect  -     Orientation Status (Cognition) oriented x 4  -     Row Name 04/05/23 0745          Range of Motion Comprehensive    General Range of Motion bilateral upper extremity ROM WFL  -     Row Name 04/05/23 0745          Bed Mobility    Sit-Supine [place] (Bed Mobility) modified independence  -     Row Name 04/05/23 0745          Transfer Assessment/Treatment    Transfers sit-stand transfer;stand-sit transfer;toilet transfer  -     Row Name 04/05/23 0745          Chair-Bed Transfer    Chair-Bed [place] (Transfers) contact guard  -     Assistive Device (Chair-Bed Transfers) walker, front-wheeled  -     Row Name 04/05/23 0745          Sit-Stand Transfer    Sit-Stand [place] (Transfers) supervision  -     Assistive Device (Sit-Stand Transfers) walker,  front-wheeled  -     Row Name 04/05/23 0745          Stand-Sit Transfer    Stand-Sit [place] (Transfers) supervision  -     Assistive Device (Stand-Sit Transfers) walker, front-wheeled  -     Row Name 04/05/23 0745          Motor Skills    Additional Documentation --  participated in ue act to increase endurance for adl tasks  -     Row Name 04/05/23 0745          Balance    Static Standing Balance standby assist  -     Row Name 04/05/23 0745          Vital Signs    Pre Systolic BP Rehab 116  -RC     Pre Treatment Diastolic BP 71  -RC     Post Systolic BP Rehab 114  -RC     Post Treatment Diastolic BP 66  -RC     Pretreatment Heart Rate (beats/min) 84  -RC     Intratreatment Heart Rate (beats/min) 110  -RC     Posttreatment Heart Rate (beats/min) 89  -RC     Pre SpO2 (%) 93  -RC     O2 Delivery Pre Treatment room air  -RC     Intra SpO2 (%) 90  after walking 100 ft  -RC     O2 Delivery Intra Treatment room air  -RC     Post SpO2 (%) 94  -RC     O2 Delivery Post Treatment room air  -     Row Name 04/05/23 0745          Therapy Assessment/Plan (OT)    Rehab Potential/Prognosis (OT) good, to achieve stated therapy goals  -RC     Frequency of Treatment (OT) other (see comments)  5-6 d/wk  -RC     Estimated Duration of Therapy (OT) until facility discharge  -     Problem List (OT) balance;mobility;strength  -     Activity Limitations Related to Problem List (OT) unable to transfer safely;IADLs not performed adequately or safely;unable to ambulate safely;BADLs not performed adequately or safely  -     Row Name 04/05/23 0745          Daily Progress Summary (OT)    Overall Progress Toward Functional Goals (OT) progressing toward functional goals as expected  -     Row Name 04/05/23 0745          Therapy Plan Review/Discharge Plan (OT)    Anticipated Equipment Needs At Discharge (OT) other (see comments)  TBD  -RC     Anticipated Discharge Disposition (OT) home  -     Row Name 04/05/23 0745           IRF OT Goals    Bathing Goal Selection (OT-IRF) bathing, OT goal 1  -RC     Endurance Goal Selection (OT) endurance, OT goal 1  -RC     Activity Tolerance Goal Selection (OT) activity tolerance, OT goal 1  -RC     Row Name 04/05/23 0745          Bathing Goal 1 (OT-IRF)    Activity/Device (Bathing Goal 1, OT-IRF) bathing skills, all;grab bar, tub/shower;long-handled sponge;shower chair  -RC     [place] Level (Bathing Goal 1, OT-IRF) modified independence  -RC     Time Frame (Bathing Goal 1, OT-IRF) long-term goal (LTG);by discharge  -RC     Progress/Outcomes (Bathing Goal 1, OT-IRF) goal not met  -     Row Name 04/05/23 0745          Strength Goal 1 (OT-IRF)    Strength Goal 1 (OT-IRF) Pt will tolerate at least 20 minutes BUE strengthening activities OOB/EOB with VSS and with only 4 rest breaks needed for increased strenth and endurance required for ADLs and mobility.  -RC     Time Frame (Strength Goal 1, OT-IRF) long-term goal (LTG);by discharge  -RC     Progress/Outcomes (Strength Goal 1, OT-IRF) goal not met  -     Row Name 04/05/23 0745           Endurance Goal 1 (OT)    Activity Level (Endurance Goal 1, OT) endurance 2 good  -RC     Progress/Outcome (Endurance Goal 1, OT) goal not met  -     Row Name 04/05/23 0745           Activity Tolerance Goal 1 (OT)    Activity Level (Endurance Goal 1, OT) 20 min activity  -RC     Progress/Outcome (Activity Tolerance Goal 1, OT) goal not met  -           User Key  (r) = Recorded By, (t) = Taken By, (c) = Cosigned By    Initials Name Effective Dates     Ramirez IbethEMILY Palomares 06/16/21 -                  Occupational Therapy Education     Title: PT OT SLP Therapies (Not Started)     Topic: Occupational Therapy (Not Started)     Point: ADL training (Not Started)     Description:   Instruct learner(s) on proper safety adaptation and remediation techniques during self care or transfers.   Instruct in proper use of assistive devices.              Learner  Progress:  Not documented in this visit.          Point: Home exercise program (Not Started)     Description:   Instruct learner(s) on appropriate technique for monitoring, assisting and/or progressing therapeutic exercises/activities.              Learner Progress:  Not documented in this visit.          Point: Precautions (Not Started)     Description:   Instruct learner(s) on prescribed precautions during self-care and functional transfers.              Learner Progress:  Not documented in this visit.          Point: Body mechanics (Not Started)     Description:   Instruct learner(s) on proper positioning and spine alignment during self-care, functional mobility activities and/or exercises.              Learner Progress:  Not documented in this visit.                                OT Recommendation and Plan         Plan of Care Review  Plan of Care Reviewed With: patient  Progress: improving  Daily Progress Summary (OT)  Overall Progress Toward Functional Goals (OT): progressing toward functional goals as expected  Plan of Care Reviewed With: patient  Progress: improving       Outcome Measures     Row Name 04/05/23 1025 04/04/23 1238          10 Meter Walk Test Self-Selected Velocity    Self-Selected Velocity: Trial 1 17.1 sec.  -LR --     Self-Selected Velocity: Trial 2 15.1 sec.  -LR --     Self-Selected Velocity: Trial 3 16.1 sec.  -LR --     Self-Selected Velocity: Average Time 16.1 sec.  -LR --        10 Meter Walk Test Fast Velocity    10 Meter Walk Fast Velocity: Trial 1 12.5 sec.  -LR --     10 Meter Walk Fast Velocity: Trial 2 11.5 sec.  -LR --     10 Meter Walk Fast Velocity: Trial 3 12 sec.  -LR --     10 Meter Walk Fast Velocity: Average time 12 sec.  -LR --        10 Meter Walk Test Actual Velocity    Actual Self-Selected Velocity 0.37 m/s  -LR --     Actual Fast-Velocity 0.5 m/s  -LR --        9 Hole Peg    9-Hole Peg Left -- 33.29  -CM     9-Hole Peg Right -- 41.98  -CM        How much help from  another person do you currently need...    Turning from your back to your side while in flat bed without using bedrails? 4  -LR --     Moving from lying on back to sitting on the side of a flat bed without bedrails? 4  -LR --     Moving to and from a bed to a chair (including a wheelchair)? 3  -LR --     Standing up from a chair using your arms (e.g., wheelchair, bedside chair)? 3  -LR --     Climbing 3-5 steps with a railing? 3  -LR --     To walk in hospital room? 3  -LR --     AM-PAC 6 Clicks Score (PT) 20  -LR --        How much help from another is currently needed...    Putting on and taking off regular lower body clothing? -- 3  -CM     Bathing (including washing, rinsing, and drying) -- 3  -CM     Toileting (which includes using toilet bed pan or urinal) -- 4  -CM     Putting on and taking off regular upper body clothing -- 4  -CM     Taking care of personal grooming (such as brushing teeth) -- 4  -CM     Eating meals -- 4  -CM     AM-PAC 6 Clicks Score (OT) -- 22  -CM        Reis Balance Scale    Sitting to Standing 4  -LR --     Standing Unsupported 4  -LR --     Sitting with Back Unsupported but Feet Supported on Floor or on Stool 4  -LR --     Standing to Sitting 3  -LR --     Transfers 3  -LR --     Standing Unsupported with Eyes Closed 4  -LR --     Standing Unsupported with Feet Together 1  -LR --     Reaching Forward with Outstretched Arm While Standing 4  -LR --      Object From the Floor From a Standing Position 4  -LR --     Turning to Look Behind Over Left and Right Shoulders While Standing 4  -LR --     Turn 360 Degrees 1  -LR --     Place Alternate Foot on Step or Stool While Standing Unsupported 0  -LR --     Standing Unsupported with One Foot in Front 0  -LR --     Standing on One Leg 1  -LR --     Reis Total Score 37  -LR --        Functional Assessment    Outcome Measure Options 10 Meter Walk;AM-PAC 6 Clicks Basic Mobility (PT);Reis Balance  -LR 9 Hole Peg;AM-PAC 6 Clicks Daily  Activity (OT)  -CM           User Key  (r) = Recorded By, (t) = Taken By, (c) = Cosigned By    Initials Name Provider Type    LR Aj Zimmerman Physical Therapist    CM Jimena Harman OT Occupational Therapist                  Time Calculation:      Time Calculation- OT     Row Name 04/05/23 1443             Time Calculation- OT    OT Start Time 0745  -RC      OT Stop Time 0915  -RC      OT Time Calculation (min) 90 min  -RC      Total Timed Code Minutes- OT 90 minute(s)  -RC      OT Received On 04/05/23  -RC         Timed Charges    43791 - OT Therapeutic Exercise Minutes 30  -RC      08982 - OT Therapeutic Activity Minutes 60  -RC         Total Minutes    Timed Charges Total Minutes 90  -RC       Total Minutes 90  -RC            User Key  (r) = Recorded By, (t) = Taken By, (c) = Cosigned By    Initials Name Provider Type    RC Ibeth Guzmán COTA Occupational Therapist Assistant              Therapy Charges for Today     Code Description Service Date Service Provider Modifiers Qty    44611169495 HC OT THERAPEUTIC ACT EA 15 MIN 4/5/2023 Ibeth Guzmán COTA GO 4    47880540931 HC OT THER PROC EA 15 MIN 4/5/2023 Ibeth Guzmán DIAZ GO 2                   JENN RenteriaA  4/5/2023   Scribe Attestation (For Scribes USE Only)... Scribe Attestation (For Scribes USE Only).../Attending Attestation (For Attendings USE Only)...

## 2023-04-05 NOTE — PLAN OF CARE
Goal Outcome Evaluation:      Pt continues to work towards discharge. Pt participated in all therapies offered. Pt was visited by provider [facility] Goodson [facility]. Provider suggested pt to continue consuming more protein and boost shakes were added back to the patient's menu lineup for each meal. Pt continues to choose fruits, soups, sandwiches, cereal and donut for meal choices. Pt has been reluctantly compliant with fluid restriction. No other needs voiced at this time for luis m.

## 2023-04-05 NOTE — PLAN OF CARE
Goal Outcome Evaluation:               PT eval completed. Patient reports a mostly sedentary lifestyle but does report he can walk community distances. Patient appears flat/blunt emotionally in all conversations. Patient Independent with supine<>sit transfer with HOB down and not bed rails. SPV for sit<>stand transfer with RW. CGA for ambulation 20'x1, 64'x4, 155'x1 with RW. No change in gait during ambulation on uneven surface. Patient tends to have medial/lateral drift with the RW but is able to course correct easily. Patient does not appear to be reliant on the RW at this time. CGA for up/down 2 curb steps with RW, up/down 4 gyms steps and 12 gym steps with B hand rails.  Patient would benefit from skilled PT to improve functional mobility and return to PLOF.      10 meter walk test (10MWT):     Pt completed the 10MWT at self selected velocity in 0.37 m/s and fast velocity in 0.5 m/s.    < 0.4 m/s would classify them as a household ambulator (?0.49 in stroke).   0.4-0.8 m/s would classify them as a limited community ambulator.   > 0.8 m/s would classify them as a community ambulator (? 0.93 in stroke).   > 1.3 m/s would classify them as able to safely navigate a crosswalk.   <0.6-0.7 would classify them as at an increased risk for adverse events including falls, hospitalization, & death.       Reis Balance Scale (BBS):     Pt scored a  37/56 on the Reis Balance Scale.    <40/56 would indicate very high fall risk.   <45 would indicate a high fall risk.   50-55 would indicate a normal range for adults 60-89 years old

## 2023-04-05 NOTE — PLAN OF CARE
Goal Outcome Evaluation:  Plan of Care Reviewed With: patient        Progress: improving  Outcome Evaluation: ST: Pt seen for skilled services to assess cognitive-linguistic abilities. Pt noted to have flat affect throughout session and stated he did not understand the need for ST evaluation, however, he was cooperative with therapy. Completed BIMS and scored 13/15. MoCA completed and scored 25/30. Scores on cognitive assessments indicate mild cognitive impairment but likely baseline function. ST to d/c due to pt's cognitive linguistic abilities being at baseline function.

## 2023-04-05 NOTE — THERAPY TREATMENT NOTE
Inpatient Rehabilitation - Physical Therapy Treatment Note       [facility]     Patient Name: Pavan Mccauley  : 1955  MRN: [id number]    Today's Date: 2023                    Admit Date: 2023      Visit Dx:     ICD-10-CM ICD-9-CM   1. Impaired mobility and ADLs  Z74.09 V49.89    Z78.9    2. Impaired functional mobility, balance, gait, and endurance  Z74.09 V49.89   3. Symbolic dysfunction  R48.9 784.60       Patient Active Problem List   Diagnosis   • General medical exam   • Malaise   • Hyperglycemia   • Cough   • Tick bite   • Screening for colon cancer   • Pneumonia due to organism   • Carotid artery aneurysm   • Dizziness   • SVT (supraventricular tachycardia)   • Atrial fibrillation (HCC)   • Dyspnea on exertion   • Atrial fibrillation with RVR   • Non-STEMI (non-ST elevated myocardial infarction)   • Biventricular heart failure with reduced left ventricular function   • Acute on chronic systolic (congestive) heart failure   • Leg DVT (deep venous thromboembolism), acute, bilateral   • Abnormal LFTs   • Medically complex patient       Past Medical History:   Diagnosis Date   • Atrial fibrillation 2022    5 months   • Bronchopneumonia    • Chest pain    • Corneal foreign body     both eyes   • Hypertension    • Neck pain    • Perforation of left tympanic membrane     history of   • Prashant [place] spotted fever    • Wheezing        Past Surgical History:   Procedure Laterality Date   • CARDIAC CATHETERIZATION N/A 3/27/2023    Procedure: Left Heart Cath;  Surgeon: [place] DO;  Location: [facility] INVASIVE LOCATION;  Service: Cardiology;  Laterality: N/A;   • EYE FOREIGN BODY REMOVAL  2013    REMOVE FOREIGN BODY CORNEA W/SLIT LAMP 68496 (1)     • INJECTION OF MEDICATION  2011    Kenalog (1)      • SPINE SURGERY  1991    Exploration of the previous C5-6 fusion with refusion, exploration of the C5 nerve root, left side   • TYMPANOPLASTY Left 1972     Perforation of the left tympanic membrane       PT ASSESSMENT (last 12 hours)     IRF PT Evaluation and Treatment     Row Name 04/05/23 1426 04/05/23 1025       PT Time and Intention    Document Type daily treatment  -JA initial evaluation  -LR    Mode of Treatment physical therapy  - physical therapy  -LR    Row Name 04/05/23 1426 04/05/23 1025       General Information    Patient Profile Reviewed yes  -JA yes  -LR    Existing Precautions/Restrictions fall  -JA fall  -LR    Row Name 04/05/23 1025          Previous Level of Function/Home Environm    Bathing/Grooming, Premorbid Functional Level independent  -LR     Dressing, Premorbid Functional Level independent  -LR     Eating/Feeding, Premorbid Functional Level independent  -LR     Toileting, Premorbid Functional Level independent  -LR     BADLs, Premorbid Functional Level independent  -LR     IADLs, Premorbid Functional Level independent  -LR     Bed Mobility, Premorbid Functional Level independent  -LR     Transfers, Premorbid Functional Level independent  -LR     Household Ambulation, Premorbid Functional Level independent  -LR     Stairs, Premorbid Functional Level independent  -LR     Community Ambulation, Premorbid Functional Level independent  -LR     Row Name 04/05/23 1426 04/05/23 1025       Living Environment    People in Home -- alone  -LR    Primary Care Provided by self  - self  -LR    Row Name 04/05/23 1025          Stairs Within Home, Primary    Number of Stairs, Within Home, Primary none  -LR     Row Name 04/05/23 1426 04/05/23 1025       Home Use of Assistive/Adaptive Equipment    Equipment Currently Used at Home none  -JA none  -LR    Row Name 04/05/23 1426 04/05/23 1025       Pain Assessment    Pretreatment Pain Rating 0/10 - no pain  -JA 0/10 - no pain  -LR    Posttreatment Pain Rating 0/10 - no pain  -JA 0/10 - no pain  -LR    Row Name 04/05/23 1426 04/05/23 1025       Cognition/Psychosocial    Affect/Mental Status (Cognition) flat/blunted  affect  -JA flat/blunted affect  -LR    Orientation Status (Cognition) oriented x 4  -JA oriented x 4  -LR    Follows Commands (Cognition) delayed response/completion;increased processing time needed  -JA delayed response/completion;increased processing time needed  -LR    Personal Safety Interventions -- safety round/check completed;elopement precautions initiated;fall prevention program maintained;gait belt;nonskid shoes/slippers when out of bed  -LR    Row Name 04/05/23 1426 04/05/23 1025       Range of Motion Comprehensive    General Range of Motion bilateral lower extremity ROM WFL  -JA bilateral lower extremity ROM WFL  -LR    Comment, General Range of Motion -- BLE grossly WFL  -LR    Row Name 04/05/23 1025          Strength Comprehensive (MMT)    Comment, General Manual Muscle Testing (MMT) Assessment BLE grossly 4/5  -LR     Row Name 04/05/23 1426 04/05/23 1025       Bed Mobility    Bed Mobility sit-supine;rolling left;supine-sit  - sit-supine;rolling left;rolling right  -LR    Rolling Left [place] (Bed Mobility) independent  -JA independent  -LR    Rolling Right [place] (Bed Mobility) -- independent  -LR    Supine-Sit [place] (Bed Mobility) independent  -JA independent  -LR    Sit-Supine [place] (Bed Mobility) independent  - --    Row Name 04/05/23 1426 04/05/23 1025       Transfer Assessment/Treatment    Transfers sit-stand transfer;stand-sit transfer;toilet transfer  -JA sit-stand transfer;stand-sit transfer;toilet transfer  -LR    Row Name 04/05/23 1426 04/05/23 1025       Sit-Stand Transfer    Sit-Stand [place] (Transfers) supervision  - supervision  -LR    Assistive Device (Sit-Stand Transfers) walker, front-wheeled  -FACUNDO walker, front-wheeled  -LR    Row Name 04/05/23 1426 04/05/23 1025       Stand-Sit Transfer    Stand-Sit [place] (Transfers) supervision  - supervision  -LR    Assistive Device (Stand-Sit Transfers) walker, front-wheeled  -JA walker, front-wheeled   -LR    Row Name 04/05/23 1426 04/05/23 1025       Gait/Stairs (Locomotion)    [place] Level (Gait) contact guard  - contact guard  -LR    Assistive Device (Gait) walker, front-wheeled  -JA walker, front-wheeled  -LR    Distance in Feet (Gait) 300  - 20'x1, 64'x4, 155'x1  -LR    Pattern (Gait) step-through  -JA step-through  -LR    Deviations/Abnormal Patterns (Gait) misael decreased;gait speed decreased  - base of support, wide;misael decreased;gait speed decreased  -    [place] Level (Stairs) -- contact guard  -LR    Assistive Device (Stairs) -- walker, front-wheeled  -LR    Handrail Location (Stairs) -- both sides  -    Number of Steps (Stairs) -- up/down 2 curb steps x1, up/down 4 and 12 gym steps  -LR    Ascending Technique (Stairs) -- step-to-step  -LR    Descending Technique (Stairs) -- step-to-step  -LR    Stairs, Safety Issues -- balance decreased during turns  -LR    Row Name 04/05/23 1426 04/05/23 1025       Safety Issues, Functional Mobility    Safety Issues Affecting Function (Mobility) -- ability to follow commands;at risk behavior observed;awareness of need for assistance;insight into deficits/self-awareness;safety precaution awareness;safety precautions follow-through/compliance  -    Impairments Affecting Function (Mobility) balance;endurance/activity tolerance;strength;shortness of breath;sensation/sensory awareness  - balance;endurance/activity tolerance;strength;shortness of breath;sensation/sensory awareness;pain  -    Row Name 04/05/23 1426          Motor Skills    Therapeutic Exercise --  supine hip a/a, SLR, AP, glut sets, heel slides  -     Row Name 04/05/23 1426 04/05/23 1025       Positioning and Restraints    Pre-Treatment Position in bed  - in bed  -LR    Post Treatment Position bed  - bed  -LR    In Bed supine;call light within reach;exit alarm on  -JA notified nsg;call light within reach;encouraged to call for assist;exit alarm on  -LR    In Wheelchair  -- --  -LR    Row Name 04/05/23 1426 04/05/23 1025       Vital Signs    Pre Systolic BP Rehab 113  - 120  -LR    Pre Treatment Diastolic BP 65  -JA 70  -LR    Post Systolic BP Rehab 121  -JA --    Post Treatment Diastolic BP 78  -JA --    Pretreatment Heart Rate (beats/min) 89  -JA 85  -LR    Posttreatment Heart Rate (beats/min) 104  - --    Pre SpO2 (%) 95  - 93  -LR    O2 Delivery Pre Treatment room air  -JA room air  -LR    Post SpO2 (%) 94  -JA --    O2 Delivery Post Treatment room air  -JA --    Pre Patient Position Supine  -JA Supine  -LR    Post Patient Position Sitting  - --    Row Name 04/05/23 1426 04/05/23 1025       Therapy Assessment/Plan (PT)    Rehab Potential/Prognosis (PT) good, to achieve stated therapy goals  - good, to achieve stated therapy goals  -LR    Frequency of Treatment (PT) other (see comments)  5-6d/week for 2 weeks  - other (see comments)  5-6d/week for 2 weeks  -LR    Estimated Duration of Therapy (PT) 2 weeks  - 2 weeks  -LR    Problem List (PT) problems related to;balance;coordination;mobility;strength;pain  - problems related to;balance;coordination;mobility;strength;pain  -LR    Activity Limitations Related to Problem List (PT) unable to transfer safely;unable to ambulate safely  - unable to transfer safely;unable to ambulate safely  -LR    Row Name 04/05/23 1025          Daily Progress Summary (PT)    Functional Goal Overall Progress (PT) progressing toward functional goals as expected  -     Row Name 04/05/23 1426 04/05/23 1025       Therapy Plan Review/Discharge Plan (PT)    Therapy Plan Review (PT) -- evaluation/treatment results reviewed;care plan/treatment goals reviewed;risks/benefits reviewed;current/potential barriers reviewed;participants voiced agreement with care plan;participants included;patient  -LR    Anticipated Discharge Disposition (PT) home with assist;home with home health  - home with assist;home with home health  -    Row Name  04/05/23 1426          IRF PT Goals    Transfer Goal Selection (PT-IRF) transfers, PT goal 1  -     Gait (Walking Locomotion) Goal Selection (PT-IRF) gait, PT goal 1;gait, PT goal (free text)  -     Balance Goal Selection (PT) balance, PT goal (free text)  -     Row Name 04/05/23 1426          Transfer Goal 1 (PT-IRF)    Activity/Assistive Device (Transfer Goal 1, PT-IRF) all transfers;bed-to-chair/chair-to-bed;sit-to-stand/stand-to-sit  -     [place] Level (Transfer Goal 1, PT-IRF) independent  -     Time Frame (Transfer Goal 1, PT-IRF) 2 weeks  -     Progress/Outcomes (Transfer Goal 1, PT-IRF) goal not met  -     Row Name 04/05/23 1426          Gait/Walking Locomotion Goal 1 (PT-IRF)    Activity/Assistive Device (Gait/Walking Locomotion Goal 1, PT-IRF) gait (walking locomotion)  -     Gait/Walking Locomotion Distance Goal 1 (PT-IRF) 300'x1  -     [place] Level (Gait/Walking Locomotion Goal 1, PT-IRF) modified independence  -     Time Frame (Gait/Walking Locomotion Goal 1, PT-IRF) 2 weeks  -     Strategies/Barriers (Gait/Walking Locomotion Goal 1, PT-IRF) No AD vs SPC  -     Progress/Outcomes (Gait/Walking Locomotion Goal 1, PT-IRF) goal not met  -     Row Name 04/05/23 1426          Gait/Walking Locomotion Goal (PT-IRF)    Gait/Walking Locomotion Goal (PT-IRF) Patient will improve 10 meter walk test to 0.8 m/s to show decreased fall risk and reduced readmittance risk.  -     Time Frame (Gait/Walking Locomotion Goal, PT-IRF) 2 weeks  -     Progress/Outcomes (Gait/Walking Locomotion Goal, PT-IRF) goal not met  -     Row Name 04/05/23 1426          Balance Goal (PT)    Balance Goal (PT) Patient will improve Reis balance scale to >45/56 to show decreased fall risk.  -     Time Frame (Balance Goal, PT) 2 weeks  -JA     Progress/Outcome (Balance Goal, PT) goal not met  -           User Key  (r) = Recorded By, (t) = Taken By, (c) = Cosigned By    Initials Name Provider Type     Maik Chairez, PTA Physical Therapist Assistant    Aj Mahmood Physical Therapist                 Physical Therapy Education     Title: PT OT SLP Therapies (Not Started)     Topic: Physical Therapy (Not Started)     Point: Mobility training (Not Started)     Learner Progress:  Not documented in this visit.          Point: Home exercise program (Not Started)     Learner Progress:  Not documented in this visit.          Point: Body mechanics (Not Started)     Learner Progress:  Not documented in this visit.          Point: Precautions (Not Started)     Learner Progress:  Not documented in this visit.                            PT Recommendation and Plan    Frequency of Treatment (PT): other (see comments) (5-6d/week for 2 weeks)  Plan of Care Reviewed With: patient  Progress: improving  Outcome Evaluation: Pt performed supine therex x20 reps in bed this p.m. Pt t/f supine to sit, sit to supine w/ ind. Pt t/f sit to stand, stand to sit SBA, v c'ing required to push off from bed when standing and reach back for bed when sitting. Pt ambulated 300' RW CGA, required cueing for hand placement on walker for safety this p.m.       Outcome Measures     Row Name 04/05/23 1025 04/04/23 1238          10 Meter Walk Test Self-Selected Velocity    Self-Selected Velocity: Trial 1 17.1 sec.  -LR --     Self-Selected Velocity: Trial 2 15.1 sec.  -LR --     Self-Selected Velocity: Trial 3 16.1 sec.  -LR --     Self-Selected Velocity: Average Time 16.1 sec.  -LR --        10 Meter Walk Test Fast Velocity    10 Meter Walk Fast Velocity: Trial 1 12.5 sec.  -LR --     10 Meter Walk Fast Velocity: Trial 2 11.5 sec.  -LR --     10 Meter Walk Fast Velocity: Trial 3 12 sec.  -LR --     10 Meter Walk Fast Velocity: Average time 12 sec.  -LR --        10 Meter Walk Test Actual Velocity    Actual Self-Selected Velocity 0.37 m/s  -LR --     Actual Fast-Velocity 0.5 m/s  -LR --        9 Hole Peg    9-Hole Peg Left -- 33.29  -CM      9-Hole Peg Right -- 41.98  -CM        How much help from another person do you currently need...    Turning from your back to your side while in flat bed without using bedrails? 4  -LR --     Moving from lying on back to sitting on the side of a flat bed without bedrails? 4  -LR --     Moving to and from a bed to a chair (including a wheelchair)? 3  -LR --     Standing up from a chair using your arms (e.g., wheelchair, bedside chair)? 3  -LR --     Climbing 3-5 steps with a railing? 3  -LR --     To walk in hospital room? 3  -LR --     AM-PAC 6 Clicks Score (PT) 20  -LR --        How much help from another is currently needed...    Putting on and taking off regular lower body clothing? -- 3  -CM     Bathing (including washing, rinsing, and drying) -- 3  -CM     Toileting (which includes using toilet bed pan or urinal) -- 4  -CM     Putting on and taking off regular upper body clothing -- 4  -CM     Taking care of personal grooming (such as brushing teeth) -- 4  -CM     Eating meals -- 4  -CM     AM-PAC 6 Clicks Score (OT) -- 22  -CM        Reis Balance Scale    Sitting to Standing 4  -LR --     Standing Unsupported 4  -LR --     Sitting with Back Unsupported but Feet Supported on Floor or on Stool 4  -LR --     Standing to Sitting 3  -LR --     Transfers 3  -LR --     Standing Unsupported with Eyes Closed 4  -LR --     Standing Unsupported with Feet Together 1  -LR --     Reaching Forward with Outstretched Arm While Standing 4  -LR --      Object From the Floor From a Standing Position 4  -LR --     Turning to Look Behind Over Left and Right Shoulders While Standing 4  -LR --     Turn 360 Degrees 1  -LR --     Place Alternate Foot on Step or Stool While Standing Unsupported 0  -LR --     Standing Unsupported with One Foot in Front 0  -LR --     Standing on One Leg 1  -LR --     Reis Total Score 37  -LR --        Functional Assessment    Outcome Measure Options 10 Meter Walk;AM-PAC 6 Clicks Basic Mobility  (PT);Reis Balance  -LR 9 Hole Peg;AM-PAC 6 Clicks Daily Activity (OT)  -CM           User Key  (r) = Recorded By, (t) = Taken By, (c) = Cosigned By    Initials Name Provider Type    Aj Mahmood Physical Therapist    CM Jimena Harman, OT Occupational Therapist                     Time Calculation:      PT Charges     Row Name 04/05/23 1552 04/05/23 1234          Time Calculation    Start Time 1426  -JA 1025  -LR     Stop Time 1504  -JA 1125  -LR     Time Calculation (min) 38 min  -JA 60 min  -LR     PT Received On -- 04/05/23  -LR     PT Goal Re-Cert Due Date -- 04/18/23  -LR        Time Calculation- PT    Total Timed Code Minutes- PT 38 minute(s)  -JA --        Timed Charges    56972 - PT Therapeutic Exercise Minutes 25  -JA --     35048 - Gait Training Minutes  13  -JA --        Untimed Charges    PT Eval/Re-eval Minutes -- 60  -LR        Total Minutes    Timed Charges Total Minutes 38  -JA --     Untimed Charges Total Minutes -- 60  -LR      Total Minutes 38  -JA 60  -LR           User Key  (r) = Recorded By, (t) = Taken By, (c) = Cosigned By    Initials Name Provider Type    Maik Chairez PTA Physical Therapist Assistant    LR Aj Zimmerman Physical Therapist                Therapy Charges for Today     Code Description Service Date Service Provider Modifiers Qty    98803080520 HC PT THER PROC EA 15 MIN 4/5/2023 Maik Gentile, PTA GP 2    78366937011 HC GAIT TRAINING EA 15 MIN 4/5/2023 Maik Gentile PTA GP 1            PT G-Codes  Outcome Measure Options: 10 Meter Walk, AM-PAC 6 Clicks Basic Mobility (PT), Reis Balance  AM-PAC 6 Clicks Score (PT): 20  AM-PAC 6 Clicks Score (OT): 22  Reis Total Score: 37      Maik Gentile PTA  4/5/2023

## 2023-04-05 NOTE — PROGRESS NOTES
Nutrition Services    Patient Name:  Pavan Mccauley  YOB: 1955  MRN: [id number]  Admit Date:  4/4/2023    RN messaged RD that cardiology ARPN would like pt on boost to increase protein intake.  Pt currently on a fluid restriction and will not get another drink from dietary - if pt does not like boost, RD can add different supplement with protein.    Electronically signed by:  Kamila Walker RD  04/05/23 13:06 CDT

## 2023-04-05 NOTE — THERAPY EVALUATION
"Inpatient Rehabilitation - Speech Language Pathology Initial Evaluation  [facility]     Patient Name: Pavan Mccauley  : 1955  MRN: [id number]  Today's Date: 2023               Admit Date: 2023     Cognitive Patterms Admission   Should Brief Interview for Mental Status (-) be Conducted?    0.No (patient is rarely/never understood) Skip to , Memory/Recall Ability     1.Yes Continue to , Repetition of Three Words    BRIEF INTERVIEW FOR MENTAL STATUS    Repetition of Three Words    Ask patient: “I am going to say three words for you to remember. Please repeat the words after I have said all three. The words are: sock, blue and bed. Now tell me the three words.”    Number of words repeated after first attempt     3.Three 2. Two  1.One  0.None 3   After the patient's first attempt, repeat the words using cues (\"sock, something to wear; blue, a color; bed, a piece of furniture\"). You may repeat the words up to two more times.    Temporal Orientation (orientation to year, month, and day)    Ask patient: “Please tell me what year it is right now.”    Able to report correct year  3. Correct    2.Missed by 1 year    1.Missed by 2 - 5 years 0.Missed by > 5 years or no answer 3   Ask patient: “What month are we in right now?”   Able to report correct month 2. Accurate within 5 days 1. Missed by 6 days to 1 month 0.Missed by > 1 month or no answer 2   Ask patient: “What day of the week is today?”    Able to report correct day of the week 1. Correct     0. Incorrect or no answer 1   Recall     Ask patient: “Let's go back to an earlier question. What were those three words that I asked you to repeat?” If unable to remember a word, give cue (something to wear; a color; a piece of furniture) for that word. Able to recall “sock” 2.Yes, no cue required  1.Yes, after cueing (\"something to wear\")  0.No - could not recall 2   Able to recall “blue” 2. Yes, no cue required 1.Yes, after cueing (\"a " "color\") 0.No - could not recall 2   Able to recall “bed” 2. Yes, no cue required1.Yes, after cueing (\"a piece of furniture\") 0.No - could not recall 0   BIMS TOTAL SCORE 0-15  13   Enter 99 IF Patient unable to complete the interview    Should the Staff Assessment for Mental Status () be Conducted? (Yes or No)    Staff Assessment for Mental Status    (Do not conduct if Brief Inerview for Mental Status was completed)    Memory Recall/Ability Check all that Apply:    A.Current season.    B.Location of own room.    C.Staff names and faces.    E.That he or she is in a hospital/hospital unit.    Z.None of the above were recalled.            Hearing, Speech and Vision Admission Goal  Date Date Date Date Date Discharge   Expression of Ideas and Wants (consider both verbal and non-verbal expression and excluding language barriers) 4 4         4.Expresses complex messages without difficulty and with speech that is clear and easy to understand.           3.Exhibits some difficulty with expressing needs and ideas (e.g., some words or finishing thoughts) or speech is not clear.           2.Frequently exhibits difficulty with expressing needs and ideas.           1.Rarely/Never expresses self or speech is very difficult to understand.           Understanding Verbal and Non-Verbal Content (with hearing aid or device, if used, and excluding language barriers) 4 4         4.Understands: Clear comprehension without cues or repetitions.           3.Usually Understands: Understands most conversations, but misses some part/intent of message. Requires cues at times to understand           2.Sometimes Understands: Understands only basic conversations or simple, direct phrases. Frequently requires cues to understand           1.Rarely/Never Understands               Visit Dx:    ICD-10-CM ICD-9-CM   1. Impaired mobility and ADLs  Z74.09 V49.89    Z78.9    2. Impaired functional mobility, balance, gait, and endurance  Z74.09 V49.89 "   3. Symbolic dysfunction  R48.9 784.60     Patient Active Problem List   Diagnosis   • General medical exam   • Malaise   • Hyperglycemia   • Cough   • Tick bite   • Screening for colon cancer   • Pneumonia due to organism   • Carotid artery aneurysm   • Dizziness   • SVT (supraventricular tachycardia)   • Atrial fibrillation (HCC)   • Dyspnea on exertion   • Atrial fibrillation with RVR   • Non-STEMI (non-ST elevated myocardial infarction)   • Biventricular heart failure with reduced left ventricular function   • Acute on chronic systolic (congestive) heart failure   • Leg DVT (deep venous thromboembolism), acute, bilateral   • Abnormal LFTs   • Medically complex patient     Past Medical History:   Diagnosis Date   • Atrial fibrillation 12/01/2022    5 months   • Bronchopneumonia    • Chest pain    • Corneal foreign body     both eyes   • Hypertension    • Neck pain    • Perforation of left tympanic membrane     history of   • Prashant [place] spotted fever    • Wheezing      Past Surgical History:   Procedure Laterality Date   • CARDIAC CATHETERIZATION N/A 3/27/2023    Procedure: Left Heart Cath;  Surgeon: [place] DO;  Location: [facility] CATH INVASIVE LOCATION;  Service: Cardiology;  Laterality: N/A;   • EYE FOREIGN BODY REMOVAL  07/29/2013    REMOVE FOREIGN BODY CORNEA W/SLIT LAMP 17530 (1)     • INJECTION OF MEDICATION  07/12/2011    Kenalog (1)      • SPINE SURGERY  03/12/1991    Exploration of the previous C5-6 fusion with refusion, exploration of the C5 nerve root, left side   • TYMPANOPLASTY Left 05/16/1972    Perforation of the left tympanic membrane       SLP Recommendation and Plan  SLP Diagnosis: functional cognitive-linguistic skills (04/05/23 1135)  SLP Diagnosis Comments: Pt at baseline for cognitive-linguistic function (04/05/23 1135)  Reason for Discharge: all goals and outcomes met, no further needs identified (04/05/23 1135)     SLC Criteria for Skilled Therapy Interventions Met: not  appropriate (04/05/23 1135)  Anticipated Discharge Disposition (SLP): home (04/05/23 1135)              Daily Summary of Progress (SLP): progress toward functional goals is good (04/05/23 1135)        Communication Strategy Suggestions: eliminate distractions when speaking with patient (04/05/23 1135)  Treatment Assessment (SLP): suspected, mild, cognitive-linguistic disorder (04/05/23 1135)  Treatment Assessment Comments (SLP): ST: Pt seen for skilled services to assess cognitive-linguistic abilities. Pt noted to have flat affect throughout session and stated he did not understand the need for ST evaluation, however, he was cooperative with therapy. Completed BIMS and scored 13/15. MoCA completed and scored 25/30. Scores on cognitive assessments indicate mild cognitive impairment but pt stated this is his baseline function. ST to d/c due to pt's cognitive linguistic abilities being at baseline function. (04/05/23 1135)  Plan for Continued Treatment (SLP): treatment no longer indicated as all goals met (04/05/23 1135)  Plan of Care Reviewed With: patient (04/05/23 1309)  Progress: improving (04/05/23 1309)  Outcome Evaluation: ST: Pt seen for skilled services to assess cognitive-linguistic abilities. Pt noted to have flat affect throughout session and stated he did not understand the need for ST evaluation, however, he was cooperative with therapy. Completed BIMS and scored 13/15. MoCA completed and scored 25/30. Scores on cognitive assessments indicate mild cognitive impairment but likely baseline function. ST to d/c due to pt's cognitive linguistic abilities being at baseline function. (04/05/23 1309)      SLP EVALUATION (last 72 hours)     SLP SLC Evaluation     Row Name 04/05/23 1135                   Communication Assessment/Intervention    Document Type evaluation  -EC        Total Evaluation Minutes, SLP 30  -EC        Subjective Information no complaints  -EC        Patient Observations  alert;cooperative;agree to therapy  -EC        Patient/Family/Caregiver Comments/Observations No family present  -EC        Patient Effort good  -EC        Comment eval performed with student clinician and supervision of CI  -EC        Symptoms Noted During/After Treatment none  -EC           General Information    Patient Profile Reviewed yes  -EC        Pertinent History Of Current Problem Admitted for heart failure. Reports state pt is medically complex.  -EC        Precautions/Limitations, Vision WFL with corrective lenses  -EC        Precautions/Limitations, Hearing WFL  -EC        Patient Level of Education GED  -EC        Prior Level of Function-Communication WFL  -EC        Plans/Goals Discussed with patient  -EC        Barriers to Rehab medically complex  -EC        Patient's Goals for Discharge return to home  -EC        Standardized Assessment Used MoCA;other (see comments)  BIMS  -EC           Pain    Additional Documentation Pain Scale: Numbers Pre/Post-Treatment (Group)  -EC           Pain Scale: Numbers Pre/Post-Treatment    Pretreatment Pain Rating 0/10 - no pain  -EC        Posttreatment Pain Rating 0/10 - no pain  -EC           Cognitive Assessment Intervention- SLP    Cognitive Function (Cognition) mild impairment  -EC        Orientation Status (Cognition) WNL  -EC        Memory (Cognitive) mild impairment  -EC        Attention (Cognitive) WNL  -EC        Thought Organization (Cognitive) mild impairment;concrete divergent  -EC        Reasoning (Cognitive) WNL  -EC        Problem Solving (Cognitive) WNL  -EC        Functional Math (Cognitive) mild impairment  -EC        Executive Function (Cognition) WNL  -EC        Pragmatics (Communication) WNL  -EC        Right Hemisphere Function mild impairment;visuo-spatial  -EC        Cognition, Comment Pt presents with mild cognitive impairment in the areas of delayed recall, organization, functional math, and visuospatial perception. Pt states that this is  his baseline. Pt also demonstrates limited motivation to improve cognitive function.  -EC           Standardized Tests    Cognitive/Memory Tests MOCA: [place] Cognitive Assessment  -EC           MOCA: The [place] Cognitive Assessment    MOCA Total Score 25  -EC        MOCA Total Score Indicative Of: Mild Cognitive Impairment  -EC        MOCA Comments 25/30. Required cues for delayed recall tasks and was unable to appropriately complete drawing of cube. Score indicates mild cognitive impairment but pt stated this is his baseline.  -EC           SLP Evaluation Clinical Impressions    SLP Diagnosis functional cognitive-linguistic skills  -EC        SLP Diagnosis Comments Pt at baseline for cognitive-linguistic function  -EC        Rehab Potential/Prognosis good  -EC        SLC Criteria for Skilled Therapy Interventions Met not appropriate  -EC        Functional Impact no impact on function  -EC           SLP Treatment Clinical Impressions    Treatment Assessment (SLP) suspected;mild;cognitive-linguistic disorder  -EC        Treatment Assessment Comments (SLP) ST: Pt seen for skilled services to assess cognitive-linguistic abilities. Pt noted to have flat affect throughout session and stated he did not understand the need for ST evaluation, however, he was cooperative with therapy. Completed BIMS and scored 13/15. MoCA completed and scored 25/30. Scores on cognitive assessments indicate mild cognitive impairment but pt stated this is his baseline function. ST to d/c due to pt's cognitive linguistic abilities being at baseline function.  -EC        Daily Summary of Progress (SLP) progress toward functional goals is good  -EC        Barriers to Overall Progress (SLP) Medically complex  -EC        Plan for Continued Treatment (SLP) treatment no longer indicated as all goals met  -EC        Care Plan Review evaluation/treatment results reviewed;risks/benefits reviewed;care plan/treatment goals reviewed;current/potential  barriers reviewed;patient/other agree to care plan  -EC           Recommendations    Therapy Frequency (SLP SLC) evaluation only  -EC        Anticipated Discharge Disposition (SLP) home  -EC        Communication Strategy Suggestions eliminate distractions when speaking with patient  -EC           SLP Discharge Summary    Discharge Destination home  -EC        Discharge Diagnostic Statement No further skilled ST warranted at this time d/t pt having met baseline status for cognitive-linguistic performance.  -EC        Progress Toward Achieving Short/long Term Goals discharge on same date as initial evaluation  -EC        Reason for Discharge all goals and outcomes met, no further needs identified  -EC              User Key  (r) = Recorded By, (t) = Taken By, (c) = Cosigned By    Initials Name Effective Dates    EC Niki Street CCC-SLP 06/16/21 -                    EDUCATION  The patient has been educated in the following areas:     Cognitive Impairment.                      Time Calculation:      Time Calculation- SLP     Row Name 04/05/23 1313             Time Calculation- SLP    SLP Start Time 1135  -EC      SLP Stop Time 1205  -EC      SLP Time Calculation (min) 30 min  -EC      Total Timed Code Minutes- SLP 30 minute(s)  -EC      SLP Received On 04/05/23  -EC         Untimed Charges    SLP Eval/Re-eval  ST Eval Speech and Production w/ Language - 80597  -EC      99855-BV Eval Speech and Production w/ Language Minutes 30  -EC         Total Minutes    Untimed Charges Total Minutes 30  -EC       Total Minutes 30  -EC            User Key  (r) = Recorded By, (t) = Taken By, (c) = Cosigned By    Initials Name Provider Type    EC Niki Street CCC-SLP Speech and Language Pathologist                Therapy Charges for Today     Code Description Service Date Service Provider Modifiers Qty    86031854065 HC ST EVAL SPEECH AND PROD W LANG  2 4/5/2023 Niki Street CCC-MARIELLE GN 1                      Niki Street, CCC-SLP  4/5/2023

## 2023-04-06 PROBLEM — R18.8 ABDOMINAL ASCITES: Chronic | Status: ACTIVE | Noted: 2023-04-06

## 2023-04-06 PROBLEM — I50.9 PLEURAL EFFUSION DUE TO CHF (CONGESTIVE HEART FAILURE): Chronic | Status: ACTIVE | Noted: 2023-04-06

## 2023-04-06 PROCEDURE — 97535 SELF CARE MNGMENT TRAINING: CPT

## 2023-04-06 PROCEDURE — 97116 GAIT TRAINING THERAPY: CPT

## 2023-04-06 PROCEDURE — 97530 THERAPEUTIC ACTIVITIES: CPT

## 2023-04-06 PROCEDURE — 97110 THERAPEUTIC EXERCISES: CPT

## 2023-04-06 PROCEDURE — 97112 NEUROMUSCULAR REEDUCATION: CPT

## 2023-04-06 PROCEDURE — 99231 SBSQ HOSP IP/OBS SF/LOW 25: CPT | Performed by: ORTHOPAEDIC SURGERY

## 2023-04-06 RX ADMIN — DOCUSATE SODIUM 100 MG: 100 CAPSULE, LIQUID FILLED ORAL at 08:50

## 2023-04-06 RX ADMIN — FUROSEMIDE 20 MG: 20 TABLET ORAL at 08:50

## 2023-04-06 RX ADMIN — RIVAROXABAN 15 MG: 15 TABLET, FILM COATED ORAL at 18:30

## 2023-04-06 RX ADMIN — FUROSEMIDE 20 MG: 20 TABLET ORAL at 18:31

## 2023-04-06 RX ADMIN — AMIODARONE HYDROCHLORIDE 200 MG: 200 TABLET ORAL at 08:49

## 2023-04-06 RX ADMIN — DIGOXIN 125 MCG: 125 TABLET ORAL at 11:58

## 2023-04-06 RX ADMIN — SPIRONOLACTONE 25 MG: 25 TABLET ORAL at 08:51

## 2023-04-06 RX ADMIN — DOCUSATE SODIUM 100 MG: 100 CAPSULE, LIQUID FILLED ORAL at 20:00

## 2023-04-06 RX ADMIN — RIVAROXABAN 15 MG: 15 TABLET, FILM COATED ORAL at 08:50

## 2023-04-06 RX ADMIN — Medication 12.5 MG: at 08:50

## 2023-04-06 NOTE — PLAN OF CARE
Goal Outcome Evaluation:  Plan of Care Reviewed With: patient        Progress: improving   Participated well w/ OT, toilet transfer w/ cga, shower transfer sba, toileting mod I, shower sba, vc's to sit for bathing and dressing, ub dressing mod I, cont poc

## 2023-04-06 NOTE — PLAN OF CARE
Goal Outcome Evaluation:  Plan of Care Reviewed With: patient         Pt VS WDL,Pt c/o no pain or discomfort this shift. Pt rested well.

## 2023-04-06 NOTE — PROGRESS NOTES
Progress Note      Admit date: 4/4/2023 10:10 AM       Treatment Team     Provider Relationship Specialty Contact    Gilmer Solomon MD Attending Orthopedic Surgery   [phone]      Maik Gentile PTA Physical Therapy Assistant Physical Therapy --    Ibeth Guzmán COTA Occupational Therapy Assistant Occupational Therapy --    Celi Mendez, RASHEED  --   [phone]      Miguel Ángel Motley BSW  -- --    Ignacia Payne RN Registered Nurse -- --    Nannette Walters MD Consulting Physician Cardiology   [phone]               Chief Complaint:  Medically complex patient    HPI: No changes since admission    Vital Signs  Temp:  [97.4 °F (36.3 °C)-97.6 °F (36.4 °C)] 97.6 °F (36.4 °C)  Heart Rate:  [90-97] 97  Resp:  [18] 18  BP: (112-127)/(77-81) 112/81    Physical Exam 1:    Constitutional: She first noted the day     HENT:      Eyes:     Neck:      Cardiovascular:     Pulmonary:      Abdominal:      Genitourinary/Anorectal:       Musculoskeletal:     Skin:     Neurological:      Psychiatric/Behavioral:        Results Review:    I reviewed the patient's new clinical results.    Nursing notes and therapy notes have been reviewed.    I have reviewed medications.    Assessment/Plan  Principal Problem:    Medically complex patient  Active Problems:    Atrial fibrillation with RVR    Non-STEMI (non-ST elevated myocardial infarction)    Biventricular heart failure with reduced left ventricular function    Acute on chronic systolic (congestive) heart failure    Leg DVT (deep venous thromboembolism), acute, bilateral    Abnormal LFTs    Abdominal ascites    Pleural effusion due to CHF (congestive heart failure)           Gilmer Solomon MD  04/06/23  14:31 CDT          This document has been electronically signed by Gilmer Solomon MD on April 6, 2023 14:31 CDT      Part of this note may be an electronic transcription/translation of spoken language to printed text  using the Dragon Dictation System.   Patient Lexie to his room and he would go to therapy this morning because of complaints of abdominal pain around his periumbilical area he thinks he might have a hernia physical exam shows no hernia there is some tenderness around the periumbilicus but no evidence of inflammation or abscess formation patient previously had a umbilical ring in that was removed here we have a CT of it in and by CT has no hernia but he has ascites and a very small liver we assume its alcoholic.  He also complained of left flank pain he had the CT scan did she shows his kidneys to be normal there is no increased heat or warmth he has had a normal bowel movement yesterday but not yet today.  Patient is distended because of ascites there is tenderness generalized but not specific he still has his appendix and but does not well visualized on the CT scan.  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  At this point time we will plan observation.  Plan is observation

## 2023-04-06 NOTE — PLAN OF CARE
Goal Outcome Evaluation:  Plan of Care Reviewed With: patient        Progress: improving  Outcome Evaluation: Pt performed x20 supine therex in bed this a.m. Performed x20 standing therex in // bars this p.m to increase endurance and balance. Pt ambulated 300' RW CGA this a.m and p.m. Pt performed cone weaving x4 this a.m to improve balance. Pt had some LOB during cone weaving activity. Pt continues to require v c'ing to push off from bed and reach back when sitting down. Pt showing improvement with activity tolerance. Pt continues with SOA with mobility with HR slightly elevated but BP stable.

## 2023-04-06 NOTE — THERAPY TREATMENT NOTE
Inpatient Rehabilitation - Physical Therapy Treatment Note       [facility]     Patient Name: Pavan Mccauley  : 1955  MRN: [id number]    Today's Date: 2023                    Admit Date: 2023      Visit Dx:     ICD-10-CM ICD-9-CM   1. Impaired mobility and ADLs  Z74.09 V49.89    Z78.9    2. Impaired functional mobility, balance, gait, and endurance  Z74.09 V49.89   3. Symbolic dysfunction  R48.9 784.60       Patient Active Problem List   Diagnosis   • General medical exam   • Malaise   • Hyperglycemia   • Cough   • Tick bite   • Screening for colon cancer   • Pneumonia due to organism   • Carotid artery aneurysm   • Dizziness   • SVT (supraventricular tachycardia)   • Atrial fibrillation (HCC)   • Dyspnea on exertion   • Atrial fibrillation with RVR   • Non-STEMI (non-ST elevated myocardial infarction)   • Biventricular heart failure with reduced left ventricular function   • Acute on chronic systolic (congestive) heart failure   • Leg DVT (deep venous thromboembolism), acute, bilateral   • Abnormal LFTs   • Medically complex patient   • Abdominal ascites   • Pleural effusion due to CHF (congestive heart failure)       Past Medical History:   Diagnosis Date   • Atrial fibrillation 2022    5 months   • Bronchopneumonia    • Chest pain    • Corneal foreign body     both eyes   • Hypertension    • Neck pain    • Perforation of left tympanic membrane     history of   • Prashant [place] spotted fever    • Wheezing        Past Surgical History:   Procedure Laterality Date   • CARDIAC CATHETERIZATION N/A 3/27/2023    Procedure: Left Heart Cath;  Surgeon: Prince Charles DO;  Location: [facility] INVASIVE LOCATION;  Service: Cardiology;  Laterality: N/A;   • EYE FOREIGN BODY REMOVAL  2013    REMOVE FOREIGN BODY CORNEA W/SLIT LAMP 47088 (1)     • INJECTION OF MEDICATION  2011    Kenalog (1)      • SPINE SURGERY  1991    Exploration of the previous C5-6 fusion with refusion,  exploration of the C5 nerve root, left side   • TYMPANOPLASTY Left 05/16/1972    Perforation of the left tympanic membrane     Mobility  Admission Goal Date Discharge   A.Roll left and right   6    B.Sit to lying   6    C. Lying to sitting on side of bed   6    D.Sit to stand   4    E. Chair/bed-to-chair transfer     4    F.Toilet transfer      4    G. Car Transfer   88    I.Walk 10 feet   4    J.Walk 50 feet with two turns.   4    K.Walk 150 feet:    4    L.Walking 10 feet on uneven surfaces    4    M.1 step (curb)   4    N.4 steps   4    O.12 steps   4    P.Picking up object   4    R. Wheel 50 feet with two turns   6    S.Wheel 150 feet   6        PT ASSESSMENT (last 12 hours)     IRF PT Evaluation and Treatment     Row Name 04/06/23 1422 04/06/23 0935       PT Time and Intention    Document Type daily treatment  - daily treatment  -    Mode of Treatment physical therapy  - physical therapy  -    Row Name 04/06/23 1422 04/06/23 0935       General Information    Patient Profile Reviewed yes  - yes  -    Existing Precautions/Restrictions fall  - fall  -    Row Name 04/06/23 1422 04/06/23 0935       Living Environment    Primary Care Provided by self  - self  -    Row Name 04/06/23 1422 04/06/23 0935       Home Use of Assistive/Adaptive Equipment    Equipment Currently Used at Home none  -JA none  -    Row Name 04/06/23 1422 04/06/23 0935       Pain Assessment    Pretreatment Pain Rating 7/10  - 5/10  -    Posttreatment Pain Rating 7/10  - 0/10 - no pain  -    Pre/Posttreatment Pain Comment Nursing notfied of pt pain, pt denied medication.  - Nursing made aware of pain in abdominal region.  -    Row Name 04/06/23 1422 04/06/23 0935       Cognition/Psychosocial    Affect/Mental Status (Cognition) flat/blunted affect  - flat/blunted affect  -    Orientation Status (Cognition) oriented x 4  -JA oriented x 4  -    Follows Commands (Cognition) delayed response/completion;increased  processing time needed  - delayed response/completion;increased processing time needed  -    Row Name 04/06/23 1422 04/06/23 0935       Range of Motion Comprehensive    General Range of Motion bilateral lower extremity ROM WFL  - bilateral lower extremity ROM WFL  -    Row Name 04/06/23 1422 04/06/23 0935       Bed Mobility    Bed Mobility sit-supine;supine-sit;rolling left  -JA sit-supine;supine-sit;rolling right  -    Rolling Left [place] (Bed Mobility) independent  -JA --    Rolling Right [place] (Bed Mobility) -- independent  -JA    Supine-Sit [place] (Bed Mobility) independent  - independent  -JA    Sit-Supine [place] (Bed Mobility) independent  - independent  -JA    Row Name 04/06/23 1422 04/06/23 0935       Transfer Assessment/Treatment    Transfers sit-stand transfer;stand-sit transfer;toilet transfer  - sit-stand transfer;stand-sit transfer;toilet transfer  -    Row Name 04/06/23 1422 04/06/23 0935       Sit-Stand Transfer    Sit-Stand [place] (Transfers) supervision  - supervision  -    Assistive Device (Sit-Stand Transfers) walker, front-wheeled  - walker, front-wheeled  -    Row Name 04/06/23 1422 04/06/23 0935       Stand-Sit Transfer    Stand-Sit [place] (Transfers) supervision  - supervision  -    Assistive Device (Stand-Sit Transfers) walker, front-wheeled  - walker, front-wheeled  -    Row Name 04/06/23 1422 04/06/23 0935       Gait/Stairs (Locomotion)    [place] Level (Gait) contact guard  - contact guard  -    Assistive Device (Gait) walker, front-wheeled  - walker, front-wheeled  -    Distance in Feet (Gait) 300  - w/ RW, 100 w/o AD  -JA    Pattern (Gait) step-through  -JA step-through  -JA    Deviations/Abnormal Patterns (Gait) misael decreased;gait speed decreased  -FACUNDO misael decreased;gait speed decreased  -    Row Name 04/06/23 1422 04/06/23 0935       Safety Issues, Functional Mobility    Impairments  Affecting Function (Mobility) balance;endurance/activity tolerance;strength;shortness of breath;sensation/sensory awareness  - balance;endurance/activity tolerance;strength;shortness of breath;sensation/sensory awareness  -    Row Name 04/06/23 1422 04/06/23 0935       Balance    Comment, Balance // bar side stepping  - [place] weaving, // bars side stepping  -    Row Name 04/06/23 1422 04/06/23 0935       Motor Skills    Therapeutic Exercise --  standing hip a/a in // bars  - --  supine SLR, hip a/a, AP, heel slides, SAQ  -    Row Name 04/06/23 1422 04/06/23 0935       Positioning and Restraints    Pre-Treatment Position in bed  - in bed  -    Post Treatment Position bed  - bed  -    In Bed supine;call light within reach;exit alarm on  - supine;call light within reach;encouraged to call for assist;exit alarm on  -    Row Name 04/06/23 1422 04/06/23 0935       Vital Signs    Pre Systolic BP Rehab -- 127  -    Pre Treatment Diastolic BP -- 83  -    Post Systolic BP Rehab 114  - --    Post Treatment Diastolic BP 78  -JA --    Pretreatment Heart Rate (beats/min) -- 98  -    Intratreatment Heart Rate (beats/min) -- 113  -JA    Posttreatment Heart Rate (beats/min) 105  - 101  -    Pre SpO2 (%) -- 95  -    O2 Delivery Pre Treatment -- room air  -    Intra SpO2 (%) -- 98  -JA    O2 Delivery Intra Treatment -- room air  -    Post SpO2 (%) 95  -JA 95  -JA    O2 Delivery Post Treatment room air  - room air  -    Pre Patient Position -- Supine  -JA    Intra Patient Position -- Sitting  -    Post Patient Position -- Sitting  -    Row Name 04/06/23 1422 04/06/23 0935       Therapy Assessment/Plan (PT)    Rehab Potential/Prognosis (PT) good, to achieve stated therapy goals  -FACUNDO good, to achieve stated therapy goals  -JA    Frequency of Treatment (PT) other (see comments)  5-6d/week for 2 weeks  -JA other (see comments)  5-6d/week for 2 weeks  -JA    Estimated Duration of Therapy (PT)  2 weeks  -JA 2 weeks  -JA    Problem List (PT) problems related to;balance;coordination;mobility;strength;pain  - problems related to;balance;coordination;mobility;strength;pain  -    Activity Limitations Related to Problem List (PT) unable to transfer safely;unable to ambulate safely  - unable to transfer safely;unable to ambulate safely  -    Row Name 04/06/23 1422 04/06/23 0935       Therapy Plan Review/Discharge Plan (PT)    Anticipated Discharge Disposition (PT) home with assist;home with home health  - home with assist;home with home health  -    Row Name 04/06/23 1422 04/06/23 0935       IRF PT Goals    Transfer Goal Selection (PT-IRF) transfers, PT goal 1  - transfers, PT goal 1  -    Gait (Walking Locomotion) Goal Selection (PT-IRF) gait, PT goal 1;gait, PT goal (free text)  - gait, PT goal 1;gait, PT goal (free text)  -    Balance Goal Selection (PT) balance, PT goal (free text)  - balance, PT goal (free text)  -    Row Name 04/06/23 1422 04/06/23 0935       Transfer Goal 1 (PT-IRF)    Activity/Assistive Device (Transfer Goal 1, PT-IRF) all transfers;bed-to-chair/chair-to-bed;sit-to-stand/stand-to-sit  - all transfers;bed-to-chair/chair-to-bed;sit-to-stand/stand-to-sit  -    [place] Level (Transfer Goal 1, PT-IRF) independent  - independent  -    Time Frame (Transfer Goal 1, PT-IRF) 2 weeks  -JA 2 weeks  -    Progress/Outcomes (Transfer Goal 1, PT-IRF) goal not met  - goal not met  -    Row Name 04/06/23 1422 04/06/23 0935       Gait/Walking Locomotion Goal 1 (PT-IRF)    Activity/Assistive Device (Gait/Walking Locomotion Goal 1, PT-IRF) gait (walking locomotion)  - gait (walking locomotion)  -    Gait/Walking Locomotion Distance Goal 1 (PT-IRF) 300'x1  -'x1  -JA    [place] Level (Gait/Walking Locomotion Goal 1, PT-IRF) modified independence  - modified independence  -    Time Frame (Gait/Walking Locomotion Goal 1, PT-IRF) 2 weeks  - 2 weeks   -JA    Strategies/Barriers (Gait/Walking Locomotion Goal 1, PT-IRF) No AD vs SPC  -JA No AD vs SPC  -JA    Progress/Outcomes (Gait/Walking Locomotion Goal 1, PT-IRF) goal not met  -JA goal not met  -JA    Row Name 04/06/23 1422 04/06/23 0935       Gait/Walking Locomotion Goal (PT-IRF)    Gait/Walking Locomotion Goal (PT-IRF) Patient will improve 10 meter walk test to 0.8 m/s to show decreased fall risk and reduced readmittance risk.  -JA Patient will improve 10 meter walk test to 0.8 m/s to show decreased fall risk and reduced readmittance risk.  -JA    Time Frame (Gait/Walking Locomotion Goal, PT-IRF) 2 weeks  -JA 2 weeks  -JA    Progress/Outcomes (Gait/Walking Locomotion Goal, PT-IRF) goal not met  -JA goal not met  -JA    Row Name 04/06/23 1422 04/06/23 0935       Balance Goal (PT)    Balance Goal (PT) Patient will improve Reis balance scale to >45/56 to show decreased fall risk.  -JA Patient will improve Reis balance scale to >45/56 to show decreased fall risk.  -JA    Time Frame (Balance Goal, PT) 2 weeks  -JA 2 weeks  -JA    Progress/Outcome (Balance Goal, PT) goal not met  -JA goal not met  -JA          User Key  (r) = Recorded By, (t) = Taken By, (c) = Cosigned By    Initials Name Provider Type    Maik Chairez, PTA Physical Therapist Assistant                 Physical Therapy Education     Title: PT OT SLP Therapies (In Progress)     Topic: Physical Therapy (Not Started)     Point: Mobility training (Not Started)     Learner Progress:  Not documented in this visit.          Point: Home exercise program (Not Started)     Learner Progress:  Not documented in this visit.          Point: Body mechanics (Not Started)     Learner Progress:  Not documented in this visit.          Point: Precautions (Not Started)     Learner Progress:  Not documented in this visit.                            PT Recommendation and Plan    Frequency of Treatment (PT): other (see comments) (5-6d/week for 2 weeks)  Plan of  Care Reviewed With: patient  Progress: improving  Outcome Evaluation: Pt performed x20 supine therex in bed this a.m. Performed x20 standing therex in // bars this p.m to increase endurance and balance. Pt ambulated 300' RW CGA this a.m and p.m. Pt performed cone weaving x4 this a.m to improve balance. Pt had some LOB during cone weaving activity. Pt continues to require v c'ing to push off from bed and reach back when sitting down. Pt showing improvement with activity tolerance. Pt continues with SOA with mobility with HR slightly elevated but BP stable.       Outcome Measures     Row Name 04/05/23 1025 04/04/23 1238          10 Meter Walk Test Self-Selected Velocity    Self-Selected Velocity: Trial 1 17.1 sec.  -LR --     Self-Selected Velocity: Trial 2 15.1 sec.  -LR --     Self-Selected Velocity: Trial 3 16.1 sec.  -LR --     Self-Selected Velocity: Average Time 16.1 sec.  -LR --        10 Meter Walk Test Fast Velocity    10 Meter Walk Fast Velocity: Trial 1 12.5 sec.  -LR --     10 Meter Walk Fast Velocity: Trial 2 11.5 sec.  -LR --     10 Meter Walk Fast Velocity: Trial 3 12 sec.  -LR --     10 Meter Walk Fast Velocity: Average time 12 sec.  -LR --        10 Meter Walk Test Actual Velocity    Actual Self-Selected Velocity 0.37 m/s  -LR --     Actual Fast-Velocity 0.5 m/s  -LR --        9 Hole Peg    9-Hole Peg Left -- 33.29  -CM     9-Hole Peg Right -- 41.98  -CM        How much help from another person do you currently need...    Turning from your back to your side while in flat bed without using bedrails? 4  -LR --     Moving from lying on back to sitting on the side of a flat bed without bedrails? 4  -LR --     Moving to and from a bed to a chair (including a wheelchair)? 3  -LR --     Standing up from a chair using your arms (e.g., wheelchair, bedside chair)? 3  -LR --     Climbing 3-5 steps with a railing? 3  -LR --     To walk in hospital room? 3  -LR --     AM-PAC 6 Clicks Score (PT) 20  -LR --         How much help from another is currently needed...    Putting on and taking off regular lower body clothing? -- 3  -CM     Bathing (including washing, rinsing, and drying) -- 3  -CM     Toileting (which includes using toilet bed pan or urinal) -- 4  -CM     Putting on and taking off regular upper body clothing -- 4  -CM     Taking care of personal grooming (such as brushing teeth) -- 4  -CM     Eating meals -- 4  -CM     AM-PAC 6 Clicks Score (OT) -- 22  -CM        Reis Balance Scale    Sitting to Standing 4  -LR --     Standing Unsupported 4  -LR --     Sitting with Back Unsupported but Feet Supported on Floor or on Stool 4  -LR --     Standing to Sitting 3  -LR --     Transfers 3  -LR --     Standing Unsupported with Eyes Closed 4  -LR --     Standing Unsupported with Feet Together 1  -LR --     Reaching Forward with Outstretched Arm While Standing 4  -LR --      Object From the Floor From a Standing Position 4  -LR --     Turning to Look Behind Over Left and Right Shoulders While Standing 4  -LR --     Turn 360 Degrees 1  -LR --     Place Alternate Foot on Step or Stool While Standing Unsupported 0  -LR --     Standing Unsupported with One Foot in Front 0  -LR --     Standing on One Leg 1  -LR --     Reis Total Score 37  -LR --        Functional Assessment    Outcome Measure Options 10 Meter Walk;AM-PAC 6 Clicks Basic Mobility (PT);Reis Balance  -LR 9 Hole Peg;AM-PAC 6 Clicks Daily Activity (OT)  -CM           User Key  (r) = Recorded By, (t) = Taken By, (c) = Cosigned By    Initials Name Provider Type    LR Aj Zimmerman Physical Therapist    CM Jimena Harman OT Occupational Therapist                     Time Calculation:      PT Charges     Row Name 04/06/23 1557 04/06/23 1255          Time Calculation    Start Time 1422  -FACUNDO 0935  -JA     Stop Time 1500  -JA 1043  -JA     Time Calculation (min) 38 min  -FACUNDO 68 min  -FACUNDO        Time Calculation- PT    Total Timed Code Minutes- PT 38 minute(s)  -FACUNDO 68  minute(s)  -JA        Timed Charges    64589 - PT Therapeutic Exercise Minutes -- 20  -     01319 -  PT Neuromuscular Reeducation Minutes -- 23  -     20070 - Gait Training Minutes  15  -JA 15  -FACUNDO     38138 - PT Therapeutic Activity Minutes 23  - 10  -        Total Minutes    Timed Charges Total Minutes 38  - 68  -      Total Minutes 38  - 68  -JA           User Key  (r) = Recorded By, (t) = Taken By, (c) = Cosigned By    Initials Name Provider Type    Maik Chairez PTA Physical Therapist Assistant                Therapy Charges for Today     Code Description Service Date Service Provider Modifiers Qty    70771811413 HC PT THER PROC EA 15 MIN 4/5/2023 Maik Gentile, PTA GP 2    70448855046 HC GAIT TRAINING EA 15 MIN 4/5/2023 Maik Gentile, PTA GP 1    83238301870 HC PT NEUROMUSC RE EDUCATION EA 15 MIN 4/6/2023 Maik Gentile, PTA GP 2    06819118287 HC PT THER PROC EA 15 MIN 4/6/2023 Maik Gentile, PTA GP 1    52825355014 HC GAIT TRAINING EA 15 MIN 4/6/2023 Maik Gentile, PTA GP 1    74044302086 HC PT THERAPEUTIC ACT EA 15 MIN 4/6/2023 Maik Gentile, PTA GP 1    78660604401 HC GAIT TRAINING EA 15 MIN 4/6/2023 Maik Gentile, PTA GP 1    11106432029 HC PT THERAPEUTIC ACT EA 15 MIN 4/6/2023 Maik Gentile, PTA GP 2            PT G-Codes  Outcome Measure Options: 10 Meter Walk, AM-PAC 6 Clicks Basic Mobility (PT), Reis Balance  AM-PAC 6 Clicks Score (PT): 20  AM-PAC 6 Clicks Score (OT): 22  Reis Total Score: 37      Maik Gentile PTA  4/6/2023

## 2023-04-06 NOTE — PLAN OF CARE
Pt compliant with therapy, rests between sessions. Ambulates with walker, gait belt and standby assist. Pt has flat affect and somber mood. Pt will turn off bed alarm. Pt has been reminded that he is to call for assistance if he needs to get up. Pt reluctantly compliant with fluid restrictions.  Vitals remain stable.

## 2023-04-06 NOTE — PROGRESS NOTES
Progress Note      Admit date: 4/4/2023 10:10 AM       Treatment Team     Provider Relationship Specialty Contact    Gilmer Solomon MD Attending Orthopedic Surgery   [phone]      Maik Gentile PTA Physical Therapy Assistant Physical Therapy --    Ibeth Guzmán COTA Occupational Therapy Assistant Occupational Therapy --    Celi Mendez, W  --   [phone]      Stephanie Traore, APRN Nurse Practitioner Cardiology   [phone]      Monica Contreras, PCT Patient Care Technician -- --    Miguel Ángel Motley BSW  -- --    Magali Gorman LPN Licensed Practical Nurse --   [phone]      Ignacia Payne RN Registered Nurse -- --    Nannette Walters MD Consulting Physician Cardiology   [phone]               Chief Complaint:  Medically complex patient    HPI: No changes since admission    Vital Signs  Temp:  [97.4 °F (36.3 °C)-97.6 °F (36.4 °C)] 97.6 °F (36.4 °C)  Heart Rate:  [78-92] 90  Resp:  [18] 18  BP: (115-127)/(77-81) 127/81    Physical Exam 1:    Constitutional: Alert and stable in the shower this morning     HENT:      Eyes:     Neck:      Cardiovascular: No chest pain or evidence of failure    Pulmonary:      Abdominal:      Genitourinary/Anorectal:       Musculoskeletal: Edema decreasing    Skin:     Neurological:      Psychiatric/Behavioral: Operative physical therapy       Results Review:    I reviewed the patient's new clinical results.    Nursing notes and therapy notes have been reviewed.    I have reviewed medications.    Assessment/Plan  Principal Problem:    Medically complex patient stable indication to why he is here because of his severe heart failure and edema continued cardiac rehab if you will with fluid restrictions and stabilize his cardiac status  Active Problems:    Atrial fibrillation with RVR stable    Non-STEMI (non-ST elevated myocardial infarction) stable    Biventricular heart failure with reduced left  ventricular function stable    Acute on chronic systolic (congestive) heart failure stable    Leg DVT (deep venous thromboembolism), acute, bilateral stable    Abnormal LFTs stable           Gilmer Solomon MD  04/06/23  08:29 CDT          This document has been electronically signed by Gilmer Solomon MD on April 6, 2023 08:29 CDT      Part of this note may be an electronic transcription/translation of spoken language to printed text using the Dragon Dictation System.

## 2023-04-06 NOTE — THERAPY TREATMENT NOTE
Inpatient Rehabilitation - Occupational Therapy Treatment Note    [facility]     Patient Name: Pavan Mccauley  : 1955  MRN: [id number]    Today's Date: 2023                 Admit Date: 2023         ICD-10-CM ICD-9-CM   1. Impaired mobility and ADLs  Z74.09 V49.89    Z78.9    2. Impaired functional mobility, balance, gait, and endurance  Z74.09 V49.89   3. Symbolic dysfunction  R48.9 784.60       Patient Active Problem List   Diagnosis   • General medical exam   • Malaise   • Hyperglycemia   • Cough   • Tick bite   • Screening for colon cancer   • Pneumonia due to organism   • Carotid artery aneurysm   • Dizziness   • SVT (supraventricular tachycardia)   • Atrial fibrillation (HCC)   • Dyspnea on exertion   • Atrial fibrillation with RVR   • Non-STEMI (non-ST elevated myocardial infarction)   • Biventricular heart failure with reduced left ventricular function   • Acute on chronic systolic (congestive) heart failure   • Leg DVT (deep venous thromboembolism), acute, bilateral   • Abnormal LFTs   • Medically complex patient       Past Medical History:   Diagnosis Date   • Atrial fibrillation 2022    5 months   • Bronchopneumonia    • Chest pain    • Corneal foreign body     both eyes   • Hypertension    • Neck pain    • Perforation of left tympanic membrane     history of   • Prashant [place] spotted fever    • Wheezing        Past Surgical History:   Procedure Laterality Date   • CARDIAC CATHETERIZATION N/A 3/27/2023    Procedure: Left Heart Cath;  Surgeon: [place] DO;  Location: [facility] INVASIVE LOCATION;  Service: Cardiology;  Laterality: N/A;   • EYE FOREIGN BODY REMOVAL  2013    REMOVE FOREIGN BODY CORNEA W/SLIT LAMP 28591 (1)     • INJECTION OF MEDICATION  2011    Kenalog (1)      • SPINE SURGERY  1991    Exploration of the previous C5-6 fusion with refusion, exploration of the C5 nerve root, left side   • TYMPANOPLASTY Left 1972    Perforation of the  left tympanic membrane             IRF OT ASSESSMENT FLOWSHEET (last 12 hours)     IRF OT Evaluation and Treatment     Row Name 04/06/23 0745          OT Time and Intention    Document Type daily treatment  -     Mode of Treatment occupational therapy;individual therapy  -     Patient Effort good  -     Row Name 04/06/23 0745          General Information    Patient Profile Reviewed yes  -     Existing Precautions/Restrictions fall  -     Row Name 04/06/23 0745          Living Environment    Primary Care Provided by self  -     Row Name 04/06/23 0745          Home Use of Assistive/Adaptive Equipment    Equipment Currently Used at Home none  -     Row Name 04/06/23 0745          Pain Assessment    Pretreatment Pain Rating 5/10  -     Posttreatment Pain Rating 7/10  -     Pain Location - abdomen  -     Row Name 04/06/23 0745          Pain Scale: Word Pre/Post-Treatment    Pain Intervention(s) Repositioned  reported to nursing  -     Row Name 04/06/23 0745          Cognition/Psychosocial    Affect/Mental Status (Cognition) flat/blunted affect  -     Orientation Status (Cognition) oriented x 4  -     Row Name 04/06/23 0745          Range of Motion Comprehensive    General Range of Motion bilateral upper extremity ROM WFL  -     Row Name 04/06/23 0745          Bed Mobility    Sit-Supine [place] (Bed Mobility) modified independence  -     Row Name 04/06/23 0745          Functional Mobility    Functional Mobility- Ind. Level contact guard assist  -     Functional Mobility- Device walker, front-wheeled  -     Row Name 04/06/23 0745          Transfer Assessment/Treatment    Transfers sit-stand transfer;stand-sit transfer;toilet transfer  -     Row Name 04/06/23 0745          Chair-Bed Transfer    Chair-Bed [place] (Transfers) contact guard  -     Assistive Device (Chair-Bed Transfers) walker, front-wheeled  -     Row Name 04/06/23 0745          Sit-Stand Transfer    Sit-Stand  [place] (Transfers) supervision  -     Assistive Device (Sit-Stand Transfers) walker, front-wheeled  -RC     Row Name 04/06/23 0745          Stand-Sit Transfer    Stand-Sit [place] (Transfers) supervision  -     Assistive Device (Stand-Sit Transfers) walker, front-wheeled  -RC     Row Name 04/06/23 0745          Positioning and Restraints    Pre-Treatment Position in bed  -RC     Post Treatment Position bed  -RC     In Bed exit alarm on;patient within staff view;encouraged to call for assist  -     Row Name 04/06/23 0745          Vital Signs    Pre Systolic BP Rehab 127  -RC     Pre Treatment Diastolic BP 81  -RC     Pretreatment Heart Rate (beats/min) 90  -RC     Intratreatment Heart Rate (beats/min) 120  -RC     Posttreatment Heart Rate (beats/min) 103  -RC     Pre SpO2 (%) 92  -RC     O2 Delivery Pre Treatment room air  -RC     Intra SpO2 (%) 92  -RC     O2 Delivery Intra Treatment room air  -RC     Post SpO2 (%) 94  -RC     O2 Delivery Post Treatment room air  -RC     Row Name 04/06/23 0745          Therapy Assessment/Plan (OT)    Rehab Potential/Prognosis (OT) good, to achieve stated therapy goals  -RC     Frequency of Treatment (OT) other (see comments)  5-6 d/wk  -RC     Estimated Duration of Therapy (OT) until facility discharge  -RC     Problem List (OT) balance;mobility;strength  -RC     Activity Limitations Related to Problem List (OT) unable to transfer safely;IADLs not performed adequately or safely;unable to ambulate safely;BADLs not performed adequately or safely  -     Row Name 04/06/23 0926 04/06/23 0745       Daily Progress Summary (OT)    Overall Progress Toward Functional Goals (OT) progressing toward functional goals as expected  -RC progressing toward functional goals as expected  -    Row Name 04/06/23 0745          Therapy Plan Review/Discharge Plan (OT)    Anticipated Equipment Needs At Discharge (OT) other (see comments)  TBD  -RC     Anticipated Discharge Disposition  (OT) home  -     Row Name 04/06/23 0745          IRF OT Goals    Bathing Goal Selection (OT-IRF) bathing, OT goal 1  -RC     Endurance Goal Selection (OT) endurance, OT goal 1  -RC     Activity Tolerance Goal Selection (OT) activity tolerance, OT goal 1  -     Row Name 04/06/23 0745          Bathing Goal 1 (OT-IRF)    Activity/Device (Bathing Goal 1, OT-IRF) bathing skills, all;grab bar, tub/shower;long-handled sponge;shower chair  -RC     [place] Level (Bathing Goal 1, OT-IRF) modified independence  -RC     Time Frame (Bathing Goal 1, OT-IRF) long-term goal (LTG);by discharge  -RC     Progress/Outcomes (Bathing Goal 1, OT-IRF) goal not met  -     Row Name 04/06/23 0745          Strength Goal 1 (OT-IRF)    Strength Goal 1 (OT-IRF) Pt will tolerate at least 20 minutes BUE strengthening activities OOB/EOB with VSS and with only 4 rest breaks needed for increased strenth and endurance required for ADLs and mobility.  -RC     Time Frame (Strength Goal 1, OT-IRF) long-term goal (LTG);by discharge  -RC     Progress/Outcomes (Strength Goal 1, OT-IRF) goal not met  -     Row Name 04/06/23 0745           Endurance Goal 1 (OT)    Activity Level (Endurance Goal 1, OT) endurance 2 good  -RC     Progress/Outcome (Endurance Goal 1, OT) goal not met  -     Row Name 04/06/23 0745           Activity Tolerance Goal 1 (OT)    Activity Level (Endurance Goal 1, OT) 20 min activity  -RC     Progress/Outcome (Activity Tolerance Goal 1, OT) goal not met  -           User Key  (r) = Recorded By, (t) = Taken By, (c) = Cosigned By    Initials Name Effective Dates     Ibeth Guzmán COTA 06/16/21 -                  Occupational Therapy Education     Title: PT OT SLP Therapies (In Progress)     Topic: Occupational Therapy (In Progress)     Point: ADL training (Done)     Description:   Instruct learner(s) on proper safety adaptation and remediation techniques during self care or transfers.   Instruct in proper use of  assistive devices.              Learning Progress Summary           Patient Acceptance, E, VU,NR by  at 4/6/2023 1027                   Point: Home exercise program (Not Started)     Description:   Instruct learner(s) on appropriate technique for monitoring, assisting and/or progressing therapeutic exercises/activities.              Learner Progress:  Not documented in this visit.          Point: Precautions (Done)     Description:   Instruct learner(s) on prescribed precautions during self-care and functional transfers.              Learning Progress Summary           Patient Acceptance, E, VU,NR by  at 4/6/2023 1027                   Point: Body mechanics (Done)     Description:   Instruct learner(s) on proper positioning and spine alignment during self-care, functional mobility activities and/or exercises.              Learning Progress Summary           Patient Acceptance, E, VU,NR by  at 4/6/2023 1027                               User Key     Initials Effective Dates Name Provider Type Discipline     06/16/21 -  Ibeth Guzmán COTA Occupational Therapist Assistant OT                    OT Recommendation and Plan         Plan of Care Review  Plan of Care Reviewed With: patient  Progress: improving  Daily Progress Summary (OT)  Overall Progress Toward Functional Goals (OT): progressing toward functional goals as expected  Plan of Care Reviewed With: patient  Progress: improving       Outcome Measures     Row Name 04/05/23 1025 04/04/23 1238          10 Meter Walk Test Self-Selected Velocity    Self-Selected Velocity: Trial 1 17.1 sec.  -LR --     Self-Selected Velocity: Trial 2 15.1 sec.  -LR --     Self-Selected Velocity: Trial 3 16.1 sec.  -LR --     Self-Selected Velocity: Average Time 16.1 sec.  -LR --        10 Meter Walk Test Fast Velocity    10 Meter Walk Fast Velocity: Trial 1 12.5 sec.  -LR --     10 Meter Walk Fast Velocity: Trial 2 11.5 sec.  -LR --     10 Meter Walk Fast Velocity: Trial 3  12 sec.  -LR --     10 Meter Walk Fast Velocity: Average time 12 sec.  -LR --        10 Meter Walk Test Actual Velocity    Actual Self-Selected Velocity 0.37 m/s  -LR --     Actual Fast-Velocity 0.5 m/s  -LR --        9 Hole Peg    9-Hole Peg Left -- 33.29  -CM     9-Hole Peg Right -- 41.98  -CM        How much help from another person do you currently need...    Turning from your back to your side while in flat bed without using bedrails? 4  -LR --     Moving from lying on back to sitting on the side of a flat bed without bedrails? 4  -LR --     Moving to and from a bed to a chair (including a wheelchair)? 3  -LR --     Standing up from a chair using your arms (e.g., wheelchair, bedside chair)? 3  -LR --     Climbing 3-5 steps with a railing? 3  -LR --     To walk in hospital room? 3  -LR --     AM-PAC 6 Clicks Score (PT) 20  -LR --        How much help from another is currently needed...    Putting on and taking off regular lower body clothing? -- 3  -CM     Bathing (including washing, rinsing, and drying) -- 3  -CM     Toileting (which includes using toilet bed pan or urinal) -- 4  -CM     Putting on and taking off regular upper body clothing -- 4  -CM     Taking care of personal grooming (such as brushing teeth) -- 4  -CM     Eating meals -- 4  -CM     AM-PAC 6 Clicks Score (OT) -- 22  -CM        Reis Balance Scale    Sitting to Standing 4  -LR --     Standing Unsupported 4  -LR --     Sitting with Back Unsupported but Feet Supported on Floor or on Stool 4  -LR --     Standing to Sitting 3  -LR --     Transfers 3  -LR --     Standing Unsupported with Eyes Closed 4  -LR --     Standing Unsupported with Feet Together 1  -LR --     Reaching Forward with Outstretched Arm While Standing 4  -LR --      Object From the Floor From a Standing Position 4  -LR --     Turning to Look Behind Over Left and Right Shoulders While Standing 4  -LR --     Turn 360 Degrees 1  -LR --     Place Alternate Foot on Step or Stool  While Standing Unsupported 0  -LR --     Standing Unsupported with One Foot in Front 0  -LR --     Standing on One Leg 1  -LR --     Reis Total Score 37  -LR --        Functional Assessment    Outcome Measure Options 10 Meter Walk;AM-PAC 6 Clicks Basic Mobility (PT);Reis Balance  -LR 9 Hole Peg;AM-PAC 6 Clicks Daily Activity (OT)  -CM           User Key  (r) = Recorded By, (t) = Taken By, (c) = Cosigned By    Initials Name Provider Type    LR Aj Zimmerman Physical Therapist    CM Jimena Harman OT Occupational Therapist                  Time Calculation:      Time Calculation- OT     Row Name 04/06/23 1423 04/06/23 1255          Time Calculation- OT    OT Start Time 0745  -RC --     OT Stop Time 0915  -RC --     OT Time Calculation (min) 90 min  -RC --     Total Timed Code Minutes- OT 90 minute(s)  -RC --        Timed Charges    95210 - OT Therapeutic Exercise Minutes 15  -RC --     59840 - Gait Training Minutes  -- 15  -JA     40161 - OT Self Care/Mgmt Minutes 75  -RC --        Total Minutes    Timed Charges Total Minutes 90  -RC 15  -JA      Total Minutes 90  -RC 15  -JA           User Key  (r) = Recorded By, (t) = Taken By, (c) = Cosigned By    Initials Name Provider Type    Maik Chairez, SHIRAZ Physical Therapist Assistant    RC Ibeth Guzmán COTA Occupational Therapist Assistant              Therapy Charges for Today     Code Description Service Date Service Provider Modifiers Qty    53059827202 HC OT THERAPEUTIC ACT EA 15 MIN 4/5/2023 RamirezSelwyna G, DIAZ GO 4    89860666498 HC OT THER PROC EA 15 MIN 4/5/2023 Ramirez, Iebth G, DIAZ GO 2    51066816082 HC OT THER PROC EA 15 MIN 4/6/2023 Ramirez Ibeth G, DIAZ GO 1    37119384104 HC OT SELF CARE/MGMT/TRAIN EA 15 MIN 4/6/2023 Ramirez, Ibeth G, DIAZ GO 5                 Self-Care Admission Goal Date Discharge   Eating   6    Oral hygiene   6    Toileting hygiene   5    Shower/bathe self   4    Upper body dressing   6    Lower body dressing   4     Putting on/taking off footwear   4                 EMILY Renteria  4/6/2023

## 2023-04-06 NOTE — PATIENT CARE CONFERENCE
Attendance of weekly team conference:   Maik Gentile, PTA, Ibeth Guzmán, DIAZ/L, Dr. Gilmer Solomon, Anisha Street, SLP, Antonia Raymond, NEMO Coordinator and Aj Zimmerman, PT, Hallie Caraballo, OT    Dr. Solomon initiated and led today's multidisciplinary team meeting. Patient's progress reviewed, GG codes reviewed, discharge date discussed and equipment needs addressed.       Discharge Disposition: Home with outpatient PT and OT    Expected Discharge Date: to be determined    Anticipated discharge orders/equipment: 5 inch fixed wheel rolling walker    Barriers: no home support, low endurance                  Self-Care Admission Goal Date  4/6/23 Discharge   Eating 6 6  6     Oral hygiene 6 6  6     Toileting hygiene 6 6  5     Shower/bathe self 4 6  4     Upper body dressing 6 6  6     Lower body dressing 4 6  4     Putting on/taking off footwear 4 6  4          Mobility  Admission Goal Date  4/6/23 Discharge   A.Roll left and right 6 6  6     B.Sit to lying 6 6  6     C. Lying to sitting on side of bed 6 6  6     D.Sit to stand 4 6  4     E. Chair/bed-to-chair transfer    4 6  4     F.Toilet transfer    4 6  4     G. Car Transfer 88 88  88     I.Walk 10 feet 4 6  4     J.Walk 50 feet with two turns. 4 6  4     K.Walk 150 feet:  4 6  4     L.Walking 10 feet on uneven surfaces  4 6  4     M.1 step (curb) 4 6  4     N.4 steps 4 6  4     O.12 steps 4 6  4     P.Picking up object 4 6  4     R. Wheel 50 feet with two turns 6 6  6     S.Wheel 150 feet 6 6  6         Hearing, Speech and Vision Admission Goal  Date  4/6/23 Discharge   Expression of Ideas and Wants (consider both verbal and non-verbal expression and excluding language barriers)  4- Without Difficulty  3- Some difficulty  2- Frequently exhibits difficulty  1- Rarely/never exhibits clear speech  4 4 4    Understanding Verbal and Non-Verbal Content (with hearing aid or device, if used, and excluding language barriers)  4- Comprehends without cues  3- Usually  understands  2- Some Understands  1- Rarely/Never Understands 4 4 4        Patient to benefit from skilled PT to address exercise endurance, safety in self-care, and functional mobility.      Patient to benefit from skilled Occupational Therapy to address activities of daily living, functional mobility, fine motor, gross motor, endurance, safety in self-care, and activity tolerance.        Patient to benefit from skilled Speech Therapy to address dysphagia, communication deficits and cognitive-linguistic communication disorder.   patient

## 2023-04-07 ENCOUNTER — APPOINTMENT (OUTPATIENT)
Dept: GENERAL RADIOLOGY | Facility: HOSPITAL | Age: 68
DRG: 280 | End: 2023-04-07
Payer: MEDICARE

## 2023-04-07 ENCOUNTER — APPOINTMENT (OUTPATIENT)
Dept: ULTRASOUND IMAGING | Facility: HOSPITAL | Age: 68
DRG: 280 | End: 2023-04-07
Payer: MEDICARE

## 2023-04-07 PROCEDURE — 97110 THERAPEUTIC EXERCISES: CPT

## 2023-04-07 PROCEDURE — 97116 GAIT TRAINING THERAPY: CPT

## 2023-04-07 PROCEDURE — 99231 SBSQ HOSP IP/OBS SF/LOW 25: CPT | Performed by: ORTHOPAEDIC SURGERY

## 2023-04-07 PROCEDURE — 97535 SELF CARE MNGMENT TRAINING: CPT

## 2023-04-07 PROCEDURE — 71046 X-RAY EXAM CHEST 2 VIEWS: CPT

## 2023-04-07 PROCEDURE — 76604 US EXAM CHEST: CPT

## 2023-04-07 RX ADMIN — FUROSEMIDE 20 MG: 20 TABLET ORAL at 08:02

## 2023-04-07 RX ADMIN — FUROSEMIDE 20 MG: 20 TABLET ORAL at 18:09

## 2023-04-07 RX ADMIN — DOCUSATE SODIUM 100 MG: 100 CAPSULE, LIQUID FILLED ORAL at 20:16

## 2023-04-07 RX ADMIN — AMIODARONE HYDROCHLORIDE 200 MG: 200 TABLET ORAL at 08:03

## 2023-04-07 RX ADMIN — RIVAROXABAN 15 MG: 15 TABLET, FILM COATED ORAL at 08:02

## 2023-04-07 RX ADMIN — DOCUSATE SODIUM 100 MG: 100 CAPSULE, LIQUID FILLED ORAL at 08:02

## 2023-04-07 RX ADMIN — SPIRONOLACTONE 25 MG: 25 TABLET ORAL at 08:03

## 2023-04-07 RX ADMIN — Medication 12.5 MG: at 08:02

## 2023-04-07 RX ADMIN — DIGOXIN 125 MCG: 125 TABLET ORAL at 12:22

## 2023-04-07 RX ADMIN — RIVAROXABAN 15 MG: 15 TABLET, FILM COATED ORAL at 18:09

## 2023-04-07 NOTE — THERAPY TREATMENT NOTE
Inpatient Rehabilitation - Physical Therapy Treatment Note       [facility]     Patient Name: Pavan Mccauley  : 1955  MRN: [id number]    Today's Date: 2023                    Admit Date: 2023      Visit Dx:     ICD-10-CM ICD-9-CM   1. Impaired mobility and ADLs  Z74.09 V49.89    Z78.9    2. Impaired functional mobility, balance, gait, and endurance  Z74.09 V49.89   3. Symbolic dysfunction  R48.9 784.60       Patient Active Problem List   Diagnosis   • General medical exam   • Malaise   • Hyperglycemia   • Cough   • Tick bite   • Screening for colon cancer   • Pneumonia due to organism   • Carotid artery aneurysm   • Dizziness   • SVT (supraventricular tachycardia)   • Atrial fibrillation (HCC)   • Dyspnea on exertion   • Atrial fibrillation with RVR   • Non-STEMI (non-ST elevated myocardial infarction)   • Biventricular heart failure with reduced left ventricular function   • Acute on chronic systolic (congestive) heart failure   • Leg DVT (deep venous thromboembolism), acute, bilateral   • Abnormal LFTs   • Medically complex patient   • Abdominal ascites   • Pleural effusion due to CHF (congestive heart failure)       Past Medical History:   Diagnosis Date   • Atrial fibrillation 2022    5 months   • Bronchopneumonia    • Chest pain    • Corneal foreign body     both eyes   • Hypertension    • Neck pain    • Perforation of left tympanic membrane     history of   • Prashant [place] spotted fever    • Wheezing        Past Surgical History:   Procedure Laterality Date   • CARDIAC CATHETERIZATION N/A 3/27/2023    Procedure: Left Heart Cath;  Surgeon: [place] DO;  Location: [facility] INVASIVE LOCATION;  Service: Cardiology;  Laterality: N/A;   • EYE FOREIGN BODY REMOVAL  2013    REMOVE FOREIGN BODY CORNEA W/SLIT LAMP 02731 (1)     • INJECTION OF MEDICATION  2011    Kenalog (1)      • SPINE SURGERY  1991    Exploration of the previous C5-6 fusion with refusion,  exploration of the C5 nerve root, left side   • TYMPANOPLASTY Left 05/16/1972    Perforation of the left tympanic membrane       PT ASSESSMENT (last 12 hours)     IRF PT Evaluation and Treatment     Row Name 04/07/23 1419 04/07/23 1109       PT Time and Intention    Document Type daily treatment  -JA daily treatment  -JA    Mode of Treatment physical therapy  - physical therapy  -    Row Name 04/07/23 1419 04/07/23 1109       General Information    Patient Profile Reviewed yes  -JA yes  -JA    Existing Precautions/Restrictions fall  -JA fall  -    Row Name 04/07/23 1419 04/07/23 1109       Living Environment    Primary Care Provided by self  -JA self  -JA    Row Name 04/07/23 1419 04/07/23 1109       Home Use of Assistive/Adaptive Equipment    Equipment Currently Used at Home none  -JA none  -    Row Name 04/07/23 1419 04/07/23 1109       Pain Assessment    Pretreatment Pain Rating 0/10 - no pain  -JA 4/10  -JA    Posttreatment Pain Rating 3/10  -JA 5/10  -JA    Pain Location - Side/Orientation Left  -JA Left  -JA    Pain Location lower  -JA lower  -JA    Pain Location - abdomen  -JA abdomen  -    Row Name 04/07/23 1419 04/07/23 1109       Cognition/Psychosocial    Affect/Mental Status (Cognition) flat/blunted affect  -JA flat/blunted affect  -JA    Orientation Status (Cognition) oriented x 4  -JA oriented x 4  -JA    Follows Commands (Cognition) -- delayed response/completion  -    Row Name 04/07/23 1419 04/07/23 1109       Range of Motion Comprehensive    General Range of Motion bilateral lower extremity ROM WFL  - bilateral lower extremity ROM WFL  -JA    Row Name 04/07/23 1419 04/07/23 1109       Bed Mobility    Bed Mobility sit-supine;supine-sit;rolling left  -JA sit-supine;supine-sit;rolling left  -JA    Rolling Left [place] (Bed Mobility) independent  -JA independent  -JA    Supine-Sit [place] (Bed Mobility) independent  -JA independent  -JA    Sit-Supine [place] (Bed Mobility)  independent  -JA independent  -JA    Row Name 04/07/23 1419 04/07/23 1109       Transfer Assessment/Treatment    Transfers sit-stand transfer;stand-sit transfer;toilet transfer;chair-bed transfer  - sit-stand transfer;stand-sit transfer;toilet transfer;chair-bed transfer  -    Row Name 04/07/23 1419 04/07/23 1109       Chair-Bed Transfer    Chair-Bed [place] (Transfers) supervision  - supervision  -    Assistive Device (Chair-Bed Transfers) cane, straight  -JA cane, straight  -JA    Row Name 04/07/23 1419 04/07/23 1109       Sit-Stand Transfer    Sit-Stand [place] (Transfers) supervision  - supervision  -    Assistive Device (Sit-Stand Transfers) cane, straight  -JA cane, straight  -JA    Row Name 04/07/23 1419 04/07/23 1109       Stand-Sit Transfer    Stand-Sit [place] (Transfers) supervision  - supervision  -    Assistive Device (Stand-Sit Transfers) cane, straight  -FACUNDO cane, straight  -JA    Row Name 04/07/23 1419 04/07/23 1109       Toilet Transfer    Type (Toilet Transfer) sit-stand  -JA sit-stand;stand-sit  -    [place] Level (Toilet Transfer) supervision  - supervision  -    Assistive Device (Toilet Transfer) cane, straight  -JA cane, straight  -JA    Row Name 04/07/23 1419 04/07/23 1109       Gait/Stairs (Locomotion)    [place] Level (Gait) contact guard  - contact guard  -    Assistive Device (Gait) cane, straight  -JA cane, straight  -JA    Distance in Feet (Gait) 300  -JA --    Pattern (Gait) step-through  -JA step-through  -JA    Deviations/Abnormal Patterns (Gait) gait speed decreased  - gait speed decreased  -    Row Name 04/07/23 1419 04/07/23 1109       Safety Issues, Functional Mobility    Impairments Affecting Function (Mobility) balance;endurance/activity tolerance;strength;shortness of breath;sensation/sensory awareness  - balance;endurance/activity tolerance;strength;shortness of breath;sensation/sensory awareness  -    Row Name 04/07/23  1109          Balance    Comment, Balance // bar side stepping  -     Row Name 04/07/23 1419 04/07/23 1109       Motor Skills    Therapeutic Exercise --  supine AP, SLR, hip a/a, heel slides, SAQ, quad sets  - --  standing hip a/a, seated LAQ, marches, AP, bolster knee squeezes  -    Row Name 04/07/23 1109          Positioning and Restraints    Pre-Treatment Position in bed  -     Post Treatment Position bed  -     In Bed supine;call light within reach;exit alarm on  -     Row Name 04/07/23 1419 04/07/23 1109       Vital Signs    Pre Systolic BP Rehab 104  - 133  -    Pre Treatment Diastolic BP 75  -JA 82  -    Pretreatment Heart Rate (beats/min) 89  -JA 89  -    Intratreatment Heart Rate (beats/min) -- 36  HR irregular during standing therex, sat down and HR increased to 50  -    Pre SpO2 (%) 94  - 93  -    O2 Delivery Pre Treatment room air  - room air  -    Row Name 04/07/23 1419 04/07/23 1109       Therapy Assessment/Plan (PT)    Rehab Potential/Prognosis (PT) good, to achieve stated therapy goals  - good, to achieve stated therapy goals  -    Frequency of Treatment (PT) other (see comments)  5-6d/week for 2 weeks  - other (see comments)  5-6d/week for 2 weeks  -    Estimated Duration of Therapy (PT) 2 weeks  - 2 weeks  -    Problem List (PT) problems related to;balance;coordination;mobility;strength;pain  - problems related to;balance;coordination;mobility;strength;pain  -    Activity Limitations Related to Problem List (PT) unable to transfer safely;unable to ambulate safely  - unable to transfer safely;unable to ambulate safely  -    Row Name 04/07/23 1419 04/07/23 1109       Therapy Plan Review/Discharge Plan (PT)    Anticipated Discharge Disposition (PT) home with assist;home with home health  - home with assist;home with home health  -    Row Name 04/07/23 1419 04/07/23 1109       IRF PT Goals    Transfer Goal Selection (PT-IRF) transfers, PT goal 1   -JA transfers, PT goal 1  -JA    Gait (Walking Locomotion) Goal Selection (PT-IRF) gait, PT goal 1;gait, PT goal (free text)  -JA gait, PT goal 1;gait, PT goal (free text)  -JA    Balance Goal Selection (PT) balance, PT goal (free text)  -JA balance, PT goal (free text)  -JA    Row Name 04/07/23 1419 04/07/23 1109       Transfer Goal 1 (PT-IRF)    Activity/Assistive Device (Transfer Goal 1, PT-IRF) all transfers;bed-to-chair/chair-to-bed;sit-to-stand/stand-to-sit  -JA all transfers;bed-to-chair/chair-to-bed;sit-to-stand/stand-to-sit  -JA    [place] Level (Transfer Goal 1, PT-IRF) independent  -JA independent  -JA    Time Frame (Transfer Goal 1, PT-IRF) 2 weeks  -JA 2 weeks  -JA    Progress/Outcomes (Transfer Goal 1, PT-IRF) goal not met  -JA goal not met  -    Row Name 04/07/23 1419 04/07/23 1109       Gait/Walking Locomotion Goal 1 (PT-IRF)    Activity/Assistive Device (Gait/Walking Locomotion Goal 1, PT-IRF) gait (walking locomotion)  -JA gait (walking locomotion)  -JA    Gait/Walking Locomotion Distance Goal 1 (PT-IRF) 300'x1  -'x1  -JA    [place] Level (Gait/Walking Locomotion Goal 1, PT-IRF) modified independence  -JA modified independence  -JA    Time Frame (Gait/Walking Locomotion Goal 1, PT-IRF) 2 weeks  -JA 2 weeks  -JA    Strategies/Barriers (Gait/Walking Locomotion Goal 1, PT-IRF) No AD vs SPC  -JA No AD vs SPC  -JA    Progress/Outcomes (Gait/Walking Locomotion Goal 1, PT-IRF) goal not met  -JA goal not met  -    Row Name 04/07/23 1419 04/07/23 1109       Gait/Walking Locomotion Goal (PT-IRF)    Gait/Walking Locomotion Goal (PT-IRF) Patient will improve 10 meter walk test to 0.8 m/s to show decreased fall risk and reduced readmittance risk.  -JA Patient will improve 10 meter walk test to 0.8 m/s to show decreased fall risk and reduced readmittance risk.  -JA    Time Frame (Gait/Walking Locomotion Goal, PT-IRF) 2 weeks  -JA 2 weeks  -JA    Progress/Outcomes (Gait/Walking Locomotion  Goal, PT-IRF) goal not met  -JA goal not met  -JA    Row Name 04/07/23 1419 04/07/23 1109       Balance Goal (PT)    Balance Goal (PT) Patient will improve Reis balance scale to >45/56 to show decreased fall risk.  -JA Patient will improve Reis balance scale to >45/56 to show decreased fall risk.  -JA    Time Frame (Balance Goal, PT) 2 weeks  -JA 2 weeks  -JA    Progress/Outcome (Balance Goal, PT) goal not met  -JA goal not met  -JA          User Key  (r) = Recorded By, (t) = Taken By, (c) = Cosigned By    Initials Name Provider Type    Maik Chairez, PTA Physical Therapist Assistant                 Physical Therapy Education     Title: PT OT SLP Therapies (In Progress)     Topic: Physical Therapy (Not Started)     Point: Mobility training (Not Started)     Learner Progress:  Not documented in this visit.          Point: Home exercise program (Not Started)     Learner Progress:  Not documented in this visit.          Point: Body mechanics (Not Started)     Learner Progress:  Not documented in this visit.          Point: Precautions (Not Started)     Learner Progress:  Not documented in this visit.                            PT Recommendation and Plan    Frequency of Treatment (PT): other (see comments) (5-6d/week for 2 weeks)  Plan of Care Reviewed With: patient  Progress: improving  Outcome Evaluation: Pt performed standing therex this a.m in // bars for balance and seated therex in w/c. Pt performed supine therex this p.m. Pt ambulated 150' straight cane CGA this a.m and 300' straight cane CGA this p.m. Pt defers walker for d/c home, agreeable to straight cane at home for mobility and balance.       Outcome Measures     Row Name 04/05/23 1025             10 Meter Walk Test Self-Selected Velocity    Self-Selected Velocity: Trial 1 17.1 sec.  -LR      Self-Selected Velocity: Trial 2 15.1 sec.  -LR      Self-Selected Velocity: Trial 3 16.1 sec.  -LR      Self-Selected Velocity: Average Time 16.1 sec.  -LR          10 Meter Walk Test Fast Velocity    10 Meter Walk Fast Velocity: Trial 1 12.5 sec.  -LR      10 Meter Walk Fast Velocity: Trial 2 11.5 sec.  -LR      10 Meter Walk Fast Velocity: Trial 3 12 sec.  -LR      10 Meter Walk Fast Velocity: Average time 12 sec.  -LR         10 Meter Walk Test Actual Velocity    Actual Self-Selected Velocity 0.37 m/s  -LR      Actual Fast-Velocity 0.5 m/s  -LR         How much help from another person do you currently need...    Turning from your back to your side while in flat bed without using bedrails? 4  -LR      Moving from lying on back to sitting on the side of a flat bed without bedrails? 4  -LR      Moving to and from a bed to a chair (including a wheelchair)? 3  -LR      Standing up from a chair using your arms (e.g., wheelchair, bedside chair)? 3  -LR      Climbing 3-5 steps with a railing? 3  -LR      To walk in hospital room? 3  -LR      AM-PAC 6 Clicks Score (PT) 20  -LR         Reis Balance Scale    Sitting to Standing 4  -LR      Standing Unsupported 4  -LR      Sitting with Back Unsupported but Feet Supported on Floor or on Stool 4  -LR      Standing to Sitting 3  -LR      Transfers 3  -LR      Standing Unsupported with Eyes Closed 4  -LR      Standing Unsupported with Feet Together 1  -LR      Reaching Forward with Outstretched Arm While Standing 4  -LR       Object From the Floor From a Standing Position 4  -LR      Turning to Look Behind Over Left and Right Shoulders While Standing 4  -LR      Turn 360 Degrees 1  -LR      Place Alternate Foot on Step or Stool While Standing Unsupported 0  -LR      Standing Unsupported with One Foot in Front 0  -LR      Standing on One Leg 1  -LR      Reis Total Score 37  -LR         Functional Assessment    Outcome Measure Options 10 Meter Walk;AM-PAC 6 Clicks Basic Mobility (PT);Reis Balance  -LR            User Key  (r) = Recorded By, (t) = Taken By, (c) = Cosigned By    Initials Name Provider Type    Aj Mahmood  Physical Therapist                     Time Calculation:      PT Charges     Row Name 04/07/23 1604 04/07/23 1257          Time Calculation    Start Time 1419  - 1109  -     Stop Time 1506  - 1159  -     Time Calculation (min) 47 min  -JA 50 min  -JA        Time Calculation- PT    Total Timed Code Minutes- PT 47 minute(s)  -JA 50 minute(s)  -JA        Timed Charges    34361 - PT Therapeutic Exercise Minutes 37  -JA 30  -FACUNDO     47386 - Gait Training Minutes  10  -JA 20  -FACUNDO     49008 - PT Therapeutic Activity Minutes -- --  -JA        Total Minutes    Timed Charges Total Minutes 47  -JA 50  -JA      Total Minutes 47  -JA 50  -JA           User Key  (r) = Recorded By, (t) = Taken By, (c) = Cosigned By    Initials Name Provider Type    Maik Chairez PTA Physical Therapist Assistant                Therapy Charges for Today     Code Description Service Date Service Provider Modifiers Qty    89635412360 HC PT NEUROMUSC RE EDUCATION EA 15 MIN 4/6/2023 Maik Gentile, PTA GP 2    72162370065 HC PT THER PROC EA 15 MIN 4/6/2023 Maik Gentile W, PTA GP 1    94429276839 HC GAIT TRAINING EA 15 MIN 4/6/2023 Maik Gentile W, PTA GP 1    60785723327 HC PT THERAPEUTIC ACT EA 15 MIN 4/6/2023 Maik Gentile, PTA GP 1    61262199922 HC GAIT TRAINING EA 15 MIN 4/6/2023 Maik Gentile, PTA GP 1    62922558987 HC PT THERAPEUTIC ACT EA 15 MIN 4/6/2023 Maik Gentile, PTA GP 2    42487034769 HC PT THER PROC EA 15 MIN 4/7/2023 Maik Gentile, PTA GP 2    02484641131 HC GAIT TRAINING EA 15 MIN 4/7/2023 Maik Gentile, PTA GP 1    11018094593 HC PT THER PROC EA 15 MIN 4/7/2023 Maik Gentile, PTA GP 2    44521549997 HC GAIT TRAINING EA 15 MIN 4/7/2023 Maik Gentile, PTA GP 1            PT G-Codes  Outcome Measure Options: 10 Meter Walk, AM-PAC 6 Clicks Basic Mobility (PT), Reis Balance  AM-PAC 6 Clicks Score (PT): 20  AM-PAC 6 Clicks Score (OT): 22  Reis Total Score:  37      Maik Gentile, PTA  4/7/2023

## 2023-04-07 NOTE — PLAN OF CARE
Problem: Rehabilitation (IRF) Plan of Care  Goal: Plan of Care Review  Flowsheets (Taken 4/7/2023 2398)  Plan of Care Reviewed With: patient  Outcome Evaluation: Ot tx complete. Pt perf sup<>sit with supervision. Pt perf sit<>stand with SBA and SPC. Pt perf functional reaching ax around room with SBA and SPC. Pt required prn rest breaks throughout session for shortness of breath. Pt perf toileting with SBA and SPC. Pt perf weight check with nsg with SBA and SPC. Pt stated he was tired and sleepy today. Cont OT POC.

## 2023-04-07 NOTE — PLAN OF CARE
Goal Outcome Evaluation:  Plan of Care Reviewed With: patient           Outcome Evaluation: OT tx complete. Pt sitting EOB upon arrival. Provided education for safety/AD for discharge for return home. Pt doff/don socks independently while sitting up in bed. Pt able to perform toileting with urinal independently. Pt perf sup<>sit independently. Cont OT POC.

## 2023-04-07 NOTE — PLAN OF CARE
Goal Outcome Evaluation:  Plan of Care Reviewed With: patient        Progress: improving  Outcome Evaluation: Pt has slept approximately 6 hours so far this shift.  He is A&O and has been pleasant and cooperative.  Pt uses urinal during night-emptied by staff.  He was able to sit on side of bed and go from sitting edge of bed to standing and ambulated with standby assist, gaitbelt and walker to bathroom.  Pt has been cooperative regarding using call light when needing assistance.  Pt is medication compliant.  Pt has been compliant with fluid restriction this shift.  VSS.  He has mild edema to left foot/ankle.  He does have slight ascites in abdomen.

## 2023-04-07 NOTE — PLAN OF CARE
Goal Outcome Evaluation:  Plan of Care Reviewed With: patient        Progress: improving  Outcome Evaluation: Pt performed standing therex this a.m in // bars for balance and seated therex in w/c. Pt performed supine therex this p.m. Pt ambulated 150' straight cane CGA this a.m and 300' straight cane CGA this p.m. Pt defers walker for d/c home, agreeable to straight cane at home for mobility and balance.

## 2023-04-07 NOTE — PROGRESS NOTES
Progress Note      Admit date: 4/4/2023 10:10 AM       Treatment Team     Provider Relationship Specialty Contact    Gilmer Solomon MD Attending Orthopedic Surgery   [phone]      Maik Gentile PTA Physical Therapy Assistant Physical Therapy --    Celi Mendez, [facility]  --   [phone]      Poppy Suarez RN Registered Nurse -- --    Miguel Ángel Motley BSW  -- --    Ignacia Payne RN Registered Nurse -- --    Bob Caicedo MD Consulting Physician Cardiothoracic Surgery   [phone]      [place], Merry Gonzalez, [facility]  --   [phone]      Mary Kay Yañez OT Occupational Therapist Occupational Therapy --             Chief Complaint:  Medically complex patient    HPI: No changes since admission    Vital Signs  Temp:  [99.3 °F (37.4 °C)] 99.3 °F (37.4 °C)  Heart Rate:  [] 95  Resp:  [18] 18  BP: (103-119)/(68-81) 119/74    Physical Exam 1:    Constitutional: Alert stable no complaints this morning peers to be in no distress     HENT:      Eyes:     Neck:      Cardiovascular: No chest pain or evidence of failure    Pulmonary: Not using any accessory muscles to breathe    Abdominal:      Genitourinary/Anorectal:       Musculoskeletal: Much less edema    Skin:     Neurological:      Psychiatric/Behavioral: Cooperative with therapy       Results Review:    I reviewed the patient's new clinical results.  Going to do his thoracentesis on his for his pleural effusions today Dr. Treviño who is a new physician radiology service    Nursing notes and therapy notes have been reviewed.    I have reviewed medications.    Assessment/Plan  Principal Problem:    Medically complex patient stable  Active Problems:    Atrial fibrillation with RVR stable    Non-STEMI (non-ST elevated myocardial infarction) stable    Biventricular heart failure with reduced left ventricular function stable    Acute on chronic systolic (congestive) heart failure stable    Leg DVT  (deep venous thromboembolism), acute, bilateral able    Abnormal LFTs stable    Abdominal ascites stable    Pleural effusion due to CHF (congestive heart failure) plan is a thoracentesis today           Gilmer Solomon MD  04/07/23  09:36 CDT          This document has been electronically signed by Gilmer Solomon MD on April 7, 2023 09:36 CDT      Part of this note may be an electronic transcription/translation of spoken language to printed text using the Dragon Dictation System.

## 2023-04-07 NOTE — THERAPY TREATMENT NOTE
Inpatient Rehabilitation - Physical Therapy Treatment Note       [facility]     Patient Name: Pavan Mccauley  : 1955  MRN: [id number]    Today's Date: 2023                    Admit Date: 2023      Visit Dx:     ICD-10-CM ICD-9-CM   1. Impaired mobility and ADLs  Z74.09 V49.89    Z78.9    2. Impaired functional mobility, balance, gait, and endurance  Z74.09 V49.89   3. Symbolic dysfunction  R48.9 784.60       Patient Active Problem List   Diagnosis   • General medical exam   • Malaise   • Hyperglycemia   • Cough   • Tick bite   • Screening for colon cancer   • Pneumonia due to organism   • Carotid artery aneurysm   • Dizziness   • SVT (supraventricular tachycardia)   • Atrial fibrillation (HCC)   • Dyspnea on exertion   • Atrial fibrillation with RVR   • Non-STEMI (non-ST elevated myocardial infarction)   • Biventricular heart failure with reduced left ventricular function   • Acute on chronic systolic (congestive) heart failure   • Leg DVT (deep venous thromboembolism), acute, bilateral   • Abnormal LFTs   • Medically complex patient   • Abdominal ascites   • Pleural effusion due to CHF (congestive heart failure)       Past Medical History:   Diagnosis Date   • Atrial fibrillation 2022    5 months   • Bronchopneumonia    • Chest pain    • Corneal foreign body     both eyes   • Hypertension    • Neck pain    • Perforation of left tympanic membrane     history of   • Prashant [place] spotted fever    • Wheezing        Past Surgical History:   Procedure Laterality Date   • CARDIAC CATHETERIZATION N/A 3/27/2023    Procedure: Left Heart Cath;  Surgeon: [place] DO;  Location: [facility] INVASIVE LOCATION;  Service: Cardiology;  Laterality: N/A;   • EYE FOREIGN BODY REMOVAL  2013    REMOVE FOREIGN BODY CORNEA W/SLIT LAMP 40774 (1)     • INJECTION OF MEDICATION  2011    Kenalog (1)      • SPINE SURGERY  1991    Exploration of the previous C5-6 fusion with refusion,  exploration of the C5 nerve root, left side   • TYMPANOPLASTY Left 05/16/1972    Perforation of the left tympanic membrane       PT ASSESSMENT (last 12 hours)     IRF PT Evaluation and Treatment     Row Name 04/07/23 1419 04/07/23 1109       PT Time and Intention    Document Type daily treatment  -JA daily treatment  -JA    Mode of Treatment physical therapy  - physical therapy  -    Row Name 04/07/23 1419 04/07/23 1109       General Information    Patient Profile Reviewed yes  -JA yes  -JA    Existing Precautions/Restrictions fall  -JA fall  -    Row Name 04/07/23 1419 04/07/23 1109       Living Environment    Primary Care Provided by self  -JA self  -JA    Row Name 04/07/23 1419 04/07/23 1109       Home Use of Assistive/Adaptive Equipment    Equipment Currently Used at Home none  -JA none  -    Row Name 04/07/23 1419 04/07/23 1109       Pain Assessment    Pretreatment Pain Rating 0/10 - no pain  -JA 4/10  -JA    Posttreatment Pain Rating 3/10  -JA 5/10  -JA    Pain Location - Side/Orientation Left  -JA Left  -JA    Pain Location lower  -JA lower  -JA    Pain Location - abdomen  -JA abdomen  -    Row Name 04/07/23 1419 04/07/23 1109       Cognition/Psychosocial    Affect/Mental Status (Cognition) flat/blunted affect  -JA flat/blunted affect  -JA    Orientation Status (Cognition) oriented x 4  -JA oriented x 4  -JA    Follows Commands (Cognition) -- delayed response/completion  -    Row Name 04/07/23 1419 04/07/23 1109       Range of Motion Comprehensive    General Range of Motion bilateral lower extremity ROM WFL  - bilateral lower extremity ROM WFL  -JA    Row Name 04/07/23 1419 04/07/23 1109       Bed Mobility    Bed Mobility sit-supine;supine-sit;rolling left  -JA sit-supine;supine-sit;rolling left  -JA    Rolling Left [place] (Bed Mobility) independent  -JA independent  -JA    Supine-Sit [place] (Bed Mobility) independent  -JA independent  -JA    Sit-Supine [place] (Bed Mobility)  independent  -JA independent  -JA    Row Name 04/07/23 1419 04/07/23 1109       Transfer Assessment/Treatment    Transfers sit-stand transfer;stand-sit transfer;toilet transfer;chair-bed transfer  - sit-stand transfer;stand-sit transfer;toilet transfer;chair-bed transfer  -    Row Name 04/07/23 1419 04/07/23 1109       Chair-Bed Transfer    Chair-Bed [place] (Transfers) supervision  - supervision  -    Assistive Device (Chair-Bed Transfers) cane, straight  -JA cane, straight  -JA    Row Name 04/07/23 1419 04/07/23 1109       Sit-Stand Transfer    Sit-Stand [place] (Transfers) supervision  - supervision  -    Assistive Device (Sit-Stand Transfers) cane, straight  -JA cane, straight  -JA    Row Name 04/07/23 1419 04/07/23 1109       Stand-Sit Transfer    Stand-Sit [place] (Transfers) supervision  - supervision  -    Assistive Device (Stand-Sit Transfers) cane, straight  -FACUNDO cane, straight  -JA    Row Name 04/07/23 1419 04/07/23 1109       Toilet Transfer    Type (Toilet Transfer) sit-stand  -JA sit-stand;stand-sit  -    [place] Level (Toilet Transfer) supervision  - supervision  -    Assistive Device (Toilet Transfer) cane, straight  -JA cane, straight  -JA    Row Name 04/07/23 1419 04/07/23 1109       Gait/Stairs (Locomotion)    [place] Level (Gait) contact guard  - contact guard  -    Assistive Device (Gait) cane, straight  -JA cane, straight  -JA    Distance in Feet (Gait) 300  -JA --    Pattern (Gait) step-through  -JA step-through  -JA    Deviations/Abnormal Patterns (Gait) gait speed decreased  - gait speed decreased  -    Row Name 04/07/23 1419 04/07/23 1109       Safety Issues, Functional Mobility    Impairments Affecting Function (Mobility) balance;endurance/activity tolerance;strength;shortness of breath;sensation/sensory awareness  - balance;endurance/activity tolerance;strength;shortness of breath;sensation/sensory awareness  -    Row Name 04/07/23  1109          Balance    Comment, Balance // bar side stepping  -     Row Name 04/07/23 1419 04/07/23 1109       Motor Skills    Therapeutic Exercise --  supine AP, SLR, hip a/a, heel slides, SAQ, quad sets  - --  standing hip a/a, seated LAQ, marches, AP, bolster knee squeezes  -    Row Name 04/07/23 1109          Positioning and Restraints    Pre-Treatment Position in bed  -     Post Treatment Position bed  -     In Bed supine;call light within reach;exit alarm on  -     Row Name 04/07/23 1419 04/07/23 1109       Vital Signs    Pre Systolic BP Rehab 104  - 133  -    Pre Treatment Diastolic BP 75  -JA 82  -    Pretreatment Heart Rate (beats/min) 89  -JA 89  -    Intratreatment Heart Rate (beats/min) -- 36  HR irregular during standing therex, sat down and HR increased to 50  -    Pre SpO2 (%) 94  - 93  -    O2 Delivery Pre Treatment room air  - room air  -    Row Name 04/07/23 1419 04/07/23 1109       Therapy Assessment/Plan (PT)    Rehab Potential/Prognosis (PT) good, to achieve stated therapy goals  - good, to achieve stated therapy goals  -    Frequency of Treatment (PT) other (see comments)  5-6d/week for 2 weeks  - other (see comments)  5-6d/week for 2 weeks  -    Estimated Duration of Therapy (PT) 2 weeks  - 2 weeks  -    Problem List (PT) problems related to;balance;coordination;mobility;strength;pain  - problems related to;balance;coordination;mobility;strength;pain  -    Activity Limitations Related to Problem List (PT) unable to transfer safely;unable to ambulate safely  - unable to transfer safely;unable to ambulate safely  -    Row Name 04/07/23 1419 04/07/23 1109       Therapy Plan Review/Discharge Plan (PT)    Anticipated Discharge Disposition (PT) home with assist;home with home health  - home with assist;home with home health  -    Row Name 04/07/23 1419 04/07/23 1109       IRF PT Goals    Transfer Goal Selection (PT-IRF) transfers, PT goal 1   -JA transfers, PT goal 1  -JA    Gait (Walking Locomotion) Goal Selection (PT-IRF) gait, PT goal 1;gait, PT goal (free text)  -JA gait, PT goal 1;gait, PT goal (free text)  -JA    Balance Goal Selection (PT) balance, PT goal (free text)  -JA balance, PT goal (free text)  -JA    Row Name 04/07/23 1419 04/07/23 1109       Transfer Goal 1 (PT-IRF)    Activity/Assistive Device (Transfer Goal 1, PT-IRF) all transfers;bed-to-chair/chair-to-bed;sit-to-stand/stand-to-sit  -JA all transfers;bed-to-chair/chair-to-bed;sit-to-stand/stand-to-sit  -JA    [place] Level (Transfer Goal 1, PT-IRF) independent  -JA independent  -JA    Time Frame (Transfer Goal 1, PT-IRF) 2 weeks  -JA 2 weeks  -JA    Progress/Outcomes (Transfer Goal 1, PT-IRF) goal not met  -JA goal not met  -    Row Name 04/07/23 1419 04/07/23 1109       Gait/Walking Locomotion Goal 1 (PT-IRF)    Activity/Assistive Device (Gait/Walking Locomotion Goal 1, PT-IRF) gait (walking locomotion)  -JA gait (walking locomotion)  -JA    Gait/Walking Locomotion Distance Goal 1 (PT-IRF) 300'x1  -'x1  -JA    [place] Level (Gait/Walking Locomotion Goal 1, PT-IRF) modified independence  -JA modified independence  -JA    Time Frame (Gait/Walking Locomotion Goal 1, PT-IRF) 2 weeks  -JA 2 weeks  -JA    Strategies/Barriers (Gait/Walking Locomotion Goal 1, PT-IRF) No AD vs SPC  -JA No AD vs SPC  -JA    Progress/Outcomes (Gait/Walking Locomotion Goal 1, PT-IRF) goal not met  -JA goal not met  -    Row Name 04/07/23 1419 04/07/23 1109       Gait/Walking Locomotion Goal (PT-IRF)    Gait/Walking Locomotion Goal (PT-IRF) Patient will improve 10 meter walk test to 0.8 m/s to show decreased fall risk and reduced readmittance risk.  -JA Patient will improve 10 meter walk test to 0.8 m/s to show decreased fall risk and reduced readmittance risk.  -JA    Time Frame (Gait/Walking Locomotion Goal, PT-IRF) 2 weeks  -JA 2 weeks  -JA    Progress/Outcomes (Gait/Walking Locomotion  Goal, PT-IRF) goal not met  -JA goal not met  -JA    Row Name 04/07/23 1419 04/07/23 1109       Balance Goal (PT)    Balance Goal (PT) Patient will improve Reis balance scale to >45/56 to show decreased fall risk.  -JA Patient will improve Reis balance scale to >45/56 to show decreased fall risk.  -JA    Time Frame (Balance Goal, PT) 2 weeks  -JA 2 weeks  -JA    Progress/Outcome (Balance Goal, PT) goal not met  -JA goal not met  -JA          User Key  (r) = Recorded By, (t) = Taken By, (c) = Cosigned By    Initials Name Provider Type    Maik Chairez, PTA Physical Therapist Assistant                 Physical Therapy Education     Title: PT OT SLP Therapies (In Progress)     Topic: Physical Therapy (Not Started)     Point: Mobility training (Not Started)     Learner Progress:  Not documented in this visit.          Point: Home exercise program (Not Started)     Learner Progress:  Not documented in this visit.          Point: Body mechanics (Not Started)     Learner Progress:  Not documented in this visit.          Point: Precautions (Not Started)     Learner Progress:  Not documented in this visit.                            PT Recommendation and Plan    Frequency of Treatment (PT): other (see comments) (5-6d/week for 2 weeks)  Plan of Care Reviewed With: patient  Progress: improving  Outcome Evaluation: Pt performed standing therex this a.m in // bars for balance and seated therex in w/c. Pt performed supine therex this p.m. Pt ambulated 150' straight cane CGA this a.m and 300' straight cane CGA this p.m. Pt defers walker for d/c home, agreeable to straight cane at home for mobility and balance.       Outcome Measures     Row Name 04/05/23 1025             10 Meter Walk Test Self-Selected Velocity    Self-Selected Velocity: Trial 1 17.1 sec.  -LR      Self-Selected Velocity: Trial 2 15.1 sec.  -LR      Self-Selected Velocity: Trial 3 16.1 sec.  -LR      Self-Selected Velocity: Average Time 16.1 sec.  -LR          10 Meter Walk Test Fast Velocity    10 Meter Walk Fast Velocity: Trial 1 12.5 sec.  -LR      10 Meter Walk Fast Velocity: Trial 2 11.5 sec.  -LR      10 Meter Walk Fast Velocity: Trial 3 12 sec.  -LR      10 Meter Walk Fast Velocity: Average time 12 sec.  -LR         10 Meter Walk Test Actual Velocity    Actual Self-Selected Velocity 0.37 m/s  -LR      Actual Fast-Velocity 0.5 m/s  -LR         How much help from another person do you currently need...    Turning from your back to your side while in flat bed without using bedrails? 4  -LR      Moving from lying on back to sitting on the side of a flat bed without bedrails? 4  -LR      Moving to and from a bed to a chair (including a wheelchair)? 3  -LR      Standing up from a chair using your arms (e.g., wheelchair, bedside chair)? 3  -LR      Climbing 3-5 steps with a railing? 3  -LR      To walk in hospital room? 3  -LR      AM-PAC 6 Clicks Score (PT) 20  -LR         Reis Balance Scale    Sitting to Standing 4  -LR      Standing Unsupported 4  -LR      Sitting with Back Unsupported but Feet Supported on Floor or on Stool 4  -LR      Standing to Sitting 3  -LR      Transfers 3  -LR      Standing Unsupported with Eyes Closed 4  -LR      Standing Unsupported with Feet Together 1  -LR      Reaching Forward with Outstretched Arm While Standing 4  -LR       Object From the Floor From a Standing Position 4  -LR      Turning to Look Behind Over Left and Right Shoulders While Standing 4  -LR      Turn 360 Degrees 1  -LR      Place Alternate Foot on Step or Stool While Standing Unsupported 0  -LR      Standing Unsupported with One Foot in Front 0  -LR      Standing on One Leg 1  -LR      Reis Total Score 37  -LR         Functional Assessment    Outcome Measure Options 10 Meter Walk;AM-PAC 6 Clicks Basic Mobility (PT);Reis Balance  -LR            User Key  (r) = Recorded By, (t) = Taken By, (c) = Cosigned By    Initials Name Provider Type    Aj Mahmood  Physical Therapist                     Time Calculation:      PT Charges     Row Name 04/07/23 1604 04/07/23 1257          Time Calculation    Start Time 1419  -JA 1109  -JA     Stop Time 1506  -JA 1159  -JA     Time Calculation (min) 47 min  -JA 50 min  -JA        Time Calculation- PT    Total Timed Code Minutes- PT 47 minute(s)  -JA 50 minute(s)  -JA        Timed Charges    91747 - PT Therapeutic Exercise Minutes 37  -JA 30  -JA     15693 - Gait Training Minutes  10  -JA 10  -JA     36395 - PT Therapeutic Activity Minutes -- 10  -JA        Total Minutes    Timed Charges Total Minutes 47  -JA 50  -JA      Total Minutes 47  -JA 50  -JA           User Key  (r) = Recorded By, (t) = Taken By, (c) = Cosigned By    Initials Name Provider Type    Maik Chairez PTA Physical Therapist Assistant                Therapy Charges for Today     Code Description Service Date Service Provider Modifiers Qty    65231743227 HC PT NEUROMUSC RE EDUCATION EA 15 MIN 4/6/2023 Maik Gentile, PTA GP 2    34445731687 HC PT THER PROC EA 15 MIN 4/6/2023 Maik Gentile, PTA GP 1    09932924771 HC GAIT TRAINING EA 15 MIN 4/6/2023 Maik Gentile W, PTA GP 1    35254942848 HC PT THERAPEUTIC ACT EA 15 MIN 4/6/2023 Maik Gentile, PTA GP 1    13161916868 HC GAIT TRAINING EA 15 MIN 4/6/2023 Maik Gentile, PTA GP 1    31779086542 HC PT THERAPEUTIC ACT EA 15 MIN 4/6/2023 Maik Gentile, PTA GP 2    57873608051 HC PT THER PROC EA 15 MIN 4/7/2023 Maik Gentile, PTA GP 2    09512341567 HC GAIT TRAINING EA 15 MIN 4/7/2023 Maik Gentile, PTA GP 1    21256512661 HC PT THER PROC EA 15 MIN 4/7/2023 Maik Gentile, PTA GP 2    99748710150 HC GAIT TRAINING EA 15 MIN 4/7/2023 Maik Gentile, PTA GP 1    54875763246 HC PT THER PROC EA 15 MIN 4/7/2023 Maik Gentile, PTA GP 2    40153284126 HC GAIT TRAINING EA 15 MIN 4/7/2023 Maik Gentile, PTA GP 1            PT G-Codes  Outcome Measure  Options: 10 Meter Walk, AM-PAC 6 Clicks Basic Mobility (PT), Reis Balance  AM-PAC 6 Clicks Score (PT): 20  AM-PAC 6 Clicks Score (OT): 22  Reis Total Score: 37      Maik Gentile, PTA  4/7/2023

## 2023-04-07 NOTE — THERAPY TREATMENT NOTE
Inpatient Rehabilitation - Occupational Therapy Treatment Note    [facility]     Patient Name: Pavan Mccauley  : 1955  MRN: [id number]    Today's Date: 2023                 Admit Date: 2023         ICD-10-CM ICD-9-CM   1. Impaired mobility and ADLs  Z74.09 V49.89    Z78.9    2. Impaired functional mobility, balance, gait, and endurance  Z74.09 V49.89   3. Symbolic dysfunction  R48.9 784.60       Patient Active Problem List   Diagnosis   • General medical exam   • Malaise   • Hyperglycemia   • Cough   • Tick bite   • Screening for colon cancer   • Pneumonia due to organism   • Carotid artery aneurysm   • Dizziness   • SVT (supraventricular tachycardia)   • Atrial fibrillation (HCC)   • Dyspnea on exertion   • Atrial fibrillation with RVR   • Non-STEMI (non-ST elevated myocardial infarction)   • Biventricular heart failure with reduced left ventricular function   • Acute on chronic systolic (congestive) heart failure   • Leg DVT (deep venous thromboembolism), acute, bilateral   • Abnormal LFTs   • Medically complex patient   • Abdominal ascites   • Pleural effusion due to CHF (congestive heart failure)       Past Medical History:   Diagnosis Date   • Atrial fibrillation 2022    5 months   • Bronchopneumonia    • Chest pain    • Corneal foreign body     both eyes   • Hypertension    • Neck pain    • Perforation of left tympanic membrane     history of   • Prashant [place] spotted fever    • Wheezing        Past Surgical History:   Procedure Laterality Date   • CARDIAC CATHETERIZATION N/A 3/27/2023    Procedure: Left Heart Cath;  Surgeon: [place] DO;  Location: [facility] INVASIVE LOCATION;  Service: Cardiology;  Laterality: N/A;   • EYE FOREIGN BODY REMOVAL  2013    REMOVE FOREIGN BODY CORNEA W/SLIT LAMP 68162 (1)     • INJECTION OF MEDICATION  2011    Kenalog (1)      • SPINE SURGERY  1991    Exploration of the previous C5-6 fusion with refusion, exploration of the  C5 nerve root, left side   • TYMPANOPLASTY Left 05/16/1972    Perforation of the left tympanic membrane             IRF OT ASSESSMENT FLOWSHEET (last 12 hours)     IRF OT Evaluation and Treatment     Row Name 04/07/23 1643 04/07/23 1308       OT Time and Intention    Document Type daily treatment  -RW daily treatment  -RW    Mode of Treatment individual therapy;occupational therapy  -RW occupational therapy  -RW    Row Name 04/07/23 1643 04/07/23 1308       General Information    Patient Profile Reviewed yes  -RW yes  -RW    Existing Precautions/Restrictions fall  -RW fall  -RW    Row Name 04/07/23 1308          Pain Assessment    Pretreatment Pain Rating 0/10 - no pain  -RW     Row Name 04/07/23 1643 04/07/23 1308       Cognition/Psychosocial    Affect/Mental Status (Cognition) -- flat/blunted affect  -RW    Orientation Status (Cognition) oriented x 4  -RW oriented x 4  -RW    Row Name 04/07/23 1643 04/07/23 1308       Basic Activities of Daily Living (BADLs)    Basic Activities of Daily Living lower body dressing;toileting  -RW toileting;grooming  -RW    Row Name 04/07/23 1643          Lower Body Dressing    [place] Level (Lower Body Dressing) doff;don;socks;independent  -RW     Position (Lower Body Dressing) sitting up in bed  -RW     Row Name 04/07/23 1308          Grooming    [place] Level (Grooming) wash face, hands;standby assist  -RW     Position (Grooming) sink side  -RW     Row Name 04/07/23 1643 04/07/23 1308       Toileting    [place] Level (Toileting) toileting skills;independent  -RW toileting skills;supervision  -RW    Assistive Device Use (Toileting) urinal  -RW --    Position (Toileting) edge of bed sitting  -RW supported standing  -RW    Row Name 04/07/23 1643          Bed Mobility    Bed Mobility sit-supine;supine-sit  -RW     Supine-Sit [place] (Bed Mobility) independent  -RW     Sit-Supine [place] (Bed Mobility) independent  -RW     Row Name 04/07/23 1308          Vital  Signs    Intra Systolic BP Rehab 104  -RW     Intra Treatment Diastolic BP 67  -RW     Intratreatment Heart Rate (beats/min) 95  -RW     Intra SpO2 (%) 95  -RW     O2 Delivery Intra Treatment room air  -RW     Row Name 04/07/23 1643 04/07/23 1308       Therapy Assessment/Plan (OT)    Rehab Potential/Prognosis (OT) good, to achieve stated therapy goals  -RW good, to achieve stated therapy goals  -RW    Frequency of Treatment (OT) other (see comments);90 minutes per session  -RW other (see comments);90 minutes per session  5-6 d/wk  -RW    Estimated Duration of Therapy (OT) until facility discharge  -RW until facility discharge  -RW    Problem List (OT) balance;mobility;strength  -RW balance;mobility;strength  -RW    Activity Limitations Related to Problem List (OT) unable to transfer safely;IADLs not performed adequately or safely;unable to ambulate safely;BADLs not performed adequately or safely  -RW unable to transfer safely;IADLs not performed adequately or safely;unable to ambulate safely;BADLs not performed adequately or safely  -RW          User Key  (r) = Recorded By, (t) = Taken By, (c) = Cosigned By    Initials Name Effective Dates    RW Mary Kay Yañez, OT 09/22/22 -                  Occupational Therapy Education     Title: PT OT SLP Therapies (In Progress)     Topic: Occupational Therapy (In Progress)     Point: ADL training (Done)     Description:   Instruct learner(s) on proper safety adaptation and remediation techniques during self care or transfers.   Instruct in proper use of assistive devices.              Learning Progress Summary           Patient Acceptance, E, VU,NR by  at 4/6/2023 1027                   Point: Home exercise program (Not Started)     Description:   Instruct learner(s) on appropriate technique for monitoring, assisting and/or progressing therapeutic exercises/activities.              Learner Progress:  Not documented in this visit.          Point: Precautions (Done)      Description:   Instruct learner(s) on prescribed precautions during self-care and functional transfers.              Learning Progress Summary           Patient Acceptance, E, VU,NR by  at 4/6/2023 1027                   Point: Body mechanics (Done)     Description:   Instruct learner(s) on proper positioning and spine alignment during self-care, functional mobility activities and/or exercises.              Learning Progress Summary           Patient Acceptance, E, VU,NR by  at 4/6/2023 1027                               User Key     Initials Effective Dates Name Provider Type Discipline     06/16/21 -  Ibeth Guzmán COTA Occupational Therapist Assistant OT                    OT Recommendation and Plan         Plan of Care Review  Plan of Care Reviewed With: patient  Outcome Evaluation: OT tx complete. Pt sitting EOB upon arrival. Provided education for safety/AD for discharge for return home. Pt doff/don socks independently while sitting up in bed. Pt able to perform toileting with urinal independently. Pt perf sup<>sit independently. Cont OT POC.  Plan of Care Reviewed With: patient       Outcome Measures     Row Name 04/05/23 1025             10 Meter Walk Test Self-Selected Velocity    Self-Selected Velocity: Trial 1 17.1 sec.  -LR      Self-Selected Velocity: Trial 2 15.1 sec.  -LR      Self-Selected Velocity: Trial 3 16.1 sec.  -LR      Self-Selected Velocity: Average Time 16.1 sec.  -LR         10 Meter Walk Test Fast Velocity    10 Meter Walk Fast Velocity: Trial 1 12.5 sec.  -LR      10 Meter Walk Fast Velocity: Trial 2 11.5 sec.  -LR      10 Meter Walk Fast Velocity: Trial 3 12 sec.  -LR      10 Meter Walk Fast Velocity: Average time 12 sec.  -LR         10 Meter Walk Test Actual Velocity    Actual Self-Selected Velocity 0.37 m/s  -LR      Actual Fast-Velocity 0.5 m/s  -LR         How much help from another person do you currently need...    Turning from your back to your side while in flat bed  without using bedrails? 4  -LR      Moving from lying on back to sitting on the side of a flat bed without bedrails? 4  -LR      Moving to and from a bed to a chair (including a wheelchair)? 3  -LR      Standing up from a chair using your arms (e.g., wheelchair, bedside chair)? 3  -LR      Climbing 3-5 steps with a railing? 3  -LR      To walk in hospital room? 3  -LR      AM-PAC 6 Clicks Score (PT) 20  -LR         Reis Balance Scale    Sitting to Standing 4  -LR      Standing Unsupported 4  -LR      Sitting with Back Unsupported but Feet Supported on Floor or on Stool 4  -LR      Standing to Sitting 3  -LR      Transfers 3  -LR      Standing Unsupported with Eyes Closed 4  -LR      Standing Unsupported with Feet Together 1  -LR      Reaching Forward with Outstretched Arm While Standing 4  -LR       Object From the Floor From a Standing Position 4  -LR      Turning to Look Behind Over Left and Right Shoulders While Standing 4  -LR      Turn 360 Degrees 1  -LR      Place Alternate Foot on Step or Stool While Standing Unsupported 0  -LR      Standing Unsupported with One Foot in Front 0  -LR      Standing on One Leg 1  -LR      Reis Total Score 37  -LR         Functional Assessment    Outcome Measure Options 10 Meter Walk;AM-PAC 6 Clicks Basic Mobility (PT);Reis Balance  -LR            User Key  (r) = Recorded By, (t) = Taken By, (c) = Cosigned By    Initials Name Provider Type    LR Aj Zimmerman Physical Therapist                  Time Calculation:      Time Calculation- OT     Row Name 04/07/23 1737 04/07/23 1604 04/07/23 1550       Time Calculation- OT    OT Start Time 1643  -RW -- 1308  -RW    OT Stop Time 1721  -RW -- 1401  -RW    OT Time Calculation (min) 38 min  -RW -- 53 min  -RW    Total Timed Code Minutes- OT 38 minute(s)  -RW -- 53 minute(s)  -RW    OT Received On 04/07/23  -RW -- 04/07/23  -RW       Timed Charges    11787 - Gait Training Minutes  -- 10  -JA --    05563 - OT Therapeutic Activity  Minutes -- -- 53  -RW    39798 - OT Self Care/Mgmt Minutes 38  -RW -- --       Total Minutes    Timed Charges Total Minutes 38  -RW 10  -JA 53  -RW     Total Minutes 38  -RW 10  -JA 53  -RW    Row Name 04/07/23 1257             Timed Charges    05553 - Gait Training Minutes  20  -JA         Total Minutes    Timed Charges Total Minutes 20  -JA       Total Minutes 20  -JA            User Key  (r) = Recorded By, (t) = Taken By, (c) = Cosigned By    Initials Name Provider Type    Maik Chairez, PTA Physical Therapist Assistant    RW Mary Kay Yañez OT Occupational Therapist              Therapy Charges for Today     Code Description Service Date Service Provider Modifiers Qty    88037773204 HC OT SELF CARE/MGMT/TRAIN EA 15 MIN 4/7/2023 Mary Kay Yañez OT GO 3                   Mary Kay Yañez OT  4/7/2023

## 2023-04-07 NOTE — PLAN OF CARE
Problem: Rehabilitation (IRF) Plan of Care  Goal: Plan of Care Review  Outcome: Ongoing, Progressing  Flowsheets (Taken 4/7/2023 1413)  Progress: improving  Plan of Care Reviewed With: patient  Outcome Evaluation: vss. no complaints of pain or discomfort. working with therapy.  resting between care.   Goal Outcome Evaluation:  Plan of Care Reviewed With: patient        Progress: improving  Outcome Evaluation: vss. no complaints of pain or discomfort. working with therapy.  resting between care.

## 2023-04-08 PROCEDURE — 97110 THERAPEUTIC EXERCISES: CPT

## 2023-04-08 PROCEDURE — 97530 THERAPEUTIC ACTIVITIES: CPT

## 2023-04-08 PROCEDURE — 97110 THERAPEUTIC EXERCISES: CPT | Performed by: PHYSICAL THERAPIST

## 2023-04-08 PROCEDURE — 97112 NEUROMUSCULAR REEDUCATION: CPT | Performed by: PHYSICAL THERAPIST

## 2023-04-08 PROCEDURE — 97116 GAIT TRAINING THERAPY: CPT | Performed by: PHYSICAL THERAPIST

## 2023-04-08 PROCEDURE — 97535 SELF CARE MNGMENT TRAINING: CPT

## 2023-04-08 PROCEDURE — 99231 SBSQ HOSP IP/OBS SF/LOW 25: CPT | Performed by: ORTHOPAEDIC SURGERY

## 2023-04-08 RX ADMIN — FUROSEMIDE 20 MG: 20 TABLET ORAL at 08:06

## 2023-04-08 RX ADMIN — RIVAROXABAN 15 MG: 15 TABLET, FILM COATED ORAL at 18:10

## 2023-04-08 RX ADMIN — DOCUSATE SODIUM 100 MG: 100 CAPSULE, LIQUID FILLED ORAL at 08:06

## 2023-04-08 RX ADMIN — SPIRONOLACTONE 25 MG: 25 TABLET ORAL at 08:07

## 2023-04-08 RX ADMIN — AMIODARONE HYDROCHLORIDE 200 MG: 200 TABLET ORAL at 08:06

## 2023-04-08 RX ADMIN — DIGOXIN 125 MCG: 125 TABLET ORAL at 12:52

## 2023-04-08 RX ADMIN — Medication 12.5 MG: at 08:06

## 2023-04-08 RX ADMIN — FUROSEMIDE 20 MG: 20 TABLET ORAL at 18:10

## 2023-04-08 RX ADMIN — RIVAROXABAN 15 MG: 15 TABLET, FILM COATED ORAL at 08:06

## 2023-04-08 RX ADMIN — DOCUSATE SODIUM 100 MG: 100 CAPSULE, LIQUID FILLED ORAL at 20:13

## 2023-04-08 NOTE — PLAN OF CARE
Problem: Rehabilitation (IRF) Plan of Care  Goal: Plan of Care Review  Outcome: Ongoing, Progressing  Flowsheets (Taken 4/8/2023 8332)  Progress: improving  Plan of Care Reviewed With: patient  Outcome Evaluation: pt perf well with OT although  pt required encouragment.  perf adl this am shower bench tf with ruiz bathing with ruiz ub dressing with ind and lb with ruiz   pt perf funct mob in room sba - mod I using SC.  pm perf perf ther ex b ue bike x 12 min wheelchair mob with ind   Goal Outcome Evaluation:  Plan of Care Reviewed With: patient        Progress: improving  Outcome Evaluation: pt perf well with OT although  pt required encouragment.  perf adl this am shower bench tf with ruiz bathing with ruiz ub dressing with ind and lb with ruiz   pt perf funct mob in room sba - mod I using SC.  pm perf perf ther ex b ue bike x 12 min wheelchair mob with ind

## 2023-04-08 NOTE — PROGRESS NOTES
Progress Note      Admit date: 4/4/2023 10:10 AM       Treatment Team     Provider Relationship Specialty Contact    Gilmer Solomon MD Attending Orthopedic Surgery   [phone]      Maik Gentile, PTA Physical Therapy Assistant Physical Therapy --    Celi Mendez, [facility]  -- --    Poppy Suarez RN Registered Nurse -- --    Celi Zee COTA Occupational Therapy Assistant Occupational Therapy --    Miguel Ángel Motley BSW  -- --    Ignacia Payne RN Registered Nurse -- --    Bob Caicedo MD Consulting Physician Cardiothoracic Surgery   [phone]      Schuyler Bhakta, PT Physical Therapist Physical Therapy   [phone]      Mima Todd RN Registered Nurse -- --    Merry Carranza, [facility]  -- --    Narciso Raymond PTA Physical Therapy Assistant Physical Therapy --             Chief Complaint:  Medically complex patient    HPI: No changes since admission    Vital Signs  Temp:  [97.2 °F (36.2 °C)-99.3 °F (37.4 °C)] 97.2 °F (36.2 °C)  Heart Rate:  [88-97] 88  Resp:  [18] 18  BP: (113-121)/(75-82) 113/77    Physical Exam 1:    Constitutional: Alert stable states feels better today     HENT:      Eyes:     Neck:      Cardiovascular: No chest pain or failure    Pulmonary: Lungs clear patient had a chest x-ray yesterday in anticipation of asked thoracentesis his chest x-ray was clear no fluid in his lungs that is all resolved we will continue and no thoracentesis was done    Abdominal:      Genitourinary/Anorectal:       Musculoskeletal:     Skin:     Neurological:      Psychiatric/Behavioral: Cooperative physical therapy       Results Review:    I reviewed the patient's new clinical results.    Nursing notes and therapy notes have been reviewed.    I have reviewed medications.    Assessment/Plan  Principal Problem:    Medically complex patient improved  Active Problems:    Atrial fibrillation with RVR    Non-STEMI (non-ST elevated  myocardial infarction)    Biventricular heart failure with reduced left ventricular function    Acute on chronic systolic (congestive) heart failure    Leg DVT (deep venous thromboembolism), acute, bilateral    Abnormal LFTs    Abdominal ascites    Pleural effusion due to CHF (congestive heart failure) resolved     All other conditions are stable pleural effusion is gone patient is lost considerable amount of weight she was fluid and just overall improvement is occurring we will check his electrolytes Monday      Gilmer Solomon MD  04/08/23  08:40 CDT          This document has been electronically signed by Gilmer Solomon MD on April 8, 2023 08:40 CDT      Part of this note may be an electronic transcription/translation of spoken language to printed text using the Dragon Dictation System.

## 2023-04-08 NOTE — THERAPY TREATMENT NOTE
Inpatient Rehabilitation - Occupational Therapy Treatment Note    [facility]     Patient Name: Pavan Mccauley  : 1955  MRN: [id number]    Today's Date: 2023                 Admit Date: 2023         ICD-10-CM ICD-9-CM   1. Impaired mobility and ADLs  Z74.09 V49.89    Z78.9    2. Impaired functional mobility, balance, gait, and endurance  Z74.09 V49.89   3. Symbolic dysfunction  R48.9 784.60       Patient Active Problem List   Diagnosis   • General medical exam   • Malaise   • Hyperglycemia   • Cough   • Tick bite   • Screening for colon cancer   • Pneumonia due to organism   • Carotid artery aneurysm   • Dizziness   • SVT (supraventricular tachycardia)   • Atrial fibrillation (HCC)   • Dyspnea on exertion   • Atrial fibrillation with RVR   • Non-STEMI (non-ST elevated myocardial infarction)   • Biventricular heart failure with reduced left ventricular function   • Acute on chronic systolic (congestive) heart failure   • Leg DVT (deep venous thromboembolism), acute, bilateral   • Abnormal LFTs   • Medically complex patient   • Abdominal ascites   • Pleural effusion due to CHF (congestive heart failure)       Past Medical History:   Diagnosis Date   • Atrial fibrillation 2022    5 months   • Bronchopneumonia    • Chest pain    • Corneal foreign body     both eyes   • Hypertension    • Neck pain    • Perforation of left tympanic membrane     history of   • Prashant [place] spotted fever    • Wheezing        Past Surgical History:   Procedure Laterality Date   • CARDIAC CATHETERIZATION N/A 3/27/2023    Procedure: Left Heart Cath;  Surgeon: [place] DO;  Location: [facility] INVASIVE LOCATION;  Service: Cardiology;  Laterality: N/A;   • EYE FOREIGN BODY REMOVAL  2013    REMOVE FOREIGN BODY CORNEA W/SLIT LAMP 41012 (1)     • INJECTION OF MEDICATION  2011    Kenalog (1)      • SPINE SURGERY  1991    Exploration of the previous C5-6 fusion with refusion, exploration of the  C5 nerve root, left side   • TYMPANOPLASTY Left 05/16/1972    Perforation of the left tympanic membrane             IRF OT ASSESSMENT FLOWSHEET (last 12 hours)     IRF OT Evaluation and Treatment     [facility] Name 04/08/23 [facility] 04/08/23 1055       OT Time and Intention    Document Type daily treatment  -LM daily treatment  -LM    Mode of Treatment individual therapy;occupational therapy  -LM individual therapy;occupational therapy  -LM    Row Name 04/08/23 [facility] 04/08/23 1055       General Information    Patient Profile Reviewed yes  -LM yes  -LM    Existing Precautions/Restrictions fall  -LM fall  -LM    Row Name 04/08/23 [facility] 04/08/23 1055       Pain Assessment    Pretreatment Pain Rating 0/10 - no pain  -LM 0/10 - no pain  -LM    Posttreatment Pain Rating 0/10 - no pain  -LM 0/10 - no pain  -LM    [facility] Name 04/08/23 [facility] 04/08/23 1055       Cognition/Psychosocial    Orientation Status (Cognition) oriented x 4  -LM oriented x 4  -LM    Row Name 04/08/23 1055          Basic Activities of Daily Living (BADLs)    Basic Activities of Daily Living lower body dressing;toileting;bathing;upper body dressing;grooming  -LM     [facility] Name 04/08/23 1055          Bathing    [place] Level (Bathing) bathing skills;lower body;upper body;upper extremities;chest/trunk;distal lower extremities/feet;proximal lower extremities;perineal area;set up;independent  -[facility] Name 04/08/23 1055          Upper Body Dressing    [place] Level (Upper Body Dressing) upper body dressing skills;don;doff;independent  -LM     Row Name 04/08/23 1055          Lower Body Dressing    [place] Level (Lower Body Dressing) doff;don;socks;supervision;pants/bottoms;shoes/slippers;underwear;set up  -     Position (Lower Body Dressing) sitting up in bed  -[facility] Name 04/08/23 1055          Grooming    [place] Level (Grooming) grooming skills;deodorant application;hair care, combing/brushing;independent;wash face, hands;oral care regimen  -      Position (Grooming) supported standing  -LM     Row Name 04/08/23 1055          Toileting    [place] Level (Toileting) toileting skills;independent  -LM     Row Name 04/08/23 1055          Bed Mobility    Bed Mobility sit-supine;supine-sit  -LM     Supine-Sit [place] (Bed Mobility) independent  -LM     Sit-Supine [place] (Bed Mobility) independent  -LM     Row Name 04/08/23 1435 04/08/23 1055       Functional Mobility    Functional Mobility- Ind. Level --  in room  -LM independent;other (see comments);supervision required  in room  -LM    Row Name 04/08/23 1435 04/08/23 1055       Shower Transfer    Type (Shower Transfer) --  shower tf with ruiz  -LM sit-stand;stand-sit;stand pivot/stand step  shower tf with ruiz  -LM    Row Name 04/08/23 1435          Motor Skills    Motor Skills coordination;other (see comments)  ue bike x 12 min  -LM     Row Name 04/08/23 1435          Positioning and Restraints    Pre-Treatment Position sitting in chair/recliner  -LM     Post Treatment Position chair  -LM     Row Name 04/08/23 1055          Vital Signs    Intra Treatment Diastolic BP 97  reported to nursing  -LM     Post Systolic BP Rehab 74  -LM     Intra SpO2 (%) 92  -LM     O2 Delivery Intra Treatment room air  -LM     Post SpO2 (%) 92  -LM     O2 Delivery Post Treatment room air  -LM     Row Name 04/08/23 1435 04/08/23 1055       Therapy Assessment/Plan (OT)    Rehab Potential/Prognosis (OT) good, to achieve stated therapy goals  -LM good, to achieve stated therapy goals  -LM    Frequency of Treatment (OT) other (see comments);90 minutes per session  -LM other (see comments);90 minutes per session  -LM    Estimated Duration of Therapy (OT) until facility discharge  -LM until facility discharge  -LM    Problem List (OT) balance;mobility;strength  -LM balance;mobility;strength  -LM    Activity Limitations Related to Problem List (OT) unable to transfer safely;IADLs not performed adequately or safely;unable to  ambulate safely;BADLs not performed adequately or safely  -LM unable to transfer safely;IADLs not performed adequately or safely;unable to ambulate safely;BADLs not performed adequately or safely  -LM    Row Name 04/08/23 1435 04/08/23 1055       IRF OT Goals    Bathing Goal Selection (OT-IRF) bathing, OT goal 1  -LM bathing, OT goal 1  -LM    Endurance Goal Selection (OT) endurance, OT goal 1  -LM endurance, OT goal 1  -LM    Activity Tolerance Goal Selection (OT) activity tolerance, OT goal 1  -LM activity tolerance, OT goal 1  -LM    Row Name 04/08/23 1435 04/08/23 1055       Bathing Goal 1 (OT-IRF)    Activity/Device (Bathing Goal 1, OT-IRF) bathing skills, all;grab bar, tub/shower;long-handled sponge;shower chair  -LM bathing skills, all;grab bar, tub/shower;long-handled sponge;shower chair  -LM    [place] Level (Bathing Goal 1, OT-IRF) modified independence  -LM modified independence  -LM    Time Frame (Bathing Goal 1, OT-IRF) long-term goal (LTG);by discharge  -LM long-term goal (LTG);by discharge  -LM    Progress/Outcomes (Bathing Goal 1, OT-IRF) goal not met  -LM goal not met  -LM    Row Name 04/08/23 1435 04/08/23 1055       Strength Goal 1 (OT-IRF)    Strength Goal 1 (OT-IRF) Pt will tolerate at least 20 minutes BUE strengthening activities OOB/EOB with VSS and with only 4 rest breaks needed for increased strenth and endurance required for ADLs and mobility.  -LM Pt will tolerate at least 20 minutes BUE strengthening activities OOB/EOB with VSS and with only 4 rest breaks needed for increased strenth and endurance required for ADLs and mobility.  -LM    Time Frame (Strength Goal 1, OT-IRF) long-term goal (LTG);by discharge  -LM long-term goal (LTG);by discharge  -LM    Progress/Outcomes (Strength Goal 1, OT-IRF) goal not met  -LM goal not met  -LM    Row Name 04/08/23 1435 04/08/23 1055        Endurance Goal 1 (OT)    Activity Level (Endurance Goal 1, OT) endurance 2 good  -LM endurance 2 good  -LM     Progress/Outcome (Endurance Goal 1, OT) goal not met  -LM goal not met  -LM    Row Name 04/08/23 1435 04/08/23 1055        Activity Tolerance Goal 1 (OT)    Activity Level (Endurance Goal 1, OT) 20 min activity  -LM 20 min activity  -LM    Progress/Outcome (Activity Tolerance Goal 1, OT) goal not met  -LM goal not met  -LM          User Key  (r) = Recorded By, (t) = Taken By, (c) = Cosigned By    Initials Name Effective Dates    Celi Chaney COTA 06/16/21 -                  Occupational Therapy Education     Title: PT OT SLP Therapies (In Progress)     Topic: Occupational Therapy (In Progress)     Point: ADL training (Done)     Description:   Instruct learner(s) on proper safety adaptation and remediation techniques during self care or transfers.   Instruct in proper use of assistive devices.              Learning Progress Summary           Patient Acceptance, E, VU,NR by  at 4/6/2023 1027                   Point: Home exercise program (Not Started)     Description:   Instruct learner(s) on appropriate technique for monitoring, assisting and/or progressing therapeutic exercises/activities.              Learner Progress:  Not documented in this visit.          Point: Precautions (Done)     Description:   Instruct learner(s) on prescribed precautions during self-care and functional transfers.              Learning Progress Summary           Patient Acceptance, E, VU,NR by  at 4/6/2023 1027                   Point: Body mechanics (Done)     Description:   Instruct learner(s) on proper positioning and spine alignment during self-care, functional mobility activities and/or exercises.              Learning Progress Summary           Patient Acceptance, E, VU,NR by  at 4/6/2023 1027                               User Key     Initials Effective Dates Name Provider Type Discipline     06/16/21 -  Ibeth Guzmán COTA Occupational Therapist Assistant OT                    OT Recommendation and Plan          Plan of Care Review  Plan of Care Reviewed With: patient  Progress: improving  Outcome Evaluation: pt perf well with OT although  pt required encouragment.  perf adl this am shower bench tf with ruiz bathing with ruiz ub dressing with ind and lb with ruiz   pt perf funct mob in room sba - mod I using SC.  pm perf perf ther ex b ue bike x 12 min wheelchair mob with ind  Plan of Care Reviewed With: patient  Progress: improving            Time Calculation:      Time Calculation- OT     Row Name 04/08/23 1542 04/08/23 1512          Time Calculation- OT    OT Start Time 1435  -LM 1055  -LM     OT Stop Time 1500  -LM 1205  -LM     OT Time Calculation (min) 25 min  -LM 70 min  -LM     Total Timed Code Minutes- OT 25 minute(s)  -LM 70 minute(s)  -LM     OT Received On 04/08/23  -LM 04/08/23  -LM        Timed Charges    62123 - OT Therapeutic Exercise Minutes 15  -LM --     62240 - OT Therapeutic Activity Minutes 10  -LM --     89325 - OT Self Care/Mgmt Minutes -- 70  -LM        Total Minutes    Timed Charges Total Minutes 25  -LM 70  -LM      Total Minutes 25  -LM 70  -LM           User Key  (r) = Recorded By, (t) = Taken By, (c) = Cosigned By    Initials Name Provider Type    LM Celi Zee COTA Occupational Therapist Assistant              Therapy Charges for Today     Code Description Service Date Service Provider Modifiers Qty    27278140106 HC OT SELF CARE/MGMT/TRAIN EA 15 MIN 4/8/2023 Celi Zee COTA GO 5    11536544441 HC OT THERAPEUTIC ACT EA 15 MIN 4/8/2023 Celi Zee COTA GO 1    25686926202 HC OT THER PROC EA 15 MIN 4/8/2023 Celi Zee COTA GO 1                   EMILY Harrison  4/8/2023

## 2023-04-08 NOTE — PLAN OF CARE
Goal Outcome Evaluation:  Plan of Care Reviewed With: patient        Progress: improving  Outcome Evaluation: pt perf well with OT although  pt required encouragment.  perf adl this am shower bench tf with ruiz bathing with ruiz ub dressing with ind and lb with ruiz   pt perf funct mob in room sba - mod I using SC.  pm perf perf ther ex b ue bike x 12 min wheelchair mob with ind

## 2023-04-08 NOTE — PLAN OF CARE
Goal Outcome Evaluation:      PT treatement note: Patient presented with flat affect. Alert and oriented x 4, minimally responsive to patient inquiries. No pain noted. Gait training 130' x 2 SBA with SC, indep with all bed mobility tasks, supervised with sit to stand transfers with SC. Vital signs stable throughout the session. No goals met. Continue with balance training. Attempt standing with narrow QUYNH with ball toss with follow up treatment.

## 2023-04-08 NOTE — PLAN OF CARE
Goal Outcome Evaluation:  Plan of Care Reviewed With: patient        Progress: improving  Outcome Evaluation: Alert and oriented x4 voices needs as they arise, VSS with no acute distress noted.  Denies discomfort., Has rested during the night.

## 2023-04-08 NOTE — THERAPY TREATMENT NOTE
Inpatient Rehabilitation - Occupational Therapy Treatment Note    [facility]     Patient Name: Pavan Mccauley  : 1955  MRN: [id number]    Today's Date: 2023                 Admit Date: 2023         ICD-10-CM ICD-9-CM   1. Impaired mobility and ADLs  Z74.09 V49.89    Z78.9    2. Impaired functional mobility, balance, gait, and endurance  Z74.09 V49.89   3. Symbolic dysfunction  R48.9 784.60       Patient Active Problem List   Diagnosis   • General medical exam   • Malaise   • Hyperglycemia   • Cough   • Tick bite   • Screening for colon cancer   • Pneumonia due to organism   • Carotid artery aneurysm   • Dizziness   • SVT (supraventricular tachycardia)   • Atrial fibrillation (HCC)   • Dyspnea on exertion   • Atrial fibrillation with RVR   • Non-STEMI (non-ST elevated myocardial infarction)   • Biventricular heart failure with reduced left ventricular function   • Acute on chronic systolic (congestive) heart failure   • Leg DVT (deep venous thromboembolism), acute, bilateral   • Abnormal LFTs   • Medically complex patient   • Abdominal ascites   • Pleural effusion due to CHF (congestive heart failure)       Past Medical History:   Diagnosis Date   • Atrial fibrillation 2022    5 months   • Bronchopneumonia    • Chest pain    • Corneal foreign body     both eyes   • Hypertension    • Neck pain    • Perforation of left tympanic membrane     history of   • Prashant [place] spotted fever    • Wheezing        Past Surgical History:   Procedure Laterality Date   • CARDIAC CATHETERIZATION N/A 3/27/2023    Procedure: Left Heart Cath;  Surgeon: Prince Charles DO;  Location: [facility] INVASIVE LOCATION;  Service: Cardiology;  Laterality: N/A;   • EYE FOREIGN BODY REMOVAL  2013    REMOVE FOREIGN BODY CORNEA W/SLIT LAMP 29128 (1)     • INJECTION OF MEDICATION  2011    Kenalog (1)      • SPINE SURGERY  1991    Exploration of the previous C5-6 fusion with refusion, exploration of the  C5 nerve root, left side   • TYMPANOPLASTY Left 05/16/1972    Perforation of the left tympanic membrane             IRF OT ASSESSMENT FLOWSHEET (last 12 hours)     IRF OT Evaluation and Treatment     [facility] Name 04/08/23 [facility] 04/08/23 1055       OT Time and Intention    Document Type daily treatment  -LM daily treatment  -LM    Mode of Treatment individual therapy;occupational therapy  -LM individual therapy;occupational therapy  -LM    Row Name 04/08/23 [facility] 04/08/23 1055       General Information    Patient Profile Reviewed yes  -LM yes  -LM    Existing Precautions/Restrictions fall  -LM fall  -LM    Row Name 04/08/23 [facility] 04/08/23 1055       Pain Assessment    Pretreatment Pain Rating 0/10 - no pain  -LM 0/10 - no pain  -LM    Posttreatment Pain Rating 0/10 - no pain  -LM 0/10 - no pain  -LM    [facility] Name 04/08/23 [facility] 04/08/23 1055       Cognition/Psychosocial    Orientation Status (Cognition) oriented x 4  -LM oriented x 4  -LM    Row Name 04/08/23 1055          Basic Activities of Daily Living (BADLs)    Basic Activities of Daily Living lower body dressing;toileting;bathing;upper body dressing;grooming  -LM     [facility] Name 04/08/23 1055          Bathing    [place] Level (Bathing) bathing skills;lower body;upper body;upper extremities;chest/trunk;distal lower extremities/feet;proximal lower extremities;perineal area;set up;independent  -[facility] Name 04/08/23 1055          Upper Body Dressing    [place] Level (Upper Body Dressing) upper body dressing skills;don;doff;independent  -LM     Row Name 04/08/23 1055          Lower Body Dressing    [place] Level (Lower Body Dressing) doff;don;socks;supervision;pants/bottoms;shoes/slippers;underwear;set up  -     Position (Lower Body Dressing) sitting up in bed  -[facility] Name 04/08/23 1055          Grooming    [place] Level (Grooming) grooming skills;deodorant application;hair care, combing/brushing;independent;wash face, hands;oral care regimen  -      Position (Grooming) supported standing  -LM     Row Name 04/08/23 1055          Toileting    [place] Level (Toileting) toileting skills;independent  -LM     Row Name 04/08/23 1055          Bed Mobility    Bed Mobility sit-supine;supine-sit  -LM     Supine-Sit [place] (Bed Mobility) independent  -LM     Sit-Supine [place] (Bed Mobility) independent  -LM     Row Name 04/08/23 1435 04/08/23 1055       Functional Mobility    Functional Mobility- Ind. Level --  in room  -LM independent;other (see comments);supervision required  in room  -LM    Row Name 04/08/23 1435 04/08/23 1055       Shower Transfer    Type (Shower Transfer) --  shower tf with ruiz  -LM sit-stand;stand-sit;stand pivot/stand step  shower tf with ruiz  -LM    Row Name 04/08/23 1435          Motor Skills    Motor Skills coordination;other (see comments)  ue bike x 12 min  -LM     Row Name 04/08/23 1435          Positioning and Restraints    Pre-Treatment Position sitting in chair/recliner  -LM     Post Treatment Position chair  -LM     Row Name 04/08/23 1055          Vital Signs    Intra Treatment Diastolic BP 97  reported to nursing  -LM     Post Systolic BP Rehab 74  -LM     Intra SpO2 (%) 92  -LM     O2 Delivery Intra Treatment room air  -LM     Post SpO2 (%) 92  -LM     O2 Delivery Post Treatment room air  -LM     Row Name 04/08/23 1435 04/08/23 1055       Therapy Assessment/Plan (OT)    Rehab Potential/Prognosis (OT) good, to achieve stated therapy goals  -LM good, to achieve stated therapy goals  -LM    Frequency of Treatment (OT) other (see comments);90 minutes per session  -LM other (see comments);90 minutes per session  -LM    Estimated Duration of Therapy (OT) until facility discharge  -LM until facility discharge  -LM    Problem List (OT) balance;mobility;strength  -LM balance;mobility;strength  -LM    Activity Limitations Related to Problem List (OT) unable to transfer safely;IADLs not performed adequately or safely;unable to  ambulate safely;BADLs not performed adequately or safely  -LM unable to transfer safely;IADLs not performed adequately or safely;unable to ambulate safely;BADLs not performed adequately or safely  -LM    Row Name 04/08/23 1435 04/08/23 1055       IRF OT Goals    Bathing Goal Selection (OT-IRF) bathing, OT goal 1  -LM bathing, OT goal 1  -LM    Endurance Goal Selection (OT) endurance, OT goal 1  -LM endurance, OT goal 1  -LM    Activity Tolerance Goal Selection (OT) activity tolerance, OT goal 1  -LM activity tolerance, OT goal 1  -LM    Row Name 04/08/23 1435 04/08/23 1055       Bathing Goal 1 (OT-IRF)    Activity/Device (Bathing Goal 1, OT-IRF) bathing skills, all;grab bar, tub/shower;long-handled sponge;shower chair  -LM bathing skills, all;grab bar, tub/shower;long-handled sponge;shower chair  -LM    [place] Level (Bathing Goal 1, OT-IRF) modified independence  -LM modified independence  -LM    Time Frame (Bathing Goal 1, OT-IRF) long-term goal (LTG);by discharge  -LM long-term goal (LTG);by discharge  -LM    Progress/Outcomes (Bathing Goal 1, OT-IRF) goal not met  -LM goal not met  -LM    Row Name 04/08/23 1435 04/08/23 1055       Strength Goal 1 (OT-IRF)    Strength Goal 1 (OT-IRF) Pt will tolerate at least 20 minutes BUE strengthening activities OOB/EOB with VSS and with only 4 rest breaks needed for increased strenth and endurance required for ADLs and mobility.  -LM Pt will tolerate at least 20 minutes BUE strengthening activities OOB/EOB with VSS and with only 4 rest breaks needed for increased strenth and endurance required for ADLs and mobility.  -LM    Time Frame (Strength Goal 1, OT-IRF) long-term goal (LTG);by discharge  -LM long-term goal (LTG);by discharge  -LM    Progress/Outcomes (Strength Goal 1, OT-IRF) goal not met  -LM goal not met  -LM    Row Name 04/08/23 1435 04/08/23 1055        Endurance Goal 1 (OT)    Activity Level (Endurance Goal 1, OT) endurance 2 good  -LM endurance 2 good  -LM     Progress/Outcome (Endurance Goal 1, OT) goal not met  -LM goal not met  -LM    Row Name 04/08/23 1435 04/08/23 1055        Activity Tolerance Goal 1 (OT)    Activity Level (Endurance Goal 1, OT) 20 min activity  -LM 20 min activity  -LM    Progress/Outcome (Activity Tolerance Goal 1, OT) goal not met  -LM goal not met  -LM          User Key  (r) = Recorded By, (t) = Taken By, (c) = Cosigned By    Initials Name Effective Dates    Celi Chaney COTA 06/16/21 -                  Occupational Therapy Education     Title: PT OT SLP Therapies (In Progress)     Topic: Occupational Therapy (In Progress)     Point: ADL training (Done)     Description:   Instruct learner(s) on proper safety adaptation and remediation techniques during self care or transfers.   Instruct in proper use of assistive devices.              Learning Progress Summary           Patient Acceptance, E, VU,NR by  at 4/6/2023 1027                   Point: Home exercise program (Not Started)     Description:   Instruct learner(s) on appropriate technique for monitoring, assisting and/or progressing therapeutic exercises/activities.              Learner Progress:  Not documented in this visit.          Point: Precautions (Done)     Description:   Instruct learner(s) on prescribed precautions during self-care and functional transfers.              Learning Progress Summary           Patient Acceptance, E, VU,NR by  at 4/6/2023 1027                   Point: Body mechanics (Done)     Description:   Instruct learner(s) on proper positioning and spine alignment during self-care, functional mobility activities and/or exercises.              Learning Progress Summary           Patient Acceptance, E, VU,NR by  at 4/6/2023 1027                               User Key     Initials Effective Dates Name Provider Type Discipline     06/16/21 -  Ibeth Guzmán COTA Occupational Therapist Assistant OT                    OT Recommendation and Plan          Plan of Care Review  Plan of Care Reviewed With: patient  Progress: improving  Outcome Evaluation: pt perf well with OT although  pt required encouragment.  perf adl this am shower bench tf with ruiz bathing with ruiz ub dressing with ind and lb with ruiz   pt perf funct mob in room sba - mod I using SC.  pm perf perf ther ex b ue bike x 12 min wheelchair mob with ind  Plan of Care Reviewed With: patient  Progress: improving            Time Calculation:      Time Calculation- OT     Row Name 04/08/23 1542 04/08/23 1512          Time Calculation- OT    OT Start Time 1435  -LM 1055  -LM     OT Stop Time 1500  -LM 1205  -LM     OT Time Calculation (min) 25 min  -LM 70 min  -LM     Total Timed Code Minutes- OT 25 minute(s)  -LM 70 minute(s)  -LM     OT Received On 04/08/23  -LM 04/08/23  -LM        Timed Charges    33814 - OT Therapeutic Exercise Minutes 15  -LM --     00218 - OT Therapeutic Activity Minutes 10  -LM --     17299 - OT Self Care/Mgmt Minutes -- 70  -LM        Total Minutes    Timed Charges Total Minutes 25  -LM 70  -LM      Total Minutes 25  -LM 70  -LM           User Key  (r) = Recorded By, (t) = Taken By, (c) = Cosigned By    Initials Name Provider Type    LM Celi Zee COTA Occupational Therapist Assistant              Therapy Charges for Today     Code Description Service Date Service Provider Modifiers Qty    98457961560 HC OT SELF CARE/MGMT/TRAIN EA 15 MIN 4/8/2023 Celi Zee COTA GO 5    10492895351 HC OT THERAPEUTIC ACT EA 15 MIN 4/8/2023 Celi Zee COTA GO 1    29139152908 HC OT THER PROC EA 15 MIN 4/8/2023 Celi Zee COTA GO 1                   EMILY Harrison  4/8/2023

## 2023-04-08 NOTE — PLAN OF CARE
Problem: Rehabilitation (IRF) Plan of Care  Goal: Plan of Care Review  Outcome: Ongoing, Progressing  Flowsheets (Taken 4/8/2023 1617)  Progress: improving  Plan of Care Reviewed With: patient  Outcome Evaluation: vss. working with therapy. no complaints of pain or discomfort. standby assist. resting between care.   Goal Outcome Evaluation:  Plan of Care Reviewed With: patient        Progress: improving  Outcome Evaluation: vss. working with therapy. no complaints of pain or discomfort. standby assist. resting between care.

## 2023-04-08 NOTE — THERAPY TREATMENT NOTE
Inpatient Rehabilitation - Physical Therapy Treatment Note       [facility]     Patient Name: Pavan Mccauley  : 1955  MRN: [id number]    Today's Date: 2023                    Admit Date: 2023      Visit Dx:     ICD-10-CM ICD-9-CM   1. Impaired mobility and ADLs  Z74.09 V49.89    Z78.9    2. Impaired functional mobility, balance, gait, and endurance  Z74.09 V49.89   3. Symbolic dysfunction  R48.9 784.60       Patient Active Problem List   Diagnosis   • General medical exam   • Malaise   • Hyperglycemia   • Cough   • Tick bite   • Screening for colon cancer   • Pneumonia due to organism   • Carotid artery aneurysm   • Dizziness   • SVT (supraventricular tachycardia)   • Atrial fibrillation (HCC)   • Dyspnea on exertion   • Atrial fibrillation with RVR   • Non-STEMI (non-ST elevated myocardial infarction)   • Biventricular heart failure with reduced left ventricular function   • Acute on chronic systolic (congestive) heart failure   • Leg DVT (deep venous thromboembolism), acute, bilateral   • Abnormal LFTs   • Medically complex patient   • Abdominal ascites   • Pleural effusion due to CHF (congestive heart failure)       Past Medical History:   Diagnosis Date   • Atrial fibrillation 2022    5 months   • Bronchopneumonia    • Chest pain    • Corneal foreign body     both eyes   • Hypertension    • Neck pain    • Perforation of left tympanic membrane     history of   • Prashant [place] spotted fever    • Wheezing        Past Surgical History:   Procedure Laterality Date   • CARDIAC CATHETERIZATION N/A 3/27/2023    Procedure: Left Heart Cath;  Surgeon: Prince Charles DO;  Location: [facility] INVASIVE LOCATION;  Service: Cardiology;  Laterality: N/A;   • EYE FOREIGN BODY REMOVAL  2013    REMOVE FOREIGN BODY CORNEA W/SLIT LAMP 50103 (1)     • INJECTION OF MEDICATION  2011    Kenalog (1)      • SPINE SURGERY  1991    Exploration of the previous C5-6 fusion with refusion,  exploration of the C5 nerve root, left side   • TYMPANOPLASTY Left 05/16/1972    Perforation of the left tympanic membrane       PT ASSESSMENT (last 12 hours)     IRF PT Evaluation and Treatment     Row Name 04/08/23 1140          PT Time and Intention    Document Type daily treatment  -BS     Mode of Treatment individual therapy;physical therapy  -BS     Patient/Family/Caregiver Comments/Observations no family present  -BS     Row Name 04/08/23 1140          General Information    Patient Profile Reviewed yes  -BS     Existing Precautions/Restrictions fall  -BS     Row Name 04/08/23 1140          Pain Assessment    Pretreatment Pain Rating 0/10 - no pain  -BS     Posttreatment Pain Rating 0/10 - no pain  -BS     Row Name 04/08/23 1140          Cognition/Psychosocial    Affect/Mental Status (Cognition) flat/blunted affect  -BS     Orientation Status (Cognition) oriented x 4  -BS     Row Name 04/08/23 1140          Bed Mobility    Rolling Left [place] (Bed Mobility) independent  -BS     Rolling Right [place] (Bed Mobility) independent  -BS     Supine-Sit [place] (Bed Mobility) independent  -BS     Sit-Supine [place] (Bed Mobility) independent  -BS     Assistive Device (Bed Mobility) bed rails;head of bed elevated  -BS     Row Name 04/08/23 1140          Sit-Stand Transfer    Sit-Stand [place] (Transfers) supervision  -BS     Assistive Device (Sit-Stand Transfers) cane, straight  -BS     Row Name 04/08/23 1140          Toilet Transfer    Type (Toilet Transfer) sit-stand  -BS     [place] Level (Toilet Transfer) supervision  -BS     Assistive Device (Toilet Transfer) cane, straight  -BS     Row Name 04/08/23 1140          Gait/Stairs (Locomotion)    [place] Level (Gait) standby assist  -BS     Assistive Device (Gait) cane, straight  -BS     Distance in Feet (Gait) 130' x 2  -BS     Pattern (Gait) step-through  -BS     Deviations/Abnormal Patterns (Gait) gait speed decreased  -BS      Row Name 04/08/23 1140          Hip (Therapeutic Exercise)    Hip (Therapeutic Exercise) strengthening exercise;AROM (active range of motion)  -     Hip Strengthening (Therapeutic Exercise) mini squats;marching while standing;flexion;extension;aBduction;10 repetitions;standing;other (see comments)  sitting green TB resisted HS curls x 10, seated B hip abd resisted with green TB  -     Row Name 04/08/23 1140          Knee (Therapeutic Exercise)    Knee Strengthening (Therapeutic Exercise) sitting;bilateral;LAQ (long arc quad);3 lb free weight;marching while seated;10 repetitions  -     Row Name 04/08/23 1140          Ankle (Therapeutic Exercise)    Ankle Strengthening (Therapeutic Exercise) other (see comments)  tandem walking in // bars 8' x 2, sidestepping in // bars 8' x 2 w/ B UE A of // bars; bwd walking 8' x 2 in // bars, standing B knee flex AROM in // bars  -     Row Name 04/08/23 1140          Therapy Assessment/Plan (PT)    Rehab Potential/Prognosis (PT) good, to achieve stated therapy goals  -     Frequency of Treatment (PT) other (see comments)  5-6d/wk x 2 weeks  -BS     Estimated Duration of Therapy (PT) 2 weeks  -     Problem List (PT) problems related to;balance;coordination;mobility;strength;pain  -     Activity Limitations Related to Problem List (PT) unable to transfer safely;unable to ambulate safely  -     Row Name 04/08/23 1140          Daily Progress Summary (PT)    Functional Goal Overall Progress (PT) progressing toward functional goals as expected  -     Row Name 04/08/23 1140          Therapy Plan Review/Discharge Plan (PT)    Anticipated Discharge Disposition (PT) home with assist;home with home health  -     Row Name 04/08/23 1140          IRF PT Goals    Transfer Goal Selection (PT-IRF) transfers, PT goal 1  -     Gait (Walking Locomotion) Goal Selection (PT-IRF) gait, PT goal 1;gait, PT goal (free text)  -     Balance Goal Selection (PT) balance, PT goal (free  text)  -BS     Row Name 04/08/23 1140          Transfer Goal 1 (PT-IRF)    Activity/Assistive Device (Transfer Goal 1, PT-IRF) all transfers;bed-to-chair/chair-to-bed;sit-to-stand/stand-to-sit  -BS     [place] Level (Transfer Goal 1, PT-IRF) independent  -BS     Time Frame (Transfer Goal 1, PT-IRF) 2 weeks  -BS     Progress/Outcomes (Transfer Goal 1, PT-IRF) goal not met  -BS     Row Name 04/08/23 1140          Gait/Walking Locomotion Goal 1 (PT-IRF)    Activity/Assistive Device (Gait/Walking Locomotion Goal 1, PT-IRF) gait (walking locomotion)  -BS     Gait/Walking Locomotion Distance Goal 1 (PT-IRF) 300'x1  -BS     [place] Level (Gait/Walking Locomotion Goal 1, PT-IRF) modified independence  -BS     Time Frame (Gait/Walking Locomotion Goal 1, PT-IRF) 2 weeks  -BS     Strategies/Barriers (Gait/Walking Locomotion Goal 1, PT-IRF) No AD vs SPC  -BS     Progress/Outcomes (Gait/Walking Locomotion Goal 1, PT-IRF) goal not met  -BS     Row Name 04/08/23 1140          Gait/Walking Locomotion Goal (PT-IRF)    Gait/Walking Locomotion Goal (PT-IRF) Patient will improve 10 meter walk test to 0.8 m/s to show decreased fall risk and reduced readmittance risk.  -BS     Time Frame (Gait/Walking Locomotion Goal, PT-IRF) 2 weeks  -BS     Progress/Outcomes (Gait/Walking Locomotion Goal, PT-IRF) goal not met  -BS     Row Name 04/08/23 1140          Balance Goal (PT)    Balance Goal (PT) Patient will improve Reis balance scale to >45/56 to show decreased fall risk.  -BS     Time Frame (Balance Goal, PT) 2 weeks  -BS     Progress/Outcome (Balance Goal, PT) goal not met  -BS           User Key  (r) = Recorded By, (t) = Taken By, (c) = Cosigned By    Initials Name Provider Type    Schuyler Joy, PT Physical Therapist                 Physical Therapy Education     Title: PT OT SLP Therapies (In Progress)     Topic: Physical Therapy (Not Started)     Point: Mobility training (Not Started)     Learner Progress:  Not documented  in this visit.          Point: Home exercise program (Not Started)     Learner Progress:  Not documented in this visit.          Point: Body mechanics (Not Started)     Learner Progress:  Not documented in this visit.          Point: Precautions (Not Started)     Learner Progress:  Not documented in this visit.                            PT Recommendation and Plan    Planned Therapy Interventions (PT): balance training  Frequency of Treatment (PT): other (see comments) (5-6d/wk x 2 weeks)     Daily Progress Summary (PT)  Functional Goal Overall Progress (PT): progressing toward functional goals as expected               Time Calculation:      PT Charges     Row Name 04/08/23 1435 04/08/23 1433          Time Calculation    Start Time 1325  -BS 1140  -BS     Stop Time 1435  -BS 1208  -BS     Time Calculation (min) 70 min  -BS 28 min  -BS     PT Received On 04/08/23  -BS 04/08/23  -BS     PT Goal Re-Cert Due Date 04/18/23  -BS 04/18/23  -BS        Time Calculation- PT    Total Timed Code Minutes- PT 70 minute(s)  -BS 28 minute(s)  -BS           User Key  (r) = Recorded By, (t) = Taken By, (c) = Cosigned By    Initials Name Provider Type    BS Schuyler Bhakta, PT Physical Therapist                Therapy Charges for Today     Code Description Service Date Service Provider Modifiers Qty    24694058952 HC PT THER PROC EA 15 MIN 4/8/2023 Schuyler Bhakta, PT GP 3    41897270484 HC GAIT TRAINING EA 15 MIN 4/8/2023 Schuyler Bhakta, PT GP 1    21967429362 HC PT NEUROMUSC RE EDUCATION EA 15 MIN 4/8/2023 Schuyler Bhakta, PT GP 2            PT G-Codes  Outcome Measure Options: 10 Meter Walk, AM-PAC 6 Clicks Basic Mobility (PT), Reis Balance  AM-PAC 6 Clicks Score (PT): 20  AM-PAC 6 Clicks Score (OT): 22  Reis Total Score: 37      Schuyler Bhakta PT  4/8/2023

## 2023-04-09 LAB
ALBUMIN SERPL-MCNC: 2.5 G/DL (ref 3.5–5.2)
ALBUMIN/GLOB SERPL: 0.9 G/DL
ALP SERPL-CCNC: 125 U/L (ref 39–117)
ALT SERPL W P-5'-P-CCNC: 42 U/L (ref 1–41)
ANION GAP SERPL CALCULATED.3IONS-SCNC: 8 MMOL/L (ref 5–15)
AST SERPL-CCNC: 40 U/L (ref 1–40)
BILIRUB SERPL-MCNC: 3.2 MG/DL (ref 0–1.2)
BUN SERPL-MCNC: 15 MG/DL (ref 8–23)
BUN/CREAT SERPL: 19.2 (ref 7–25)
CALCIUM SPEC-SCNC: 8.2 MG/DL (ref 8.6–10.5)
CHLORIDE SERPL-SCNC: 102 MMOL/L (ref 98–107)
CO2 SERPL-SCNC: 27 MMOL/L (ref 22–29)
CREAT SERPL-MCNC: 0.78 MG/DL (ref 0.76–1.27)
EGFRCR SERPLBLD CKD-EPI 2021: 99 ML/MIN/1.73
GLOBULIN UR ELPH-MCNC: 2.9 GM/DL
GLUCOSE SERPL-MCNC: 74 MG/DL (ref 65–99)
POTASSIUM SERPL-SCNC: 3.9 MMOL/L (ref 3.5–5.2)
PROT SERPL-MCNC: 5.4 G/DL (ref 6–8.5)
SODIUM SERPL-SCNC: 137 MMOL/L (ref 136–145)
WHOLE BLOOD HOLD SPECIMEN: NORMAL

## 2023-04-09 PROCEDURE — 80053 COMPREHEN METABOLIC PANEL: CPT | Performed by: ORTHOPAEDIC SURGERY

## 2023-04-09 PROCEDURE — 99231 SBSQ HOSP IP/OBS SF/LOW 25: CPT | Performed by: ORTHOPAEDIC SURGERY

## 2023-04-09 RX ADMIN — RIVAROXABAN 15 MG: 15 TABLET, FILM COATED ORAL at 17:04

## 2023-04-09 RX ADMIN — Medication 12.5 MG: at 08:22

## 2023-04-09 RX ADMIN — FUROSEMIDE 20 MG: 20 TABLET ORAL at 17:05

## 2023-04-09 RX ADMIN — SPIRONOLACTONE 25 MG: 25 TABLET ORAL at 08:23

## 2023-04-09 RX ADMIN — DIGOXIN 125 MCG: 125 TABLET ORAL at 12:26

## 2023-04-09 RX ADMIN — AMIODARONE HYDROCHLORIDE 200 MG: 200 TABLET ORAL at 08:22

## 2023-04-09 RX ADMIN — DOCUSATE SODIUM 100 MG: 100 CAPSULE, LIQUID FILLED ORAL at 08:22

## 2023-04-09 RX ADMIN — DOCUSATE SODIUM 100 MG: 100 CAPSULE, LIQUID FILLED ORAL at 20:03

## 2023-04-09 RX ADMIN — FUROSEMIDE 20 MG: 20 TABLET ORAL at 08:22

## 2023-04-09 RX ADMIN — RIVAROXABAN 15 MG: 15 TABLET, FILM COATED ORAL at 08:22

## 2023-04-09 NOTE — PLAN OF CARE
Problem: Rehabilitation (IRF) Plan of Care  Goal: Plan of Care Review  Outcome: Ongoing, Progressing  Flowsheets (Taken 4/9/2023 7650)  Progress: improving  Plan of Care Reviewed With: patient  Outcome Evaluation: vss. no complaints of pain or discomfort. resting between care.   Goal Outcome Evaluation:  Plan of Care Reviewed With: patient        Progress: improving  Outcome Evaluation: vss. no complaints of pain or discomfort. resting between care.

## 2023-04-09 NOTE — PLAN OF CARE
Goal Outcome Evaluation:  Plan of Care Reviewed With: patient        Progress: improving  Outcome Evaluation: VSS  woth no acute distress noted denies pain or discomfort, up with standby assist. Has rested during night.

## 2023-04-09 NOTE — PROGRESS NOTES
Progress Note      Admit date: 4/4/2023 10:10 AM       Treatment Team     Provider Relationship Specialty Contact    Gilmer Solomon MD Attending Orthopedic Surgery   [phone]      Maik Gentile PTA Physical Therapy Assistant Physical Therapy --    Celi Mendez, W  -- --    Poppy Suarez RN Registered Nurse -- --    Miguel Ángel Motley BSW  -- --    Ignacia Payne RN Registered Nurse -- --    Bob Caicedo MD Consulting Physician Cardiothoracic Surgery   [phone]      Mima Todd RN Registered Nurse -- --    Merry Carranza [facility]  -- --             Chief Complaint:  Medically complex patient    HPI: No changes since admission    Vital Signs  Temp:  [97.2 °F (36.2 °C)-98.1 °F (36.7 °C)] 98.1 °F (36.7 °C)  Heart Rate:  [88-95] 92  Resp:  [18] 18  BP: ()/(71-77) 118/76    Physical Exam 1:    Constitutional: Alert stable eating breakfast this morning no specific complaints today feels better     HENT:      Eyes:     Neck:      Cardiovascular: No chest pain or evidence of failure    Pulmonary:      Abdominal:      Genitourinary/Anorectal:       Musculoskeletal: Improving and endurance and strength  Skin:     Neurological:      Psychiatric/Behavioral:        Results Review:    I reviewed the patient's new clinical results.    Nursing notes and therapy notes have been reviewed.    I have reviewed medications.  Waiting for his lab test    Assessment/Plan  Principal Problem:    Medically complex patient stable  Active Problems:    Atrial fibrillation with RVR    Non-STEMI (non-ST elevated myocardial infarction)    Biventricular heart failure with reduced left ventricular function    Acute on chronic systolic (congestive) heart failure    Leg DVT (deep venous thromboembolism), acute, bilateral    Abnormal LFTs    Abdominal ascites    Pleural effusion due to CHF (congestive heart failure) resolved     Other conditions are unchanged  except pleural effusion is resolved      Gilmer Solomon MD  04/09/23  07:10 CDT          This document has been electronically signed by Gilmer Solomon MD on April 9, 2023 07:10 CDT      Part of this note may be an electronic transcription/translation of spoken language to printed text using the Dragon Dictation System.

## 2023-04-10 PROCEDURE — 97530 THERAPEUTIC ACTIVITIES: CPT

## 2023-04-10 PROCEDURE — 97110 THERAPEUTIC EXERCISES: CPT

## 2023-04-10 PROCEDURE — 99231 SBSQ HOSP IP/OBS SF/LOW 25: CPT | Performed by: ORTHOPAEDIC SURGERY

## 2023-04-10 PROCEDURE — 97116 GAIT TRAINING THERAPY: CPT

## 2023-04-10 PROCEDURE — 97535 SELF CARE MNGMENT TRAINING: CPT

## 2023-04-10 RX ADMIN — RIVAROXABAN 15 MG: 15 TABLET, FILM COATED ORAL at 08:01

## 2023-04-10 RX ADMIN — FUROSEMIDE 20 MG: 20 TABLET ORAL at 08:02

## 2023-04-10 RX ADMIN — AMIODARONE HYDROCHLORIDE 200 MG: 200 TABLET ORAL at 08:02

## 2023-04-10 RX ADMIN — Medication 12.5 MG: at 08:01

## 2023-04-10 RX ADMIN — DOCUSATE SODIUM 100 MG: 100 CAPSULE, LIQUID FILLED ORAL at 08:01

## 2023-04-10 RX ADMIN — SPIRONOLACTONE 25 MG: 25 TABLET ORAL at 08:01

## 2023-04-10 RX ADMIN — RIVAROXABAN 15 MG: 15 TABLET, FILM COATED ORAL at 17:14

## 2023-04-10 RX ADMIN — DIGOXIN 125 MCG: 125 TABLET ORAL at 12:19

## 2023-04-10 RX ADMIN — DOCUSATE SODIUM 100 MG: 100 CAPSULE, LIQUID FILLED ORAL at 20:09

## 2023-04-10 RX ADMIN — FUROSEMIDE 20 MG: 20 TABLET ORAL at 17:14

## 2023-04-10 NOTE — PLAN OF CARE
Goal Outcome Evaluation:  Plan of Care Reviewed With: patient        Progress: improving  Outcome Evaluation: Pt. has met 3/4 goals at this time. Pt. independent with transfers in room with SPC, pt. amb. ~300ft with SPC this p.m. Mod independent no LOB, but guarded with performance, pt. improved with 10m walk test & Reis balance test this a.m. Pt. defers HEP upon d/c at home. Pt. scheduled to d/c home tomorrow with SPC will encourage PT after d/c home for further LE strengthening.

## 2023-04-10 NOTE — THERAPY TREATMENT NOTE
Inpatient Rehabilitation - Physical Therapy Treatment Note       [facility]     Patient Name: Pavan Mccauley  : 1955  MRN: [id number]    Today's Date: 4/10/2023                    Admit Date: 2023      Visit Dx:     ICD-10-CM ICD-9-CM   1. Impaired mobility and ADLs  Z74.09 V49.89    Z78.9    2. Impaired functional mobility, balance, gait, and endurance  Z74.09 V49.89   3. Symbolic dysfunction  R48.9 784.60       Patient Active Problem List   Diagnosis   • General medical exam   • Malaise   • Hyperglycemia   • Cough   • Tick bite   • Screening for colon cancer   • Pneumonia due to organism   • Carotid artery aneurysm   • Dizziness   • SVT (supraventricular tachycardia)   • Atrial fibrillation (HCC)   • Dyspnea on exertion   • Atrial fibrillation with RVR   • Non-STEMI (non-ST elevated myocardial infarction)   • Biventricular heart failure with reduced left ventricular function   • Acute on chronic systolic (congestive) heart failure   • Leg DVT (deep venous thromboembolism), acute, bilateral   • Abnormal LFTs   • Medically complex patient   • Abdominal ascites   • Pleural effusion due to CHF (congestive heart failure)       Past Medical History:   Diagnosis Date   • Atrial fibrillation 2022    5 months   • Bronchopneumonia    • Chest pain    • Corneal foreign body     both eyes   • Hypertension    • Neck pain    • Perforation of left tympanic membrane     history of   • Prashant [place] spotted fever    • Wheezing        Past Surgical History:   Procedure Laterality Date   • CARDIAC CATHETERIZATION N/A 3/27/2023    Procedure: Left Heart Cath;  Surgeon: [place] DO;  Location: [facility] INVASIVE LOCATION;  Service: Cardiology;  Laterality: N/A;   • EYE FOREIGN BODY REMOVAL  2013    REMOVE FOREIGN BODY CORNEA W/SLIT LAMP [id number] (1)     • INJECTION OF MEDICATION  2011    Kenalog (1)      • SPINE SURGERY  1991    Exploration of the previous C5-6 fusion with refusion,  exploration of the C5 nerve root, left side   • TYMPANOPLASTY Left 05/16/1972    Perforation of the left tympanic membrane       PT ASSESSMENT (last 12 hours)     IRF PT Evaluation and Treatment     Row Name 04/10/23 143 04/10/23 0832       PT Time and Intention    Document Type daily treatment  - daily treatment  -    Mode of Treatment individual therapy;physical therapy  - individual therapy;physical therapy  -    Row Name 04/10/23 1431 04/10/23 0832       General Information    Patient Profile Reviewed yes  -JA yes  -FACUNDO    Existing Precautions/Restrictions fall  - fall  -    Row Name 04/10/23 1431 04/10/23 0832       Pain Assessment    Pretreatment Pain Rating 0/10 - no pain  - 0/10 - no pain  -    Posttreatment Pain Rating 0/10 - no pain  - 0/10 - no pain  -    Row Name 04/10/23 1431 04/10/23 0832       Cognition/Psychosocial    Affect/Mental Status (Cognition) flat/blunted affect  - flat/blunted affect  -    Orientation Status (Cognition) oriented x 4  - oriented x 4  -[facility] Name 04/10/23 1431 04/10/23 0832       Bed Mobility    Rolling Left [place] (Bed Mobility) independent  - independent  -    Rolling Right [place] (Bed Mobility) -- independent  -    Supine-Sit [place] (Bed Mobility) independent  - independent  -    Sit-Supine [place] (Bed Mobility) independent  - independent  -    Assistive Device (Bed Mobility) bed rails;head of bed elevated  - bed rails;head of bed elevated  -[facility] Name 04/10/23 1431 04/10/23 0832       Transfer Assessment/Treatment    Transfers sit-stand transfer;stand-sit transfer  - sit-stand transfer;stand-sit transfer  -[facility] Name 04/10/23 1431 04/10/23 0832       Sit-Stand Transfer    Sit-Stand [place] (Transfers) independent  - independent  -    Assistive Device (Sit-Stand Transfers) cane, straight  - cane, straight  -[facility] Name 04/10/23 1431 04/10/23 0832       Stand-Sit Transfer     Stand-Sit [place] (Transfers) independent  - independent  -    Assistive Device (Stand-Sit Transfers) cane, straight  -JA cane, straight  -JA    Row Name 04/10/23 0832          Toilet Transfer    Type (Toilet Transfer) sit-stand;stand-sit  -JA     [facility] Level (Toilet Transfer) independent  -     Assistive Device (Toilet Transfer) cane, straight  -JA     Row Name 04/10/23 1431 04/10/23 0832       Gait/Stairs (Locomotion)    [facility] [place] Level (Gait) modified independence  - independent  -    Assistive Device (Gait) cane, straight  -JA cane, straight  -JA    Distance in Feet (Gait) 300  - x2  -JA    Pattern (Gait) step-through  -JA step-through  -JA    Deviations/Abnormal Patterns (Gait) gait speed decreased;base of support, wide  - gait speed decreased  -    Row Name 04/10/23 1431 04/10/23 0832       Motor Skills    Therapeutic Exercise --  supine HS, supine Hip A/A, SLR, AP  -JA --  reza BAUTISTA  -    Row Name 04/10/23 1431 04/10/23 0832       Positioning and Restraints    Pre-Treatment Position in bed  - standing in room  -JA    Post Treatment Position bed  - bed  -JA    In Bed supine;call light within reach;encouraged to call for assist  -JA sitting EOB;call light within reach  -    Row Name 04/10/23 1431 04/10/23 0832       Vital Signs    Pre Systolic BP Rehab 100  -  -JA    Pre Treatment Diastolic BP 65  -JA 78  -JA    Post Systolic BP Rehab 103  -  -JA    Post Treatment Diastolic BP 72  -JA 71  -JA    Pretreatment Heart Rate (beats/min) 93  -JA 98  -JA    Posttreatment Heart Rate (beats/min) 94  -JA 90  -JA    Pre SpO2 (%) 91  -JA 93  -JA    O2 Delivery Pre Treatment room air  -JA room air  -JA    Post SpO2 (%) 93  -JA 94  -JA    O2 Delivery Post Treatment room air  -JA room air  -JA    Pre Patient Position Supine  -JA --    Intra Patient Position Standing  -JA Sitting  -JA    Post Patient Position Supine  -JA Sitting  -JA    Row Name 04/10/23 1431 04/10/23  0832       Therapy Assessment/Plan (PT)    Rehab Potential/Prognosis (PT) good, to achieve stated therapy goals  - good, to achieve stated therapy goals  -    Frequency of Treatment (PT) other (see comments)  5-6d/wk x 2 weeks  -JA other (see comments)  5-6d/wk x 2 weeks  -JA    Estimated Duration of Therapy (PT) 2 weeks  -JA 2 weeks  -JA    Problem List (PT) problems related to;balance;coordination;mobility;strength;pain  - problems related to;balance;coordination;mobility;strength;pain  -    Activity Limitations Related to Problem List (PT) unable to transfer safely;unable to ambulate safely  - unable to transfer safely;unable to ambulate safely  -    Row Name 04/10/23 1431 04/10/23 0832       Therapy Plan Review/Discharge Plan (PT)    Anticipated Discharge Disposition (PT) home with assist;home with home health  - home with assist;home with home health  -    Row Name 04/10/23 1431 04/10/23 0832       IRF PT Goals    Transfer Goal Selection (PT-IRF) transfers, PT goal 1  - transfers, PT goal 1  -    Gait (Walking Locomotion) Goal Selection (PT-IRF) gait, PT goal 1;gait, PT goal (free text)  - gait, PT goal 1;gait, PT goal (free text)  -    Balance Goal Selection (PT) balance, PT goal (free text)  - balance, PT goal (free text)  -    Row Name 04/10/23 1431 04/10/23 0832       Transfer Goal 1 (PT-IRF)    Activity/Assistive Device (Transfer Goal 1, PT-IRF) all transfers;bed-to-chair/chair-to-bed;sit-to-stand/stand-to-sit  - all transfers;bed-to-chair/chair-to-bed;sit-to-stand/stand-to-sit  -    [place] Level (Transfer Goal 1, PT-IRF) independent  - independent  -    Time Frame (Transfer Goal 1, PT-IRF) 2 weeks  -JA 2 weeks  -    Progress/Outcomes (Transfer Goal 1, PT-IRF) goal met  - goal met  -    Row Name 04/10/23 1431 04/10/23 0832       Gait/Walking Locomotion Goal 1 (PT-IRF)    Activity/Assistive Device (Gait/Walking Locomotion Goal 1, PT-IRF) gait (walking  locomotion)  -JA gait (walking locomotion)  -JA    Gait/Walking Locomotion Distance Goal 1 (PT-IRF) 300'x1  -'x1  -JA    [place] Level (Gait/Walking Locomotion Goal 1, PT-IRF) modified independence  -JA modified independence  -JA    Time Frame (Gait/Walking Locomotion Goal 1, PT-IRF) 2 weeks  -JA 2 weeks  -JA    Strategies/Barriers (Gait/Walking Locomotion Goal 1, PT-IRF) No AD vs SPC  -JA No AD vs SPC  -JA    Progress/Outcomes (Gait/Walking Locomotion Goal 1, PT-IRF) goal met  -JA goal not met  -JA    Row Name 04/10/23 1431 04/10/23 0832       Gait/Walking Locomotion Goal (PT-IRF)    Gait/Walking Locomotion Goal (PT-IRF) Patient will improve 10 meter walk test to 0.8 m/s to show decreased fall risk and reduced readmittance risk.  -JA Patient will improve 10 meter walk test to 0.8 m/s to show decreased fall risk and reduced readmittance risk.  -JA    Time Frame (Gait/Walking Locomotion Goal, PT-IRF) 2 weeks  -JA 2 weeks  -JA    Progress/Outcomes (Gait/Walking Locomotion Goal, PT-IRF) goal not met  -JA goal not met  -JA    Row Name 04/10/23 1431 04/10/23 0832       Balance Goal (PT)    Balance Goal (PT) Patient will improve Reis balance scale to >45/56 to show decreased fall risk.  -JA Patient will improve Reis balance scale to >45/56 to show decreased fall risk.  -JA    Time Frame (Balance Goal, PT) 2 weeks  -JA 2 weeks  -JA    Progress/Outcome (Balance Goal, PT) goal met  -JA goal met  -JA          User Key  (r) = Recorded By, (t) = Taken By, (c) = Cosigned By    Initials Name Provider Type    Maik Chairez PTA Physical Therapist Assistant                 Physical Therapy Education     Title: PT OT SLP Therapies (In Progress)     Topic: Physical Therapy (In Progress)     Point: Mobility training (Not Started)     Learner Progress:  Not documented in this visit.          Point: Home exercise program (Done)     Learning Progress Summary           Patient Refuses, E, VU by FACUNDO at 4/10/2023 2237     Comment: Pt. defers HEP for use at home upon d/c at this time                   Point: Body mechanics (Not Started)     Learner Progress:  Not documented in this visit.          Point: Precautions (Not Started)     Learner Progress:  Not documented in this visit.                      User Key     Initials Effective Dates Name Provider Type Discipline     06/16/21 -  Maik Gentile PTA Physical Therapist Assistant PT                PT Recommendation and Plan    Frequency of Treatment (PT): other (see comments) (5-6d/wk x 2 weeks)  Plan of Care Reviewed With: patient  Progress: improving  Outcome Evaluation: Pt. has met 3/4 goals at this time. Pt. independent with transfers in room with SPC, pt. amb. ~300ft with SPC this p.m. Mod independent no LOB, but guarded with performance, pt. improved with 10m walk test & Reis balance test this a.m. Pt. defers HEP upon d/c at home. Pt. scheduled to d/c home tomorrow with SPC will encourage PT after d/c home for further LE strengthening.       Outcome Measures     Row Name 04/10/23 0800             10 Meter Walk Test Self-Selected Velocity    Self-Selected Velocity: Trial 1 10.09 sec.  -      Self-Selected Velocity: Trial 2 9.21 sec.  -      Self-Selected Velocity: Trial 3 11.19 sec.  -      Self-Selected Velocity: Average Time 10.16 sec.  -         10 Meter Walk Test Actual Velocity    Actual Self-Selected Velocity 0.59 m/s  -         Reis Balance Scale    Sitting to Standing 4  -JA      Standing Unsupported 4  -JA      Sitting with Back Unsupported but Feet Supported on Floor or on Stool 4  -JA      Standing to Sitting 4  -JA      Transfers 4  -JA      Standing Unsupported with Eyes Closed 4  -JA      Standing Unsupported with Feet Together 4  -JA      Reaching Forward with Outstretched Arm While Standing 4  -JA       Object From the Floor From a Standing Position 4  -JA      Turning to Look Behind Over Left and Right Shoulders While Standing 4  -JA       Turn 360 Degrees 4  -JA      Place Alternate Foot on Step or Stool While Standing Unsupported 3  -JA      Standing Unsupported with One Foot in Front 2  -JA      Standing on One Leg 1  -JA      Reis Total Score 50  -JA            User Key  (r) = Recorded By, (t) = Taken By, (c) = Cosigned By    Initials Name Provider Type    Maik Chairez PTA Physical Therapist Assistant                     Time Calculation:      PT Charges     Row Name 04/10/23 1533 04/10/23 1143          Time Calculation    Start Time 1431  - 0832  -JA     Stop Time 1503  - 0933  -     Time Calculation (min) 32 min  -JA 61 min  -JA        Time Calculation- PT    Total Timed Code Minutes- PT 32 minute(s)  -JA 61 minute(s)  -JA        Timed Charges    38078 - PT Therapeutic Exercise Minutes 22  -JA 16  -JA     28036 - Gait Training Minutes  -- 15  -JA     18868 - PT Therapeutic Activity Minutes 10  -JA 30  -JA        Total Minutes    Timed Charges Total Minutes 32  -JA 61  -JA      Total Minutes 32  -JA 61  -JA           User Key  (r) = Recorded By, (t) = Taken By, (c) = Cosigned By    Initials Name Provider Type    Maik Chairez PTA Physical Therapist Assistant                Therapy Charges for Today     Code Description Service Date Service Provider Modifiers Qty    77477199172 HC PT THER PROC EA 15 MIN 4/10/2023 Maik Gentile, PTA GP 1    79615317236 HC GAIT TRAINING EA 15 MIN 4/10/2023 Maik Gentile, PTA GP 1    97599199008 HC PT THERAPEUTIC ACT EA 15 MIN 4/10/2023 Maik Gentile, PTA GP 2    99381218273 HC PT THER PROC EA 15 MIN 4/10/2023 Maik Gentile, PTA GP 1    55735690316 HC PT THERAPEUTIC ACT EA 15 MIN 4/10/2023 Maik Gentile, PTA GP 1            PT G-Codes  Outcome Measure Options: 10 Meter Walk, AM-PAC 6 Clicks Basic Mobility (PT), Reis Balance  AM-PAC 6 Clicks Score (PT): 20  AM-PAC 6 Clicks Score (OT): 22  Reis Total Score: 50      Maik Gentile PTA  4/10/2023

## 2023-04-10 NOTE — PROGRESS NOTES
Progress Note      Admit date: 4/4/2023 10:10 AM       Treatment Team     Provider Relationship Specialty Contact    Gilmer Solomon MD Attending Orthopedic Surgery   [phone]      Maik Gentile PTA Physical Therapy Assistant Physical Therapy --    Celi Zee COTA Occupational Therapy Assistant Occupational Therapy --    Miguel Ángel Motley BSW  -- --    Magali Gorman LPN Licensed Practical Nurse --   [phone]      Ignacia Payne RN Registered Nurse -- --    Mima Todd RN Registered Nurse -- --    Merry Carranza LSW  -- --             Chief Complaint:  Medically complex patient    HPI: No changes since admission    Vital Signs  Temp:  [98.7 °F (37.1 °C)-98.8 °F (37.1 °C)] 98.7 °F (37.1 °C)  Heart Rate:  [78-93] 87  Resp:  [18] 18  BP: (102-118)/(70-73) 102/73    Physical Exam 1:    Constitutional: Alert stable     HENT:      Eyes:     Neck:      Cardiovascular: No chest pain or evidence of sputum production evidence of failure    Pulmonary: Lungs clear effusion resolved    Abdominal:      Genitourinary/Anorectal:       Musculoskeletal: Improving balance and control    Skin:     Neurological:      Psychiatric/Behavioral: Cooperative physical therapy       Results Review:    I reviewed the patient's new clinical results.    Nursing notes and therapy notes have been reviewed.    I have reviewed medications.    Assessment/Plan  Principal Problem:    Medically complex patient stable  Active Problems:    Atrial fibrillation with RVR    Non-STEMI (non-ST elevated myocardial infarction)    Biventricular heart failure with reduced left ventricular function    Acute on chronic systolic (congestive) heart failure    Leg DVT (deep venous thromboembolism), acute, bilateral    Abnormal LFTs    Abdominal ascites    Pleural effusion due to CHF (congestive heart failure)     All other conditions are improved discussed with the cardiology will be seen in the ingestive  heart failure clinic in 1 week they were sending up cards and samples for his Pierrerelto    Gilmer Solomon MD  04/10/23  09:03 CDT          This document has been electronically signed by Gilmer Solomon MD on April 10, 2023 09:03 CDT      Part of this note may be an electronic transcription/translation of spoken language to printed text using the Dragon Dictation System.   Progress Note             Gilmer Solomon MD  04/10/23  09:05 CDT          This document has been electronically signed by Gilmer Solomon MD on April 10, 2023 09:05 CDT      Part of this note may be an electronic transcription/translation of spoken language to printed text using the Dragon Dictation System.

## 2023-04-10 NOTE — PLAN OF CARE
Goal Outcome Evaluation:  Plan of Care Reviewed With: patient        Progress: improving  Outcome Evaluation: pt perf bed mob ind and room funct mob with mod I in am standing at sink to shave with mod I using cane  perf ther ex b ue  pm pt perf shower bench tf with mod I toilet tf wtih ind and bathing dressing with mod i using shower seat and cane as well as grab bars for safety

## 2023-04-10 NOTE — PROGRESS NOTES
Nutrition Services    Patient Name:  Pavan Mccauley  YOB: 1955  MRN: [id number]  Admit Date:  4/4/2023    Nutrition F/U:  Pt eating lunch.  Good appetite and intake is typially % at meals and most meals are 100%.  He is hoping to be discharged tomorrow.    Low sodium diet and fluid restrictions encouraged.      His wt is significantly down since admit, which pt states is mostly related to fluid status. t.  Wt on 3/26 203# and CBW is 166#.  Nutrient dense foods and beverages are also encouraged.  He is currently receiving Boost supplement with all meals.         He has been educated while on acute care and diet copies and contact number will be provided at discharge along with contact number.     Labs ALT 42; Alb 2.5   Meds: Lasix; Dig; Aldactone    Will monitor until discharage.       Electronically signed by:  Amalia Costa RD  04/10/23 12:57 CDT

## 2023-04-10 NOTE — PLAN OF CARE
Goal Outcome Evaluation:  Plan of Care Reviewed With: patient            Pt V/S WDL, pt continues working with therapy and has gotten stronger. Pt is independently able to ambulate in his room per therapy.

## 2023-04-10 NOTE — PLAN OF CARE
Goal Outcome Evaluation:  Plan of Care Reviewed With: patient        Progress: improving  Outcome Evaluation: VSS with no acute distress noted voices needs as they arise. R/P self in bed, Uses uronal during night. Has rested during nightwithout c/o.

## 2023-04-10 NOTE — THERAPY TREATMENT NOTE
Inpatient Rehabilitation - Occupational Therapy Treatment Note    [facility]     Patient Name: Pavan Mccauley  : 1955  MRN: [id number]    Today's Date: 4/10/2023                 Admit Date: 2023         ICD-10-CM ICD-9-CM   1. Impaired mobility and ADLs  Z74.09 V49.89    Z78.9    2. Impaired functional mobility, balance, gait, and endurance  Z74.09 V49.89   3. Symbolic dysfunction  R48.9 784.60       Patient Active Problem List   Diagnosis   • General medical exam   • Malaise   • Hyperglycemia   • Cough   • Tick bite   • Screening for colon cancer   • Pneumonia due to organism   • Carotid artery aneurysm   • Dizziness   • SVT (supraventricular tachycardia)   • Atrial fibrillation (HCC)   • Dyspnea on exertion   • Atrial fibrillation with RVR   • Non-STEMI (non-ST elevated myocardial infarction)   • Biventricular heart failure with reduced left ventricular function   • Acute on chronic systolic (congestive) heart failure   • Leg DVT (deep venous thromboembolism), acute, bilateral   • Abnormal LFTs   • Medically complex patient   • Abdominal ascites   • Pleural effusion due to CHF (congestive heart failure)       Past Medical History:   Diagnosis Date   • Atrial fibrillation 2022    5 months   • Bronchopneumonia    • Chest pain    • Corneal foreign body     both eyes   • Hypertension    • Neck pain    • Perforation of left tympanic membrane     history of   • Prashant [place] spotted fever    • Wheezing        Past Surgical History:   Procedure Laterality Date   • CARDIAC CATHETERIZATION N/A 3/27/2023    Procedure: Left Heart Cath;  Surgeon: [place] DO;  Location: [facility] INVASIVE LOCATION;  Service: Cardiology;  Laterality: N/A;   • EYE FOREIGN BODY REMOVAL  2013    REMOVE FOREIGN BODY CORNEA W/SLIT LAMP 53132 (1)     • INJECTION OF MEDICATION  2011    Kenalog (1)      • SPINE SURGERY  1991    Exploration of the previous C5-6 fusion with refusion, exploration of the  C5 nerve root, left side   • TYMPANOPLASTY Left 05/16/1972    Perforation of the left tympanic membrane             IRF OT ASSESSMENT FLOWSHEET (last 12 hours)     IRF OT Evaluation and Treatment     Row Name 04/10/23 [facility] 04/10/23 0945       OT Time and Intention    Document Type daily treatment  seen [id number]  -LM daily treatment  -LM    Mode of Treatment individual therapy;occupational therapy  -LM individual therapy;occupational therapy  -LM    Row Name 04/10/23 [facility] 04/10/23 0945       General Information    Patient Profile Reviewed yes  -LM yes  -LM    Existing Precautions/Restrictions fall  -LM fall  -LM    Row Name 04/10/23 [facility] 04/10/23 0945       Pain Assessment    Pretreatment Pain Rating 0/10 - no pain  -LM 0/10 - no pain  -LM    Posttreatment Pain Rating 0/10 - no pain  -LM 0/10 - no pain  -LM    Row Name 04/10/23 [facility] 04/10/23 0945       Cognition/Psychosocial    Orientation Status (Cognition) oriented x 4  -LM oriented x 4  -LM    Row Name 04/10/23 135 04/10/23 0945       Basic Activities of Daily Living (BADLs)    Basic Activities of Daily Living grooming;bathing;upper body dressing;lower body dressing;toileting  -LM grooming  -LM    Row Name 04/10/23 1350          Bathing    [place] Level (Bathing) bathing skills;lower body;upper body;upper extremities;chest/trunk;distal lower extremities/feet;proximal lower extremities;perineal area;modified independence  -LM     Row Name 04/10/23 1350          Upper Body Dressing    [place] Level (Upper Body Dressing) upper body dressing skills;doff;pull over garment;independent  -LM     Row Name 04/10/23 1350          Lower Body Dressing    [place] Level (Lower Body Dressing) doff;pants/bottoms;shoes/slippers;socks;underwear;modified independence  -LM     Row Name 04/10/23 1350 04/10/23 0945       Grooming    [place] Level (Grooming) grooming skills;independent  - grooming skills;shave face  -    Row Name 04/10/23 1350          Toileting     [place] Level (Toileting) toileting skills;adjust/manage clothing;perform perineal hygiene;modified independence  -LM     Row Name 04/10/23 1350 04/10/23 0945       Bed Mobility    Rolling Left [place] (Bed Mobility) independent  -LM independent  -LM    Rolling Right [place] (Bed Mobility) independent  -LM independent  -LM    Supine-Sit [place] (Bed Mobility) independent  -LM independent  -LM    Sit-Supine [place] (Bed Mobility) independent  -LM independent  -LM    Row Name 04/10/23 [facility] 04/10/23 0945       Functional Mobility    Functional Mobility- Ind. Level conditional independence;supervision required  in room  -LM conditional independence;supervision required  in room  -LM    Functional Mobility-Distance (Feet) -- 100  -LM    Row Name 04/10/23 1350          Toilet Transfer    Type (Toilet Transfer) sit-stand;stand-sit;stand pivot/stand step  -LM     [place] Level (Toilet Transfer) modified independence  -LM     Row Name 04/10/23 [facility] 04/10/23 0945       Shower Transfer    Type (Shower Transfer) sit-stand;stand-sit;stand pivot/stand step  shower tf with ruiz  -LM --  shower tf with ruiz  -LM    [place] Level (Shower Transfer) modified independence  -LM --    Row Name 04/10/23 0945          Motor Skills    Motor Skills --  ther ex with level 2 tband ax 2  planes 20 reps each  -LM     Row Name 04/10/23 0945          Vital Signs    Intra Systolic BP Rehab 129  -LM     Intra Treatment Diastolic BP 83  -LM     Intratreatment Heart Rate (beats/min) 81  -LM     Intra SpO2 (%) 97  -LM     O2 Delivery Intra Treatment room air  -LM     Row Name 04/10/23 [facility] 04/10/23 0945       Therapy Assessment/Plan (OT)    Rehab Potential/Prognosis (OT) good, to achieve stated therapy goals  -LM good, to achieve stated therapy goals  -LM    Frequency of Treatment (OT) other (see comments);90 minutes per session  -LM other (see comments);90 minutes per session  -LM    Estimated Duration of Therapy (OT)  until facility discharge  -LM until facility discharge  -LM    Problem List (OT) balance;mobility;strength  -LM balance;mobility;strength  -LM    Activity Limitations Related to Problem List (OT) unable to transfer safely;IADLs not performed adequately or safely;unable to ambulate safely;BADLs not performed adequately or safely  -LM unable to transfer safely;IADLs not performed adequately or safely;unable to ambulate safely;BADLs not performed adequately or safely  -LM    Row Name 04/10/23 1350 04/10/23 0945       IRF OT Goals    Bathing Goal Selection (OT-IRF) bathing, OT goal 1  -LM bathing, OT goal 1  -LM    Endurance Goal Selection (OT) endurance, OT goal 1  -LM endurance, OT goal 1  -LM    Activity Tolerance Goal Selection (OT) activity tolerance, OT goal 1  -LM activity tolerance, OT goal 1  -LM    Row Name 04/10/23 1350 04/10/23 0945       Bathing Goal 1 (OT-IRF)    Activity/Device (Bathing Goal 1, OT-IRF) bathing skills, all;grab bar, tub/shower;long-handled sponge;shower chair  -LM bathing skills, all;grab bar, tub/shower;long-handled sponge;shower chair  -LM    [place] Level (Bathing Goal 1, OT-IRF) modified independence  -LM modified independence  -LM    Time Frame (Bathing Goal 1, OT-IRF) long-term goal (LTG);by discharge  -LM long-term goal (LTG);by discharge  -LM    Progress/Outcomes (Bathing Goal 1, OT-IRF) goal met  -LM goal not met  -LM    Row Name 04/10/23 1350 04/10/23 0945       Strength Goal 1 (OT-IRF)    Strength Goal 1 (OT-IRF) Pt will tolerate at least 20 minutes BUE strengthening activities OOB/EOB with VSS and with only 4 rest breaks needed for increased strenth and endurance required for ADLs and mobility.  -LM Pt will tolerate at least 20 minutes BUE strengthening activities OOB/EOB with VSS and with only 4 rest breaks needed for increased strenth and endurance required for ADLs and mobility.  -LM    Time Frame (Strength Goal 1, OT-IRF) long-term goal (LTG);by discharge  -LM  long-term goal (LTG);by discharge  -LM    Progress/Outcomes (Strength Goal 1, OT-IRF) goal met  -LM goal not met  -LM    Row Name 04/10/23 1350 04/10/23 0945        Endurance Goal 1 (OT)    Activity Level (Endurance Goal 1, OT) endurance 2 good  -LM endurance 2 good  -LM    Progress/Outcome (Endurance Goal 1, OT) goal met  -LM goal not met  -LM    Row Name 04/10/23 1350 04/10/23 0945        Activity Tolerance Goal 1 (OT)    Activity Level (Endurance Goal 1, OT) 20 min activity  -LM 20 min activity  -LM    Progress/Outcome (Activity Tolerance Goal 1, OT) goal met  -LM goal not met  -LM          User Key  (r) = Recorded By, (t) = Taken By, (c) = Cosigned By    Initials Name Effective Dates    LM Celi Zee, EMILY 06/16/21 -                  Occupational Therapy Education     Title: PT OT SLP Therapies (In Progress)     Topic: Occupational Therapy (In Progress)     Point: ADL training (Done)     Description:   Instruct learner(s) on proper safety adaptation and remediation techniques during self care or transfers.   Instruct in proper use of assistive devices.              Learning Progress Summary           Patient Acceptance, E, VU,NR by  at 4/6/2023 1027                   Point: Home exercise program (Not Started)     Description:   Instruct learner(s) on appropriate technique for monitoring, assisting and/or progressing therapeutic exercises/activities.              Learner Progress:  Not documented in this visit.          Point: Precautions (Done)     Description:   Instruct learner(s) on prescribed precautions during self-care and functional transfers.              Learning Progress Summary           Patient Acceptance, E, VU,NR by  at 4/6/2023 1027                   Point: Body mechanics (Done)     Description:   Instruct learner(s) on proper positioning and spine alignment during self-care, functional mobility activities and/or exercises.              Learning Progress Summary           Patient  Acceptance, E, VU,NR by  at 4/6/2023 1027                               User Key     Initials Effective Dates Name Provider Type Discipline     06/16/21 -  Ibeth Guzmán COTA Occupational Therapist Assistant OT                    OT Recommendation and Plan         Plan of Care Review  Plan of Care Reviewed With: patient  Progress: improving  Outcome Evaluation: pt perf bed mob ind and room funct mob with mod I in am standing at sink to shave with mod I using cane  perf ther ex b ue  pm pt perf shower bench tf with mod I toilet tf wtih ind and bathing dressing with mod i using shower seat and cane as well as grab bars for safety  Plan of Care Reviewed With: patient  Progress: improving       Outcome Measures     Row Name 04/10/23 0800             10 Meter Walk Test Self-Selected Velocity    Self-Selected Velocity: Trial 1 10.09 sec.  -JA      Self-Selected Velocity: Trial 2 9.21 sec.  -JA      Self-Selected Velocity: Trial 3 11.19 sec.  -JA      Self-Selected Velocity: Average Time 10.16 sec.  -JA         10 Meter Walk Test Actual Velocity    Actual Self-Selected Velocity 0.59 m/s  -JA         Reis Balance Scale    Sitting to Standing 4  -JA      Standing Unsupported 4  -JA      Sitting with Back Unsupported but Feet Supported on Floor or on Stool 4  -JA      Standing to Sitting 4  -JA      Transfers 4  -JA      Standing Unsupported with Eyes Closed 4  -JA      Standing Unsupported with Feet Together 4  -JA      Reaching Forward with Outstretched Arm While Standing 4  -JA       Object From the Floor From a Standing Position 4  -JA      Turning to Look Behind Over Left and Right Shoulders While Standing 4  -JA      Turn 360 Degrees 4  -JA      Place Alternate Foot on Step or Stool While Standing Unsupported 3  -JA      Standing Unsupported with One Foot in Front 2  -JA      Standing on One Leg 1  -JA      Reis Total Score 50  -JA            User Key  (r) = Recorded By, (t) = Taken By, (c) = Cosigned By     Initials Name Provider Type    Maik Chairez, PTA Physical Therapist Assistant                  Time Calculation:      Time Calculation- OT     Row Name 04/10/23 1349 04/10/23 1321 04/10/23 1143       Time Calculation- OT    OT Start Time 1310  -LM 0945  -LM --    OT Stop Time 1350  -LM 1045  -LM --    OT Time Calculation (min) 40 min  -LM 60 min  -LM --    Total Timed Code Minutes- OT 40 minute(s)  -LM 60 minute(s)  -LM --    OT Received On 04/10/23  -LM 04/10/23  -LM --       Timed Charges    12353 - OT Therapeutic Exercise Minutes -- 15  -LM --    36423 - Gait Training Minutes  -- -- 15  -JA    22279 - OT Therapeutic Activity Minutes -- 15  -LM --    38815 - OT Self Care/Mgmt Minutes 40  -LM 30  -LM --       Total Minutes    Timed Charges Total Minutes 40  -LM 60  -LM 15  -JA     Total Minutes 40  -LM 60  -LM 15  -JA          User Key  (r) = Recorded By, (t) = Taken By, (c) = Cosigned By    Initials Name Provider Type    Maik Chairez, PTA Physical Therapist Assistant     Celi Zee COTA Occupational Therapist Assistant              Therapy Charges for Today     Code Description Service Date Service Provider Modifiers Qty    42713395569 HC OT THERAPEUTIC ACT EA 15 MIN 4/10/2023 Celi Zee COTA GO 1    10392501936 HC OT THER PROC EA 15 MIN 4/10/2023 Celi Zee COTA GO 1    46799408240 HC OT SELF CARE/MGMT/TRAIN EA 15 MIN 4/10/2023 Celi Zee COTA GO 2    85985315904 HC OT SELF CARE/MGMT/TRAIN EA 15 MIN 4/10/2023 Celi Zee COTA GO 3                   EMILY Harrison  4/10/2023

## 2023-04-11 ENCOUNTER — TELEPHONE (OUTPATIENT)
Dept: CARDIOLOGY | Facility: CLINIC | Age: 68
End: 2023-04-11
Payer: MEDICARE

## 2023-04-11 ENCOUNTER — READMISSION MANAGEMENT (OUTPATIENT)
Dept: CALL CENTER | Facility: HOSPITAL | Age: 68
End: 2023-04-11
Payer: MEDICARE

## 2023-04-11 VITALS
SYSTOLIC BLOOD PRESSURE: 116 MMHG | BODY MASS INDEX: 26.73 KG/M2 | WEIGHT: 166.3 LBS | OXYGEN SATURATION: 91 % | HEIGHT: 66 IN | TEMPERATURE: 97.1 F | DIASTOLIC BLOOD PRESSURE: 82 MMHG | HEART RATE: 84 BPM | RESPIRATION RATE: 20 BRPM

## 2023-04-11 PROCEDURE — 97530 THERAPEUTIC ACTIVITIES: CPT

## 2023-04-11 PROCEDURE — 97535 SELF CARE MNGMENT TRAINING: CPT

## 2023-04-11 PROCEDURE — 99238 HOSP IP/OBS DSCHRG MGMT 30/<: CPT | Performed by: ORTHOPAEDIC SURGERY

## 2023-04-11 RX ORDER — METOPROLOL SUCCINATE 25 MG/1
12.5 TABLET, EXTENDED RELEASE ORAL
Qty: 100 TABLET | Refills: 0 | Status: SHIPPED | OUTPATIENT
Start: 2023-04-12 | End: 2023-04-13 | Stop reason: SDUPTHER

## 2023-04-11 RX ADMIN — AMIODARONE HYDROCHLORIDE 200 MG: 200 TABLET ORAL at 09:15

## 2023-04-11 RX ADMIN — Medication 12.5 MG: at 09:17

## 2023-04-11 RX ADMIN — FUROSEMIDE 20 MG: 20 TABLET ORAL at 09:16

## 2023-04-11 RX ADMIN — DOCUSATE SODIUM 100 MG: 100 CAPSULE, LIQUID FILLED ORAL at 09:09

## 2023-04-11 RX ADMIN — RIVAROXABAN 15 MG: 15 TABLET, FILM COATED ORAL at 09:17

## 2023-04-11 RX ADMIN — SPIRONOLACTONE 25 MG: 25 TABLET ORAL at 09:18

## 2023-04-11 RX ADMIN — DIGOXIN 125 MCG: 125 TABLET ORAL at 12:08

## 2023-04-11 NOTE — NURSING NOTE
Pt discharged home. Medications & follow up appts discussed with pt with verbalized understanding. Cane brought to pt prior to discharge.  Belongings sent home with pt. AVS faxed to PCP.

## 2023-04-11 NOTE — PLAN OF CARE
Goal Outcome Evaluation:  Plan of Care Reviewed With: patient        Progress: improving  Outcome Evaluation: Pt met 3/4 goals at this time. PT recommending use of SPC upon d/c home. Pt educated this a.m on energy conservation and monitoring of symptoms once home to prevent readmission. Pt defers HEP at this time.

## 2023-04-11 NOTE — PLAN OF CARE
Goal Outcome Evaluation:  Pt to d/c home this date. Pt was (I) with bed mobility, t/fs, and rubi-care this date. Pt completed 200 foot mobility with SPC. Pt reports he feels safe to d/c home and has all appropriate AD/DME he needs. Pt educated on EC/WS.

## 2023-04-11 NOTE — TELEPHONE ENCOUNTER
----- Message from Latisha Og sent at 4/11/2023 11:49 AM CDT -----  Discharging today to see dr Brothers in 1 week . Please advise

## 2023-04-11 NOTE — DISCHARGE SUMMARY
"REHABILITATION DISCHARGE SUMMARY    NAME: Pavan Mccauley     : 1955  MRN: [id number]    ADMITTING PROVIDER: Gilmer Solomon MD  DISCHARGING PROVIDER: Gilmer Solomon MD     ADMITTED: 2023  DISCHARGED:  2023    ADMISSION DIAGNOSES:  Medically complex patient [Z78.9]    DISCHARGE DIAGNOSES:     Medically complex patient    Atrial fibrillation with RVR    Non-STEMI (non-ST elevated myocardial infarction)    Biventricular heart failure with reduced left ventricular function    Acute on chronic systolic (congestive) heart failure    Leg DVT (deep venous thromboembolism), acute, bilateral    Abnormal LFTs    Abdominal ascites    Pleural effusion due to CHF (congestive heart failure)      CONSULTS:   Consults     Date and Time Order Name Status Description    2023  2:42 PM Inpatient Cardiothoracic Surgery Consult      2023 11:43 AM Inpatient Cardiology Consult Completed     3/22/2023  4:27 PM Inpatient Nephrology Consult Completed     3/22/2023  3:40 PM Inpatient Cardiology Consult Completed         PRIMARY CARE PROVIDER AT DISCHARGE: Dilcia Kapoor APRN    PROCEDURES: none    BP 99/73 (BP Location: Right arm, Patient Position: Sitting)   Pulse 88   Temp 97.1 °F (36.2 °C) (Tympanic)   Resp 20   Ht 167.6 cm (66\")   Wt 75.4 kg (166 lb 4.8 oz)   SpO2 91%   BMI 26.84 kg/m²     LABS:   Lab Results (last 24 hours)     ** No results found for the last 24 hours. **                    Self-Care Admission Goal Date  23 Discharge   Eating 6 6  6     Oral hygiene 6 6  6     Toileting hygiene 6 6  5     Shower/bathe self 4 6  4     Upper body dressing 6 6  6     Lower body dressing 4 6  4     Putting on/taking off footwear 4 6  4           Mobility  Admission Goal Date  23 Discharge   A.Roll left and right 6 6  6     B.Sit to lying 6 6  6     C. Lying to sitting on side of bed 6 6  6     D.Sit to stand 4 6  4     E. Chair/bed-to-chair transfer    4 6  4     F.Toilet transfer    4 6  4 "     G. Car Transfer 88 88  88     I.Walk 10 feet 4 6  4     J.Walk 50 feet with two turns. 4 6  4     K.Walk 150 feet:  4 6  4     L.Walking 10 feet on uneven surfaces  4 6  4     M.1 step (curb) 4 6  4     N.4 steps 4 6  4     O.12 steps 4 6  4     P.Picking up object 4 6  4     R. Wheel 50 feet with two turns 6 6  6     S.Wheel 150 feet 6 6  6           Hearing, Speech and Vision Admission Goal  Date  4/6/23 Discharge   Expression of Ideas and Wants (consider both verbal and non-verbal expression and excluding language barriers)  4- Without Difficulty  3- Some difficulty  2- Frequently exhibits difficulty  1- Rarely/never exhibits clear speech  4 4 4     Understanding Verbal and Non-Verbal Content (with hearing aid or device, if used, and excluding language barriers)  4- Comprehends without cues  3- Usually understands  2- Some Understands  1- Rarely/Never Understands 4 4 4           Patient to benefit from skilled PT to address exercise endurance, safety in self-care, and functional mobility.        Patient to benefit from skilled Occupational Therapy to address activities of daily living, functional mobility, fine motor, gross motor, endurance, safety in self-care, and activity tolerance.          Patient to benefit from skilled Speech Therapy to address dysphagia, communication deficits and cognitive-linguistic communication disorder.        Cosigned by: Gilmer Solomon MD at 04/06/23 1756                 Self-Care Admission Goal Date  4/6/23 Discharge   Eating 6 6  6     Oral hygiene 6 6  6     Toileting hygiene 6 6  5     Shower/bathe self 4 6  4     Upper body dressing 6 6  6     Lower body dressing 4 6  4     Putting on/taking off footwear 4 6  4           Mobility  Admission Goal Date  4/6/23 Discharge   A.Roll left and right 6 6  6     B.Sit to lying 6 6  6     C. Lying to sitting on side of bed 6 6  6     D.Sit to stand 4 6  4     E. Chair/bed-to-chair transfer    4 6  4     F.Toilet transfer    4 6  4      G. Car Transfer 88 88  88     I.Walk 10 feet 4 6  4     J.Walk 50 feet with two turns. 4 6  4     K.Walk 150 feet:  4 6  4     L.Walking 10 feet on uneven surfaces  4 6  4     M.1 step (curb) 4 6  4     N.4 steps 4 6  4     O.12 steps 4 6  4     P.Picking up object 4 6  4     R. Wheel 50 feet with two turns 6 6  6     S.Wheel 150 feet 6 6  6           Hearing, Speech and Vision Admission Goal  Date  4/6/23 Discharge   Expression of Ideas and Wants (consider both verbal and non-verbal expression and excluding language barriers)  4- Without Difficulty  3- Some difficulty  2- Frequently exhibits difficulty  1- Rarely/never exhibits clear speech  4 4 4     Understanding Verbal and Non-Verbal Content (with hearing aid or device, if used, and excluding language barriers)  4- Comprehends without cues  3- Usually understands  2- Some Understands  1- Rarely/Never Understands 4 4 4           Patient to benefit from skilled PT to address exercise endurance, safety in self-care, and functional mobility.        Patient to benefit from skilled Occupational Therapy to address activities of daily living, functional mobility, fine motor, gross motor, endurance, safety in self-care, and activity tolerance.          Patient to benefit from skilled Speech Therapy to address dysphagia, communication deficits and cognitive-linguistic communication disorder.        Cosigned by: Gilmer Solomon MD at 04/06/23 1457     R    REHABILITATION she was responsive to rehabilitation and was fairly cooperative and regained a lot of his function is his heart improved and his edema decreased his anasarca has improved feeling however is that he will probably noncompliant when he goes home made arrangements to get free samples or cards from the cardiologist with Xarelto because the expensive drug were working on that problem and follow-up with Dr. Silver in a week    CONDITION ON DISCHARGE: Stable good condition    Disposition Home self-care  says he has friends to check on him a lot    DISCHARGE DIET: Same as Admission diet. See below  Dietary Orders (From admission, onward)     Start     Ordered    04/05/23 1800  Dietary Nutrition Supplements Boost Plus (Ensure Enlive, Ensure Plus)  Daily With Breakfast, Lunch & Dinner      Comments: Believe pref is jonathan   Question:  Select Supplement:  Answer:  Boost Plus (Ensure Enlive, Ensure Plus)    04/05/23 1308    04/04/23 1800  Dietary Nutrition Supplements Other (See Comment); jonathan pudding  Daily With Lunch & Dinner      Question Answer Comment   Select Supplement: Other (See Comment)    Other jonathan pudding        04/04/23 1517    04/04/23 1134  Diet: Cardiac Diets, Fluid Restriction (240 mL/tray) Diets; Low Sodium (2g); 1500 mL/day; Texture: Regular Texture (IDDSI 7); Fluid Consistency: Thin (IDDSI 0)  Diet Effective Now        References:    Diet Order Crosswalk   Question Answer Comment   Diets: Cardiac Diets    Diets: Fluid Restriction (240 mL/tray) Diets    Cardiac Diet: Low Sodium (2g)    Fluid Restriction Diet (240 mL/tray): 1500 mL/day    Texture: Regular Texture (IDDSI 7)    Fluid Consistency: Thin (IDDSI 0)        04/04/23 1135                 DISCHARGE ACTIVITY: ADLs use a walker protect him from falling    DISCHARGE MEDICATIONS     Discharge Medications      New Medications      Instructions Start Date   metoprolol succinate XL 25 MG 24 hr tablet  Commonly known as: TOPROL-XL   12.5 mg, Oral, Every 24 Hours Scheduled   Start Date: April 12, 2023        Changes to Medications      Instructions Start Date   rivaroxaban 15 MG tablet  Commonly known as: XARELTO  What changed:   · medication strength  · how much to take  · how to take this  · when to take this   15 mg, Oral, 2 Times Daily With Meals         Continue These Medications      Instructions Start Date   amiodarone 200 MG tablet  Commonly known as: PACERONE   200 mg, Oral, Every 24 Hours Scheduled      digoxin 125 MCG tablet  Commonly known as:  LANOXIN   125 mcg, Oral, Daily Digoxin      furosemide 20 MG tablet  Commonly known as: LASIX   20 mg, Oral, 2 Times Daily      spironolactone 25 MG tablet  Commonly known as: ALDACTONE   25 mg, Oral, Daily             FOLLOW UP:      Follow-up Information     Dilcia Kapoor APRN .    Specialty: Family Medicine  Contact information:  [address]  [address]  [facility] [address]  [phone]                     Post had appointment with Dr. Silver in 1 week Dr. Mahoney said he will give him cards for the Xarelto patient apparently has no insurance    Future Appointments   Date Time Provider Department Center   4/13/2023 10:45 AM Dilcia Kapoor APRN [facility]        Gilmer Solomon MD          This document has been electronically signed by Gilmer Solomon MD on April 11, 2023 10:47 CDT        Part of this note may be an electronic transcription/translation of spoken language to printed text using the Dragon Dictation System.

## 2023-04-11 NOTE — PATIENT CARE CONFERENCE
Attendance of weekly team conference:    Maik Gentile PTA, Dr. Gilmer Solomon, Anisha Armas, SLP, NEMO Caruso and TUTU MaciasW, Kamila Walker RD, and Bronwyn Harman OTR/L.     Dr. Solomon initiated and led today's multidisciplinary team meeting. Patient's progress reviewed, GG codes reviewed, discharge date discussed and equipment needs addressed.       Discharge Disposition: Home     Expected Discharge Date: 4/11/23    Anticipated discharge orders/equipment:     Barriers:   Self-Care Admission Goal Date Discharge 4/11   Eating   6   6   Oral hygiene   6   6   Toileting hygiene   6   6   Shower/bathe self   6   6   Upper body dressing   6   6   Lower body dressing   6   6   Putting on/taking off footwear   6   6       Mobility  Admission Goal Date Discharge   A.Roll left and right       6   B.Sit to lying       6   C. Lying to sitting on side of bed       6   D.Sit to stand       6   E. Chair/bed-to-chair transfer          6   F.Toilet transfer          6   G. Car Transfer       88   I.Walk 10 feet       6   J.Walk 50 feet with two turns.       6   K.Walk 150 feet:        6   L.Walking 10 feet on uneven surfaces        4   M.1 step (curb)       4   N.4 steps       4   O.12 steps       4   P.Picking up object       4   R. Wheel 50 feet with two turns       6   S.150     6               Hearing, Speech and Vision Admission Goal  Date Discharge   Expression of Ideas and Wants (consider both verbal and non-verbal expression and excluding language barriers)  4- Without Difficulty  3- Some difficulty  2- Frequently exhibits difficulty  1- Rarely/never exhibits clear speech  4 4 4    Understanding Verbal and Non-Verbal Content (with hearing aid or device, if used, and excluding language barriers)  4- Comprehends without cues  3- Usually understands  2- Some Understands  1- Rarely/Never Understands 4 4 4        Patient to benefit from skilled PT to address exercise endurance, safety in  self-care, and functional mobility.      Patient to benefit from skilled Occupational Therapy to address activities of daily living, functional mobility, fine motor, gross motor, endurance, safety in self-care, and activity tolerance.        Patient to benefit from skilled Speech Therapy to address dysphagia, communication deficits and cognitive-linguistic communication disorder.

## 2023-04-11 NOTE — PLAN OF CARE
Goal Outcome Evaluation:   Pt pleasant and cooperative; no voiced c/o discomfort; urinal kept within reach during shift; Pt noted up during night watching tv but did rest and appear to sleep most of the night

## 2023-04-11 NOTE — THERAPY DISCHARGE NOTE
Inpatient Rehabilitation - Physical Therapy Treatment Note/Discharge  [facility]     Patient Name: Pavan Mccauley  : 1955  MRN: [id number]  Today's Date: 2023                Admit Date: 2023    Visit Dx:    ICD-10-CM ICD-9-CM   1. Impaired mobility and ADLs  Z74.09 V49.89    Z78.9    2. Impaired functional mobility, balance, gait, and endurance  Z74.09 V49.89   3. Symbolic dysfunction  R48.9 784.60     Patient Active Problem List   Diagnosis   • General medical exam   • Malaise   • Hyperglycemia   • Cough   • Tick bite   • Screening for colon cancer   • Pneumonia due to organism   • Carotid artery aneurysm   • Dizziness   • SVT (supraventricular tachycardia)   • Atrial fibrillation (HCC)   • Dyspnea on exertion   • Atrial fibrillation with RVR   • Non-STEMI (non-ST elevated myocardial infarction)   • Biventricular heart failure with reduced left ventricular function   • Acute on chronic systolic (congestive) heart failure   • Leg DVT (deep venous thromboembolism), acute, bilateral   • Abnormal LFTs   • Medically complex patient   • Abdominal ascites   • Pleural effusion due to CHF (congestive heart failure)     Past Medical History:   Diagnosis Date   • Atrial fibrillation 2022    5 months   • Bronchopneumonia    • Chest pain    • Corneal foreign body     both eyes   • Hypertension    • Neck pain    • Perforation of left tympanic membrane     history of   • Prashant Mountain spotted fever    • Wheezing      Past Surgical History:   Procedure Laterality Date   • CARDIAC CATHETERIZATION N/A 3/27/2023    Procedure: Left Heart Cath;  Surgeon: [place] DO;  Location: [facility] INVASIVE LOC[facility];  Service: Cardiology;  Laterality: N/A;   • EYE FOREIGN BODY REMOVAL  2013    REMOVE FOREIGN BODY CORNEA W/SLIT LAMP 90070 (1)     • INJECTION OF MEDICATION  2011    Kenalog (1)      • SPINE SURGERY  1991    Exploration of the previous C5-6 fusion with refusion, exploration of  the C5 nerve root, left side   • TYMPANOPLASTY Left 05/16/1972    Perforation of the left tympanic membrane     Telephone Information:   Mobile [phone]      Mobility  Admission Goal Date Discharge   A.Roll left and right    6   B.Sit to lying    6   C. Lying to sitting on side of bed    6   D.Sit to stand    6   E. Chair/bed-to-chair transfer      6   F.Toilet transfer       6   G. Car Transfer    88   I.Walk 10 feet    6   J.Walk 50 feet with two turns.    6   K.Walk 150 feet:     6   L.Walking 10 feet on uneven surfaces     4   M.1 step (curb)    4   N.4 steps    4   O.12 steps    4   P.Picking up object    4   R. Wheel 50 feet with two turns    6   S.Wheel 150 feet    6         PT ASSESSMENT (last 12 hours)     IRF PT Evaluation and Treatment    No documentation.                 Physical Therapy Education     Title: PT OT SLP Therapies (In Progress)     Topic: Physical Therapy (Done)     Point: Mobility training (Done)     Learning Progress Summary           Patient Acceptance, E, VU by FACUNDO at 4/11/2023 0913    Comment: Educated pt on energy conservation, fall prevention at home, no HEP given due to pt defers at this time. Pt scheduled to d/c home with SPC.                   Point: Home exercise program (Done)     Learning Progress Summary           Patient Acceptance, E, VU by FACUNDO at 4/11/2023 0913    Comment: Educated pt on energy conservation, fall prevention at home, no HEP given due to pt defers at this time. Pt scheduled to d/c home with SPC.    Refuses, E, VU by FACUNDO at 4/10/2023 1529    Comment: Pt. defers HEP for use at home upon d/c at this time                   Point: Body mechanics (Done)     Learning Progress Summary           Patient Acceptance, E, VU by FACUNDO at 4/11/2023 0913    Comment: Educated pt on energy conservation, fall prevention at home, no HEP given due to pt defers at this time. Pt scheduled to d/c home with SPC.                   Point: Precautions (Done)     Learning Progress Summary            Patient Acceptance, E, VU by FACUNDO at 4/11/2023 0913    Comment: Educated pt on energy conservation, fall prevention at home, no HEP given due to pt defers at this time. Pt scheduled to d/c home with SPC.                               User Key     Initials Effective Dates Name Provider Type Discipline    FACUNDO 06/16/21 -  Maik Gentile PTA Physical Therapist Assistant PT                PT Recommendation and Plan  Frequency of Treatment (PT): other (see comments) (5-6d/wk x 2 weeks)  Plan of Care Reviewed With: patient  Progress: improving  Outcome Evaluation: Pt met 3/4 goals at this time. PT recommending use of SPC upon d/c home. Pt educated this a.m on energy conservation and monitoring of symptoms once home to prevent readmission. Pt defers HEP at this time.     Outcome Measures     Row Name 04/10/23 0800             10 Meter Walk Test Self-Selected Velocity    Self-Selected Velocity: Trial 1 10.09 sec.  -      Self-Selected Velocity: Trial 2 9.21 sec.  -      Self-Selected Velocity: Trial 3 11.19 sec.  -      Self-Selected Velocity: Average Time 10.16 sec.  -         10 Meter Walk Test Actual Velocity    Actual Self-Selected Velocity 0.59 m/s  -JA         Reis Balance Scale    Sitting to Standing 4  -JA      Standing Unsupported 4  -JA      Sitting with Back Unsupported but Feet Supported on Floor or on Stool 4  -JA      Standing to Sitting 4  -JA      Transfers 4  -JA      Standing Unsupported with Eyes Closed 4  -JA      Standing Unsupported with Feet Together 4  -JA      Reaching Forward with Outstretched Arm While Standing 4  -JA       Object From the Floor From a Standing Position 4  -JA      Turning to Look Behind Over Left and Right Shoulders While Standing 4  -JA      Turn 360 Degrees 4  -JA      Place Alternate Foot on Step or Stool While Standing Unsupported 3  -JA      Standing Unsupported with One Foot in Front 2  -JA      Standing on One Leg 1  -JA      Reis Total Score 50   -            User Key  (r) = Recorded By, (t) = Taken By, (c) = Cosigned By    Initials Name Provider Type    Maik Chairez PTA Physical Therapist Assistant                 Time Calculation:    PT Charges     Row Name 04/11/23 0912             Time Calculation    Start Time 0829  -      Stop Time 0853  -      Time Calculation (min) 24 min  -JA         Time Calculation- PT    Total Timed Code Minutes- PT 24 minute(s)  -         Timed Charges    02578 - PT Therapeutic Activity Minutes 10  -      17908 - PT Self Care/Mgmt Minutes 14  -JA         Total Minutes    Timed Charges Total Minutes 24  -JA       Total Minutes 24  -JA            User Key  (r) = Recorded By, (t) = Taken By, (c) = Cosigned By    Initials Name Provider Type    Maik Chairez PTA Physical Therapist Assistant                Therapy Charges for Today     Code Description Service Date Service Provider Modifiers Qty    70339677744 HC PT THER PROC EA 15 MIN 4/10/2023 Maik Gentile, PTA GP 1    87221435420 HC GAIT TRAINING EA 15 MIN 4/10/2023 Maik Gentile, PTA GP 1    98689214174 HC PT THERAPEUTIC ACT EA 15 MIN 4/10/2023 Maik Gentile, PTA GP 2    89166162639 HC PT THER PROC EA 15 MIN 4/10/2023 Maik Gentile, PTA GP 1    89886135602 HC PT THERAPEUTIC ACT EA 15 MIN 4/10/2023 Maik Gentile, PTA GP 1    99629351836 HC PT THERAPEUTIC ACT EA 15 MIN 4/11/2023 Maik Gentile, PTA GP 1    62532435649 HC PT SELF CARE/MGMT/TRAIN EA 15 MIN 4/11/2023 Maik Gentile, PTA GP 1          PT G-Codes  Outcome Measure Options: 10 Meter Walk, AM-PAC 6 Clicks Basic Mobility (PT), Reis Balance  AM-PAC 6 Clicks Score (PT): 20  AM-PAC 6 Clicks Score (OT): 22  Reis Total Score: 50         Maik Gentile PTA  4/11/2023

## 2023-04-11 NOTE — THERAPY DISCHARGE NOTE
Inpatient Rehabilitation - IRF Occupational Therapy Treatment Note/Discharge  [facility]     Patient Name: Pavan Mccauley  : 1955  MRN: [id number]  Today's Date: 2023               Admit Date: 2023       ICD-10-CM ICD-9-CM   1. Non-STEMI (non-ST elevated myocardial infarction)  I21.4 410.70   2. Impaired mobility and ADLs  Z74.09 V49.89    Z78.9    3. Impaired functional mobility, balance, gait, and endurance  Z74.09 V49.89   4. Symbolic dysfunction  R48.9 784.60   5. Medically complex patient  Z78.9 V49.9   6. Acute on chronic systolic (congestive) heart failure  I50.23 428.23     428.0   7. Atrial fibrillation with RVR  I48.91 427.31   8. Pleural effusion due to CHF (congestive heart failure)  I50.9 428.0   9. Biventricular heart failure with reduced left ventricular function  I50.814 428.0     Patient Active Problem List   Diagnosis   • General medical exam   • Malaise   • Hyperglycemia   • Cough   • Tick bite   • Screening for colon cancer   • Pneumonia due to organism   • Carotid artery aneurysm   • Dizziness   • SVT (supraventricular tachycardia)   • Atrial fibrillation (HCC)   • Dyspnea on exertion   • Atrial fibrillation with RVR   • Non-STEMI (non-ST elevated myocardial infarction)   • Biventricular heart failure with reduced left ventricular function   • Acute on chronic systolic (congestive) heart failure   • Leg DVT (deep venous thromboembolism), acute, bilateral   • Abnormal LFTs   • Medically complex patient   • Abdominal ascites   • Pleural effusion due to CHF (congestive heart failure)     Past Medical History:   Diagnosis Date   • Atrial fibrillation 2022    5 months   • Bronchopneumonia    • Chest pain    • Corneal foreign body     both eyes   • Hypertension    • Neck pain    • Perforation of left tympanic membrane     history of   • Prashant [place] spotted fever    • Wheezing      Past Surgical History:   Procedure Laterality Date   • CARDIAC CATHETERIZATION N/A  3/27/2023    Procedure: Left Heart Cath;  Surgeon: [place] DO;  Location: [facility] CATH INVASIVE LOCATION;  Service: Cardiology;  Laterality: N/A;   • EYE FOREIGN BODY REMOVAL  07/29/2013    REMOVE FOREIGN BODY CORNEA W/SLIT LAMP 96190 (1)     • INJECTION OF MEDICATION  07/12/2011    Kenalog (1)      • SPINE SURGERY  03/12/1991    Exploration of the previous C5-6 fusion with refusion, exploration of the C5 nerve root, left side   • TYMPANOPLASTY Left 05/16/1972    Perforation of the left tympanic membrane       IRF OT ASSESSMENT FLOWSHEET (last 12 hours)     IRF OT Evaluation and Treatment     Row Name 04/11/23 1041          OT Time and Intention    Document Type daily treatment  -CM     Mode of Treatment individual therapy;occupational therapy  -CM     Total Minutes, Occupational Therapy 24  -CM     Patient Effort good  -CM     Symptoms Noted During/After Treatment none  -CM     Row Name 04/11/23 1041          General Information    Patient Profile Reviewed yes  -CM     Existing Precautions/Restrictions fall  -CM     Row Name 04/11/23 1041          Pain Assessment    Pretreatment Pain Rating 0/10 - no pain  -CM     Posttreatment Pain Rating 0/10 - no pain  -CM     Row Name 04/11/23 1041          Cognition/Psychosocial    Affect/Mental Status (Cognition) flat/blunted affect  -CM     Orientation Status (Cognition) oriented x 4  -CM     Row Name 04/11/23 1041          Basic Activities of Daily Living (BADLs)    60904 - OT Self Care/Mgmt Minutes 10  -CM     Row Name 04/11/23 1041          Toileting    [place] Level (Toileting) toileting skills;adjust/manage clothing;perform perineal hygiene;independent  -CM     Position (Toileting) supported sitting  -CM     Row Name 04/11/23 1041          Bed Mobility    Supine-Sit [place] (Bed Mobility) independent  -CM     Row Name 04/11/23 1041          Functional Mobility    Functional Mobility- Ind. Level conditional independence  -CM     Functional Mobility- Device  cane, straight  -CM     Functional Mobility-Distance (Feet) 200  -CM     Row Name 04/11/23 1041          Transfer Assessment/Treatment    Transfers sit-stand transfer;stand-sit transfer;toilet transfer  -CM     Row Name 04/11/23 1041          Sit-Stand Transfer    Sit-Stand [place] (Transfers) independent  -CM     Assistive Device (Sit-Stand Transfers) cane, straight  -CM     Row Name 04/11/23 1041          Stand-Sit Transfer    Stand-Sit [place] (Transfers) independent  -CM     Assistive Device (Stand-Sit Transfers) cane, straight  -CM     Row Name 04/11/23 1041          Toilet Transfer    Type (Toilet Transfer) sit-stand;stand-sit  -CM     [place] Level (Toilet Transfer) independent  -CM     Row Name 04/11/23 1041          Positioning and Restraints    Pre-Treatment Position in bed  -CM     Post Treatment Position bed  -CM     In Bed sitting EOB;call light within reach  -CM     Row Name 04/11/23 1041          Vital Signs    Pre SpO2 (%) 95  -CM     O2 Delivery Pre Treatment room air  -CM     Intra SpO2 (%) 97  -CM     O2 Delivery Intra Treatment room air  -CM     Post SpO2 (%) 95  -CM     O2 Delivery Post Treatment room air  -CM     Pre Patient Position Sitting  -CM     Intra Patient Position Sitting  -CM     Post Patient Position Sitting  -CM     Row Name 04/11/23 1041          Discharge Summary (OT)    Outcomes Achieved Upon Discharge (OT) goals partially achieved prior to discharge;progress was made toward goals  -CM     Discharge Summary Statement (OT) Pt reported he feels a lot better and stronger since admission. He reports he feels safe to d/c home. Pt is Mod I to I with ADLs and mobility at this time.  -CM     Transfer to Another Level of Care or Facility (OT) home  -CM           User Key  (r) = Recorded By, (t) = Taken By, (c) = Cosigned By    Initials Name Effective Dates    CM Jimena Harman, OT 11/18/22 -                    Occupational Therapy Education     Title: PT OT SLP Therapies  (Resolved)     Topic: Occupational Therapy (Resolved)     Point: ADL training (Resolved)     Description:   Instruct learner(s) on proper safety adaptation and remediation techniques during self care or transfers.   Instruct in proper use of assistive devices.              Learning Progress Summary           Patient Acceptance, E, VU,NR by  at 4/6/2023 1027                   Point: Home exercise program (Resolved)     Description:   Instruct learner(s) on appropriate technique for monitoring, assisting and/or progressing therapeutic exercises/activities.              Learner Progress:  Not documented in this visit.          Point: Precautions (Resolved)     Description:   Instruct learner(s) on prescribed precautions during self-care and functional transfers.              Learning Progress Summary           Patient Acceptance, E, VU,NR by  at 4/6/2023 1027                   Point: Body mechanics (Resolved)     Description:   Instruct learner(s) on proper positioning and spine alignment during self-care, functional mobility activities and/or exercises.              Learning Progress Summary           Patient Acceptance, E, VU,NR by  at 4/6/2023 1027                               User Key     Initials Effective Dates Name Provider Type Discipline     06/16/21 -  Ibeth Guzmán COTA Occupational Therapist Assistant OT                     Self-Care Admission Goal Date Discharge 4/11   Eating  6  6   Oral hygiene  6  6   Toileting hygiene  6  6   Shower/bathe self  6  6   Upper body dressing  6  6   Lower body dressing  6  6   Putting on/taking off footwear  6  6         OT Recommendation and Plan  Planned Therapy Interventions (OT): activity tolerance training, adaptive equipment training, functional balance retraining, IADL retraining, occupation/activity based interventions, BADL retraining, cognitive/visual perception retraining, patient/caregiver education/training, strengthening exercise,  transfer/mobility retraining    Plan of Care Reviewed With: patient      OT IRF GOALS     Row Name 04/10/23 1350 04/10/23 0945 04/08/23 1435       Bathing Goal 1 (OT-IRF)    Activity/Device (Bathing Goal 1, OT-IRF) bathing skills, all;grab bar, tub/shower;long-handled sponge;shower chair  -LM bathing skills, all;grab bar, tub/shower;long-handled sponge;shower chair  -LM bathing skills, all;grab bar, tub/shower;long-handled sponge;shower chair  -LM    [place] Level (Bathing Goal 1, OT-IRF) modified independence  -LM modified independence  -LM modified independence  -LM    Time Frame (Bathing Goal 1, OT-IRF) long-term goal (LTG);by discharge  -LM long-term goal (LTG);by discharge  -LM long-term goal (LTG);by discharge  -LM    Progress/Outcomes (Bathing Goal 1, OT-IRF) goal met  -LM goal not met  -LM goal not met  -LM       Strength Goal 1 (OT-IRF)    Strength Goal 1 (OT-IRF) Pt will tolerate at least 20 minutes BUE strengthening activities OOB/EOB with VSS and with only 4 rest breaks needed for increased strenth and endurance required for ADLs and mobility.  -LM Pt will tolerate at least 20 minutes BUE strengthening activities OOB/EOB with VSS and with only 4 rest breaks needed for increased strenth and endurance required for ADLs and mobility.  -LM Pt will tolerate at least 20 minutes BUE strengthening activities OOB/EOB with VSS and with only 4 rest breaks needed for increased strenth and endurance required for ADLs and mobility.  -LM    Time Frame (Strength Goal 1, OT-IRF) long-term goal (LTG);by discharge  -LM long-term goal (LTG);by discharge  -LM long-term goal (LTG);by discharge  -LM    Progress/Outcomes (Strength Goal 1, OT-IRF) goal met  -LM goal not met  -LM goal not met  -LM    Row Name 04/08/23 1055 04/07/23 1643 04/07/23 1308       Bathing Goal 1 (OT-IRF)    Activity/Device (Bathing Goal 1, OT-IRF) bathing skills, all;grab bar, tub/shower;long-handled sponge;shower chair  -LM bathing skills, all;migue  bar, tub/shower;long-handled sponge;shower chair  -RW bathing skills, all;grab bar, tub/shower;long-handled sponge;shower chair  -RW    [place] Level (Bathing Goal 1, OT-IRF) modified independence  -LM modified independence  -RW modified independence  -RW    Time Frame (Bathing Goal 1, OT-IRF) long-term goal (LTG);by discharge  -LM long-term goal (LTG);by discharge  -RW long-term goal (LTG);by discharge  -RW    Progress/Outcomes (Bathing Goal 1, OT-IRF) goal not met  -LM goal not met  -RW goal not met  -RW       Strength Goal 1 (OT-IRF)    Strength Goal 1 (OT-IRF) Pt will tolerate at least 20 minutes BUE strengthening activities OOB/EOB with VSS and with only 4 rest breaks needed for increased strenth and endurance required for ADLs and mobility.  -LM Pt will tolerate at least 20 minutes BUE strengthening activities OOB/EOB with VSS and with only 4 rest breaks needed for increased strenth and endurance required for ADLs and mobility.  -RW Pt will tolerate at least 20 minutes BUE strengthening activities OOB/EOB with VSS and with only 4 rest breaks needed for increased strenth and endurance required for ADLs and mobility.  -RW    Time Frame (Strength Goal 1, OT-IRF) long-term goal (LTG);by discharge  -LM long-term goal (LTG);by discharge  -RW long-term goal (LTG);by discharge  -RW    Progress/Outcomes (Strength Goal 1, OT-IRF) goal not met  -LM goal not met  -RW goal not met  -RW    Row Name 04/06/23 0745 04/05/23 0745 04/04/23 1238       Bathing Goal 1 (OT-IRF)    Activity/Device (Bathing Goal 1, OT-IRF) bathing skills, all;grab bar, tub/shower;long-handled sponge;shower chair  -RC bathing skills, all;grab bar, tub/shower;long-handled sponge;shower chair  -RC bathing skills, all;grab bar, tub/shower;long-handled sponge;shower chair  -CM    [place] Level (Bathing Goal 1, OT-IRF) modified independence  -RC modified independence  -RC modified independence  -CM    Time Frame (Bathing Goal 1, OT-IRF) long-term  goal (LTG);by discharge  -RC long-term goal (LTG);by discharge  -RC long-term goal (LTG);by discharge  -CM    Progress/Outcomes (Bathing Goal 1, OT-IRF) goal not met  -RC goal not met  -RC goal not met  -CM       Strength Goal 1 (OT-IRF)    Strength Goal 1 (OT-IRF) Pt will tolerate at least 20 minutes BUE strengthening activities OOB/EOB with VSS and with only 4 rest breaks needed for increased strenth and endurance required for ADLs and mobility.  -RC Pt will tolerate at least 20 minutes BUE strengthening activities OOB/EOB with VSS and with only 4 rest breaks needed for increased strenth and endurance required for ADLs and mobility.  -RC Pt will tolerate at least 20 minutes BUE strengthening activities OOB/EOB with VSS and with only 4 rest breaks needed for increased strenth and endurance required for ADLs and mobility.  -CM    Time Frame (Strength Goal 1, OT-IRF) long-term goal (LTG);by discharge  -RC long-term goal (LTG);by discharge  -RC long-term goal (LTG);by discharge  -CM    Progress/Outcomes (Strength Goal 1, OT-IRF) goal not met  -RC goal not met  -RC goal not met  -CM          User Key  (r) = Recorded By, (t) = Taken By, (c) = Cosigned By    Initials Name Provider Type    Ibeth Basurto COTA Occupational Therapist Assistant    Celi Chaney DIAZ Occupational Therapist Assistant    RW Mary Kay Yañez OT Occupational Therapist    CM Jimena Harman OT Occupational Therapist                 Outcome Measures     Row Name 04/10/23 0800             10 Meter Walk Test Self-Selected Velocity    Self-Selected Velocity: Trial 1 10.09 sec.  -JA      Self-Selected Velocity: Trial 2 9.21 sec.  -JA      Self-Selected Velocity: Trial 3 11.19 sec.  -JA      Self-Selected Velocity: Average Time 10.16 sec.  -JA         10 Meter Walk Test Actual Velocity    Actual Self-Selected Velocity 0.59 m/s  -JA         Reis Balance Scale    Sitting to Standing 4  -JA      Standing Unsupported 4  -JA      Sitting with  Back Unsupported but Feet Supported on Floor or on Stool 4  -JA      Standing to Sitting 4  -JA      Transfers 4  -JA      Standing Unsupported with Eyes Closed 4  -JA      Standing Unsupported with Feet Together 4  -JA      Reaching Forward with Outstretched Arm While Standing 4  -JA       Object From the Floor From a Standing Position 4  -JA      Turning to Look Behind Over Left and Right Shoulders While Standing 4  -JA      Turn 360 Degrees 4  -JA      Place Alternate Foot on Step or Stool While Standing Unsupported 3  -JA      Standing Unsupported with One Foot in Front 2  -JA      Standing on One Leg 1  -JA      Reis Total Score 50  -JA            User Key  (r) = Recorded By, (t) = Taken By, (c) = Cosigned By    Initials Name Provider Type    Maik Chairez PTA Physical Therapist Assistant                Time Calculation:    Time Calculation- OT     Row Name 04/11/23 1138 04/11/23 1041          Time Calculation- OT    OT Start Time 1041  -CM --     OT Stop Time 1105  -CM --     OT Time Calculation (min) 24 min  -CM --     Total Timed Code Minutes- OT 24 minute(s)  -CM --     OT Received On 04/11/23  -CM --        Timed Charges    86682 - OT Therapeutic Activity Minutes 14  -CM --     22716 - OT Self Care/Mgmt Minutes 10  -CM 10  -CM        Total Minutes    Timed Charges Total Minutes 24  -CM 10  -CM      Total Minutes 24  -CM 10  -CM           User Key  (r) = Recorded By, (t) = Taken By, (c) = Cosigned By    Initials Name Provider Type    Jimena Snyder OT Occupational Therapist                Therapy Charges for Today     Code Description Service Date Service Provider Modifiers Qty    14584181285 HC OT SELF CARE/MGMT/TRAIN EA 15 MIN 4/11/2023 Jimena Harman OT GO 1    88152675573 HC OT THERAPEUTIC ACT EA 15 MIN 4/11/2023 Jimena Harman OT GO 1                    Jimena Harman OT  4/11/2023

## 2023-04-11 NOTE — DISCHARGE PLACEMENT REQUEST
"Rachel Mccauley (67 y.o. Male)     Date of Birth   1955    Social Security Number       Address   [address] [id number]    Home Phone   [phone]    MRN   [id number]       Zoroastrianism   Restorationist    Marital Status   Single                            Admission Date   4/4/23    Admission Type   Urgent    Admitting Provider   Gilmer Solomon MD    Attending Provider   Gilmer Solomon MD    Department, Room/Bed   [facility] ACUTE REHAB, 529/1       Discharge Date       Discharge Disposition   Home or Self Care    Discharge Destination                               Attending Provider: Gilmer Solomon MD    Allergies: No Known Allergies    Isolation: None   Infection: None   Code Status: CPR    Ht: 167.6 cm (66\")   Wt: 75.4 kg (166 lb 4.8 oz)    Admission Cmt: None   Principal Problem: Medically complex patient [Z78.9]                 Active Insurance as of 4/4/2023     Primary Coverage     Payor Plan Insurance Group Employer/Plan Group    MEDICARE MEDICARE A & B      Payor Plan Address Payor Plan Phone Number Payor Plan Fax Number Effective Dates    [address] [phone]  6/1/2022 - None Entered    [address] [id number]       Subscriber Name Subscriber Birth Date Member ID       RACHEL MCCAULEY 1955 [id number]                 Emergency Contacts      (Rel.) Home Phone Work Phone Mobile Phone    Jennifer Honeycutt (Sister) [phone] -- [phone]    [employer] (Relative) -- -- [phone]    paige mccauley -- -- --               History & Physical      Gilmer Solomon MD at 04/04/23 5026          HISTORY AND PHYSICAL    Admit date: 04/04/23     Chief complaint: Heart failure    History of present illness (4):  Patient is a 67 y.o. male presents with patient has a long history of atrial fibrillation and apparently congestive heart failure approximately 3 months ago he stopped taking his medicines because he got too expensive.  He has a " past history of heavy drinking and prolonged smoking but states he is gave that up months ago.  He immediately admitted to the acute hospital with progressive shortness of breath bilateral lower extremity edema he has a echo which showed approximately 20% ejection fraction obviously had fluid overload was diuresed for fluid restrictions and diet when in acute renal failure that apparently is corrected followed by Doctor Bernal the cardiologist and the nephrologist    Personal/Family/Social History: After examined this later did not know his status for home  Past Medical History:   Diagnosis Date   • Atrial fibrillation 12/01/2022    5 months   • Bronchopneumonia    • Chest pain    • Corneal foreign body     both eyes   • Hypertension    • Neck pain    • Perforation of left tympanic membrane     history of   • Prashant Mountain spotted fever    • Wheezing      Past Surgical History:   Procedure Laterality Date   • CARDIAC CATHETERIZATION N/A 3/27/2023    Procedure: Left Heart Cath;  Surgeon: [place] DO;  Location: [facility] CATH INVASIVE LOCATION;  Service: Cardiology;  Laterality: N/A;   • EYE FOREIGN BODY REMOVAL  07/29/2013    REMOVE FOREIGN BODY CORNEA W/SLIT LAMP 60501 (1)     • INJECTION OF MEDICATION  07/12/2011    Kenalog (1)      • SPINE SURGERY  03/12/1991    Exploration of the previous C5-6 fusion with refusion, exploration of the C5 nerve root, left side   • TYMPANOPLASTY Left 05/16/1972    Perforation of the left tympanic membrane     History reviewed. No pertinent family history.  Social History     Tobacco Use   • Smoking status: Every Day     Types: Cigarettes   • Smokeless tobacco: Never   Vaping Use   • Vaping Use: Never used   Substance Use Topics   • Alcohol use: Yes     Comment: nominal   • Drug use: No       Review of Systems:(detailed)    Constitutional: Other than heart failure     HENT: Negative    Eyes: Negative    Respiratory: Negative    Cardiovascular: History of atrial fibs with  conversion to normal sinus when he went back in atrial for we did not check with the doctors apparently is not very compliant    Gastrointestinal: Negative    Endocrine: Negative    Genitourinary: Negative      Musculoskeletal: Surgery on the cervical spine disc excision and fusion years ago no complications    Skin: Negative    Allergic/Immunologic:  No Known Allergies    Neurological: Negative    Hematological: Negative    Psychiatric: Negative    [x]  All other systems reviewed and are negative      Prior to Admission medications    Medication Sig Start Date End Date Taking? Authorizing Provider   amiodarone (PACERONE) 200 MG tablet Take 1 tablet by mouth Daily for 30 days. 4/5/23 5/5/23 Yes Gurvinder David DO   digoxin (LANOXIN) 125 MCG tablet Take 1 tablet by mouth Daily for 30 days. 4/3/23 5/3/23 Yes Gurvinder David DO   furosemide (LASIX) 20 MG tablet Take 1 tablet by mouth 2 (Two) Times a Day for 30 days. 4/3/23 5/3/23 Yes Gurvinder David DO   rivaroxaban (XARELTO) 20 MG tablet Take 1 tablet by mouth once daily. 3/17/21  Yes Hilary Sims MD   dilTIAZem CD (CARDIZEM CD) 240 MG 24 hr capsule Take 1 capsule by mouth Daily. 5/11/22 4/4/23 Yes Hilary Sims MD   spironolactone (ALDACTONE) 25 MG tablet Take 1 tablet by mouth Daily for 30 days. 4/4/23 5/4/23  Gurvinder David DO   metoprolol succinate XL (TOPROL-XL) 100 MG 24 hr tablet Take 1 tablet by mouth Daily. 5/11/22 4/4/23  Hilary Sims MD     No Known Allergies      Vital Signs  Temp:  [96.9 °F (36.1 °C)-98.3 °F (36.8 °C)] 98.3 °F (36.8 °C)  Heart Rate:  [] 112  Resp:  [18] 18  BP: ()/(58-74) 137/63    Physical Exam:    Constitutional: Patient is alert and oriented stable carries a little bit of peripheral edema and is to have somewhat distended abdomen     HENT: Thin normals for age    Eyes: EOM intact with normal pupillary reaction    Neck: Full range of motion no carotid bruits scar right side  cervical spine from approximately 3 cm in length    Cardiovascular:  irregular rhythm with ejection bruit    Pulmonary: Lungs clear occasional wheezing sound tight more on the right base than left    Abdominal: Distended no masses palpable    Genitourinary/Anorectal: Furred      Musculoskeletal: No abnormality noted    Skin: Intact    Neurological: No neurological findings    Psychiatric/Behavioral: Cooperative but restrained    Results Review:   Lab Results (last 24 hours)     ** No results found for the last 24 hours. **        Imaging Results (Last 24 Hours)     ** No results found for the last 24 hours. **          Assessment/Plan  Principal Problem:    Medically complex patient case for admission because of his heart failure and deconditioning the concern of course is mobilization and Kyle and causing further deterioration of heart function as we try to regain his strength and independent function  Active Problems:    Atrial fibrillation with RVR stable    Non-STEMI (non-ST elevated myocardial infarction) stable    Biventricular heart failure with reduced left ventricular function able stable    Acute on chronic systolic (congestive) heart failure stable    Leg DVT (deep venous thromboembolism), acute, bilateral under treatment  Notation congestive heart failure cardiac problems are all under treatment        Gilmer Solomon MD  04/04/23  11:35 CDT            This document has been electronically signed by Gilmer Solomon MD on April 4, 2023 11:35 CDT      Part of this note may be an electronic transcription/translation of spoken language to printed text using the Dragon Dictation System.       Electronically signed by Gilmer Solomon MD at 04/04/23 1141          Physician Progress Notes (most recent note)      Gilmer Solomon MD at 04/10/23 0903          Progress Note      Admit date: 4/4/2023 10:10 AM       Treatment Team     Provider Relationship Specialty Contact    Gilmer Sloomon MD Attending  Orthopedic Surgery   [phone]      Maik Gentile PTA Physical Therapy Assistant Physical Therapy --    Celi Zee COTA Occupational Therapy Assistant Occupational Therapy --    Miguel Ángel Motley BSW  -- --    Magali Gorman LPN Licensed Practical Nurse --   [phone]      Ignacia Payne RN Registered Nurse -- --    Mima Todd RN Registered Nurse -- --    Merry Carranza LSW  -- --             Chief Complaint:  Medically complex patient    HPI: No changes since admission    Vital Signs  Temp:  [98.7 °F (37.1 °C)-98.8 °F (37.1 °C)] 98.7 °F (37.1 °C)  Heart Rate:  [78-93] 87  Resp:  [18] 18  BP: (102-118)/(70-73) 102/73    Physical Exam 1:    Constitutional: Alert stable     HENT:      Eyes:     Neck:      Cardiovascular: No chest pain or evidence of sputum production evidence of failure    Pulmonary: Lungs clear effusion resolved    Abdominal:      Genitourinary/Anorectal:       Musculoskeletal: Improving balance and control    Skin:     Neurological:      Psychiatric/Behavioral: Cooperative physical therapy       Results Review:    I reviewed the patient's new clinical results.    Nursing notes and therapy notes have been reviewed.    I have reviewed medications.    Assessment/Plan  Principal Problem:    Medically complex patient stable  Active Problems:    Atrial fibrillation with RVR    Non-STEMI (non-ST elevated myocardial infarction)    Biventricular heart failure with reduced left ventricular function    Acute on chronic systolic (congestive) heart failure    Leg DVT (deep venous thromboembolism), acute, bilateral    Abnormal LFTs    Abdominal ascites    Pleural effusion due to CHF (congestive heart failure)     All other conditions are improved discussed with the cardiology will be seen in the ingestive heart failure clinic in 1 week they were sending up cards and samples for his Nils Solomon MD  04/10/23  09:03 CDT          This  "document has been electronically signed by Gilmer Solomon MD on April 10, 2023 09:03 CDT      Part of this note may be an electronic transcription/translation of spoken language to printed text using the Dragon Dictation System.   Progress Note             Gilmer Solomon MD  04/10/23  09:05 CDT          This document has been electronically signed by Gilmer Solomon MD on April 10, 2023 09:05 CDT      Part of this note may be an electronic transcription/translation of spoken language to printed text using the Dragon Dictation System.       Electronically signed by Gilmer Solomon MD at 04/10/23 0909       [facility]  [address] [place] [id number]  Dept. Phone:  [phone]  Dept. Fax:   Date Ordered: 2023         Patient:  Pavan Mccauley MRN:  [id number]   [address] [facility] [address] :  1955  SSN:    Phone: [phone] Sex:  M     Weight: 75.4 kg (166 lb 4.8 oz)         Ht Readings from Last 1 Encounters:   23 167.6 cm (66\")         Cane    (Order ID: [id number])    Diagnosis:  Impaired mobility and ADLs (Z74.09,Z78.9 [ICD-10-CM] V49.89 [ICD-9-CM])  Impaired functional mobility, balance, gait, and endurance (Z74.09 [ICD-10-CM] V49.89 [ICD-9-CM])  Medically complex patient (Z78.9 [ICD-10-CM] V49.9 [ICD-9-CM])   Quantity:  1  Comments: Straight cane     Equipment:  Single Tip Cane with Tip  Length of Need (99 Months = Lifetime): 99 Months = Lifetime        Authorizing Provider's Phone: [phone]  Verbal Order Mode: Verbal with readback   Authorizing Provider: Gilmer Solomon MD  Authorizing Provider's NPI: [id number]     Order Entered By: Ora Higuera LPN 2023 11:07 AM       "

## 2023-04-12 ENCOUNTER — TRANSITIONAL CARE MANAGEMENT TELEPHONE ENCOUNTER (OUTPATIENT)
Dept: CALL CENTER | Facility: HOSPITAL | Age: 68
End: 2023-04-12
Payer: MEDICARE

## 2023-04-12 NOTE — OUTREACH NOTE
Call Center TCM Note    Flowsheet Row Responses   [facility] patient discharged from? [place]   Does the patient have one of the following disease processes/diagnoses(primary or secondary)? Other   TCM attempt successful? No   Unsuccessful attempts Attempt 1   Call Status Left message          Marianne Cordoba RN    4/12/2023, 08:22 CDT

## 2023-04-12 NOTE — OUTREACH NOTE
Prep Survey    Flowsheet Row Responses   [facility] patient discharged from? [place]   Is LACE score < 7 ? No   Eligibility [facility]--rehab   Date of Admission 04/04/23   Date of Discharge 04/04/23   Discharge Disposition Home or Self Care   Discharge diagnosis Medically complex patient, NSTEMI, Afib with RVR   Does the patient have one of the following disease processes/diagnoses(primary or secondary)? Other   Does the patient have Home health ordered? No   Is there a DME ordered? Yes   What DME was ordered? straight cane   Medication alerts for this patient see AVS for meds   Prep survey completed? Yes          Silvina PRUETT - Registered Nurse

## 2023-04-12 NOTE — OUTREACH NOTE
Call Center TCM Note    Flowsheet Row Responses   [facility] patient discharged from? [place]   Does the patient have one of the following disease processes/diagnoses(primary or secondary)? Other   TCM attempt successful? Yes   Call start time 1459   Call end time 1503   Discharge diagnosis Medically complex patient, NSTEMI, Afib with RVR   List who call center can speak with pt   Meds reviewed with patient/caregiver? Yes   Is the patient having any side effects they believe may be caused by any medication additions or changes? No   Does the patient have all medications ordered at discharge? Yes   Is the patient taking all medications as directed (includes completed medication regime)? Yes   Comments Hosptial d/c f/u 4/13/2023 10:45 am.   Does the patient have an appointment with their PCP within 7 days of discharge? Yes   Has home health visited the patient within 72 hours of discharge? N/A   What DME was ordered? straight cane   Psychosocial issues? No   Did the patient receive a copy of their discharge instructions? Yes   Nursing interventions Reviewed instructions with patient   What is the patient's perception of their health status since discharge? Improving   Is the patient/caregiver able to teach back signs and symptoms related to disease process for when to call PCP? Yes   Is the patient/caregiver able to teach back signs and symptoms related to disease process for when to call 911? Yes   Is the patient/caregiver able to teach back the hierarchy of who to call/visit for symptoms/problems? PCP, Specialist, Home health nurse, Urgent Care, ED, 911 Yes   TCM call completed? Yes   Wrap up additional comments Pt feeling improved. Pt glad to be home. Pt is indepenedent at home.   Call end time 1503   Would this patient benefit from a Referral to Amb Social Work? No   Is the patient interested in additional calls from an ambulatory ?  NOTE:  applies to high risk patients requiring additional  follow-up. No          Marianne Cordoba RN    4/12/2023, 15:05 CDT

## 2023-04-13 ENCOUNTER — OFFICE VISIT (OUTPATIENT)
Dept: FAMILY MEDICINE CLINIC | Facility: CLINIC | Age: 68
End: 2023-04-13
Payer: MEDICARE

## 2023-04-13 VITALS
DIASTOLIC BLOOD PRESSURE: 70 MMHG | SYSTOLIC BLOOD PRESSURE: 110 MMHG | HEIGHT: 66 IN | HEART RATE: 52 BPM | WEIGHT: 163 LBS | OXYGEN SATURATION: 99 % | BODY MASS INDEX: 26.2 KG/M2

## 2023-04-13 DIAGNOSIS — R06.02 SHORTNESS OF BREATH: ICD-10-CM

## 2023-04-13 DIAGNOSIS — I50.23 ACUTE ON CHRONIC SYSTOLIC (CONGESTIVE) HEART FAILURE: Chronic | ICD-10-CM

## 2023-04-13 DIAGNOSIS — I48.91 NEW ONSET ATRIAL FIBRILLATION: Primary | ICD-10-CM

## 2023-04-13 RX ORDER — FUROSEMIDE 20 MG/1
20 TABLET ORAL 2 TIMES DAILY
Qty: 60 TABLET | Refills: 1 | Status: SHIPPED | OUTPATIENT
Start: 2023-04-13 | End: 2023-05-13

## 2023-04-13 RX ORDER — AMIODARONE HYDROCHLORIDE 200 MG/1
200 TABLET ORAL
Qty: 30 TABLET | Refills: 1 | Status: SHIPPED | OUTPATIENT
Start: 2023-04-13 | End: 2023-05-13

## 2023-04-13 RX ORDER — DIGOXIN 125 MCG
125 TABLET ORAL
Qty: 30 TABLET | Refills: 1 | Status: SHIPPED | OUTPATIENT
Start: 2023-04-13 | End: 2023-05-13

## 2023-04-13 RX ORDER — SPIRONOLACTONE 25 MG/1
25 TABLET ORAL DAILY
Qty: 30 TABLET | Refills: 1 | Status: SHIPPED | OUTPATIENT
Start: 2023-04-13 | End: 2023-05-13

## 2023-04-13 RX ORDER — METOPROLOL SUCCINATE 25 MG/1
12.5 TABLET, EXTENDED RELEASE ORAL
Qty: 30 TABLET | Refills: 1 | Status: SHIPPED | OUTPATIENT
Start: 2023-04-13

## 2023-04-13 NOTE — PROGRESS NOTES
Chief Complaint   Patient presents with   • Hospital Follow Up Visit   • Atrial Fibrillation     Blood clots     Subjective   Pavan Mccauley is a 67 y.o. male.           History of Present Illness  Presents with hospital discharge from recent dx of afib and chf   Shortness of Breath  This is a new problem. The current episode started 1 to 4 weeks ago. The problem occurs constantly. The problem has been gradually improving. Associated symptoms include abdominal pain and wheezing. Pertinent negatives include no fever, leg pain, leg swelling or vomiting. The symptoms are aggravated by any activity. Risk factors include smoking. Treatments tried: new medication changes. The treatment provided moderate relief. His past medical history is significant for CAD, chronic lung disease and COPD. There is no history of a heart failure, PE or pneumonia.   Leg Swelling  This is a recurrent problem. The current episode started 1 to 4 weeks ago. The problem occurs constantly. The problem has been gradually improving. Associated symptoms include abdominal pain, arthralgias, fatigue, myalgias and weakness. Pertinent negatives include no congestion, coughing, diaphoresis, fever, numbness, vertigo or vomiting. Nothing aggravates the symptoms. He has tried nothing for the symptoms. The treatment provided no relief.   Palpitations   This is a new problem. The current episode started more than 1 month ago. The problem occurs intermittently. The problem has been rapidly improving. Associated symptoms include shortness of breath and weakness. Pertinent negatives include no coughing, diaphoresis, fever, numbness or vomiting. The treatment provided mild relief. There is no history of anemia, anxiety, drug use, heart disease, hyperthyroidism or a valve disorder.        The following portions of the patient's history were reviewed and updated as appropriate: allergies, current medications, past social history and problem list.    Review of  "Systems   Constitutional: Positive for activity change and fatigue. Negative for diaphoresis, fever and unexpected weight change.   HENT: Negative for congestion, dental problem, drooling, ear discharge and postnasal drip.    Eyes: Negative for photophobia, pain, discharge and redness.   Respiratory: Positive for shortness of breath and wheezing. Negative for cough and choking.    Cardiovascular: Positive for palpitations. Negative for leg swelling.        Improving swelling    Gastrointestinal: Positive for abdominal pain. Negative for vomiting.   Endocrine: Negative for polydipsia and polyuria.   Musculoskeletal: Positive for arthralgias and myalgias.   Allergic/Immunologic: Negative for environmental allergies and immunocompromised state.   Neurological: Positive for weakness. Negative for vertigo, seizures, speech difficulty and numbness.   Hematological: Negative.    Psychiatric/Behavioral: Negative.  Negative for agitation, behavioral problems, confusion, decreased concentration, dysphoric mood and hallucinations.       Objective   /70   Pulse 52   Ht 167.6 cm (66\")   Wt 73.9 kg (163 lb)   SpO2 99%   BMI 26.31 kg/m²   Physical Exam  Vitals and nursing note reviewed.   Constitutional:       Appearance: He is well-developed. He is not ill-appearing.   HENT:      Head: Normocephalic and atraumatic.      Right Ear: Hearing and tympanic membrane normal.      Left Ear: Hearing normal.      Nose: Nose normal. No mucosal edema or rhinorrhea.   Eyes:      General: Lids are normal.      Conjunctiva/sclera: Conjunctivae normal.      Pupils: Pupils are equal, round, and reactive to light.   Neck:      Thyroid: No thyroid mass or thyromegaly.      Trachea: Trachea normal. No tracheal tenderness or tracheal deviation.   Cardiovascular:      Rate and Rhythm: Rhythm irregular.      Pulses: Normal pulses.      Heart sounds: Normal heart sounds.   Pulmonary:      Effort: Pulmonary effort is normal. No respiratory " distress.      Breath sounds: Normal breath sounds. No stridor. No wheezing or rales.   Abdominal:      General: There is no distension.      Palpations: Abdomen is soft.      Tenderness: There is no abdominal tenderness.   Musculoskeletal:         General: Normal range of motion.      Cervical back: Normal range of motion.      Right lower leg: No edema.      Left lower leg: No edema.   Skin:     General: Skin is warm and dry.      Coloration: Skin is not jaundiced, mottled, pale or sallow.   Neurological:      General: No focal deficit present.      Mental Status: He is alert and oriented to person, place, and time.   Psychiatric:         Mood and Affect: Mood is not anxious. Affect is not inappropriate.         Speech: Speech normal.         Behavior: Behavior normal. Behavior is not agitated or hyperactive.         Thought Content: Thought content normal.         Judgment: Judgment normal.              Assessment & Plan     Problems Addressed this Visit        Cardiac and Vasculature    Acute on chronic systolic (congestive) heart failure (Chronic)    Relevant Medications    digoxin (LANOXIN) 125 MCG tablet    metoprolol succinate XL (TOPROL-XL) 25 MG 24 hr tablet   Other Visit Diagnoses     New onset atrial fibrillation    -  Primary    Relevant Medications    digoxin (LANOXIN) 125 MCG tablet    metoprolol succinate XL (TOPROL-XL) 25 MG 24 hr tablet    Shortness of breath          Diagnoses       Codes Comments    New onset atrial fibrillation    -  Primary ICD-10-CM: I48.91  ICD-9-CM: 427.31     Shortness of breath     ICD-10-CM: R06.02  ICD-9-CM: 786.05     Acute on chronic systolic (congestive) heart failure     ICD-10-CM: I50.23  ICD-9-CM: 428.23, 428.0            New Medications Ordered This Visit   Medications   • amiodarone (PACERONE) 200 MG tablet     Sig: Take 1 tablet by mouth Daily for 30 days.     Dispense:  30 tablet     Refill:  1   • digoxin (LANOXIN) 125 MCG tablet     Sig: Take 1 tablet by  mouth Daily for 30 days.     Dispense:  30 tablet     Refill:  1   • furosemide (LASIX) 20 MG tablet     Sig: Take 1 tablet by mouth 2 (Two) Times a Day for 30 days.     Dispense:  60 tablet     Refill:  1   • metoprolol succinate XL (TOPROL-XL) 25 MG 24 hr tablet     Sig: Take one-half of a tablet (12.5 mg) by mouth Daily.     Dispense:  30 tablet     Refill:  1   • spironolactone (ALDACTONE) 25 MG tablet     Sig: Take 1 tablet by mouth Daily for 30 days.     Dispense:  30 tablet     Refill:  1     Current Outpatient Medications on File Prior to Visit   Medication Sig Dispense Refill   • rivaroxaban (XARELTO) 20 MG tablet Take 1 tablet by mouth Daily with evening meal. 30 tablet 0   • [DISCONTINUED] amiodarone (PACERONE) 200 MG tablet Take 1 tablet by mouth Daily for 30 days. 30 tablet 0   • [DISCONTINUED] digoxin (LANOXIN) 125 MCG tablet Take 1 tablet by mouth Daily for 30 days. 30 tablet 0   • [DISCONTINUED] furosemide (LASIX) 20 MG tablet Take 1 tablet by mouth 2 (Two) Times a Day for 30 days. 60 tablet 0   • [DISCONTINUED] metoprolol succinate XL (TOPROL-XL) 25 MG 24 hr tablet Take one-half of a tablet (12.5 mg) by mouth Daily. 100 tablet 0   • [DISCONTINUED] spironolactone (ALDACTONE) 25 MG tablet Take 1 tablet by mouth Daily for 30 days. 30 tablet 0     No current facility-administered medications on file prior to visit.       20 minutes  Follow Up   Return in about 3 months (around 7/13/2023) for Next scheduled follow up.     Hospital chart reviewed  Diet discussed    Samples of xarelto given to the patient -sample lot [id number]  Only 15mg available -patient was aware to take this as directed   meds as directed  See back in 3 months  Sooner if needed  Continue follow up with cardiology as directed     Continue meds as directed, no salt in diet -see back in 3 months as directed    Refill meds as directed     See back in 3 months as directed   No changes at this time -meds reviewed

## 2023-04-20 ENCOUNTER — READMISSION MANAGEMENT (OUTPATIENT)
Dept: CALL CENTER | Facility: HOSPITAL | Age: 68
End: 2023-04-20
Payer: MEDICARE

## 2023-04-20 NOTE — OUTREACH NOTE
Medical Week 2 Survey    Flowsheet Row Responses   [facility] patient discharged from? [facility]   Does the patient have one of the following disease processes/diagnoses(primary or secondary)? Other   Week 2 attempt successful? Yes   Call start time 1348   Discharge diagnosis Medically complex patient, NSTEMI, Afib with RVR   Call end time 1350   Meds reviewed with patient/caregiver? Yes   Is the patient having any side effects they believe may be caused by any medication additions or changes? No   Does the patient have all medications ordered at discharge? Yes   Is the patient taking all medications as directed (includes completed medication regime)? Yes   Does the patient have a primary care provider?  Yes   Does the patient have an appointment with their PCP within 7 days of discharge? Yes   Comments regarding PCP Has had PCP follow up   Has the patient kept scheduled appointments due by today? Yes   Has home health visited the patient within 72 hours of discharge? N/A   Psychosocial issues? No   Did the patient receive a copy of their discharge instructions? Yes   Nursing interventions Reviewed instructions with patient   What is the patient's perception of their health status since discharge? Improving   Is the patient/caregiver able to teach back signs and symptoms related to disease process for when to call PCP? Yes   Is the patient/caregiver able to teach back signs and symptoms related to disease process for when to call 911? Yes   Is the patient/caregiver able to teach back the hierarchy of who to call/visit for symptoms/problems? PCP, Specialist, Home health nurse, Urgent Care, ED, 911 Yes   If the patient is a current smoker, are they able to teach back resources for cessation? Not a smoker   Week 2 Call Completed? Yes   Is the patient interested in additional calls from an ambulatory ?  NOTE:  applies to high risk patients requiring additional follow-up. No          Oriana T -  Registered Nurse

## 2023-05-03 ENCOUNTER — OFFICE VISIT (OUTPATIENT)
Dept: CARDIOLOGY | Facility: CLINIC | Age: 68
End: 2023-05-03
Payer: MEDICARE

## 2023-05-03 VITALS
SYSTOLIC BLOOD PRESSURE: 116 MMHG | OXYGEN SATURATION: 96 % | BODY MASS INDEX: 27.92 KG/M2 | HEART RATE: 83 BPM | WEIGHT: 173.7 LBS | HEIGHT: 66 IN | DIASTOLIC BLOOD PRESSURE: 72 MMHG | TEMPERATURE: 97.7 F

## 2023-05-03 DIAGNOSIS — I82.403 LEG DVT (DEEP VENOUS THROMBOEMBOLISM), ACUTE, BILATERAL: Chronic | ICD-10-CM

## 2023-05-03 DIAGNOSIS — I50.22 CHRONIC HFREF (HEART FAILURE WITH REDUCED EJECTION FRACTION): ICD-10-CM

## 2023-05-03 DIAGNOSIS — I50.814 BIVENTRICULAR HEART FAILURE WITH REDUCED LEFT VENTRICULAR FUNCTION: Chronic | ICD-10-CM

## 2023-05-03 DIAGNOSIS — I48.0 PAROXYSMAL ATRIAL FIBRILLATION: Primary | ICD-10-CM

## 2023-05-03 DIAGNOSIS — R06.09 DYSPNEA ON EXERTION: ICD-10-CM

## 2023-05-03 PROBLEM — I48.19 ATRIAL FIBRILLATION, PERSISTENT: Status: ACTIVE | Noted: 2020-09-09

## 2023-05-03 PROBLEM — I48.91 ATRIAL FIBRILLATION: Status: RESOLVED | Noted: 2020-09-09 | Resolved: 2023-05-03

## 2023-05-03 LAB
QT INTERVAL: 402 MS
QTC INTERVAL: 472 MS

## 2023-05-03 PROCEDURE — 99214 OFFICE O/P EST MOD 30 MIN: CPT | Performed by: INTERNAL MEDICINE

## 2023-05-03 PROCEDURE — 1160F RVW MEDS BY RX/DR IN RCRD: CPT | Performed by: INTERNAL MEDICINE

## 2023-05-03 PROCEDURE — 1159F MED LIST DOCD IN RCRD: CPT | Performed by: INTERNAL MEDICINE

## 2023-05-03 PROCEDURE — 93000 ELECTROCARDIOGRAM COMPLETE: CPT | Performed by: INTERNAL MEDICINE

## 2023-05-03 NOTE — PROGRESS NOTES
Pavan Mccauley  67 y.o. male      1. Paroxysmal atrial fibrillation    2. Dyspnea on exertion    3. Chronic HFrEF (heart failure with reduced ejection fraction)    4. Leg DVT (deep venous thromboembolism), acute, bilateral    5. Biventricular heart failure with reduced left ventricular function        History of Present Illness  Pavan Mccauley is a 67-year-old male who has history of paroxysmal atrial fibrillation/SVT in the past who has presented in March 2023 with progressive fatigue, dyspnea, weight gain, edema, chest pressure/palpitation and noted to have atrial fibrillation with rapid ventricular response and congestive heart failure consistent with HFrEF.  His symptoms have been going on for the past several months and he quit taking all his medicines several months prior. He has longstanding history of tobacco and alcohol use, though he claims to have quit smoking about 5 months prior.    He had evidence of anasarca, and his blood tests showed elevated high-sensitivity troponins.  He has evidence of acute renal failure, hyponatremia, hypoalbuminemia, LFT abnormalities, polycythemia, thrombocytopenia.       Echocardiogram showed severe LV dysfunction with an ejection fraction of 20 to 25%.  There was left ventricular hypertrophy.  RV systolic function was moderately decreased.  There was severe RV dilatation.  Severe biatrial enlargement and RVSP was 35 to 45 mmHg.     Cardiac catheterization on 3/27/2023 showed no significant epicardial coronary artery disease.     Fortunately the patient has been compliant with his medications following discharge from the hospital and reports improvement in dyspnea.  He denies any chest pain.  He remains in atrial fibrillation.  His blood pressure was in the normal range.  No signs of pulmonary congestion was noted today.    EKG today showed atrial fibrillation, rightward axis, nonspecific ST-T changes.  Poor R wave progression anteroseptal leads.    SUBJECTIVE    No Known  Allergies      Past Medical History:   Diagnosis Date   • Atrial fibrillation 12/01/2022    5 months   • Bronchopneumonia    • Chest pain    • Corneal foreign body     both eyes   • Hypertension    • Neck pain    • Perforation of left tympanic membrane     history of   • Prashant [place] spotted fever    • Wheezing          Past Surgical History:   Procedure Laterality Date   • CARDIAC CATHETERIZATION N/A 3/27/2023    Procedure: Left Heart Cath;  Surgeon: [place] DO;  Location: [facility] CATH INVASIVE LOCATION;  Service: Cardiology;  Laterality: N/A;   • EYE FOREIGN BODY REMOVAL  07/29/2013    REMOVE FOREIGN BODY CORNEA W/SLIT LAMP 76764 (1)     • INJECTION OF MEDICATION  07/12/2011    Kenalog (1)      • SPINE SURGERY  03/12/1991    Exploration of the previous C5-6 fusion with refusion, exploration of the C5 nerve root, left side   • TYMPANOPLASTY Left 05/16/1972    Perforation of the left tympanic membrane         History reviewed. No pertinent family history.      Social History     Socioeconomic History   • Marital status: Single   Tobacco Use   • Smoking status: Former     Types: Cigarettes   • Smokeless tobacco: Never   Vaping Use   • Vaping Use: Never used   Substance and Sexual Activity   • Alcohol use: Yes     Comment: nominal   • Drug use: No   • Sexual activity: Defer         Current Outpatient Medications   Medication Sig Dispense Refill   • amiodarone (PACERONE) 200 MG tablet Take 1 tablet by mouth Daily for 30 days. 30 tablet 1   • digoxin (LANOXIN) 125 MCG tablet Take 1 tablet by mouth Daily for 30 days. 30 tablet 1   • furosemide (LASIX) 20 MG tablet Take 1 tablet by mouth 2 (Two) Times a Day for 30 days. 60 tablet 1   • metoprolol succinate XL (TOPROL-XL) 25 MG 24 hr tablet Take one-half of a tablet (12.5 mg) by mouth Daily. 30 tablet 1   • rivaroxaban (XARELTO) 20 MG tablet Take 1 tablet by mouth Daily with evening meal. 30 tablet 0   • spironolactone (ALDACTONE) 25 MG tablet Take 1 tablet by mouth  "Daily for 30 days. 30 tablet 1     No current facility-administered medications for this visit.         OBJECTIVE    /72 (BP Location: Left arm, Patient Position: Sitting, Cuff Size: Adult)   Pulse 83   Temp 97.7 °F (36.5 °C)   Ht 167.6 cm (66\")   Wt 78.8 kg (173 lb 11.2 oz)   SpO2 96%   BMI 28.04 kg/m²         Review of Systems: The following systems were reviewed and changes noted as indicated in the history of present illness and below    Constitutional:  Denies recent weight loss, weight gain, fever or chills     HENT:  Denies any hearing loss, epistaxis, hoarseness, or difficulty speaking.     Eyes: Wears eyeglasses or contact lenses     Respiratory:  Dyspnea with exertion improved, no cough, wheezing, or hemoptysis.     Cardiovascular: Negative for palpitations, chest pain, orthopnea, PND    Gastrointestinal:  Denies change in bowel habits, dyspepsia, ulcer disease, hematochezia, or melena.     Endocrine: Negative for cold intolerance, heat intolerance, polydipsia, polyphagia and polyuria.     Genitourinary: Negative.      Musculoskeletal: Denies any history of arthritic symptoms or back problems     Skin:  Denies any change in hair or nails, rashes, or skin lesions.     Allergic/Immunologic: Negative.  Negative for environmental allergies, food allergies and/or immunocompromised state.     Neurological:  Denies any history of recurrent headaches, strokes, TIA, or seizure disorder.     Hematological: Denies any food allergies, seasonal allergies, bleeding disorders, or lymphadenopathy.     Psychiatric/Behavioral: Denies any history of depression, substance abuse, or change in cognitive function.       Physical Exam: The following systems reviewed and changes noted as indicated below    Constitutional: Cooperative, alert and oriented, in no acute distress.     HENT:   Head: Normocephalic, normal hair patterns, no masses or tenderness.  Ears, Nose, and Throat: No gross abnormalities. No pallor or " cyanosis.   Eyes: EOMS intact, PERRL, conjunctivae and lids unremarkable. Fundoscopic exam and visual fields not performed.   Neck: No palpable masses or adenopathy, no thyromegaly, no JVD, carotid pulses are full and equal bilaterally and without bruit.     Cardiovascular: Irregular rhythm, S1 variable and S2 normal, no S3 or S4.  No murmurs, gallops, or rubs detected.     Pulmonary/Chest: Chest: normal symmetry, normal respiratory excursion, no intercostal retraction, no use of accessory muscles.     Pulmonary: Normal breath sounds. No rales or rhonchi.    Abdominal: Abdomen soft, bowel sounds normoactive, no masses, no hepatosplenomegaly, nontender, no bruit.     Musculoskeletal: No deformities, clubbing, cyanosis, erythema, or edema observed.     Neurological: No gross motor or sensory deficits noted, affect appropriate, oriented to time, person, place.     Skin: Warm and dry to the touch, no apparent skin lesion or mass noted.     Psychiatric: He has a normal mood and affect. His behavior is normal. Judgment and thought content normal.         Procedures      Lab Results   Component Value Date    WBC 8.15 04/04/2023    HGB 15.3 04/04/2023    HCT 44.1 04/04/2023    MCV 89.5 04/04/2023     04/04/2023     Lab Results   Component Value Date    GLUCOSE 74 04/09/2023    BUN 15 04/09/2023    CREATININE 0.78 04/09/2023    EGFRIFNONA 99 11/09/2020    BCR 19.2 04/09/2023    CO2 27.0 04/09/2023    CALCIUM 8.2 (L) 04/09/2023    ALBUMIN 2.5 (L) 04/09/2023    AST 40 04/09/2023    ALT 42 (H) 04/09/2023     Lab Results   Component Value Date    CHOL 218 (H) 08/30/2022    CHOL 194 08/31/2020    CHOL 153 07/10/2017     Lab Results   Component Value Date    TRIG 79 08/30/2022    TRIG 64 08/31/2020    TRIG 59 07/10/2017     Lab Results   Component Value Date    HDL 69 (H) 08/30/2022    HDL 72 (H) 08/31/2020    HDL 54 (L) 07/10/2017     No components found for: LDLCALC  Lab Results   Component Value Date     (H)  08/30/2022     (H) 08/31/2020    LDL 96 07/10/2017     No results found for: HDLLDLRATIO  No components found for: CHOLHDL  Lab Results   Component Value Date    HGBA1C 5.51 07/10/2017     Lab Results   Component Value Date    TSH 2.670 08/31/2020           ASSESSMENT AND PLAN  Pavan Mccauley is a 67-year-old male with multiple cardiac issues as discussed in detail and history of present illness.  He has nonischemic cardiomyopathy and now persistent atrial fibrillation.  He claims to have quit drinking and smoking.  Fortunately he has been compliant with his medications.  Cost of medicine has been a significant issue and hence he is not on Entresto or Farxiga.  I have continued antiarrhythmic therapy with amiodarone, low-dose digoxin, Lasix 20 mg daily, metoprolol XL 25 mg daily and spironolactone.  Compliance with medications is also been stressed.  Anticoagulation with Xarelto has been continued.    She will be reassessed in about 3 months and at that time I will consider cardioversion to sinus rhythm, provided he is compliant with all his medicines including anticoagulation.    Diagnoses and all orders for this visit:    1. Paroxysmal atrial fibrillation (Primary)  -     ECG 12 Lead    2. Dyspnea on exertion    3. Chronic HFrEF (heart failure with reduced ejection fraction)    4. Leg DVT (deep venous thromboembolism), acute, bilateral    5. Biventricular heart failure with reduced left ventricular function        BMI is >= 30 and <35. (Class 1 Obesity). The following options were offered after discussion;: nutrition counseling/recommendations      Pavan Mccauley  reports that he has quit smoking. His smoking use included cigarettes. He has never used smokeless tobacco.. I have educated him on the risk of diseases from using tobacco products such as cancer, COPD and heart disease.     I advised him to quit and he is not willing to quit.    I spent 3  minutes counseling the patient.               Nannette  Olivia Walters MD  5/3/2023  09:57 CDT

## 2023-08-08 RX ORDER — DIGOXIN 125 MCG
TABLET ORAL
Qty: 90 TABLET | Refills: 0 | Status: SHIPPED | OUTPATIENT
Start: 2023-08-08

## 2023-08-14 RX ORDER — AMIODARONE HYDROCHLORIDE 200 MG/1
TABLET ORAL
Qty: 90 TABLET | Refills: 1 | Status: SHIPPED | OUTPATIENT
Start: 2023-08-14

## 2023-09-07 RX ORDER — METOPROLOL SUCCINATE 25 MG/1
TABLET, EXTENDED RELEASE ORAL
Qty: 45 TABLET | Refills: 1 | Status: SHIPPED | OUTPATIENT
Start: 2023-09-07

## 2023-09-28 RX ORDER — FUROSEMIDE 20 MG/1
TABLET ORAL
Qty: 60 TABLET | Refills: 1 | Status: SHIPPED | OUTPATIENT
Start: 2023-09-28

## 2024-02-29 NOTE — Clinical Note
Prepped: groin and Right Wrist. Prepped with: ChloraPrep. The site was clipped. The patient was draped in a sterile fashion. 29-Feb-2024 09:04 29-Feb-2024 09:50

## (undated) DEVICE — TR BAND RADIAL ARTERY COMPRESSION DEVICE: Brand: TR BAND

## (undated) DEVICE — PK CATH LAB 60

## (undated) DEVICE — ELECTRODE,RT,STRESS,FOAM,50PK: Brand: MEDLINE

## (undated) DEVICE — GLIDESHEATH SLENDER STAINLESS STEEL KIT: Brand: GLIDESHEATH SLENDER

## (undated) DEVICE — RADIFOCUS OPTITORQUE ANGIOGRAPHIC CATHETER: Brand: OPTITORQUE

## (undated) DEVICE — BAND BAG 36" X 25": Brand: STERIMED

## (undated) DEVICE — ST CVR PROB PULLUP ULTRASND 5X48IN

## (undated) DEVICE — GW PERIPH GUIDERIGHT STD/EXCHNG/J/TIP SS 0.038IN 5X260CM

## (undated) DEVICE — MODEL BT2000 P/N 700287-012KIT CONTENTS: MANIFOLD WITH SALINE AND CONTRAST PORTS, SALINE TUBING WITH SPIKE AND HAND SYRINGE, TRANSDUCER: Brand: BT2000 AUTOMATED MANIFOLD KIT